# Patient Record
Sex: MALE | Race: WHITE
[De-identification: names, ages, dates, MRNs, and addresses within clinical notes are randomized per-mention and may not be internally consistent; named-entity substitution may affect disease eponyms.]

---

## 2013-06-25 VITALS
SYSTOLIC BLOOD PRESSURE: 116 MMHG | TEMPERATURE: 99.2 F | TEMPERATURE: 99.2 F | DIASTOLIC BLOOD PRESSURE: 73 MMHG | TEMPERATURE: 99.2 F | TEMPERATURE: 99.2 F | TEMPERATURE: 99.2 F | TEMPERATURE: 99.2 F | TEMPERATURE: 99.2 F | TEMPERATURE: 99.2 F | TEMPERATURE: 99.2 F | TEMPERATURE: 99.2 F | TEMPERATURE: 99.2 F | TEMPERATURE: 99.2 F | TEMPERATURE: 99.2 F | DIASTOLIC BLOOD PRESSURE: 73 MMHG | TEMPERATURE: 99.2 F | TEMPERATURE: 99.2 F | TEMPERATURE: 99.2 F | DIASTOLIC BLOOD PRESSURE: 73 MMHG | TEMPERATURE: 99.2 F | SYSTOLIC BLOOD PRESSURE: 116 MMHG | TEMPERATURE: 99.2 F | SYSTOLIC BLOOD PRESSURE: 116 MMHG | TEMPERATURE: 99.2 F

## 2017-01-21 ENCOUNTER — HOSPITAL ENCOUNTER (OUTPATIENT)
Dept: HOSPITAL 58 - AMBL | Age: 67
Discharge: HOME | End: 2017-01-21
Attending: INTERNAL MEDICINE

## 2017-01-21 ENCOUNTER — HOSPITAL ENCOUNTER (EMERGENCY)
Dept: HOSPITAL 58 - ED | Age: 67
Discharge: HOME | End: 2017-01-21
Payer: COMMERCIAL

## 2017-01-21 VITALS — TEMPERATURE: 96.9 F | DIASTOLIC BLOOD PRESSURE: 93 MMHG | SYSTOLIC BLOOD PRESSURE: 154 MMHG

## 2017-01-21 VITALS — BODY MASS INDEX: 31.4 KG/M2

## 2017-01-21 DIAGNOSIS — Z79.899: ICD-10-CM

## 2017-01-21 DIAGNOSIS — R42: Primary | ICD-10-CM

## 2017-01-21 DIAGNOSIS — M54.5: Primary | ICD-10-CM

## 2017-01-21 DIAGNOSIS — F17.210: ICD-10-CM

## 2017-01-21 DIAGNOSIS — R61: ICD-10-CM

## 2017-01-21 DIAGNOSIS — Z87.440: ICD-10-CM

## 2017-01-21 LAB
ADD URINE MICROSCOPIC: NO
ALBUMIN SERPL-MCNC: 3.6 G/DL (ref 3.4–5)
ALBUMIN/GLOB SERPL: 1.03 {RATIO}
ALP SERPL-CCNC: 102 U/L (ref 56–119)
ALT SERPL-CCNC: 14 U/L (ref 12–78)
ANION GAP SERPL CALC-SCNC: 16 MMOL/L
AST SERPL-CCNC: 22 U/L (ref 15–37)
BASOPHILS # BLD AUTO: 0.1 K/UL (ref 0–0.2)
BASOPHILS NFR BLD AUTO: 1 % (ref 0–3)
BILIRUB SERPL-MCNC: 0.68 MG/DL (ref 0–1.2)
BUN SERPL-MCNC: 9 MG/DL (ref 7–18)
BUN/CREAT SERPL: 11.25
CALCIUM SERPL-MCNC: 9.3 MG/DL (ref 8.2–10.2)
CHLORIDE SERPL-SCNC: 106 MMOL/L (ref 98–107)
CO2 BLD-SCNC: 26 MMOL/L (ref 23–31)
CREAT SERPL-MCNC: 0.8 MG/DL (ref 0.6–1.1)
EOSINOPHIL # BLD AUTO: 0.2 K/UL (ref 0–0.7)
EOSINOPHIL NFR BLD AUTO: 4.3 % (ref 0–7)
ERYTHROCYTE [SEDIMENTATION RATE] IN BLOOD: 9 MM/HR (ref 0–15)
ESR INTERNAL QC: (no result)
GFR SERPLBLD BASED ON 1.73 SQ M-ARVRAT: 96 ML/MIN
GLOBULIN SER CALC-MCNC: 3.5 G/L
GLUCOSE SERPL-MCNC: 94 MG/DL (ref 82–115)
HCT VFR BLD AUTO: 52 % (ref 42–52)
HGB BLD-MCNC: 16.9 G/DL (ref 14–18)
IMM GRANULOCYTES NFR BLD AUTO: 0.2 % (ref 0–5)
LYMPHOCYTES # SPEC AUTO: 1.9 K/UL (ref 0.6–3.4)
LYMPHOCYTES NFR BLD AUTO: 38.7 % (ref 10–50)
MCH RBC QN: 29.8 PG (ref 27–31)
MCHC RBC AUTO-ENTMCNC: 32.5 G/DL (ref 31.8–35.4)
MCV RBC: 91.5 FL (ref 80–94)
MONOCYTES # BLD AUTO: 0.7 K/UL (ref 0.4–2)
MONOCYTES NFR BLD AUTO: 14 % (ref 0–10)
NEUTROPHILS # BLD AUTO: 2 K/UL (ref 2–6.9)
NEUTROPHILS NFR BLD AUTO: 41.8 %
PH UR: 7 [PH] (ref 5–9)
PLATELET # BLD AUTO: 210 10^3/UL (ref 140–440)
POTASSIUM SERPL-SCNC: 4 MMOL/L (ref 3.5–5.1)
PROT SERPL-MCNC: 7.1 G/DL (ref 5.8–8.1)
RBC # BLD AUTO: 5.68 10^6/UL (ref 4.7–6.1)
SODIUM SERPL-SCNC: 144 MMOL/L (ref 136–145)
SP GR UR: 1.02 (ref 1–1.03)
WBC # BLD AUTO: 4.86 K/UL (ref 4.2–10.2)

## 2017-01-21 PROCEDURE — 36415 COLL VENOUS BLD VENIPUNCTURE: CPT

## 2017-01-21 PROCEDURE — 80053 COMPREHEN METABOLIC PANEL: CPT

## 2017-01-21 PROCEDURE — 96372 THER/PROPH/DIAG INJ SC/IM: CPT

## 2017-01-21 PROCEDURE — 99283 EMERGENCY DEPT VISIT LOW MDM: CPT

## 2017-01-21 PROCEDURE — 85025 COMPLETE CBC W/AUTO DIFF WBC: CPT

## 2017-01-21 PROCEDURE — 81001 URINALYSIS AUTO W/SCOPE: CPT

## 2017-01-21 PROCEDURE — 85651 RBC SED RATE NONAUTOMATED: CPT

## 2017-01-21 PROCEDURE — 99282 EMERGENCY DEPT VISIT SF MDM: CPT

## 2017-03-23 ENCOUNTER — HOSPITAL ENCOUNTER (EMERGENCY)
Dept: HOSPITAL 58 - ED | Age: 67
Discharge: HOME | End: 2017-03-23
Payer: COMMERCIAL

## 2017-03-23 VITALS — DIASTOLIC BLOOD PRESSURE: 81 MMHG | SYSTOLIC BLOOD PRESSURE: 153 MMHG | TEMPERATURE: 96.8 F

## 2017-03-23 VITALS — BODY MASS INDEX: 27.1 KG/M2

## 2017-03-23 DIAGNOSIS — G89.29: ICD-10-CM

## 2017-03-23 DIAGNOSIS — M54.9: ICD-10-CM

## 2017-03-23 DIAGNOSIS — Z79.899: ICD-10-CM

## 2017-03-23 DIAGNOSIS — J30.9: Primary | ICD-10-CM

## 2017-03-23 DIAGNOSIS — E87.6: ICD-10-CM

## 2017-03-23 PROCEDURE — 99283 EMERGENCY DEPT VISIT LOW MDM: CPT

## 2017-04-13 ENCOUNTER — HOSPITAL ENCOUNTER (EMERGENCY)
Dept: HOSPITAL 58 - ED | Age: 67
Discharge: HOME | End: 2017-04-13
Payer: COMMERCIAL

## 2017-04-13 VITALS — BODY MASS INDEX: 25.4 KG/M2

## 2017-04-13 VITALS — TEMPERATURE: 98.5 F | SYSTOLIC BLOOD PRESSURE: 104 MMHG | DIASTOLIC BLOOD PRESSURE: 70 MMHG

## 2017-04-13 DIAGNOSIS — F17.210: ICD-10-CM

## 2017-04-13 DIAGNOSIS — M54.5: Primary | ICD-10-CM

## 2017-04-13 PROCEDURE — 99282 EMERGENCY DEPT VISIT SF MDM: CPT

## 2017-06-02 ENCOUNTER — HOSPITAL ENCOUNTER (EMERGENCY)
Dept: HOSPITAL 58 - ED | Age: 67
Discharge: HOME | End: 2017-06-02

## 2017-06-02 VITALS — BODY MASS INDEX: 26.7 KG/M2

## 2017-06-02 VITALS — DIASTOLIC BLOOD PRESSURE: 90 MMHG | SYSTOLIC BLOOD PRESSURE: 168 MMHG | TEMPERATURE: 98.1 F

## 2017-06-02 DIAGNOSIS — J44.9: ICD-10-CM

## 2017-06-02 DIAGNOSIS — Z79.899: ICD-10-CM

## 2017-06-02 DIAGNOSIS — R10.13: Primary | ICD-10-CM

## 2017-06-02 DIAGNOSIS — Z87.440: ICD-10-CM

## 2017-06-02 DIAGNOSIS — I50.9: ICD-10-CM

## 2017-06-02 DIAGNOSIS — F17.210: ICD-10-CM

## 2017-06-02 LAB
ALBUMIN SERPL-MCNC: 3.7 G/DL (ref 3.4–5)
ALBUMIN/GLOB SERPL: 1.19 {RATIO}
ALP SERPL-CCNC: 77 U/L (ref 56–119)
ALT SERPL-CCNC: 19 U/L (ref 12–78)
AMYLASE SERPL-CCNC: 54 U/L (ref 25–115)
ANION GAP SERPL CALC-SCNC: 13.5 MMOL/L
AST SERPL-CCNC: 50 U/L (ref 15–37)
BASOPHILS # BLD AUTO: 0.1 K/UL (ref 0–0.2)
BASOPHILS NFR BLD AUTO: 1 % (ref 0–3)
BILIRUB SERPL-MCNC: 0.83 MG/DL (ref 0–1.2)
BUN SERPL-MCNC: 8 MG/DL (ref 7–18)
BUN/CREAT SERPL: 10.95
CALCIUM SERPL-MCNC: 9 MG/DL (ref 8.2–10.2)
CHLORIDE SERPL-SCNC: 106 MMOL/L (ref 98–107)
CK SERPL-CCNC: 62 U/L
CO2 BLD-SCNC: 26 MMOL/L (ref 23–31)
CREAT SERPL-MCNC: 0.73 MG/DL (ref 0.6–1.1)
EOSINOPHIL # BLD AUTO: 0.1 K/UL (ref 0–0.7)
EOSINOPHIL NFR BLD AUTO: 1.6 % (ref 0–7)
GFR SERPLBLD BASED ON 1.73 SQ M-ARVRAT: 107 ML/MIN
GLOBULIN SER CALC-MCNC: 3.1 G/L
GLUCOSE SERPL-MCNC: 102 MG/DL (ref 82–115)
HCT VFR BLD AUTO: 50.9 % (ref 42–52)
HGB BLD-MCNC: 17.4 G/DL (ref 14–18)
IMM GRANULOCYTES NFR BLD AUTO: 0.3 % (ref 0–5)
LIPASE SERPL-CCNC: 26 U/L (ref 8–78)
LYMPHOCYTES # SPEC AUTO: 2.1 K/UL (ref 0.6–3.4)
LYMPHOCYTES NFR BLD AUTO: 31 % (ref 10–50)
MCH RBC QN: 31.1 PG (ref 27–31)
MCHC RBC AUTO-ENTMCNC: 34.2 G/DL (ref 31.8–35.4)
MCV RBC: 90.9 FL (ref 80–94)
MONOCYTES # BLD AUTO: 0.9 K/UL (ref 0.4–2)
MONOCYTES NFR BLD AUTO: 13.1 % (ref 0–10)
NEUTROPHILS # BLD AUTO: 3.6 K/UL (ref 2–6.9)
NEUTROPHILS NFR BLD AUTO: 53 %
PLATELET # BLD AUTO: 243 10^3/UL (ref 140–440)
POTASSIUM SERPL-SCNC: 3.5 MMOL/L (ref 3.5–5.1)
PROT SERPL-MCNC: 6.8 G/DL (ref 5.8–8.1)
RBC # BLD AUTO: 5.6 10^6/UL (ref 4.7–6.1)
SODIUM SERPL-SCNC: 142 MMOL/L (ref 136–145)
WBC # BLD AUTO: 6.72 K/UL (ref 4.2–10.2)

## 2017-06-02 PROCEDURE — 36415 COLL VENOUS BLD VENIPUNCTURE: CPT

## 2017-06-02 PROCEDURE — 85025 COMPLETE CBC W/AUTO DIFF WBC: CPT

## 2017-06-02 PROCEDURE — 99283 EMERGENCY DEPT VISIT LOW MDM: CPT

## 2017-06-02 PROCEDURE — 80053 COMPREHEN METABOLIC PANEL: CPT

## 2017-06-02 PROCEDURE — 84484 ASSAY OF TROPONIN QUANT: CPT

## 2017-06-02 PROCEDURE — 83690 ASSAY OF LIPASE: CPT

## 2017-06-02 PROCEDURE — 93005 ELECTROCARDIOGRAM TRACING: CPT

## 2017-06-02 PROCEDURE — 82550 ASSAY OF CK (CPK): CPT

## 2017-06-02 PROCEDURE — 82150 ASSAY OF AMYLASE: CPT

## 2017-06-02 PROCEDURE — 93010 ELECTROCARDIOGRAM REPORT: CPT

## 2017-10-23 ENCOUNTER — HOSPITAL ENCOUNTER (OUTPATIENT)
Dept: HOSPITAL 58 - LAB | Age: 67
Discharge: HOME | End: 2017-10-23
Attending: INTERNAL MEDICINE

## 2017-10-23 VITALS — BODY MASS INDEX: 26.7 KG/M2

## 2017-10-23 DIAGNOSIS — N40.0: ICD-10-CM

## 2017-10-23 DIAGNOSIS — Z12.5: ICD-10-CM

## 2017-10-23 DIAGNOSIS — E03.9: Primary | ICD-10-CM

## 2017-10-23 LAB
ALBUMIN SERPL-MCNC: 3.5 G/DL (ref 3.4–5)
ALBUMIN/GLOB SERPL: 1 {RATIO}
ALP SERPL-CCNC: 78 U/L (ref 56–119)
ALT SERPL-CCNC: 12 U/L (ref 12–78)
ANION GAP SERPL CALC-SCNC: 15.1 MMOL/L
AST SERPL-CCNC: 22 U/L (ref 15–37)
BASOPHILS # BLD AUTO: 0.1 K/UL (ref 0–0.2)
BASOPHILS NFR BLD AUTO: 1 % (ref 0–3)
BILIRUB SERPL-MCNC: 0.89 MG/DL (ref 0–1.2)
BUN SERPL-MCNC: 5 MG/DL (ref 7–18)
BUN/CREAT SERPL: 6.66
CALCIUM SERPL-MCNC: 9.6 MG/DL (ref 8.2–10.2)
CHLORIDE SERPL-SCNC: 104 MMOL/L (ref 98–107)
CHOLEST SERPL-MCNC: 153 MG/DL (ref 0–200)
CHOLEST/HDLC SERPL: 3.5 {RATIO} (ref 4.5–6.4)
CO2 BLD-SCNC: 29 MMOL/L (ref 23–31)
CREAT SERPL-MCNC: 0.75 MG/DL (ref 0.6–1.1)
EOSINOPHIL # BLD AUTO: 0.2 K/UL (ref 0–0.7)
EOSINOPHIL NFR BLD AUTO: 2.6 % (ref 0–7)
GFR SERPLBLD BASED ON 1.73 SQ M-ARVRAT: 104 ML/MIN
GLOBULIN SER CALC-MCNC: 3.5 G/L
GLUCOSE SERPL-MCNC: 79 MG/DL (ref 82–115)
HCT VFR BLD AUTO: 53.5 % (ref 42–52)
HDLC SERPL-MCNC: 44 MG/DL (ref 35–60)
HGB BLD-MCNC: 17.8 G/DL (ref 14–18)
IMM GRANULOCYTES NFR BLD AUTO: 0.4 % (ref 0–5)
LYMPHOCYTES # SPEC AUTO: 2.1 K/UL (ref 0.6–3.4)
LYMPHOCYTES NFR BLD AUTO: 28.6 % (ref 10–50)
MCH RBC QN: 31.1 PG (ref 27–31)
MCHC RBC AUTO-ENTMCNC: 33.3 G/DL (ref 31.8–35.4)
MCV RBC: 93.4 FL (ref 80–94)
MONOCYTES # BLD AUTO: 0.8 K/UL (ref 0.4–2)
MONOCYTES NFR BLD AUTO: 11 % (ref 0–10)
NEUTROPHILS # BLD AUTO: 4.1 K/UL (ref 2–6.9)
NEUTROPHILS NFR BLD AUTO: 56.4 %
PLATELET # BLD AUTO: 261 10^3/UL (ref 140–440)
POTASSIUM SERPL-SCNC: 4.1 MMOL/L (ref 3.5–5.1)
PROT SERPL-MCNC: 7 G/DL (ref 5.8–8.1)
RBC # BLD AUTO: 5.73 10^6/UL (ref 4.7–6.1)
SODIUM SERPL-SCNC: 144 MMOL/L (ref 136–145)
TRIGL SERPL-MCNC: 71 MG/DL (ref 30–150)
VLDLC SERPL CALC-MCNC: 14 MG/DL (ref 2–30)
WBC # BLD AUTO: 7.3 K/UL (ref 4.2–10.2)

## 2017-10-23 PROCEDURE — 80061 LIPID PANEL: CPT

## 2017-10-23 PROCEDURE — 36415 COLL VENOUS BLD VENIPUNCTURE: CPT

## 2017-10-23 PROCEDURE — 80053 COMPREHEN METABOLIC PANEL: CPT

## 2017-10-23 PROCEDURE — 84443 ASSAY THYROID STIM HORMONE: CPT

## 2017-10-23 PROCEDURE — 85025 COMPLETE CBC W/AUTO DIFF WBC: CPT

## 2017-12-24 ENCOUNTER — HOSPITAL ENCOUNTER (EMERGENCY)
Dept: HOSPITAL 58 - ED | Age: 67
Discharge: HOME | End: 2017-12-24

## 2017-12-24 VITALS — BODY MASS INDEX: 26.9 KG/M2

## 2017-12-24 VITALS — DIASTOLIC BLOOD PRESSURE: 76 MMHG | SYSTOLIC BLOOD PRESSURE: 123 MMHG | TEMPERATURE: 99.3 F

## 2017-12-24 DIAGNOSIS — J20.9: Primary | ICD-10-CM

## 2017-12-24 DIAGNOSIS — F17.210: ICD-10-CM

## 2017-12-24 PROCEDURE — 99282 EMERGENCY DEPT VISIT SF MDM: CPT

## 2018-01-05 ENCOUNTER — HOSPITAL ENCOUNTER (OUTPATIENT)
Dept: HOSPITAL 58 - LAB | Age: 68
Discharge: HOME | End: 2018-01-05
Attending: INTERNAL MEDICINE

## 2018-01-05 VITALS — BODY MASS INDEX: 26.9 KG/M2

## 2018-01-05 DIAGNOSIS — M54.9: Primary | ICD-10-CM

## 2018-01-05 DIAGNOSIS — G89.29: ICD-10-CM

## 2018-01-05 DIAGNOSIS — Z12.5: ICD-10-CM

## 2018-01-05 DIAGNOSIS — N40.0: ICD-10-CM

## 2018-01-05 DIAGNOSIS — E03.9: ICD-10-CM

## 2018-01-05 DIAGNOSIS — M51.36: ICD-10-CM

## 2018-01-05 PROCEDURE — 36415 COLL VENOUS BLD VENIPUNCTURE: CPT

## 2018-01-05 PROCEDURE — 80061 LIPID PANEL: CPT

## 2018-01-05 PROCEDURE — 80053 COMPREHEN METABOLIC PANEL: CPT

## 2018-01-05 PROCEDURE — 85025 COMPLETE CBC W/AUTO DIFF WBC: CPT

## 2018-01-05 PROCEDURE — 84443 ASSAY THYROID STIM HORMONE: CPT

## 2018-04-25 ENCOUNTER — TRANSCRIBE ORDERS (OUTPATIENT)
Dept: ADMINISTRATIVE | Facility: HOSPITAL | Age: 68
End: 2018-04-25

## 2018-04-25 DIAGNOSIS — M51.17 INTERVERTEBRAL DISC DISORDER WITH RADICULOPATHY OF LUMBOSACRAL REGION: Primary | ICD-10-CM

## 2018-04-27 ENCOUNTER — HOSPITAL ENCOUNTER (OUTPATIENT)
Dept: MRI IMAGING | Facility: HOSPITAL | Age: 68
Discharge: HOME | End: 2018-04-27

## 2018-04-27 DIAGNOSIS — M51.17 INTERVERTEBRAL DISC DISORDER WITH RADICULOPATHY OF LUMBOSACRAL REGION: ICD-10-CM

## 2018-06-11 ENCOUNTER — HOSPITAL ENCOUNTER (OUTPATIENT)
Dept: HOSPITAL 58 - RAD | Age: 68
Discharge: HOME | End: 2018-06-11
Attending: INTERNAL MEDICINE
Payer: COMMERCIAL

## 2018-06-11 VITALS — BODY MASS INDEX: 26.9 KG/M2

## 2018-06-11 DIAGNOSIS — M25.552: Primary | ICD-10-CM

## 2018-06-12 ENCOUNTER — HOSPITAL ENCOUNTER (OUTPATIENT)
Dept: HOSPITAL 58 - RHC-LAB | Age: 68
Discharge: HOME | End: 2018-06-12
Attending: INTERNAL MEDICINE
Payer: COMMERCIAL

## 2018-06-12 VITALS — BODY MASS INDEX: 26.9 KG/M2

## 2018-06-12 DIAGNOSIS — M54.9: ICD-10-CM

## 2018-06-12 DIAGNOSIS — E03.9: Primary | ICD-10-CM

## 2018-06-12 DIAGNOSIS — G89.29: ICD-10-CM

## 2018-06-12 PROCEDURE — 80061 LIPID PANEL: CPT

## 2018-06-12 PROCEDURE — 36415 COLL VENOUS BLD VENIPUNCTURE: CPT

## 2018-06-12 PROCEDURE — 85025 COMPLETE CBC W/AUTO DIFF WBC: CPT

## 2018-06-12 PROCEDURE — 84443 ASSAY THYROID STIM HORMONE: CPT

## 2018-06-12 PROCEDURE — 80053 COMPREHEN METABOLIC PANEL: CPT

## 2018-06-26 ENCOUNTER — HOSPITAL ENCOUNTER (OUTPATIENT)
Dept: HOSPITAL 58 - RHC-LAB | Age: 68
Discharge: HOME | End: 2018-06-26
Attending: INTERNAL MEDICINE
Payer: COMMERCIAL

## 2018-06-26 VITALS — BODY MASS INDEX: 26.9 KG/M2

## 2018-06-26 DIAGNOSIS — E03.9: Primary | ICD-10-CM

## 2018-06-26 PROCEDURE — 36415 COLL VENOUS BLD VENIPUNCTURE: CPT

## 2018-06-26 PROCEDURE — 84443 ASSAY THYROID STIM HORMONE: CPT

## 2018-08-24 ENCOUNTER — HOSPITAL ENCOUNTER (EMERGENCY)
Dept: HOSPITAL 58 - ED | Age: 68
Discharge: LEFT BEFORE DISCHARGE | End: 2018-08-24
Payer: COMMERCIAL

## 2018-08-24 VITALS — SYSTOLIC BLOOD PRESSURE: 138 MMHG | DIASTOLIC BLOOD PRESSURE: 74 MMHG | TEMPERATURE: 97.6 F

## 2018-08-24 VITALS — BODY MASS INDEX: 27.1 KG/M2

## 2018-08-24 DIAGNOSIS — Z87.440: ICD-10-CM

## 2018-08-24 DIAGNOSIS — Z91.14: ICD-10-CM

## 2018-08-24 DIAGNOSIS — E03.9: ICD-10-CM

## 2018-08-24 DIAGNOSIS — F17.210: ICD-10-CM

## 2018-08-24 DIAGNOSIS — R06.02: ICD-10-CM

## 2018-08-24 DIAGNOSIS — R10.9: Primary | ICD-10-CM

## 2018-08-24 DIAGNOSIS — R91.8: ICD-10-CM

## 2018-08-24 PROCEDURE — 93010 ELECTROCARDIOGRAM REPORT: CPT

## 2018-08-24 PROCEDURE — 83880 ASSAY OF NATRIURETIC PEPTIDE: CPT

## 2018-08-24 PROCEDURE — 99283 EMERGENCY DEPT VISIT LOW MDM: CPT

## 2018-08-24 PROCEDURE — 84443 ASSAY THYROID STIM HORMONE: CPT

## 2018-08-24 PROCEDURE — 83690 ASSAY OF LIPASE: CPT

## 2018-08-24 PROCEDURE — 85025 COMPLETE CBC W/AUTO DIFF WBC: CPT

## 2018-08-24 PROCEDURE — 82150 ASSAY OF AMYLASE: CPT

## 2018-08-24 PROCEDURE — 80053 COMPREHEN METABOLIC PANEL: CPT

## 2018-08-24 PROCEDURE — 93005 ELECTROCARDIOGRAM TRACING: CPT

## 2018-08-24 PROCEDURE — 36415 COLL VENOUS BLD VENIPUNCTURE: CPT

## 2018-08-24 PROCEDURE — 81001 URINALYSIS AUTO W/SCOPE: CPT

## 2018-10-30 ENCOUNTER — HOSPITAL ENCOUNTER (EMERGENCY)
Dept: HOSPITAL 58 - ED | Age: 68
Discharge: HOME | End: 2018-10-30

## 2018-10-30 VITALS — TEMPERATURE: 97.5 F | SYSTOLIC BLOOD PRESSURE: 144 MMHG | DIASTOLIC BLOOD PRESSURE: 79 MMHG

## 2018-10-30 VITALS — BODY MASS INDEX: 25.5 KG/M2

## 2018-10-30 DIAGNOSIS — R94.6: ICD-10-CM

## 2018-10-30 DIAGNOSIS — J18.9: Primary | ICD-10-CM

## 2018-10-30 DIAGNOSIS — J44.9: ICD-10-CM

## 2018-10-30 DIAGNOSIS — F17.210: ICD-10-CM

## 2018-10-30 PROCEDURE — 93010 ELECTROCARDIOGRAM REPORT: CPT

## 2018-10-30 PROCEDURE — 94640 AIRWAY INHALATION TREATMENT: CPT

## 2018-10-30 PROCEDURE — 80306 DRUG TEST PRSMV INSTRMNT: CPT

## 2018-10-30 PROCEDURE — 82803 BLOOD GASES ANY COMBINATION: CPT

## 2018-10-30 PROCEDURE — 36415 COLL VENOUS BLD VENIPUNCTURE: CPT

## 2018-10-30 PROCEDURE — 84443 ASSAY THYROID STIM HORMONE: CPT

## 2018-10-30 PROCEDURE — 93005 ELECTROCARDIOGRAM TRACING: CPT

## 2018-10-30 PROCEDURE — 87086 URINE CULTURE/COLONY COUNT: CPT

## 2018-10-30 PROCEDURE — 83605 ASSAY OF LACTIC ACID: CPT

## 2018-10-30 PROCEDURE — 80053 COMPREHEN METABOLIC PANEL: CPT

## 2018-10-30 PROCEDURE — 84145 PROCALCITONIN (PCT): CPT

## 2018-10-30 PROCEDURE — 87040 BLOOD CULTURE FOR BACTERIA: CPT

## 2018-10-30 PROCEDURE — 87502 INFLUENZA DNA AMP PROBE: CPT

## 2018-10-30 PROCEDURE — 99283 EMERGENCY DEPT VISIT LOW MDM: CPT

## 2018-10-30 PROCEDURE — 82375 ASSAY CARBOXYHB QUANT: CPT

## 2018-10-30 PROCEDURE — 85025 COMPLETE CBC W/AUTO DIFF WBC: CPT

## 2018-10-30 PROCEDURE — 81001 URINALYSIS AUTO W/SCOPE: CPT

## 2018-10-31 ENCOUNTER — HOSPITAL ENCOUNTER (OUTPATIENT)
Dept: HOSPITAL 58 - RHC-LAB | Age: 68
Discharge: HOME | End: 2018-10-31
Attending: NURSE PRACTITIONER

## 2018-10-31 VITALS — BODY MASS INDEX: 25.5 KG/M2

## 2018-10-31 DIAGNOSIS — Z13.9: ICD-10-CM

## 2018-10-31 DIAGNOSIS — E03.9: Primary | ICD-10-CM

## 2018-10-31 PROCEDURE — 84443 ASSAY THYROID STIM HORMONE: CPT

## 2018-10-31 PROCEDURE — 80061 LIPID PANEL: CPT

## 2018-10-31 PROCEDURE — 36415 COLL VENOUS BLD VENIPUNCTURE: CPT

## 2018-11-19 ENCOUNTER — HOSPITAL ENCOUNTER (EMERGENCY)
Dept: HOSPITAL 58 - ED | Age: 68
Discharge: HOME | End: 2018-11-19
Payer: COMMERCIAL

## 2018-11-19 VITALS — SYSTOLIC BLOOD PRESSURE: 110 MMHG | DIASTOLIC BLOOD PRESSURE: 47 MMHG | TEMPERATURE: 100.3 F

## 2018-11-19 VITALS — BODY MASS INDEX: 25.5 KG/M2

## 2018-11-19 DIAGNOSIS — F17.210: ICD-10-CM

## 2018-11-19 DIAGNOSIS — K29.80: ICD-10-CM

## 2018-11-19 DIAGNOSIS — G89.29: ICD-10-CM

## 2018-11-19 DIAGNOSIS — R07.9: ICD-10-CM

## 2018-11-19 DIAGNOSIS — R50.9: ICD-10-CM

## 2018-11-19 DIAGNOSIS — M54.9: ICD-10-CM

## 2018-11-19 DIAGNOSIS — R11.0: ICD-10-CM

## 2018-11-19 DIAGNOSIS — J44.9: ICD-10-CM

## 2018-11-19 DIAGNOSIS — R10.84: ICD-10-CM

## 2018-11-19 DIAGNOSIS — K29.70: Primary | ICD-10-CM

## 2018-11-19 PROCEDURE — 83690 ASSAY OF LIPASE: CPT

## 2018-11-19 PROCEDURE — 96374 THER/PROPH/DIAG INJ IV PUSH: CPT

## 2018-11-19 PROCEDURE — 96375 TX/PRO/DX INJ NEW DRUG ADDON: CPT

## 2018-11-19 PROCEDURE — 84145 PROCALCITONIN (PCT): CPT

## 2018-11-19 PROCEDURE — 93010 ELECTROCARDIOGRAM REPORT: CPT

## 2018-11-19 PROCEDURE — 80053 COMPREHEN METABOLIC PANEL: CPT

## 2018-11-19 PROCEDURE — 83605 ASSAY OF LACTIC ACID: CPT

## 2018-11-19 PROCEDURE — 74176 CT ABD & PELVIS W/O CONTRAST: CPT

## 2018-11-19 PROCEDURE — 82550 ASSAY OF CK (CPK): CPT

## 2018-11-19 PROCEDURE — 87186 SC STD MICRODIL/AGAR DIL: CPT

## 2018-11-19 PROCEDURE — 82150 ASSAY OF AMYLASE: CPT

## 2018-11-19 PROCEDURE — 99283 EMERGENCY DEPT VISIT LOW MDM: CPT

## 2018-11-19 PROCEDURE — 85025 COMPLETE CBC W/AUTO DIFF WBC: CPT

## 2018-11-19 PROCEDURE — 96361 HYDRATE IV INFUSION ADD-ON: CPT

## 2018-11-19 PROCEDURE — 93005 ELECTROCARDIOGRAM TRACING: CPT

## 2018-11-19 PROCEDURE — 81001 URINALYSIS AUTO W/SCOPE: CPT

## 2018-11-19 PROCEDURE — 87070 CULTURE OTHR SPECIMN AEROBIC: CPT

## 2018-11-19 PROCEDURE — 87040 BLOOD CULTURE FOR BACTERIA: CPT

## 2018-11-19 PROCEDURE — 36415 COLL VENOUS BLD VENIPUNCTURE: CPT

## 2018-11-19 PROCEDURE — 84484 ASSAY OF TROPONIN QUANT: CPT

## 2018-11-19 RX ADMIN — Medication PRN SYR: at 03:22

## 2018-11-19 RX ADMIN — Medication PRN SYR: at 03:12

## 2019-01-17 ENCOUNTER — HOSPITAL ENCOUNTER (OUTPATIENT)
Dept: HOSPITAL 58 - RHC-LAB | Age: 69
Discharge: HOME | End: 2019-01-17
Attending: NURSE PRACTITIONER

## 2019-01-17 VITALS — BODY MASS INDEX: 25.5 KG/M2

## 2019-01-17 DIAGNOSIS — E03.9: Primary | ICD-10-CM

## 2019-01-17 DIAGNOSIS — R74.8: ICD-10-CM

## 2019-01-17 PROCEDURE — 84443 ASSAY THYROID STIM HORMONE: CPT

## 2019-01-17 PROCEDURE — 80053 COMPREHEN METABOLIC PANEL: CPT

## 2019-01-17 PROCEDURE — 36415 COLL VENOUS BLD VENIPUNCTURE: CPT

## 2019-01-26 ENCOUNTER — HOSPITAL ENCOUNTER (EMERGENCY)
Dept: HOSPITAL 58 - ED | Age: 69
Discharge: HOME | End: 2019-01-26
Payer: COMMERCIAL

## 2019-01-26 VITALS — SYSTOLIC BLOOD PRESSURE: 155 MMHG | DIASTOLIC BLOOD PRESSURE: 86 MMHG

## 2019-01-26 VITALS — BODY MASS INDEX: 27.8 KG/M2

## 2019-01-26 VITALS — TEMPERATURE: 97.8 F

## 2019-01-26 DIAGNOSIS — K80.50: ICD-10-CM

## 2019-01-26 DIAGNOSIS — Z79.899: ICD-10-CM

## 2019-01-26 DIAGNOSIS — K29.00: Primary | ICD-10-CM

## 2019-01-26 DIAGNOSIS — F17.210: ICD-10-CM

## 2019-01-26 DIAGNOSIS — K80.20: ICD-10-CM

## 2019-01-26 PROCEDURE — 82150 ASSAY OF AMYLASE: CPT

## 2019-01-26 PROCEDURE — 83690 ASSAY OF LIPASE: CPT

## 2019-01-26 PROCEDURE — 36415 COLL VENOUS BLD VENIPUNCTURE: CPT

## 2019-01-26 PROCEDURE — 93010 ELECTROCARDIOGRAM REPORT: CPT

## 2019-01-26 PROCEDURE — 99283 EMERGENCY DEPT VISIT LOW MDM: CPT

## 2019-01-26 PROCEDURE — 87086 URINE CULTURE/COLONY COUNT: CPT

## 2019-01-26 PROCEDURE — 80053 COMPREHEN METABOLIC PANEL: CPT

## 2019-01-26 PROCEDURE — 93005 ELECTROCARDIOGRAM TRACING: CPT

## 2019-01-26 PROCEDURE — 81001 URINALYSIS AUTO W/SCOPE: CPT

## 2019-01-26 PROCEDURE — 80306 DRUG TEST PRSMV INSTRMNT: CPT

## 2019-01-26 PROCEDURE — 85025 COMPLETE CBC W/AUTO DIFF WBC: CPT

## 2019-01-26 PROCEDURE — 84484 ASSAY OF TROPONIN QUANT: CPT

## 2019-01-28 ENCOUNTER — HOSPITAL ENCOUNTER (OUTPATIENT)
Dept: HOSPITAL 58 - RAD | Age: 69
Discharge: HOME | End: 2019-01-28
Attending: NURSE PRACTITIONER

## 2019-01-28 VITALS — BODY MASS INDEX: 27.8 KG/M2

## 2019-01-28 DIAGNOSIS — K80.50: ICD-10-CM

## 2019-01-28 DIAGNOSIS — K80.20: Primary | ICD-10-CM

## 2019-01-29 ENCOUNTER — ANESTHESIA (OUTPATIENT)
Dept: ENDOSCOPY | Age: 69
DRG: 872 | End: 2019-01-29
Payer: MEDICARE

## 2019-01-29 ENCOUNTER — HOSPITAL ENCOUNTER (EMERGENCY)
Dept: HOSPITAL 58 - ED | Age: 69
Discharge: ANOTHER HOSPITAL | End: 2019-01-29
Payer: COMMERCIAL

## 2019-01-29 ENCOUNTER — APPOINTMENT (OUTPATIENT)
Dept: GENERAL RADIOLOGY | Age: 69
DRG: 872 | End: 2019-01-29
Attending: INTERNAL MEDICINE
Payer: MEDICARE

## 2019-01-29 ENCOUNTER — ANESTHESIA EVENT (OUTPATIENT)
Dept: ENDOSCOPY | Age: 69
DRG: 872 | End: 2019-01-29
Payer: MEDICARE

## 2019-01-29 ENCOUNTER — HOSPITAL ENCOUNTER (INPATIENT)
Age: 69
LOS: 7 days | Discharge: HOME | DRG: 872 | End: 2019-02-05
Attending: INTERNAL MEDICINE | Admitting: INTERNAL MEDICINE
Payer: MEDICARE

## 2019-01-29 VITALS
DIASTOLIC BLOOD PRESSURE: 91 MMHG | SYSTOLIC BLOOD PRESSURE: 103 MMHG | OXYGEN SATURATION: 98 % | RESPIRATION RATE: 19 BRPM

## 2019-01-29 VITALS — BODY MASS INDEX: 28.1 KG/M2

## 2019-01-29 VITALS — TEMPERATURE: 99 F | SYSTOLIC BLOOD PRESSURE: 139 MMHG | DIASTOLIC BLOOD PRESSURE: 66 MMHG

## 2019-01-29 DIAGNOSIS — R10.9: ICD-10-CM

## 2019-01-29 DIAGNOSIS — K80.50: Primary | ICD-10-CM

## 2019-01-29 DIAGNOSIS — F17.210: ICD-10-CM

## 2019-01-29 DIAGNOSIS — Z87.440: ICD-10-CM

## 2019-01-29 PROBLEM — K80.31 CHOLANGITIS DUE TO BILE DUCT CALCULUS WITH OBSTRUCTION: Status: ACTIVE | Noted: 2019-01-29

## 2019-01-29 PROBLEM — E03.9 HYPOTHYROIDISM: Status: ACTIVE | Noted: 2019-01-29

## 2019-01-29 PROBLEM — G89.29 CHRONIC BACK PAIN: Status: ACTIVE | Noted: 2019-01-29

## 2019-01-29 PROBLEM — R33.9 URINARY RETENTION: Status: ACTIVE | Noted: 2019-01-29

## 2019-01-29 PROBLEM — M54.9 CHRONIC BACK PAIN: Status: ACTIVE | Noted: 2019-01-29

## 2019-01-29 PROBLEM — J44.9 COPD (CHRONIC OBSTRUCTIVE PULMONARY DISEASE) (HCC): Status: ACTIVE | Noted: 2019-01-29

## 2019-01-29 PROBLEM — D72.829 LEUKOCYTOSIS: Status: ACTIVE | Noted: 2019-01-29

## 2019-01-29 LAB
ALBUMIN SERPL-MCNC: 2.6 G/DL (ref 3.5–5.2)
ALBUMIN SERPL-MCNC: 2.9 G/DL (ref 3.5–5.2)
ALBUMIN SERPL-MCNC: 3.5 G/DL (ref 3.5–5.2)
ALP BLD-CCNC: 164 U/L (ref 40–130)
ALP BLD-CCNC: 199 U/L (ref 40–130)
ALP BLD-CCNC: 230 U/L (ref 40–130)
ALT SERPL-CCNC: 70 U/L (ref 5–41)
ALT SERPL-CCNC: 77 U/L (ref 5–41)
ALT SERPL-CCNC: 87 U/L (ref 5–41)
AMMONIA: 44 UMOL/L (ref 16–60)
ANION GAP SERPL CALCULATED.3IONS-SCNC: 11 MMOL/L (ref 7–19)
ANION GAP SERPL CALCULATED.3IONS-SCNC: 14 MMOL/L (ref 7–19)
ANION GAP SERPL CALCULATED.3IONS-SCNC: 15 MMOL/L (ref 7–19)
APTT: 28.6 SEC (ref 26–36.2)
AST SERPL-CCNC: 73 U/L (ref 5–40)
AST SERPL-CCNC: 75 U/L (ref 5–40)
AST SERPL-CCNC: 85 U/L (ref 5–40)
BASOPHILS ABSOLUTE: 0 K/UL (ref 0–0.2)
BASOPHILS ABSOLUTE: 0 K/UL (ref 0–0.2)
BASOPHILS RELATIVE PERCENT: 0.2 % (ref 0–1)
BASOPHILS RELATIVE PERCENT: 0.2 % (ref 0–1)
BILIRUB SERPL-MCNC: 4.2 MG/DL (ref 0.2–1.2)
BILIRUB SERPL-MCNC: 5.7 MG/DL (ref 0.2–1.2)
BILIRUB SERPL-MCNC: 6.2 MG/DL (ref 0.2–1.2)
BUN BLDV-MCNC: 6 MG/DL (ref 8–23)
BUN BLDV-MCNC: 8 MG/DL (ref 8–23)
BUN BLDV-MCNC: 9 MG/DL (ref 8–23)
CALCIUM SERPL-MCNC: 7.8 MG/DL (ref 8.8–10.2)
CALCIUM SERPL-MCNC: 8 MG/DL (ref 8.8–10.2)
CALCIUM SERPL-MCNC: 8.6 MG/DL (ref 8.8–10.2)
CHLORIDE BLD-SCNC: 102 MMOL/L (ref 98–111)
CHLORIDE BLD-SCNC: 104 MMOL/L (ref 98–111)
CHLORIDE BLD-SCNC: 105 MMOL/L (ref 98–111)
CO2: 21 MMOL/L (ref 22–29)
CO2: 23 MMOL/L (ref 22–29)
CO2: 26 MMOL/L (ref 22–29)
CREAT SERPL-MCNC: 0.5 MG/DL (ref 0.5–1.2)
CREAT SERPL-MCNC: 0.5 MG/DL (ref 0.5–1.2)
CREAT SERPL-MCNC: <0.5 MG/DL (ref 0.5–1.2)
EKG P AXIS: 66 DEGREES
EKG P-R INTERVAL: 160 MS
EKG Q-T INTERVAL: 378 MS
EKG QRS DURATION: 126 MS
EKG QTC CALCULATION (BAZETT): 418 MS
EKG T AXIS: 46 DEGREES
EOSINOPHILS ABSOLUTE: 0 K/UL (ref 0–0.6)
EOSINOPHILS ABSOLUTE: 0 K/UL (ref 0–0.6)
EOSINOPHILS RELATIVE PERCENT: 0 % (ref 0–5)
EOSINOPHILS RELATIVE PERCENT: 0 % (ref 0–5)
GFR NON-AFRICAN AMERICAN: >60
GLUCOSE BLD-MCNC: 100 MG/DL (ref 70–99)
GLUCOSE BLD-MCNC: 100 MG/DL (ref 74–109)
GLUCOSE BLD-MCNC: 129 MG/DL (ref 74–109)
GLUCOSE BLD-MCNC: 153 MG/DL (ref 74–109)
HCT VFR BLD CALC: 45.2 % (ref 42–52)
HCT VFR BLD CALC: 47.8 % (ref 42–52)
HEMOGLOBIN: 14.8 G/DL (ref 14–18)
HEMOGLOBIN: 15.8 G/DL (ref 14–18)
INR BLD: 1.21 (ref 0.88–1.18)
LACTIC ACID: 1.3 MMOL/L (ref 0.5–1.9)
LACTIC ACID: 2.2 MMOL/L (ref 0.5–1.9)
LIPASE: 25 U/L (ref 13–60)
LYMPHOCYTES ABSOLUTE: 0.3 K/UL (ref 1.1–4.5)
LYMPHOCYTES ABSOLUTE: 0.4 K/UL (ref 1.1–4.5)
LYMPHOCYTES RELATIVE PERCENT: 1.7 % (ref 20–40)
LYMPHOCYTES RELATIVE PERCENT: 3 % (ref 20–40)
MCH RBC QN AUTO: 30 PG (ref 27–31)
MCH RBC QN AUTO: 30.1 PG (ref 27–31)
MCHC RBC AUTO-ENTMCNC: 32.7 G/DL (ref 33–37)
MCHC RBC AUTO-ENTMCNC: 33.1 G/DL (ref 33–37)
MCV RBC AUTO: 90.7 FL (ref 80–94)
MCV RBC AUTO: 91.9 FL (ref 80–94)
MONOCYTES ABSOLUTE: 0.8 K/UL (ref 0–0.9)
MONOCYTES ABSOLUTE: 1.4 K/UL (ref 0–0.9)
MONOCYTES RELATIVE PERCENT: 10.7 % (ref 0–10)
MONOCYTES RELATIVE PERCENT: 4.7 % (ref 0–10)
NEUTROPHILS ABSOLUTE: 11.4 K/UL (ref 1.5–7.5)
NEUTROPHILS ABSOLUTE: 15.5 K/UL (ref 1.5–7.5)
NEUTROPHILS RELATIVE PERCENT: 85.7 % (ref 50–65)
NEUTROPHILS RELATIVE PERCENT: 92.5 % (ref 50–65)
PDW BLD-RTO: 13.5 % (ref 11.5–14.5)
PDW BLD-RTO: 13.7 % (ref 11.5–14.5)
PERFORMED ON: ABNORMAL
PHOSPHORUS: 2.3 MG/DL (ref 2.5–4.5)
PLATELET # BLD: 160 K/UL (ref 130–400)
PLATELET # BLD: 221 K/UL (ref 130–400)
PMV BLD AUTO: 9.4 FL (ref 9.4–12.4)
PMV BLD AUTO: 9.7 FL (ref 9.4–12.4)
POTASSIUM SERPL-SCNC: 2.8 MMOL/L (ref 3.5–5)
POTASSIUM SERPL-SCNC: 3.4 MMOL/L (ref 3.5–5)
POTASSIUM SERPL-SCNC: 3.5 MMOL/L (ref 3.5–5)
PROTHROMBIN TIME: 14.7 SEC (ref 12–14.6)
RBC # BLD: 4.92 M/UL (ref 4.7–6.1)
RBC # BLD: 5.27 M/UL (ref 4.7–6.1)
SODIUM BLD-SCNC: 139 MMOL/L (ref 136–145)
SODIUM BLD-SCNC: 141 MMOL/L (ref 136–145)
SODIUM BLD-SCNC: 141 MMOL/L (ref 136–145)
TOTAL PROTEIN: 5.1 G/DL (ref 6.6–8.7)
TOTAL PROTEIN: 5.6 G/DL (ref 6.6–8.7)
TOTAL PROTEIN: 6.4 G/DL (ref 6.6–8.7)
WBC # BLD: 13.3 K/UL (ref 4.8–10.8)
WBC # BLD: 16.8 K/UL (ref 4.8–10.8)

## 2019-01-29 PROCEDURE — 81001 URINALYSIS AUTO W/SCOPE: CPT

## 2019-01-29 PROCEDURE — 93005 ELECTROCARDIOGRAM TRACING: CPT

## 2019-01-29 PROCEDURE — 6360000002 HC RX W HCPCS: Performed by: FAMILY MEDICINE

## 2019-01-29 PROCEDURE — 6370000000 HC RX 637 (ALT 250 FOR IP): Performed by: INTERNAL MEDICINE

## 2019-01-29 PROCEDURE — 0FC98ZZ EXTIRPATION OF MATTER FROM COMMON BILE DUCT, VIA NATURAL OR ARTIFICIAL OPENING ENDOSCOPIC: ICD-10-PCS | Performed by: INTERNAL MEDICINE

## 2019-01-29 PROCEDURE — 83605 ASSAY OF LACTIC ACID: CPT

## 2019-01-29 PROCEDURE — 7100000001 HC PACU RECOVERY - ADDTL 15 MIN: Performed by: INTERNAL MEDICINE

## 2019-01-29 PROCEDURE — 84100 ASSAY OF PHOSPHORUS: CPT

## 2019-01-29 PROCEDURE — 2580000003 HC RX 258: Performed by: NURSE ANESTHETIST, CERTIFIED REGISTERED

## 2019-01-29 PROCEDURE — 87186 SC STD MICRODIL/AGAR DIL: CPT

## 2019-01-29 PROCEDURE — 3700000001 HC ADD 15 MINUTES (ANESTHESIA): Performed by: INTERNAL MEDICINE

## 2019-01-29 PROCEDURE — C1726 CATH, BAL DIL, NON-VASCULAR: HCPCS | Performed by: INTERNAL MEDICINE

## 2019-01-29 PROCEDURE — 2500000003 HC RX 250 WO HCPCS: Performed by: PHYSICIAN ASSISTANT

## 2019-01-29 PROCEDURE — 85025 COMPLETE CBC W/AUTO DIFF WBC: CPT

## 2019-01-29 PROCEDURE — 2580000003 HC RX 258: Performed by: INTERNAL MEDICINE

## 2019-01-29 PROCEDURE — 3700000000 HC ANESTHESIA ATTENDED CARE: Performed by: INTERNAL MEDICINE

## 2019-01-29 PROCEDURE — 87077 CULTURE AEROBIC IDENTIFY: CPT

## 2019-01-29 PROCEDURE — 82948 REAGENT STRIP/BLOOD GLUCOSE: CPT

## 2019-01-29 PROCEDURE — 74328 X-RAY BILE DUCT ENDOSCOPY: CPT

## 2019-01-29 PROCEDURE — 94664 DEMO&/EVAL PT USE INHALER: CPT

## 2019-01-29 PROCEDURE — 80053 COMPREHEN METABOLIC PANEL: CPT

## 2019-01-29 PROCEDURE — 99222 1ST HOSP IP/OBS MODERATE 55: CPT | Performed by: PHYSICIAN ASSISTANT

## 2019-01-29 PROCEDURE — 96375 TX/PRO/DX INJ NEW DRUG ADDON: CPT

## 2019-01-29 PROCEDURE — C1769 GUIDE WIRE: HCPCS | Performed by: INTERNAL MEDICINE

## 2019-01-29 PROCEDURE — 3209999900 FLUORO FOR SURGICAL PROCEDURES

## 2019-01-29 PROCEDURE — 99221 1ST HOSP IP/OBS SF/LOW 40: CPT | Performed by: INTERNAL MEDICINE

## 2019-01-29 PROCEDURE — 6360000002 HC RX W HCPCS: Performed by: PHYSICIAN ASSISTANT

## 2019-01-29 PROCEDURE — 2720000010 HC SURG SUPPLY STERILE: Performed by: INTERNAL MEDICINE

## 2019-01-29 PROCEDURE — 2580000003 HC RX 258: Performed by: PHYSICIAN ASSISTANT

## 2019-01-29 PROCEDURE — 83690 ASSAY OF LIPASE: CPT

## 2019-01-29 PROCEDURE — 0F798DZ DILATION OF COMMON BILE DUCT WITH INTRALUMINAL DEVICE, VIA NATURAL OR ARTIFICIAL OPENING ENDOSCOPIC: ICD-10-PCS | Performed by: INTERNAL MEDICINE

## 2019-01-29 PROCEDURE — 36415 COLL VENOUS BLD VENIPUNCTURE: CPT

## 2019-01-29 PROCEDURE — 99285 EMERGENCY DEPT VISIT HI MDM: CPT

## 2019-01-29 PROCEDURE — 85730 THROMBOPLASTIN TIME PARTIAL: CPT

## 2019-01-29 PROCEDURE — 82150 ASSAY OF AMYLASE: CPT

## 2019-01-29 PROCEDURE — 2000000000 HC ICU R&B

## 2019-01-29 PROCEDURE — 7100000000 HC PACU RECOVERY - FIRST 15 MIN: Performed by: INTERNAL MEDICINE

## 2019-01-29 PROCEDURE — 2709999900 HC NON-CHARGEABLE SUPPLY: Performed by: INTERNAL MEDICINE

## 2019-01-29 PROCEDURE — 82140 ASSAY OF AMMONIA: CPT

## 2019-01-29 PROCEDURE — C1874 STENT, COATED/COV W/DEL SYS: HCPCS | Performed by: INTERNAL MEDICINE

## 2019-01-29 PROCEDURE — 3609015200 HC ERCP REMOVE CALCULI/DEBRIS BILIARY/PANCREAS DUCT: Performed by: INTERNAL MEDICINE

## 2019-01-29 PROCEDURE — 85610 PROTHROMBIN TIME: CPT

## 2019-01-29 PROCEDURE — 6360000002 HC RX W HCPCS: Performed by: NURSE ANESTHETIST, CERTIFIED REGISTERED

## 2019-01-29 PROCEDURE — 93010 ELECTROCARDIOGRAM REPORT: CPT

## 2019-01-29 PROCEDURE — BF130ZZ FLUOROSCOPY OF GALLBLADDER AND BILE DUCTS USING HIGH OSMOLAR CONTRAST: ICD-10-PCS | Performed by: INTERNAL MEDICINE

## 2019-01-29 PROCEDURE — 2500000003 HC RX 250 WO HCPCS: Performed by: NURSE ANESTHETIST, CERTIFIED REGISTERED

## 2019-01-29 PROCEDURE — 96360 HYDRATION IV INFUSION INIT: CPT

## 2019-01-29 PROCEDURE — 87040 BLOOD CULTURE FOR BACTERIA: CPT

## 2019-01-29 DEVICE — STENT SYSTEM RMV
Type: IMPLANTABLE DEVICE | Site: BILE DUCT | Status: FUNCTIONAL
Brand: WALLFLEX BILIARY

## 2019-01-29 RX ORDER — PROMETHAZINE HYDROCHLORIDE 25 MG/ML
6.25 INJECTION, SOLUTION INTRAMUSCULAR; INTRAVENOUS
Status: DISCONTINUED | OUTPATIENT
Start: 2019-01-29 | End: 2019-01-29

## 2019-01-29 RX ORDER — POTASSIUM CHLORIDE 1.5 G/1.77G
20 POWDER, FOR SOLUTION ORAL EVERY OTHER DAY
Status: ON HOLD | COMMUNITY
End: 2019-02-26 | Stop reason: ALTCHOICE

## 2019-01-29 RX ORDER — CIPROFLOXACIN 2 MG/ML
400 INJECTION, SOLUTION INTRAVENOUS EVERY 12 HOURS
Status: DISCONTINUED | OUTPATIENT
Start: 2019-01-29 | End: 2019-01-30

## 2019-01-29 RX ORDER — METOCLOPRAMIDE HYDROCHLORIDE 5 MG/ML
10 INJECTION INTRAMUSCULAR; INTRAVENOUS
Status: DISCONTINUED | OUTPATIENT
Start: 2019-01-29 | End: 2019-01-29

## 2019-01-29 RX ORDER — SODIUM CHLORIDE, SODIUM LACTATE, POTASSIUM CHLORIDE, CALCIUM CHLORIDE 600; 310; 30; 20 MG/100ML; MG/100ML; MG/100ML; MG/100ML
INJECTION, SOLUTION INTRAVENOUS CONTINUOUS PRN
Status: DISCONTINUED | OUTPATIENT
Start: 2019-01-29 | End: 2019-01-29 | Stop reason: SDUPTHER

## 2019-01-29 RX ORDER — ONDANSETRON 2 MG/ML
4 INJECTION INTRAMUSCULAR; INTRAVENOUS EVERY 6 HOURS PRN
Status: DISCONTINUED | OUTPATIENT
Start: 2019-01-29 | End: 2019-02-05 | Stop reason: HOSPADM

## 2019-01-29 RX ORDER — FENTANYL CITRATE 50 UG/ML
INJECTION, SOLUTION INTRAMUSCULAR; INTRAVENOUS PRN
Status: DISCONTINUED | OUTPATIENT
Start: 2019-01-29 | End: 2019-01-29 | Stop reason: SDUPTHER

## 2019-01-29 RX ORDER — TRAMADOL HYDROCHLORIDE 50 MG/1
50 TABLET ORAL 2 TIMES DAILY PRN
COMMUNITY
End: 2019-05-16

## 2019-01-29 RX ORDER — SODIUM CHLORIDE 9 MG/ML
INJECTION, SOLUTION INTRAVENOUS CONTINUOUS
Status: DISCONTINUED | OUTPATIENT
Start: 2019-01-29 | End: 2019-02-01

## 2019-01-29 RX ORDER — MEPERIDINE HYDROCHLORIDE 50 MG/ML
12.5 INJECTION INTRAMUSCULAR; INTRAVENOUS; SUBCUTANEOUS EVERY 5 MIN PRN
Status: DISCONTINUED | OUTPATIENT
Start: 2019-01-29 | End: 2019-01-29

## 2019-01-29 RX ORDER — HYDRALAZINE HYDROCHLORIDE 20 MG/ML
5 INJECTION INTRAMUSCULAR; INTRAVENOUS EVERY 10 MIN PRN
Status: DISCONTINUED | OUTPATIENT
Start: 2019-01-29 | End: 2019-01-29

## 2019-01-29 RX ORDER — ENALAPRILAT 2.5 MG/2ML
1.25 INJECTION INTRAVENOUS
Status: DISCONTINUED | OUTPATIENT
Start: 2019-01-29 | End: 2019-01-29

## 2019-01-29 RX ORDER — SODIUM CHLORIDE, SODIUM LACTATE, POTASSIUM CHLORIDE, CALCIUM CHLORIDE 600; 310; 30; 20 MG/100ML; MG/100ML; MG/100ML; MG/100ML
INJECTION, SOLUTION INTRAVENOUS CONTINUOUS
Status: DISCONTINUED | OUTPATIENT
Start: 2019-01-29 | End: 2019-01-29

## 2019-01-29 RX ORDER — LEVOTHYROXINE SODIUM 0.1 MG/1
100 TABLET ORAL DAILY
COMMUNITY

## 2019-01-29 RX ORDER — POTASSIUM CHLORIDE 7.45 MG/ML
10 INJECTION INTRAVENOUS PRN
Status: DISCONTINUED | OUTPATIENT
Start: 2019-01-29 | End: 2019-02-05 | Stop reason: HOSPADM

## 2019-01-29 RX ORDER — MIDAZOLAM HYDROCHLORIDE 1 MG/ML
INJECTION INTRAMUSCULAR; INTRAVENOUS PRN
Status: DISCONTINUED | OUTPATIENT
Start: 2019-01-29 | End: 2019-01-29 | Stop reason: SDUPTHER

## 2019-01-29 RX ORDER — LIDOCAINE HYDROCHLORIDE 20 MG/ML
INJECTION, SOLUTION INFILTRATION; PERINEURAL PRN
Status: DISCONTINUED | OUTPATIENT
Start: 2019-01-29 | End: 2019-01-29 | Stop reason: SDUPTHER

## 2019-01-29 RX ORDER — PROPOFOL 10 MG/ML
INJECTION, EMULSION INTRAVENOUS PRN
Status: DISCONTINUED | OUTPATIENT
Start: 2019-01-29 | End: 2019-01-29 | Stop reason: SDUPTHER

## 2019-01-29 RX ORDER — RANITIDINE 150 MG/1
150 TABLET ORAL
COMMUNITY
End: 2019-05-16

## 2019-01-29 RX ORDER — MORPHINE SULFATE/0.9% NACL/PF 1 MG/ML
2 SYRINGE (ML) INJECTION EVERY 5 MIN PRN
Status: DISCONTINUED | OUTPATIENT
Start: 2019-01-29 | End: 2019-01-29

## 2019-01-29 RX ORDER — MORPHINE SULFATE/0.9% NACL/PF 1 MG/ML
4 SYRINGE (ML) INJECTION EVERY 5 MIN PRN
Status: DISCONTINUED | OUTPATIENT
Start: 2019-01-29 | End: 2019-01-29

## 2019-01-29 RX ORDER — SUCCINYLCHOLINE CHLORIDE 20 MG/ML
INJECTION INTRAMUSCULAR; INTRAVENOUS PRN
Status: DISCONTINUED | OUTPATIENT
Start: 2019-01-29 | End: 2019-01-29 | Stop reason: SDUPTHER

## 2019-01-29 RX ORDER — DIPHENHYDRAMINE HYDROCHLORIDE 50 MG/ML
12.5 INJECTION INTRAMUSCULAR; INTRAVENOUS
Status: DISCONTINUED | OUTPATIENT
Start: 2019-01-29 | End: 2019-01-29

## 2019-01-29 RX ORDER — LABETALOL HYDROCHLORIDE 5 MG/ML
5 INJECTION, SOLUTION INTRAVENOUS EVERY 10 MIN PRN
Status: DISCONTINUED | OUTPATIENT
Start: 2019-01-29 | End: 2019-01-29

## 2019-01-29 RX ORDER — 0.9 % SODIUM CHLORIDE 0.9 %
500 INTRAVENOUS SOLUTION INTRAVENOUS ONCE
Status: COMPLETED | OUTPATIENT
Start: 2019-01-29 | End: 2019-01-30

## 2019-01-29 RX ORDER — ROCURONIUM BROMIDE 10 MG/ML
INJECTION, SOLUTION INTRAVENOUS PRN
Status: DISCONTINUED | OUTPATIENT
Start: 2019-01-29 | End: 2019-01-29 | Stop reason: SDUPTHER

## 2019-01-29 RX ORDER — ONDANSETRON 2 MG/ML
INJECTION INTRAMUSCULAR; INTRAVENOUS PRN
Status: DISCONTINUED | OUTPATIENT
Start: 2019-01-29 | End: 2019-01-29 | Stop reason: SDUPTHER

## 2019-01-29 RX ADMIN — SUGAMMADEX 305 MG: 100 INJECTION, SOLUTION INTRAVENOUS at 15:13

## 2019-01-29 RX ADMIN — Medication 10 MEQ: at 23:19

## 2019-01-29 RX ADMIN — SODIUM CHLORIDE, SODIUM LACTATE, POTASSIUM CHLORIDE, AND CALCIUM CHLORIDE: 600; 310; 30; 20 INJECTION, SOLUTION INTRAVENOUS at 14:12

## 2019-01-29 RX ADMIN — Medication 10 MEQ: at 22:10

## 2019-01-29 RX ADMIN — METRONIDAZOLE 500 MG: 500 INJECTION, SOLUTION INTRAVENOUS at 19:37

## 2019-01-29 RX ADMIN — Medication 10 MEQ: at 21:05

## 2019-01-29 RX ADMIN — LIDOCAINE HYDROCHLORIDE 40 MG: 20 INJECTION, SOLUTION INFILTRATION; PERINEURAL at 14:18

## 2019-01-29 RX ADMIN — FENTANYL CITRATE 50 MCG: 50 INJECTION INTRAMUSCULAR; INTRAVENOUS at 14:18

## 2019-01-29 RX ADMIN — CIPROFLOXACIN 400 MG: 2 INJECTION, SOLUTION INTRAVENOUS at 21:05

## 2019-01-29 RX ADMIN — SODIUM CHLORIDE, SODIUM LACTATE, POTASSIUM CHLORIDE, AND CALCIUM CHLORIDE: 600; 310; 30; 20 INJECTION, SOLUTION INTRAVENOUS at 14:10

## 2019-01-29 RX ADMIN — CIPROFLOXACIN 400 MG: 2 INJECTION, SOLUTION INTRAVENOUS at 09:56

## 2019-01-29 RX ADMIN — ROCURONIUM BROMIDE 5 MG: 10 INJECTION INTRAVENOUS at 14:18

## 2019-01-29 RX ADMIN — SODIUM CHLORIDE 500 ML: 9 INJECTION, SOLUTION INTRAVENOUS at 22:18

## 2019-01-29 RX ADMIN — PROPOFOL 150 MG: 10 INJECTION, EMULSION INTRAVENOUS at 14:18

## 2019-01-29 RX ADMIN — Medication 10 MEQ: at 19:58

## 2019-01-29 RX ADMIN — SODIUM CHLORIDE, SODIUM LACTATE, POTASSIUM CHLORIDE, AND CALCIUM CHLORIDE: 600; 310; 30; 20 INJECTION, SOLUTION INTRAVENOUS at 15:08

## 2019-01-29 RX ADMIN — SODIUM CHLORIDE: 9 INJECTION, SOLUTION INTRAVENOUS at 18:51

## 2019-01-29 RX ADMIN — Medication 0.25 MG: at 10:05

## 2019-01-29 RX ADMIN — SODIUM CHLORIDE: 9 INJECTION, SOLUTION INTRAVENOUS at 09:56

## 2019-01-29 RX ADMIN — SUCCINYLCHOLINE CHLORIDE 120 MG: 20 INJECTION, SOLUTION INTRAMUSCULAR; INTRAVENOUS; PARENTERAL at 14:19

## 2019-01-29 RX ADMIN — ONDANSETRON HYDROCHLORIDE 4 MG: 2 INJECTION, SOLUTION INTRAMUSCULAR; INTRAVENOUS at 14:47

## 2019-01-29 RX ADMIN — ROCURONIUM BROMIDE 45 MG: 10 INJECTION INTRAVENOUS at 14:31

## 2019-01-29 RX ADMIN — MIDAZOLAM 2 MG: 1 INJECTION INTRAMUSCULAR; INTRAVENOUS at 14:09

## 2019-01-29 RX ADMIN — METRONIDAZOLE 500 MG: 500 INJECTION, SOLUTION INTRAVENOUS at 12:20

## 2019-01-29 ASSESSMENT — PAIN SCALES - GENERAL
PAINLEVEL_OUTOF10: 10
PAINLEVEL_OUTOF10: 0
PAINLEVEL_OUTOF10: 8
PAINLEVEL_OUTOF10: 4

## 2019-01-29 ASSESSMENT — PAIN DESCRIPTION - LOCATION
LOCATION: ABDOMEN
LOCATION: ABDOMEN

## 2019-01-29 ASSESSMENT — PAIN DESCRIPTION - PAIN TYPE: TYPE: REFERRED PAIN

## 2019-01-29 ASSESSMENT — PAIN DESCRIPTION - DIRECTION: RADIATING_TOWARDS: BACK

## 2019-01-29 ASSESSMENT — PAIN DESCRIPTION - ORIENTATION
ORIENTATION: RIGHT
ORIENTATION: RIGHT

## 2019-01-30 LAB
ALBUMIN SERPL-MCNC: 3.1 G/DL (ref 3.5–5.2)
ALP BLD-CCNC: 176 U/L (ref 40–130)
ALT SERPL-CCNC: 85 U/L (ref 5–41)
ANION GAP SERPL CALCULATED.3IONS-SCNC: 13 MMOL/L (ref 7–19)
AST SERPL-CCNC: 99 U/L (ref 5–40)
BASOPHILS ABSOLUTE: 0 K/UL (ref 0–0.2)
BASOPHILS RELATIVE PERCENT: 0.1 % (ref 0–1)
BILIRUB SERPL-MCNC: 5.8 MG/DL (ref 0.2–1.2)
BUN BLDV-MCNC: 9 MG/DL (ref 8–23)
CALCIUM SERPL-MCNC: 7.6 MG/DL (ref 8.8–10.2)
CHLORIDE BLD-SCNC: 108 MMOL/L (ref 98–111)
CO2: 20 MMOL/L (ref 22–29)
CREAT SERPL-MCNC: 0.5 MG/DL (ref 0.5–1.2)
EOSINOPHILS ABSOLUTE: 0 K/UL (ref 0–0.6)
EOSINOPHILS RELATIVE PERCENT: 0 % (ref 0–5)
GFR NON-AFRICAN AMERICAN: >60
GLUCOSE BLD-MCNC: 109 MG/DL (ref 74–109)
HCT VFR BLD CALC: 44.1 % (ref 42–52)
HEMOGLOBIN: 14.7 G/DL (ref 14–18)
LACTIC ACID: 2 MMOL/L (ref 0.5–1.9)
LV EF: 52 %
LVEF MODALITY: NORMAL
LYMPHOCYTES ABSOLUTE: 1.1 K/UL (ref 1.1–4.5)
LYMPHOCYTES RELATIVE PERCENT: 6.1 % (ref 20–40)
MCH RBC QN AUTO: 29.9 PG (ref 27–31)
MCHC RBC AUTO-ENTMCNC: 33.3 G/DL (ref 33–37)
MCV RBC AUTO: 89.6 FL (ref 80–94)
MONOCYTES ABSOLUTE: 1.8 K/UL (ref 0–0.9)
MONOCYTES RELATIVE PERCENT: 9.7 % (ref 0–10)
NEUTROPHILS ABSOLUTE: 15.5 K/UL (ref 1.5–7.5)
NEUTROPHILS RELATIVE PERCENT: 83.3 % (ref 50–65)
PDW BLD-RTO: 13.8 % (ref 11.5–14.5)
PLATELET # BLD: 139 K/UL (ref 130–400)
PMV BLD AUTO: 9.9 FL (ref 9.4–12.4)
POTASSIUM SERPL-SCNC: 3.9 MMOL/L (ref 3.5–5)
RBC # BLD: 4.92 M/UL (ref 4.7–6.1)
SODIUM BLD-SCNC: 141 MMOL/L (ref 136–145)
TOTAL PROTEIN: 5 G/DL (ref 6.6–8.7)
TROPONIN: <0.01 NG/ML (ref 0–0.03)
WBC # BLD: 19 K/UL (ref 4.8–10.8)

## 2019-01-30 PROCEDURE — 2580000003 HC RX 258: Performed by: INTERNAL MEDICINE

## 2019-01-30 PROCEDURE — 84484 ASSAY OF TROPONIN QUANT: CPT

## 2019-01-30 PROCEDURE — 6370000000 HC RX 637 (ALT 250 FOR IP): Performed by: HOSPITALIST

## 2019-01-30 PROCEDURE — 99231 SBSQ HOSP IP/OBS SF/LOW 25: CPT | Performed by: INTERNAL MEDICINE

## 2019-01-30 PROCEDURE — 2500000003 HC RX 250 WO HCPCS: Performed by: PHYSICIAN ASSISTANT

## 2019-01-30 PROCEDURE — 74328 X-RAY BILE DUCT ENDOSCOPY: CPT | Performed by: INTERNAL MEDICINE

## 2019-01-30 PROCEDURE — 93306 TTE W/DOPPLER COMPLETE: CPT

## 2019-01-30 PROCEDURE — 99233 SBSQ HOSP IP/OBS HIGH 50: CPT | Performed by: HOSPITALIST

## 2019-01-30 PROCEDURE — 6360000002 HC RX W HCPCS: Performed by: PHYSICIAN ASSISTANT

## 2019-01-30 PROCEDURE — 85025 COMPLETE CBC W/AUTO DIFF WBC: CPT

## 2019-01-30 PROCEDURE — 2500000003 HC RX 250 WO HCPCS: Performed by: INTERNAL MEDICINE

## 2019-01-30 PROCEDURE — 43264 ERCP REMOVE DUCT CALCULI: CPT | Performed by: INTERNAL MEDICINE

## 2019-01-30 PROCEDURE — 80053 COMPREHEN METABOLIC PANEL: CPT

## 2019-01-30 PROCEDURE — 36415 COLL VENOUS BLD VENIPUNCTURE: CPT

## 2019-01-30 PROCEDURE — 83605 ASSAY OF LACTIC ACID: CPT

## 2019-01-30 PROCEDURE — 6360000002 HC RX W HCPCS: Performed by: INTERNAL MEDICINE

## 2019-01-30 PROCEDURE — 6370000000 HC RX 637 (ALT 250 FOR IP): Performed by: PHYSICIAN ASSISTANT

## 2019-01-30 PROCEDURE — 1210000000 HC MED SURG R&B

## 2019-01-30 PROCEDURE — 6360000002 HC RX W HCPCS: Performed by: FAMILY MEDICINE

## 2019-01-30 PROCEDURE — 99222 1ST HOSP IP/OBS MODERATE 55: CPT | Performed by: SURGERY

## 2019-01-30 PROCEDURE — 43274 ERCP DUCT STENT PLACEMENT: CPT | Performed by: INTERNAL MEDICINE

## 2019-01-30 RX ORDER — ACETAMINOPHEN 325 MG/1
650 TABLET ORAL EVERY 4 HOURS PRN
Status: DISCONTINUED | OUTPATIENT
Start: 2019-01-30 | End: 2019-02-05 | Stop reason: HOSPADM

## 2019-01-30 RX ADMIN — MOMETASONE FUROATE AND FORMOTEROL FUMARATE DIHYDRATE 2 PUFF: 200; 5 AEROSOL RESPIRATORY (INHALATION) at 11:11

## 2019-01-30 RX ADMIN — Medication 10 MEQ: at 01:22

## 2019-01-30 RX ADMIN — MOMETASONE FUROATE AND FORMOTEROL FUMARATE DIHYDRATE 2 PUFF: 200; 5 AEROSOL RESPIRATORY (INHALATION) at 20:32

## 2019-01-30 RX ADMIN — NOREPINEPHRINE BITARTRATE 5 MCG/MIN: 1 INJECTION INTRAVENOUS at 00:35

## 2019-01-30 RX ADMIN — METRONIDAZOLE 500 MG: 500 INJECTION, SOLUTION INTRAVENOUS at 04:09

## 2019-01-30 RX ADMIN — ACETAMINOPHEN 650 MG: 325 TABLET ORAL at 17:13

## 2019-01-30 RX ADMIN — CIPROFLOXACIN 400 MG: 2 INJECTION, SOLUTION INTRAVENOUS at 11:11

## 2019-01-30 RX ADMIN — MEROPENEM 1 G: 1 INJECTION, POWDER, FOR SOLUTION INTRAVENOUS at 17:54

## 2019-01-30 RX ADMIN — Medication 10 MEQ: at 00:21

## 2019-01-30 RX ADMIN — METRONIDAZOLE 500 MG: 500 INJECTION, SOLUTION INTRAVENOUS at 12:20

## 2019-01-30 ASSESSMENT — PAIN SCALES - GENERAL
PAINLEVEL_OUTOF10: 2
PAINLEVEL_OUTOF10: 0
PAINLEVEL_OUTOF10: 4
PAINLEVEL_OUTOF10: 0

## 2019-01-31 LAB
ALBUMIN SERPL-MCNC: 2.4 G/DL (ref 3.5–5.2)
ALP BLD-CCNC: 131 U/L (ref 40–130)
ALT SERPL-CCNC: 58 U/L (ref 5–41)
ANION GAP SERPL CALCULATED.3IONS-SCNC: 12 MMOL/L (ref 7–19)
AST SERPL-CCNC: 61 U/L (ref 5–40)
BASOPHILS ABSOLUTE: 0 K/UL (ref 0–0.2)
BASOPHILS RELATIVE PERCENT: 0.2 % (ref 0–1)
BILIRUB SERPL-MCNC: 2.9 MG/DL (ref 0.2–1.2)
BLOOD CULTURE, ROUTINE: ABNORMAL
BLOOD CULTURE, ROUTINE: ABNORMAL
BUN BLDV-MCNC: 7 MG/DL (ref 8–23)
CALCIUM SERPL-MCNC: 7.6 MG/DL (ref 8.8–10.2)
CHLORIDE BLD-SCNC: 110 MMOL/L (ref 98–111)
CO2: 23 MMOL/L (ref 22–29)
CREAT SERPL-MCNC: <0.5 MG/DL (ref 0.5–1.2)
CULTURE, BLOOD 2: ABNORMAL
CULTURE, BLOOD 2: ABNORMAL
EOSINOPHILS ABSOLUTE: 0 K/UL (ref 0–0.6)
EOSINOPHILS RELATIVE PERCENT: 0.1 % (ref 0–5)
GFR NON-AFRICAN AMERICAN: >60
GLUCOSE BLD-MCNC: 107 MG/DL (ref 74–109)
HCT VFR BLD CALC: 39.9 % (ref 42–52)
HEMOGLOBIN: 13.2 G/DL (ref 14–18)
LYMPHOCYTES ABSOLUTE: 1 K/UL (ref 1.1–4.5)
LYMPHOCYTES RELATIVE PERCENT: 6.3 % (ref 20–40)
MAGNESIUM: 1.6 MG/DL (ref 1.6–2.4)
MCH RBC QN AUTO: 29.9 PG (ref 27–31)
MCHC RBC AUTO-ENTMCNC: 33.1 G/DL (ref 33–37)
MCV RBC AUTO: 90.5 FL (ref 80–94)
MONOCYTES ABSOLUTE: 1.4 K/UL (ref 0–0.9)
MONOCYTES RELATIVE PERCENT: 9.3 % (ref 0–10)
NEUTROPHILS ABSOLUTE: 12.9 K/UL (ref 1.5–7.5)
NEUTROPHILS RELATIVE PERCENT: 83.3 % (ref 50–65)
ORGANISM: ABNORMAL
ORGANISM: ABNORMAL
PDW BLD-RTO: 14.1 % (ref 11.5–14.5)
PLATELET # BLD: 141 K/UL (ref 130–400)
PMV BLD AUTO: 10.6 FL (ref 9.4–12.4)
POTASSIUM SERPL-SCNC: 3.1 MMOL/L (ref 3.5–5)
RBC # BLD: 4.41 M/UL (ref 4.7–6.1)
SODIUM BLD-SCNC: 145 MMOL/L (ref 136–145)
TOTAL PROTEIN: 4.7 G/DL (ref 6.6–8.7)
WBC # BLD: 15.5 K/UL (ref 4.8–10.8)

## 2019-01-31 PROCEDURE — 87040 BLOOD CULTURE FOR BACTERIA: CPT

## 2019-01-31 PROCEDURE — 2580000003 HC RX 258: Performed by: INTERNAL MEDICINE

## 2019-01-31 PROCEDURE — 80053 COMPREHEN METABOLIC PANEL: CPT

## 2019-01-31 PROCEDURE — 6360000002 HC RX W HCPCS: Performed by: PHYSICIAN ASSISTANT

## 2019-01-31 PROCEDURE — 85025 COMPLETE CBC W/AUTO DIFF WBC: CPT

## 2019-01-31 PROCEDURE — 99232 SBSQ HOSP IP/OBS MODERATE 35: CPT | Performed by: SURGERY

## 2019-01-31 PROCEDURE — 6370000000 HC RX 637 (ALT 250 FOR IP): Performed by: INTERNAL MEDICINE

## 2019-01-31 PROCEDURE — 6370000000 HC RX 637 (ALT 250 FOR IP): Performed by: HOSPITALIST

## 2019-01-31 PROCEDURE — 83735 ASSAY OF MAGNESIUM: CPT

## 2019-01-31 PROCEDURE — 1210000000 HC MED SURG R&B

## 2019-01-31 PROCEDURE — 99232 SBSQ HOSP IP/OBS MODERATE 35: CPT | Performed by: INTERNAL MEDICINE

## 2019-01-31 PROCEDURE — 6370000000 HC RX 637 (ALT 250 FOR IP)

## 2019-01-31 PROCEDURE — 99233 SBSQ HOSP IP/OBS HIGH 50: CPT | Performed by: INTERNAL MEDICINE

## 2019-01-31 PROCEDURE — 36415 COLL VENOUS BLD VENIPUNCTURE: CPT

## 2019-01-31 PROCEDURE — 6360000002 HC RX W HCPCS: Performed by: INTERNAL MEDICINE

## 2019-01-31 PROCEDURE — 6360000002 HC RX W HCPCS: Performed by: HOSPITALIST

## 2019-01-31 RX ORDER — LEVOTHYROXINE SODIUM 0.1 MG/1
100 TABLET ORAL DAILY
Status: DISCONTINUED | OUTPATIENT
Start: 2019-02-01 | End: 2019-02-05 | Stop reason: HOSPADM

## 2019-01-31 RX ORDER — POTASSIUM CHLORIDE 20 MEQ/1
40 TABLET, EXTENDED RELEASE ORAL ONCE
Status: COMPLETED | OUTPATIENT
Start: 2019-01-31 | End: 2019-01-31

## 2019-01-31 RX ORDER — LEVOTHYROXINE SODIUM 0.1 MG/1
TABLET ORAL
Status: COMPLETED
Start: 2019-01-31 | End: 2019-01-31

## 2019-01-31 RX ORDER — LANOLIN ALCOHOL/MO/W.PET/CERES
800 CREAM (GRAM) TOPICAL ONCE
Status: COMPLETED | OUTPATIENT
Start: 2019-01-31 | End: 2019-01-31

## 2019-01-31 RX ADMIN — MOMETASONE FUROATE AND FORMOTEROL FUMARATE DIHYDRATE 2 PUFF: 200; 5 AEROSOL RESPIRATORY (INHALATION) at 08:38

## 2019-01-31 RX ADMIN — ENOXAPARIN SODIUM 40 MG: 40 INJECTION SUBCUTANEOUS at 08:38

## 2019-01-31 RX ADMIN — CEFTRIAXONE 1 G: 1 INJECTION, POWDER, FOR SOLUTION INTRAMUSCULAR; INTRAVENOUS at 08:47

## 2019-01-31 RX ADMIN — Medication 800 MG: at 13:02

## 2019-01-31 RX ADMIN — MEROPENEM 1 G: 1 INJECTION, POWDER, FOR SOLUTION INTRAVENOUS at 01:22

## 2019-01-31 RX ADMIN — POTASSIUM CHLORIDE 40 MEQ: 20 TABLET, EXTENDED RELEASE ORAL at 13:02

## 2019-01-31 RX ADMIN — LEVOTHYROXINE SODIUM 100 MCG: 100 TABLET ORAL at 13:08

## 2019-01-31 RX ADMIN — MOMETASONE FUROATE AND FORMOTEROL FUMARATE DIHYDRATE 2 PUFF: 200; 5 AEROSOL RESPIRATORY (INHALATION) at 22:50

## 2019-01-31 RX ADMIN — ACETAMINOPHEN 650 MG: 325 TABLET ORAL at 23:53

## 2019-01-31 RX ADMIN — Medication 0.5 MG: at 01:32

## 2019-01-31 ASSESSMENT — PAIN SCALES - GENERAL
PAINLEVEL_OUTOF10: 7
PAINLEVEL_OUTOF10: 5

## 2019-02-01 LAB
ALBUMIN SERPL-MCNC: 2.5 G/DL (ref 3.5–5.2)
ALP BLD-CCNC: 135 U/L (ref 40–130)
ALT SERPL-CCNC: 46 U/L (ref 5–41)
ANION GAP SERPL CALCULATED.3IONS-SCNC: 9 MMOL/L (ref 7–19)
AST SERPL-CCNC: 35 U/L (ref 5–40)
BASOPHILS ABSOLUTE: 0 K/UL (ref 0–0.2)
BASOPHILS RELATIVE PERCENT: 0.3 % (ref 0–1)
BILIRUB SERPL-MCNC: 2.2 MG/DL (ref 0.2–1.2)
BLOOD CULTURE, ROUTINE: ABNORMAL
BLOOD CULTURE, ROUTINE: ABNORMAL
BUN BLDV-MCNC: 4 MG/DL (ref 8–23)
CALCIUM SERPL-MCNC: 7.7 MG/DL (ref 8.8–10.2)
CHLORIDE BLD-SCNC: 110 MMOL/L (ref 98–111)
CO2: 24 MMOL/L (ref 22–29)
CREAT SERPL-MCNC: 0.5 MG/DL (ref 0.5–1.2)
CULTURE, BLOOD 2: ABNORMAL
CULTURE, BLOOD 2: ABNORMAL
EOSINOPHILS ABSOLUTE: 0 K/UL (ref 0–0.6)
EOSINOPHILS RELATIVE PERCENT: 0.2 % (ref 0–5)
GFR NON-AFRICAN AMERICAN: >60
GLUCOSE BLD-MCNC: 131 MG/DL (ref 74–109)
HCT VFR BLD CALC: 38.2 % (ref 42–52)
HEMOGLOBIN: 12.9 G/DL (ref 14–18)
LYMPHOCYTES ABSOLUTE: 1.2 K/UL (ref 1.1–4.5)
LYMPHOCYTES RELATIVE PERCENT: 10.2 % (ref 20–40)
MCH RBC QN AUTO: 30.1 PG (ref 27–31)
MCHC RBC AUTO-ENTMCNC: 33.8 G/DL (ref 33–37)
MCV RBC AUTO: 89 FL (ref 80–94)
MONOCYTES ABSOLUTE: 1.7 K/UL (ref 0–0.9)
MONOCYTES RELATIVE PERCENT: 13.9 % (ref 0–10)
NEUTROPHILS ABSOLUTE: 8.5 K/UL (ref 1.5–7.5)
NEUTROPHILS RELATIVE PERCENT: 71.4 % (ref 50–65)
ORGANISM: ABNORMAL
ORGANISM: ABNORMAL
PDW BLD-RTO: 13.8 % (ref 11.5–14.5)
PLATELET # BLD: 143 K/UL (ref 130–400)
PMV BLD AUTO: 10.9 FL (ref 9.4–12.4)
POTASSIUM SERPL-SCNC: 2.7 MMOL/L (ref 3.5–5)
RBC # BLD: 4.29 M/UL (ref 4.7–6.1)
SODIUM BLD-SCNC: 143 MMOL/L (ref 136–145)
TOTAL PROTEIN: 4.8 G/DL (ref 6.6–8.7)
WBC # BLD: 11.9 K/UL (ref 4.8–10.8)

## 2019-02-01 PROCEDURE — 80053 COMPREHEN METABOLIC PANEL: CPT

## 2019-02-01 PROCEDURE — 6370000000 HC RX 637 (ALT 250 FOR IP): Performed by: INTERNAL MEDICINE

## 2019-02-01 PROCEDURE — 99232 SBSQ HOSP IP/OBS MODERATE 35: CPT | Performed by: INTERNAL MEDICINE

## 2019-02-01 PROCEDURE — 1210000000 HC MED SURG R&B

## 2019-02-01 PROCEDURE — 85025 COMPLETE CBC W/AUTO DIFF WBC: CPT

## 2019-02-01 PROCEDURE — 2580000003 HC RX 258: Performed by: INTERNAL MEDICINE

## 2019-02-01 PROCEDURE — 6360000002 HC RX W HCPCS: Performed by: INTERNAL MEDICINE

## 2019-02-01 PROCEDURE — 6360000002 HC RX W HCPCS: Performed by: HOSPITALIST

## 2019-02-01 PROCEDURE — 6360000002 HC RX W HCPCS: Performed by: FAMILY MEDICINE

## 2019-02-01 PROCEDURE — 36415 COLL VENOUS BLD VENIPUNCTURE: CPT

## 2019-02-01 RX ORDER — POTASSIUM CHLORIDE 20 MEQ/1
40 TABLET, EXTENDED RELEASE ORAL ONCE
Status: COMPLETED | OUTPATIENT
Start: 2019-02-01 | End: 2019-02-01

## 2019-02-01 RX ORDER — POTASSIUM CHLORIDE 20 MEQ/1
40 TABLET, EXTENDED RELEASE ORAL 2 TIMES DAILY
Status: DISCONTINUED | OUTPATIENT
Start: 2019-02-01 | End: 2019-02-01

## 2019-02-01 RX ORDER — POTASSIUM CHLORIDE 750 MG/1
10 TABLET, EXTENDED RELEASE ORAL 2 TIMES DAILY
Status: DISCONTINUED | OUTPATIENT
Start: 2019-02-01 | End: 2019-02-01

## 2019-02-01 RX ADMIN — ENOXAPARIN SODIUM 40 MG: 40 INJECTION SUBCUTANEOUS at 08:07

## 2019-02-01 RX ADMIN — Medication 10 MEQ: at 06:30

## 2019-02-01 RX ADMIN — CEFTRIAXONE 1 G: 1 INJECTION, POWDER, FOR SOLUTION INTRAMUSCULAR; INTRAVENOUS at 09:35

## 2019-02-01 RX ADMIN — Medication 10 MEQ: at 11:47

## 2019-02-01 RX ADMIN — Medication 10 MEQ: at 09:32

## 2019-02-01 RX ADMIN — Medication 10 MEQ: at 12:48

## 2019-02-01 RX ADMIN — MOMETASONE FUROATE AND FORMOTEROL FUMARATE DIHYDRATE 2 PUFF: 200; 5 AEROSOL RESPIRATORY (INHALATION) at 08:07

## 2019-02-01 RX ADMIN — Medication 10 MEQ: at 13:56

## 2019-02-01 RX ADMIN — POTASSIUM CHLORIDE 40 MEQ: 20 TABLET, EXTENDED RELEASE ORAL at 08:27

## 2019-02-01 RX ADMIN — LEVOTHYROXINE SODIUM 100 MCG: 100 TABLET ORAL at 06:30

## 2019-02-01 RX ADMIN — POTASSIUM CHLORIDE 40 MEQ: 20 TABLET, EXTENDED RELEASE ORAL at 14:18

## 2019-02-01 RX ADMIN — Medication 10 MEQ: at 11:48

## 2019-02-01 RX ADMIN — MOMETASONE FUROATE AND FORMOTEROL FUMARATE DIHYDRATE 2 PUFF: 200; 5 AEROSOL RESPIRATORY (INHALATION) at 22:03

## 2019-02-01 RX ADMIN — Medication 10 MEQ: at 04:40

## 2019-02-01 ASSESSMENT — PAIN SCALES - GENERAL: PAINLEVEL_OUTOF10: 0

## 2019-02-02 LAB
ALBUMIN SERPL-MCNC: 2.5 G/DL (ref 3.5–5.2)
ALP BLD-CCNC: 148 U/L (ref 40–130)
ALT SERPL-CCNC: 42 U/L (ref 5–41)
ANION GAP SERPL CALCULATED.3IONS-SCNC: 10 MMOL/L (ref 7–19)
AST SERPL-CCNC: 30 U/L (ref 5–40)
BASOPHILS ABSOLUTE: 0 K/UL (ref 0–0.2)
BASOPHILS RELATIVE PERCENT: 0.4 % (ref 0–1)
BILIRUB SERPL-MCNC: 1.8 MG/DL (ref 0.2–1.2)
BUN BLDV-MCNC: 6 MG/DL (ref 8–23)
CALCIUM SERPL-MCNC: 7.9 MG/DL (ref 8.8–10.2)
CHLORIDE BLD-SCNC: 109 MMOL/L (ref 98–111)
CO2: 22 MMOL/L (ref 22–29)
CREAT SERPL-MCNC: 0.5 MG/DL (ref 0.5–1.2)
EOSINOPHILS ABSOLUTE: 0.1 K/UL (ref 0–0.6)
EOSINOPHILS RELATIVE PERCENT: 0.6 % (ref 0–5)
GFR NON-AFRICAN AMERICAN: >60
GLUCOSE BLD-MCNC: 128 MG/DL (ref 74–109)
HCT VFR BLD CALC: 39.3 % (ref 42–52)
HEMOGLOBIN: 13.1 G/DL (ref 14–18)
LYMPHOCYTES ABSOLUTE: 1.3 K/UL (ref 1.1–4.5)
LYMPHOCYTES RELATIVE PERCENT: 13.3 % (ref 20–40)
MAGNESIUM: 1.7 MG/DL (ref 1.6–2.4)
MCH RBC QN AUTO: 29.8 PG (ref 27–31)
MCHC RBC AUTO-ENTMCNC: 33.3 G/DL (ref 33–37)
MCV RBC AUTO: 89.3 FL (ref 80–94)
MONOCYTES ABSOLUTE: 1.4 K/UL (ref 0–0.9)
MONOCYTES RELATIVE PERCENT: 14.7 % (ref 0–10)
NEUTROPHILS ABSOLUTE: 6.7 K/UL (ref 1.5–7.5)
NEUTROPHILS RELATIVE PERCENT: 69 % (ref 50–65)
PDW BLD-RTO: 14.1 % (ref 11.5–14.5)
PLATELET # BLD: 173 K/UL (ref 130–400)
PMV BLD AUTO: 10.9 FL (ref 9.4–12.4)
POTASSIUM SERPL-SCNC: 3.7 MMOL/L (ref 3.5–5)
RBC # BLD: 4.4 M/UL (ref 4.7–6.1)
SODIUM BLD-SCNC: 141 MMOL/L (ref 136–145)
TOTAL PROTEIN: 5.3 G/DL (ref 6.6–8.7)
WBC # BLD: 9.7 K/UL (ref 4.8–10.8)

## 2019-02-02 PROCEDURE — 6370000000 HC RX 637 (ALT 250 FOR IP): Performed by: INTERNAL MEDICINE

## 2019-02-02 PROCEDURE — 6360000002 HC RX W HCPCS: Performed by: INTERNAL MEDICINE

## 2019-02-02 PROCEDURE — 1210000000 HC MED SURG R&B

## 2019-02-02 PROCEDURE — 6360000002 HC RX W HCPCS: Performed by: HOSPITALIST

## 2019-02-02 PROCEDURE — 99232 SBSQ HOSP IP/OBS MODERATE 35: CPT | Performed by: INTERNAL MEDICINE

## 2019-02-02 PROCEDURE — 80053 COMPREHEN METABOLIC PANEL: CPT

## 2019-02-02 PROCEDURE — 2580000003 HC RX 258: Performed by: INTERNAL MEDICINE

## 2019-02-02 PROCEDURE — 36415 COLL VENOUS BLD VENIPUNCTURE: CPT

## 2019-02-02 PROCEDURE — 85025 COMPLETE CBC W/AUTO DIFF WBC: CPT

## 2019-02-02 PROCEDURE — 83735 ASSAY OF MAGNESIUM: CPT

## 2019-02-02 PROCEDURE — 6360000002 HC RX W HCPCS: Performed by: PHYSICIAN ASSISTANT

## 2019-02-02 RX ADMIN — ENOXAPARIN SODIUM 40 MG: 40 INJECTION SUBCUTANEOUS at 09:51

## 2019-02-02 RX ADMIN — Medication 0.5 MG: at 23:00

## 2019-02-02 RX ADMIN — MOMETASONE FUROATE AND FORMOTEROL FUMARATE DIHYDRATE 2 PUFF: 200; 5 AEROSOL RESPIRATORY (INHALATION) at 09:53

## 2019-02-02 RX ADMIN — LEVOTHYROXINE SODIUM 100 MCG: 100 TABLET ORAL at 07:27

## 2019-02-02 RX ADMIN — MOMETASONE FUROATE AND FORMOTEROL FUMARATE DIHYDRATE 2 PUFF: 200; 5 AEROSOL RESPIRATORY (INHALATION) at 20:20

## 2019-02-02 RX ADMIN — CEFTRIAXONE 1 G: 1 INJECTION, POWDER, FOR SOLUTION INTRAMUSCULAR; INTRAVENOUS at 09:51

## 2019-02-02 ASSESSMENT — PAIN SCALES - GENERAL
PAINLEVEL_OUTOF10: 0
PAINLEVEL_OUTOF10: 8

## 2019-02-03 LAB
ALBUMIN SERPL-MCNC: 2.7 G/DL (ref 3.5–5.2)
ALP BLD-CCNC: 148 U/L (ref 40–130)
ALT SERPL-CCNC: 32 U/L (ref 5–41)
ANION GAP SERPL CALCULATED.3IONS-SCNC: 9 MMOL/L (ref 7–19)
AST SERPL-CCNC: 23 U/L (ref 5–40)
BILIRUB SERPL-MCNC: 1.4 MG/DL (ref 0.2–1.2)
BUN BLDV-MCNC: 9 MG/DL (ref 8–23)
CALCIUM SERPL-MCNC: 8.5 MG/DL (ref 8.8–10.2)
CHLORIDE BLD-SCNC: 107 MMOL/L (ref 98–111)
CO2: 24 MMOL/L (ref 22–29)
CREAT SERPL-MCNC: 0.7 MG/DL (ref 0.5–1.2)
GFR NON-AFRICAN AMERICAN: >60
GLUCOSE BLD-MCNC: 128 MG/DL (ref 74–109)
MAGNESIUM: 1.6 MG/DL (ref 1.6–2.4)
POTASSIUM SERPL-SCNC: 3.4 MMOL/L (ref 3.5–5)
SODIUM BLD-SCNC: 140 MMOL/L (ref 136–145)
TOTAL PROTEIN: 5.3 G/DL (ref 6.6–8.7)

## 2019-02-03 PROCEDURE — 2580000003 HC RX 258: Performed by: INTERNAL MEDICINE

## 2019-02-03 PROCEDURE — 6360000002 HC RX W HCPCS: Performed by: HOSPITALIST

## 2019-02-03 PROCEDURE — 80053 COMPREHEN METABOLIC PANEL: CPT

## 2019-02-03 PROCEDURE — 6360000002 HC RX W HCPCS: Performed by: PHYSICIAN ASSISTANT

## 2019-02-03 PROCEDURE — 1210000000 HC MED SURG R&B

## 2019-02-03 PROCEDURE — 36415 COLL VENOUS BLD VENIPUNCTURE: CPT

## 2019-02-03 PROCEDURE — 99232 SBSQ HOSP IP/OBS MODERATE 35: CPT | Performed by: INTERNAL MEDICINE

## 2019-02-03 PROCEDURE — 6370000000 HC RX 637 (ALT 250 FOR IP): Performed by: INTERNAL MEDICINE

## 2019-02-03 PROCEDURE — 6360000002 HC RX W HCPCS: Performed by: INTERNAL MEDICINE

## 2019-02-03 PROCEDURE — 83735 ASSAY OF MAGNESIUM: CPT

## 2019-02-03 RX ORDER — POTASSIUM CHLORIDE 20 MEQ/1
40 TABLET, EXTENDED RELEASE ORAL ONCE
Status: COMPLETED | OUTPATIENT
Start: 2019-02-03 | End: 2019-02-03

## 2019-02-03 RX ADMIN — MOMETASONE FUROATE AND FORMOTEROL FUMARATE DIHYDRATE 2 PUFF: 200; 5 AEROSOL RESPIRATORY (INHALATION) at 09:47

## 2019-02-03 RX ADMIN — MOMETASONE FUROATE AND FORMOTEROL FUMARATE DIHYDRATE 2 PUFF: 200; 5 AEROSOL RESPIRATORY (INHALATION) at 19:59

## 2019-02-03 RX ADMIN — LEVOTHYROXINE SODIUM 100 MCG: 100 TABLET ORAL at 06:03

## 2019-02-03 RX ADMIN — Medication 0.5 MG: at 19:57

## 2019-02-03 RX ADMIN — CEFTRIAXONE 1 G: 1 INJECTION, POWDER, FOR SOLUTION INTRAMUSCULAR; INTRAVENOUS at 09:45

## 2019-02-03 RX ADMIN — POTASSIUM CHLORIDE 40 MEQ: 20 TABLET, EXTENDED RELEASE ORAL at 09:45

## 2019-02-03 RX ADMIN — ENOXAPARIN SODIUM 40 MG: 40 INJECTION SUBCUTANEOUS at 09:45

## 2019-02-03 ASSESSMENT — PAIN SCALES - GENERAL
PAINLEVEL_OUTOF10: 10
PAINLEVEL_OUTOF10: 0

## 2019-02-04 LAB
ALBUMIN SERPL-MCNC: 2.8 G/DL (ref 3.5–5.2)
ALP BLD-CCNC: 160 U/L (ref 40–130)
ALT SERPL-CCNC: 32 U/L (ref 5–41)
ANION GAP SERPL CALCULATED.3IONS-SCNC: 12 MMOL/L (ref 7–19)
AST SERPL-CCNC: 27 U/L (ref 5–40)
BILIRUB SERPL-MCNC: 1.2 MG/DL (ref 0.2–1.2)
BUN BLDV-MCNC: 8 MG/DL (ref 8–23)
CALCIUM SERPL-MCNC: 8.6 MG/DL (ref 8.8–10.2)
CHLORIDE BLD-SCNC: 103 MMOL/L (ref 98–111)
CO2: 26 MMOL/L (ref 22–29)
CREAT SERPL-MCNC: 0.6 MG/DL (ref 0.5–1.2)
GFR NON-AFRICAN AMERICAN: >60
GLUCOSE BLD-MCNC: 120 MG/DL (ref 74–109)
POTASSIUM SERPL-SCNC: 3.4 MMOL/L (ref 3.5–5)
SODIUM BLD-SCNC: 141 MMOL/L (ref 136–145)
TOTAL PROTEIN: 5.8 G/DL (ref 6.6–8.7)

## 2019-02-04 PROCEDURE — 6360000002 HC RX W HCPCS: Performed by: INTERNAL MEDICINE

## 2019-02-04 PROCEDURE — 6370000000 HC RX 637 (ALT 250 FOR IP): Performed by: INTERNAL MEDICINE

## 2019-02-04 PROCEDURE — 36415 COLL VENOUS BLD VENIPUNCTURE: CPT

## 2019-02-04 PROCEDURE — 80053 COMPREHEN METABOLIC PANEL: CPT

## 2019-02-04 PROCEDURE — 99232 SBSQ HOSP IP/OBS MODERATE 35: CPT | Performed by: INTERNAL MEDICINE

## 2019-02-04 PROCEDURE — 1210000000 HC MED SURG R&B

## 2019-02-04 PROCEDURE — 6360000002 HC RX W HCPCS: Performed by: HOSPITALIST

## 2019-02-04 PROCEDURE — 2580000003 HC RX 258: Performed by: INTERNAL MEDICINE

## 2019-02-04 RX ORDER — POTASSIUM CHLORIDE 20 MEQ/1
40 TABLET, EXTENDED RELEASE ORAL ONCE
Status: COMPLETED | OUTPATIENT
Start: 2019-02-04 | End: 2019-02-04

## 2019-02-04 RX ADMIN — ENOXAPARIN SODIUM 40 MG: 40 INJECTION SUBCUTANEOUS at 08:52

## 2019-02-04 RX ADMIN — LEVOTHYROXINE SODIUM 100 MCG: 100 TABLET ORAL at 05:44

## 2019-02-04 RX ADMIN — CEFTRIAXONE 1 G: 1 INJECTION, POWDER, FOR SOLUTION INTRAMUSCULAR; INTRAVENOUS at 10:01

## 2019-02-04 RX ADMIN — POTASSIUM CHLORIDE 40 MEQ: 20 TABLET, EXTENDED RELEASE ORAL at 13:35

## 2019-02-04 RX ADMIN — MOMETASONE FUROATE AND FORMOTEROL FUMARATE DIHYDRATE 2 PUFF: 200; 5 AEROSOL RESPIRATORY (INHALATION) at 08:53

## 2019-02-04 RX ADMIN — MOMETASONE FUROATE AND FORMOTEROL FUMARATE DIHYDRATE 2 PUFF: 200; 5 AEROSOL RESPIRATORY (INHALATION) at 22:27

## 2019-02-05 VITALS
RESPIRATION RATE: 16 BRPM | TEMPERATURE: 97.9 F | WEIGHT: 173.3 LBS | HEART RATE: 111 BPM | BODY MASS INDEX: 28.87 KG/M2 | SYSTOLIC BLOOD PRESSURE: 112 MMHG | HEIGHT: 65 IN | OXYGEN SATURATION: 95 % | DIASTOLIC BLOOD PRESSURE: 77 MMHG

## 2019-02-05 LAB
BLOOD CULTURE, ROUTINE: NORMAL
CULTURE, BLOOD 2: NORMAL

## 2019-02-05 PROCEDURE — 99239 HOSP IP/OBS DSCHRG MGMT >30: CPT | Performed by: INTERNAL MEDICINE

## 2019-02-05 PROCEDURE — 2580000003 HC RX 258: Performed by: INTERNAL MEDICINE

## 2019-02-05 PROCEDURE — 6370000000 HC RX 637 (ALT 250 FOR IP): Performed by: INTERNAL MEDICINE

## 2019-02-05 PROCEDURE — 6360000002 HC RX W HCPCS: Performed by: INTERNAL MEDICINE

## 2019-02-05 RX ORDER — CEFDINIR 300 MG/1
300 CAPSULE ORAL EVERY 12 HOURS SCHEDULED
Qty: 14 CAPSULE | Refills: 0 | Status: SHIPPED | OUTPATIENT
Start: 2019-02-06 | End: 2019-02-13

## 2019-02-05 RX ORDER — CEFDINIR 300 MG/1
300 CAPSULE ORAL EVERY 12 HOURS SCHEDULED
Status: DISCONTINUED | OUTPATIENT
Start: 2019-02-06 | End: 2019-02-05 | Stop reason: HOSPADM

## 2019-02-05 RX ADMIN — LEVOTHYROXINE SODIUM 100 MCG: 100 TABLET ORAL at 06:25

## 2019-02-05 RX ADMIN — CEFTRIAXONE 1 G: 1 INJECTION, POWDER, FOR SOLUTION INTRAMUSCULAR; INTRAVENOUS at 09:59

## 2019-02-06 ENCOUNTER — TELEPHONE (OUTPATIENT)
Dept: SURGERY | Age: 69
End: 2019-02-06

## 2019-02-08 ENCOUNTER — HOSPITAL ENCOUNTER (EMERGENCY)
Dept: HOSPITAL 58 - ED | Age: 69
LOS: 1 days | Discharge: HOME | End: 2019-02-09
Payer: COMMERCIAL

## 2019-02-08 ENCOUNTER — HOSPITAL ENCOUNTER (OUTPATIENT)
Dept: HOSPITAL 58 - RHC-LAB | Age: 69
Discharge: HOME | End: 2019-02-08
Attending: NURSE PRACTITIONER
Payer: COMMERCIAL

## 2019-02-08 VITALS — TEMPERATURE: 97.9 F | DIASTOLIC BLOOD PRESSURE: 74 MMHG | SYSTOLIC BLOOD PRESSURE: 129 MMHG

## 2019-02-08 VITALS — BODY MASS INDEX: 28.1 KG/M2

## 2019-02-08 VITALS — BODY MASS INDEX: 27.1 KG/M2

## 2019-02-08 DIAGNOSIS — R33.9: Primary | ICD-10-CM

## 2019-02-08 DIAGNOSIS — F17.210: ICD-10-CM

## 2019-02-08 DIAGNOSIS — R10.30: ICD-10-CM

## 2019-02-08 DIAGNOSIS — K80.20: Primary | ICD-10-CM

## 2019-02-08 DIAGNOSIS — Z98.890: ICD-10-CM

## 2019-02-08 DIAGNOSIS — K80.20: ICD-10-CM

## 2019-02-08 DIAGNOSIS — Z87.440: ICD-10-CM

## 2019-02-08 PROCEDURE — 36415 COLL VENOUS BLD VENIPUNCTURE: CPT

## 2019-02-08 PROCEDURE — 80053 COMPREHEN METABOLIC PANEL: CPT

## 2019-02-08 PROCEDURE — 85025 COMPLETE CBC W/AUTO DIFF WBC: CPT

## 2019-02-08 PROCEDURE — 99282 EMERGENCY DEPT VISIT SF MDM: CPT

## 2019-02-08 PROCEDURE — 51798 US URINE CAPACITY MEASURE: CPT

## 2019-02-11 ENCOUNTER — HOSPITAL ENCOUNTER (OUTPATIENT)
Dept: HOSPITAL 58 - RHC-LAB | Age: 69
Discharge: HOME | End: 2019-02-11
Attending: NURSE PRACTITIONER
Payer: COMMERCIAL

## 2019-02-11 ENCOUNTER — OFFICE VISIT (OUTPATIENT)
Dept: SURGERY | Age: 69
End: 2019-02-11
Payer: MEDICARE

## 2019-02-11 ENCOUNTER — TELEPHONE (OUTPATIENT)
Dept: GASTROENTEROLOGY | Age: 69
End: 2019-02-11

## 2019-02-11 VITALS
HEART RATE: 76 BPM | HEIGHT: 65 IN | BODY MASS INDEX: 27.16 KG/M2 | WEIGHT: 163 LBS | DIASTOLIC BLOOD PRESSURE: 70 MMHG | SYSTOLIC BLOOD PRESSURE: 118 MMHG

## 2019-02-11 VITALS — BODY MASS INDEX: 27.1 KG/M2

## 2019-02-11 DIAGNOSIS — K80.20: Primary | ICD-10-CM

## 2019-02-11 DIAGNOSIS — R74.8: ICD-10-CM

## 2019-02-11 DIAGNOSIS — R17: ICD-10-CM

## 2019-02-11 DIAGNOSIS — K80.13 CALCULUS OF GALLBLADDER WITH ACUTE ON CHRONIC CHOLECYSTITIS WITH OBSTRUCTION: Primary | ICD-10-CM

## 2019-02-11 PROCEDURE — 80076 HEPATIC FUNCTION PANEL: CPT

## 2019-02-11 PROCEDURE — 3017F COLORECTAL CA SCREEN DOC REV: CPT | Performed by: PHYSICIAN ASSISTANT

## 2019-02-11 PROCEDURE — 36415 COLL VENOUS BLD VENIPUNCTURE: CPT

## 2019-02-11 PROCEDURE — 1036F TOBACCO NON-USER: CPT | Performed by: PHYSICIAN ASSISTANT

## 2019-02-11 PROCEDURE — 4040F PNEUMOC VAC/ADMIN/RCVD: CPT | Performed by: PHYSICIAN ASSISTANT

## 2019-02-11 PROCEDURE — G8484 FLU IMMUNIZE NO ADMIN: HCPCS | Performed by: PHYSICIAN ASSISTANT

## 2019-02-11 PROCEDURE — 1101F PT FALLS ASSESS-DOCD LE1/YR: CPT | Performed by: PHYSICIAN ASSISTANT

## 2019-02-11 PROCEDURE — 1123F ACP DISCUSS/DSCN MKR DOCD: CPT | Performed by: PHYSICIAN ASSISTANT

## 2019-02-11 PROCEDURE — 1111F DSCHRG MED/CURRENT MED MERGE: CPT | Performed by: PHYSICIAN ASSISTANT

## 2019-02-11 PROCEDURE — G8419 CALC BMI OUT NRM PARAM NOF/U: HCPCS | Performed by: PHYSICIAN ASSISTANT

## 2019-02-11 PROCEDURE — 99213 OFFICE O/P EST LOW 20 MIN: CPT | Performed by: PHYSICIAN ASSISTANT

## 2019-02-11 PROCEDURE — G8427 DOCREV CUR MEDS BY ELIG CLIN: HCPCS | Performed by: PHYSICIAN ASSISTANT

## 2019-02-11 RX ORDER — DULOXETIN HYDROCHLORIDE 30 MG/1
CAPSULE, DELAYED RELEASE ORAL DAILY
COMMUNITY
Start: 2019-01-02 | End: 2019-05-16

## 2019-02-15 ENCOUNTER — HOSPITAL ENCOUNTER (OUTPATIENT)
Dept: HOSPITAL 58 - RHC-LAB | Age: 69
Discharge: HOME | End: 2019-02-15
Attending: NURSE PRACTITIONER

## 2019-02-15 VITALS — BODY MASS INDEX: 27.1 KG/M2

## 2019-02-15 DIAGNOSIS — R30.0: Primary | ICD-10-CM

## 2019-02-15 PROCEDURE — 81001 URINALYSIS AUTO W/SCOPE: CPT

## 2019-02-26 ENCOUNTER — APPOINTMENT (OUTPATIENT)
Dept: GENERAL RADIOLOGY | Age: 69
End: 2019-02-26
Attending: SURGERY
Payer: MEDICARE

## 2019-02-26 ENCOUNTER — ANESTHESIA (OUTPATIENT)
Dept: OPERATING ROOM | Age: 69
End: 2019-02-26
Payer: MEDICARE

## 2019-02-26 ENCOUNTER — HOSPITAL ENCOUNTER (OUTPATIENT)
Age: 69
Setting detail: OUTPATIENT SURGERY
Discharge: HOME HEALTH | End: 2019-02-26
Attending: SURGERY | Admitting: SURGERY
Payer: MEDICARE

## 2019-02-26 ENCOUNTER — ANESTHESIA EVENT (OUTPATIENT)
Dept: OPERATING ROOM | Age: 69
End: 2019-02-26
Payer: MEDICARE

## 2019-02-26 VITALS
BODY MASS INDEX: 27.16 KG/M2 | WEIGHT: 163 LBS | TEMPERATURE: 97.9 F | SYSTOLIC BLOOD PRESSURE: 143 MMHG | OXYGEN SATURATION: 91 % | HEART RATE: 91 BPM | DIASTOLIC BLOOD PRESSURE: 73 MMHG | RESPIRATION RATE: 20 BRPM | HEIGHT: 65 IN

## 2019-02-26 VITALS
SYSTOLIC BLOOD PRESSURE: 127 MMHG | TEMPERATURE: 96 F | DIASTOLIC BLOOD PRESSURE: 73 MMHG | OXYGEN SATURATION: 100 % | RESPIRATION RATE: 7 BRPM

## 2019-02-26 DIAGNOSIS — K80.20 GALLSTONES: Primary | ICD-10-CM

## 2019-02-26 PROCEDURE — 7100000000 HC PACU RECOVERY - FIRST 15 MIN: Performed by: SURGERY

## 2019-02-26 PROCEDURE — 74300 X-RAY BILE DUCTS/PANCREAS: CPT

## 2019-02-26 PROCEDURE — 3600000014 HC SURGERY LEVEL 4 ADDTL 15MIN: Performed by: SURGERY

## 2019-02-26 PROCEDURE — 3700000001 HC ADD 15 MINUTES (ANESTHESIA): Performed by: SURGERY

## 2019-02-26 PROCEDURE — 7100000010 HC PHASE II RECOVERY - FIRST 15 MIN: Performed by: SURGERY

## 2019-02-26 PROCEDURE — 88304 TISSUE EXAM BY PATHOLOGIST: CPT

## 2019-02-26 PROCEDURE — 2580000003 HC RX 258: Performed by: SURGERY

## 2019-02-26 PROCEDURE — 6360000002 HC RX W HCPCS: Performed by: SURGERY

## 2019-02-26 PROCEDURE — 7100000001 HC PACU RECOVERY - ADDTL 15 MIN: Performed by: SURGERY

## 2019-02-26 PROCEDURE — 7100000011 HC PHASE II RECOVERY - ADDTL 15 MIN: Performed by: SURGERY

## 2019-02-26 PROCEDURE — 47563 LAPARO CHOLECYSTECTOMY/GRAPH: CPT | Performed by: SURGERY

## 2019-02-26 PROCEDURE — 3700000000 HC ANESTHESIA ATTENDED CARE: Performed by: SURGERY

## 2019-02-26 PROCEDURE — 3600000004 HC SURGERY LEVEL 4 BASE: Performed by: SURGERY

## 2019-02-26 PROCEDURE — 2500000003 HC RX 250 WO HCPCS: Performed by: NURSE ANESTHETIST, CERTIFIED REGISTERED

## 2019-02-26 PROCEDURE — 6370000000 HC RX 637 (ALT 250 FOR IP): Performed by: ANESTHESIOLOGY

## 2019-02-26 PROCEDURE — 99999 PR OFFICE/OUTPT VISIT,PROCEDURE ONLY: CPT | Performed by: PHYSICIAN ASSISTANT

## 2019-02-26 PROCEDURE — 3209999900 FLUORO FOR SURGICAL PROCEDURES

## 2019-02-26 PROCEDURE — C1729 CATH, DRAINAGE: HCPCS | Performed by: SURGERY

## 2019-02-26 PROCEDURE — 6360000002 HC RX W HCPCS: Performed by: ANESTHESIOLOGY

## 2019-02-26 PROCEDURE — 2709999900 HC NON-CHARGEABLE SUPPLY: Performed by: SURGERY

## 2019-02-26 PROCEDURE — 6360000002 HC RX W HCPCS: Performed by: NURSE ANESTHETIST, CERTIFIED REGISTERED

## 2019-02-26 RX ORDER — SODIUM CHLORIDE 9 MG/ML
INJECTION, SOLUTION INTRAVENOUS CONTINUOUS
Status: DISCONTINUED | OUTPATIENT
Start: 2019-02-26 | End: 2019-02-26 | Stop reason: HOSPADM

## 2019-02-26 RX ORDER — ROCURONIUM BROMIDE 10 MG/ML
INJECTION, SOLUTION INTRAVENOUS PRN
Status: DISCONTINUED | OUTPATIENT
Start: 2019-02-26 | End: 2019-02-26 | Stop reason: SDUPTHER

## 2019-02-26 RX ORDER — FENTANYL CITRATE 50 UG/ML
50 INJECTION, SOLUTION INTRAMUSCULAR; INTRAVENOUS
Status: DISCONTINUED | OUTPATIENT
Start: 2019-02-26 | End: 2019-02-26 | Stop reason: HOSPADM

## 2019-02-26 RX ORDER — SODIUM CHLORIDE 0.9 % (FLUSH) 0.9 %
10 SYRINGE (ML) INJECTION EVERY 12 HOURS SCHEDULED
Status: DISCONTINUED | OUTPATIENT
Start: 2019-02-26 | End: 2019-02-26 | Stop reason: HOSPADM

## 2019-02-26 RX ORDER — APREPITANT 40 MG/1
40 CAPSULE ORAL ONCE
Status: COMPLETED | OUTPATIENT
Start: 2019-02-26 | End: 2019-02-26

## 2019-02-26 RX ORDER — HYDRALAZINE HYDROCHLORIDE 20 MG/ML
5 INJECTION INTRAMUSCULAR; INTRAVENOUS EVERY 10 MIN PRN
Status: DISCONTINUED | OUTPATIENT
Start: 2019-02-26 | End: 2019-02-26 | Stop reason: HOSPADM

## 2019-02-26 RX ORDER — PROMETHAZINE HYDROCHLORIDE 25 MG/ML
6.25 INJECTION, SOLUTION INTRAMUSCULAR; INTRAVENOUS
Status: DISCONTINUED | OUTPATIENT
Start: 2019-02-26 | End: 2019-02-26 | Stop reason: HOSPADM

## 2019-02-26 RX ORDER — ENALAPRILAT 2.5 MG/2ML
1.25 INJECTION INTRAVENOUS
Status: DISCONTINUED | OUTPATIENT
Start: 2019-02-26 | End: 2019-02-26 | Stop reason: HOSPADM

## 2019-02-26 RX ORDER — MORPHINE SULFATE 2 MG/ML
2 INJECTION, SOLUTION INTRAMUSCULAR; INTRAVENOUS EVERY 5 MIN PRN
Status: DISCONTINUED | OUTPATIENT
Start: 2019-02-26 | End: 2019-02-26 | Stop reason: HOSPADM

## 2019-02-26 RX ORDER — LIDOCAINE HYDROCHLORIDE 10 MG/ML
INJECTION, SOLUTION EPIDURAL; INFILTRATION; INTRACAUDAL; PERINEURAL PRN
Status: DISCONTINUED | OUTPATIENT
Start: 2019-02-26 | End: 2019-02-26 | Stop reason: SDUPTHER

## 2019-02-26 RX ORDER — MORPHINE SULFATE 2 MG/ML
4 INJECTION, SOLUTION INTRAMUSCULAR; INTRAVENOUS EVERY 5 MIN PRN
Status: DISCONTINUED | OUTPATIENT
Start: 2019-02-26 | End: 2019-02-26 | Stop reason: HOSPADM

## 2019-02-26 RX ORDER — LIDOCAINE HYDROCHLORIDE 10 MG/ML
1 INJECTION, SOLUTION EPIDURAL; INFILTRATION; INTRACAUDAL; PERINEURAL
Status: DISCONTINUED | OUTPATIENT
Start: 2019-02-26 | End: 2019-02-26 | Stop reason: HOSPADM

## 2019-02-26 RX ORDER — HYDROCODONE BITARTRATE AND ACETAMINOPHEN 5; 325 MG/1; MG/1
1 TABLET ORAL EVERY 6 HOURS PRN
Qty: 15 TABLET | Refills: 0 | Status: SHIPPED | OUTPATIENT
Start: 2019-02-26 | End: 2019-05-16

## 2019-02-26 RX ORDER — SODIUM CHLORIDE 0.9 % (FLUSH) 0.9 %
10 SYRINGE (ML) INJECTION PRN
Status: DISCONTINUED | OUTPATIENT
Start: 2019-02-26 | End: 2019-02-26 | Stop reason: HOSPADM

## 2019-02-26 RX ORDER — ONDANSETRON 2 MG/ML
INJECTION INTRAMUSCULAR; INTRAVENOUS PRN
Status: DISCONTINUED | OUTPATIENT
Start: 2019-02-26 | End: 2019-02-26 | Stop reason: SDUPTHER

## 2019-02-26 RX ORDER — DEXAMETHASONE SODIUM PHOSPHATE 10 MG/ML
INJECTION INTRAMUSCULAR; INTRAVENOUS PRN
Status: DISCONTINUED | OUTPATIENT
Start: 2019-02-26 | End: 2019-02-26 | Stop reason: SDUPTHER

## 2019-02-26 RX ORDER — LIDOCAINE HYDROCHLORIDE 10 MG/ML
1 INJECTION, SOLUTION EPIDURAL; INFILTRATION; INTRACAUDAL; PERINEURAL ONCE
Status: DISCONTINUED | OUTPATIENT
Start: 2019-02-26 | End: 2019-02-26 | Stop reason: HOSPADM

## 2019-02-26 RX ORDER — FENTANYL CITRATE 50 UG/ML
INJECTION, SOLUTION INTRAMUSCULAR; INTRAVENOUS PRN
Status: DISCONTINUED | OUTPATIENT
Start: 2019-02-26 | End: 2019-02-26 | Stop reason: SDUPTHER

## 2019-02-26 RX ORDER — SODIUM CHLORIDE, SODIUM LACTATE, POTASSIUM CHLORIDE, CALCIUM CHLORIDE 600; 310; 30; 20 MG/100ML; MG/100ML; MG/100ML; MG/100ML
INJECTION, SOLUTION INTRAVENOUS CONTINUOUS
Status: DISCONTINUED | OUTPATIENT
Start: 2019-02-26 | End: 2019-02-26 | Stop reason: HOSPADM

## 2019-02-26 RX ORDER — LABETALOL HYDROCHLORIDE 5 MG/ML
5 INJECTION, SOLUTION INTRAVENOUS EVERY 10 MIN PRN
Status: DISCONTINUED | OUTPATIENT
Start: 2019-02-26 | End: 2019-02-26 | Stop reason: HOSPADM

## 2019-02-26 RX ORDER — FENTANYL CITRATE 50 UG/ML
25 INJECTION, SOLUTION INTRAMUSCULAR; INTRAVENOUS
Status: DISCONTINUED | OUTPATIENT
Start: 2019-02-26 | End: 2019-02-26 | Stop reason: HOSPADM

## 2019-02-26 RX ORDER — EPHEDRINE SULFATE 50 MG/ML
INJECTION, SOLUTION INTRAVENOUS PRN
Status: DISCONTINUED | OUTPATIENT
Start: 2019-02-26 | End: 2019-02-26 | Stop reason: SDUPTHER

## 2019-02-26 RX ORDER — KETAMINE HYDROCHLORIDE 100 MG/ML
INJECTION, SOLUTION INTRAMUSCULAR; INTRAVENOUS PRN
Status: DISCONTINUED | OUTPATIENT
Start: 2019-02-26 | End: 2019-02-26 | Stop reason: SDUPTHER

## 2019-02-26 RX ORDER — PROPOFOL 10 MG/ML
INJECTION, EMULSION INTRAVENOUS PRN
Status: DISCONTINUED | OUTPATIENT
Start: 2019-02-26 | End: 2019-02-26 | Stop reason: SDUPTHER

## 2019-02-26 RX ORDER — MIDAZOLAM HYDROCHLORIDE 1 MG/ML
2 INJECTION INTRAMUSCULAR; INTRAVENOUS
Status: COMPLETED | OUTPATIENT
Start: 2019-02-26 | End: 2019-02-26

## 2019-02-26 RX ORDER — METOCLOPRAMIDE HYDROCHLORIDE 5 MG/ML
10 INJECTION INTRAMUSCULAR; INTRAVENOUS
Status: DISCONTINUED | OUTPATIENT
Start: 2019-02-26 | End: 2019-02-26 | Stop reason: HOSPADM

## 2019-02-26 RX ORDER — MEPERIDINE HYDROCHLORIDE 50 MG/ML
12.5 INJECTION INTRAMUSCULAR; INTRAVENOUS; SUBCUTANEOUS EVERY 5 MIN PRN
Status: DISCONTINUED | OUTPATIENT
Start: 2019-02-26 | End: 2019-02-26 | Stop reason: HOSPADM

## 2019-02-26 RX ORDER — SCOLOPAMINE TRANSDERMAL SYSTEM 1 MG/1
1 PATCH, EXTENDED RELEASE TRANSDERMAL ONCE
Status: DISCONTINUED | OUTPATIENT
Start: 2019-02-26 | End: 2019-02-26 | Stop reason: HOSPADM

## 2019-02-26 RX ORDER — DIPHENHYDRAMINE HYDROCHLORIDE 50 MG/ML
12.5 INJECTION INTRAMUSCULAR; INTRAVENOUS
Status: DISCONTINUED | OUTPATIENT
Start: 2019-02-26 | End: 2019-02-26 | Stop reason: HOSPADM

## 2019-02-26 RX ADMIN — ROCURONIUM BROMIDE 50 MG: 10 INJECTION INTRAVENOUS at 15:10

## 2019-02-26 RX ADMIN — Medication 50 MG: at 15:17

## 2019-02-26 RX ADMIN — ROCURONIUM BROMIDE 10 MG: 10 INJECTION INTRAVENOUS at 15:56

## 2019-02-26 RX ADMIN — LIDOCAINE HYDROCHLORIDE 50 MG: 10 INJECTION, SOLUTION EPIDURAL; INFILTRATION; INTRACAUDAL; PERINEURAL at 15:09

## 2019-02-26 RX ADMIN — SODIUM CHLORIDE, SODIUM LACTATE, POTASSIUM CHLORIDE, AND CALCIUM CHLORIDE: 600; 310; 30; 20 INJECTION, SOLUTION INTRAVENOUS at 11:09

## 2019-02-26 RX ADMIN — ONDANSETRON HYDROCHLORIDE 4 MG: 2 INJECTION, SOLUTION INTRAMUSCULAR; INTRAVENOUS at 16:20

## 2019-02-26 RX ADMIN — EPHEDRINE SULFATE 10 MG: 50 INJECTION, SOLUTION INTRAMUSCULAR; INTRAVENOUS; SUBCUTANEOUS at 15:35

## 2019-02-26 RX ADMIN — FENTANYL CITRATE 100 MCG: 50 INJECTION INTRAMUSCULAR; INTRAVENOUS at 15:09

## 2019-02-26 RX ADMIN — HYDROMORPHONE HYDROCHLORIDE 0.5 MG: 1 INJECTION, SOLUTION INTRAMUSCULAR; INTRAVENOUS; SUBCUTANEOUS at 15:53

## 2019-02-26 RX ADMIN — HYDROMORPHONE HYDROCHLORIDE 0.5 MG: 1 INJECTION, SOLUTION INTRAMUSCULAR; INTRAVENOUS; SUBCUTANEOUS at 16:07

## 2019-02-26 RX ADMIN — SUGAMMADEX 150 MG: 100 INJECTION, SOLUTION INTRAVENOUS at 16:26

## 2019-02-26 RX ADMIN — SODIUM CHLORIDE, SODIUM LACTATE, POTASSIUM CHLORIDE, AND CALCIUM CHLORIDE: 600; 310; 30; 20 INJECTION, SOLUTION INTRAVENOUS at 16:20

## 2019-02-26 RX ADMIN — DEXAMETHASONE SODIUM PHOSPHATE 10 MG: 10 INJECTION INTRAMUSCULAR; INTRAVENOUS at 15:19

## 2019-02-26 RX ADMIN — HYDROMORPHONE HYDROCHLORIDE 0.5 MG: 1 INJECTION, SOLUTION INTRAMUSCULAR; INTRAVENOUS; SUBCUTANEOUS at 16:34

## 2019-02-26 RX ADMIN — HYDROMORPHONE HYDROCHLORIDE 0.5 MG: 1 INJECTION, SOLUTION INTRAMUSCULAR; INTRAVENOUS; SUBCUTANEOUS at 16:28

## 2019-02-26 RX ADMIN — APREPITANT 40 MG: 40 CAPSULE ORAL at 11:12

## 2019-02-26 RX ADMIN — PROPOFOL 150 MG: 10 INJECTION, EMULSION INTRAVENOUS at 15:09

## 2019-02-26 RX ADMIN — MIDAZOLAM 2 MG: 1 INJECTION INTRAMUSCULAR; INTRAVENOUS at 14:31

## 2019-02-26 RX ADMIN — WATER 1 G: 1 INJECTION INTRAMUSCULAR; INTRAVENOUS; SUBCUTANEOUS at 15:22

## 2019-02-26 ASSESSMENT — PAIN SCALES - GENERAL
PAINLEVEL_OUTOF10: 0

## 2019-02-26 ASSESSMENT — PAIN - FUNCTIONAL ASSESSMENT: PAIN_FUNCTIONAL_ASSESSMENT: 0-10

## 2019-03-04 ENCOUNTER — TELEPHONE (OUTPATIENT)
Dept: SURGERY | Age: 69
End: 2019-03-04

## 2019-03-08 ENCOUNTER — TELEPHONE (OUTPATIENT)
Dept: SURGERY | Age: 69
End: 2019-03-08

## 2019-03-10 ENCOUNTER — HOSPITAL ENCOUNTER (EMERGENCY)
Dept: HOSPITAL 58 - ED | Age: 69
Discharge: HOME | End: 2019-03-10
Payer: COMMERCIAL

## 2019-03-10 VITALS — TEMPERATURE: 98.4 F | DIASTOLIC BLOOD PRESSURE: 78 MMHG | SYSTOLIC BLOOD PRESSURE: 133 MMHG

## 2019-03-10 VITALS — BODY MASS INDEX: 25.7 KG/M2

## 2019-03-10 DIAGNOSIS — F17.210: ICD-10-CM

## 2019-03-10 DIAGNOSIS — T14.90XA: Primary | ICD-10-CM

## 2019-03-10 PROCEDURE — 99281 EMR DPT VST MAYX REQ PHY/QHP: CPT

## 2019-03-11 ENCOUNTER — TELEPHONE (OUTPATIENT)
Dept: SURGERY | Age: 69
End: 2019-03-11

## 2019-03-18 ENCOUNTER — TELEPHONE (OUTPATIENT)
Dept: SURGERY | Age: 69
End: 2019-03-18

## 2019-03-27 ENCOUNTER — OFFICE VISIT (OUTPATIENT)
Dept: SURGERY | Age: 69
End: 2019-03-27

## 2019-03-27 VITALS
OXYGEN SATURATION: 98 % | BODY MASS INDEX: 24.02 KG/M2 | HEART RATE: 75 BPM | TEMPERATURE: 97.5 F | WEIGHT: 162.2 LBS | HEIGHT: 69 IN

## 2019-03-27 DIAGNOSIS — Z90.49 S/P LAPAROSCOPIC CHOLECYSTECTOMY: Primary | ICD-10-CM

## 2019-03-27 PROCEDURE — 99024 POSTOP FOLLOW-UP VISIT: CPT | Performed by: PHYSICIAN ASSISTANT

## 2019-04-05 ENCOUNTER — HOSPITAL ENCOUNTER (OUTPATIENT)
Dept: HOSPITAL 58 - RHC-LAB | Age: 69
Discharge: HOME | End: 2019-04-05
Attending: NURSE PRACTITIONER

## 2019-04-05 VITALS — BODY MASS INDEX: 25.7 KG/M2

## 2019-04-05 DIAGNOSIS — J44.9: Primary | ICD-10-CM

## 2019-04-05 DIAGNOSIS — R74.8: ICD-10-CM

## 2019-04-05 DIAGNOSIS — D72.828: ICD-10-CM

## 2019-04-05 PROCEDURE — 36415 COLL VENOUS BLD VENIPUNCTURE: CPT

## 2019-04-05 PROCEDURE — 85025 COMPLETE CBC W/AUTO DIFF WBC: CPT

## 2019-04-05 PROCEDURE — 80053 COMPREHEN METABOLIC PANEL: CPT

## 2019-04-09 ENCOUNTER — TELEPHONE (OUTPATIENT)
Dept: GASTROENTEROLOGY | Age: 69
End: 2019-04-09

## 2019-04-09 NOTE — TELEPHONE ENCOUNTER
Patient left me a vm today stating that he is scheduled for ERCP with stent removal on 4/24/19. He said he wanted to know what that meant. I left him a voice mail stating that it is an endoscope where they go through his mouth into his stomach. They remove the stent and then he will be able to go home the same day. I advised for him to call with any other questions.

## 2019-04-12 ENCOUNTER — HOSPITAL ENCOUNTER (OUTPATIENT)
Dept: HOSPITAL 58 - REHAB | Age: 69
LOS: 18 days | End: 2019-04-30
Attending: NURSE PRACTITIONER

## 2019-04-12 VITALS — BODY MASS INDEX: 25.7 KG/M2

## 2019-04-12 DIAGNOSIS — M79.604: ICD-10-CM

## 2019-04-12 DIAGNOSIS — M54.16: ICD-10-CM

## 2019-04-12 DIAGNOSIS — G89.29: ICD-10-CM

## 2019-04-12 DIAGNOSIS — M62.89: ICD-10-CM

## 2019-04-12 DIAGNOSIS — M62.81: ICD-10-CM

## 2019-04-12 DIAGNOSIS — M51.36: ICD-10-CM

## 2019-04-12 DIAGNOSIS — M79.605: ICD-10-CM

## 2019-04-12 DIAGNOSIS — M54.9: Primary | ICD-10-CM

## 2019-04-23 ENCOUNTER — ANESTHESIA EVENT (OUTPATIENT)
Dept: ENDOSCOPY | Age: 69
End: 2019-04-23
Payer: MEDICARE

## 2019-04-24 ENCOUNTER — ANESTHESIA (OUTPATIENT)
Dept: ENDOSCOPY | Age: 69
End: 2019-04-24
Payer: MEDICARE

## 2019-04-24 ENCOUNTER — HOSPITAL ENCOUNTER (OUTPATIENT)
Age: 69
Setting detail: OUTPATIENT SURGERY
Discharge: HOME | End: 2019-04-24
Attending: INTERNAL MEDICINE | Admitting: INTERNAL MEDICINE
Payer: MEDICARE

## 2019-04-24 ENCOUNTER — APPOINTMENT (OUTPATIENT)
Dept: GENERAL RADIOLOGY | Age: 69
End: 2019-04-24
Attending: INTERNAL MEDICINE
Payer: MEDICARE

## 2019-04-24 VITALS
DIASTOLIC BLOOD PRESSURE: 59 MMHG | OXYGEN SATURATION: 98 % | RESPIRATION RATE: 18 BRPM | BODY MASS INDEX: 24.14 KG/M2 | TEMPERATURE: 96.6 F | SYSTOLIC BLOOD PRESSURE: 123 MMHG | HEART RATE: 53 BPM | HEIGHT: 69 IN | WEIGHT: 163 LBS

## 2019-04-24 VITALS
RESPIRATION RATE: 15 BRPM | DIASTOLIC BLOOD PRESSURE: 58 MMHG | SYSTOLIC BLOOD PRESSURE: 92 MMHG | OXYGEN SATURATION: 100 %

## 2019-04-24 PROCEDURE — 2720000010 HC SURG SUPPLY STERILE: Performed by: INTERNAL MEDICINE

## 2019-04-24 PROCEDURE — 6360000002 HC RX W HCPCS: Performed by: NURSE ANESTHETIST, CERTIFIED REGISTERED

## 2019-04-24 PROCEDURE — 43273 ENDOSCOPIC PANCREATOSCOPY: CPT | Performed by: INTERNAL MEDICINE

## 2019-04-24 PROCEDURE — 2709999900 HC NON-CHARGEABLE SUPPLY: Performed by: INTERNAL MEDICINE

## 2019-04-24 PROCEDURE — 2500000003 HC RX 250 WO HCPCS: Performed by: INTERNAL MEDICINE

## 2019-04-24 PROCEDURE — C1769 GUIDE WIRE: HCPCS | Performed by: INTERNAL MEDICINE

## 2019-04-24 PROCEDURE — 7100000010 HC PHASE II RECOVERY - FIRST 15 MIN: Performed by: INTERNAL MEDICINE

## 2019-04-24 PROCEDURE — 3609014400 HC ERCP DIAGNOSTIC: Performed by: INTERNAL MEDICINE

## 2019-04-24 PROCEDURE — 7100000000 HC PACU RECOVERY - FIRST 15 MIN: Performed by: INTERNAL MEDICINE

## 2019-04-24 PROCEDURE — 2580000003 HC RX 258: Performed by: INTERNAL MEDICINE

## 2019-04-24 PROCEDURE — 3700000001 HC ADD 15 MINUTES (ANESTHESIA): Performed by: INTERNAL MEDICINE

## 2019-04-24 PROCEDURE — 43275 ERCP REMOVE FORGN BODY DUCT: CPT | Performed by: INTERNAL MEDICINE

## 2019-04-24 PROCEDURE — 74328 X-RAY BILE DUCT ENDOSCOPY: CPT

## 2019-04-24 PROCEDURE — 7100000001 HC PACU RECOVERY - ADDTL 15 MIN: Performed by: INTERNAL MEDICINE

## 2019-04-24 PROCEDURE — 3700000000 HC ANESTHESIA ATTENDED CARE: Performed by: INTERNAL MEDICINE

## 2019-04-24 PROCEDURE — 74328 X-RAY BILE DUCT ENDOSCOPY: CPT | Performed by: INTERNAL MEDICINE

## 2019-04-24 PROCEDURE — 3209999900 FLUORO FOR SURGICAL PROCEDURES

## 2019-04-24 PROCEDURE — 7100000011 HC PHASE II RECOVERY - ADDTL 15 MIN: Performed by: INTERNAL MEDICINE

## 2019-04-24 PROCEDURE — 2500000003 HC RX 250 WO HCPCS: Performed by: NURSE ANESTHETIST, CERTIFIED REGISTERED

## 2019-04-24 PROCEDURE — 43264 ERCP REMOVE DUCT CALCULI: CPT | Performed by: INTERNAL MEDICINE

## 2019-04-24 RX ORDER — LIDOCAINE HYDROCHLORIDE 20 MG/ML
INJECTION, SOLUTION INFILTRATION; PERINEURAL PRN
Status: DISCONTINUED | OUTPATIENT
Start: 2019-04-24 | End: 2019-04-24 | Stop reason: SDUPTHER

## 2019-04-24 RX ORDER — MIDAZOLAM HYDROCHLORIDE 1 MG/ML
INJECTION INTRAMUSCULAR; INTRAVENOUS PRN
Status: DISCONTINUED | OUTPATIENT
Start: 2019-04-24 | End: 2019-04-24 | Stop reason: SDUPTHER

## 2019-04-24 RX ORDER — MORPHINE SULFATE 2 MG/ML
2 INJECTION, SOLUTION INTRAMUSCULAR; INTRAVENOUS EVERY 5 MIN PRN
Status: DISCONTINUED | OUTPATIENT
Start: 2019-04-24 | End: 2019-04-24 | Stop reason: HOSPADM

## 2019-04-24 RX ORDER — HYDRALAZINE HYDROCHLORIDE 20 MG/ML
5 INJECTION INTRAMUSCULAR; INTRAVENOUS EVERY 10 MIN PRN
Status: DISCONTINUED | OUTPATIENT
Start: 2019-04-24 | End: 2019-04-24 | Stop reason: HOSPADM

## 2019-04-24 RX ORDER — ENALAPRILAT 2.5 MG/2ML
1.25 INJECTION INTRAVENOUS
Status: DISCONTINUED | OUTPATIENT
Start: 2019-04-24 | End: 2019-04-24 | Stop reason: HOSPADM

## 2019-04-24 RX ORDER — MORPHINE SULFATE 2 MG/ML
4 INJECTION, SOLUTION INTRAMUSCULAR; INTRAVENOUS EVERY 5 MIN PRN
Status: DISCONTINUED | OUTPATIENT
Start: 2019-04-24 | End: 2019-04-24 | Stop reason: HOSPADM

## 2019-04-24 RX ORDER — DIPHENHYDRAMINE HYDROCHLORIDE 50 MG/ML
12.5 INJECTION INTRAMUSCULAR; INTRAVENOUS
Status: DISCONTINUED | OUTPATIENT
Start: 2019-04-24 | End: 2019-04-24 | Stop reason: HOSPADM

## 2019-04-24 RX ORDER — SUCCINYLCHOLINE CHLORIDE 20 MG/ML
INJECTION INTRAMUSCULAR; INTRAVENOUS PRN
Status: DISCONTINUED | OUTPATIENT
Start: 2019-04-24 | End: 2019-04-24 | Stop reason: SDUPTHER

## 2019-04-24 RX ORDER — LABETALOL HYDROCHLORIDE 5 MG/ML
5 INJECTION, SOLUTION INTRAVENOUS EVERY 10 MIN PRN
Status: DISCONTINUED | OUTPATIENT
Start: 2019-04-24 | End: 2019-04-24 | Stop reason: HOSPADM

## 2019-04-24 RX ORDER — LIDOCAINE HYDROCHLORIDE 10 MG/ML
1 INJECTION, SOLUTION EPIDURAL; INFILTRATION; INTRACAUDAL; PERINEURAL ONCE
Status: COMPLETED | OUTPATIENT
Start: 2019-04-24 | End: 2019-04-24

## 2019-04-24 RX ORDER — FENTANYL CITRATE 50 UG/ML
INJECTION, SOLUTION INTRAMUSCULAR; INTRAVENOUS PRN
Status: DISCONTINUED | OUTPATIENT
Start: 2019-04-24 | End: 2019-04-24 | Stop reason: SDUPTHER

## 2019-04-24 RX ORDER — SODIUM CHLORIDE, SODIUM LACTATE, POTASSIUM CHLORIDE, CALCIUM CHLORIDE 600; 310; 30; 20 MG/100ML; MG/100ML; MG/100ML; MG/100ML
INJECTION, SOLUTION INTRAVENOUS CONTINUOUS
Status: DISCONTINUED | OUTPATIENT
Start: 2019-04-24 | End: 2019-04-24 | Stop reason: HOSPADM

## 2019-04-24 RX ORDER — PROPOFOL 10 MG/ML
INJECTION, EMULSION INTRAVENOUS PRN
Status: DISCONTINUED | OUTPATIENT
Start: 2019-04-24 | End: 2019-04-24 | Stop reason: SDUPTHER

## 2019-04-24 RX ORDER — METOCLOPRAMIDE HYDROCHLORIDE 5 MG/ML
10 INJECTION INTRAMUSCULAR; INTRAVENOUS
Status: DISCONTINUED | OUTPATIENT
Start: 2019-04-24 | End: 2019-04-24 | Stop reason: HOSPADM

## 2019-04-24 RX ORDER — ONDANSETRON 2 MG/ML
INJECTION INTRAMUSCULAR; INTRAVENOUS PRN
Status: DISCONTINUED | OUTPATIENT
Start: 2019-04-24 | End: 2019-04-24 | Stop reason: SDUPTHER

## 2019-04-24 RX ORDER — MEPERIDINE HYDROCHLORIDE 50 MG/ML
12.5 INJECTION INTRAMUSCULAR; INTRAVENOUS; SUBCUTANEOUS EVERY 5 MIN PRN
Status: DISCONTINUED | OUTPATIENT
Start: 2019-04-24 | End: 2019-04-24 | Stop reason: HOSPADM

## 2019-04-24 RX ORDER — ROCURONIUM BROMIDE 10 MG/ML
INJECTION, SOLUTION INTRAVENOUS PRN
Status: DISCONTINUED | OUTPATIENT
Start: 2019-04-24 | End: 2019-04-24 | Stop reason: SDUPTHER

## 2019-04-24 RX ORDER — PROMETHAZINE HYDROCHLORIDE 25 MG/ML
6.25 INJECTION, SOLUTION INTRAMUSCULAR; INTRAVENOUS
Status: DISCONTINUED | OUTPATIENT
Start: 2019-04-24 | End: 2019-04-24 | Stop reason: HOSPADM

## 2019-04-24 RX ADMIN — FENTANYL CITRATE 50 MCG: 50 INJECTION INTRAMUSCULAR; INTRAVENOUS at 10:36

## 2019-04-24 RX ADMIN — LIDOCAINE HYDROCHLORIDE 1 ML: 10 INJECTION, SOLUTION EPIDURAL; INFILTRATION; INTRACAUDAL; PERINEURAL at 08:48

## 2019-04-24 RX ADMIN — FENTANYL CITRATE 50 MCG: 50 INJECTION INTRAMUSCULAR; INTRAVENOUS at 10:53

## 2019-04-24 RX ADMIN — SUGAMMADEX 296 MG: 100 INJECTION, SOLUTION INTRAVENOUS at 11:21

## 2019-04-24 RX ADMIN — SODIUM CHLORIDE, POTASSIUM CHLORIDE, SODIUM LACTATE AND CALCIUM CHLORIDE: 600; 310; 30; 20 INJECTION, SOLUTION INTRAVENOUS at 08:48

## 2019-04-24 RX ADMIN — ROCURONIUM BROMIDE 5 MG: 10 INJECTION INTRAVENOUS at 10:36

## 2019-04-24 RX ADMIN — MIDAZOLAM 2 MG: 1 INJECTION INTRAMUSCULAR; INTRAVENOUS at 10:32

## 2019-04-24 RX ADMIN — SUCCINYLCHOLINE CHLORIDE 120 MG: 20 INJECTION, SOLUTION INTRAMUSCULAR; INTRAVENOUS; PARENTERAL at 10:38

## 2019-04-24 RX ADMIN — ROCURONIUM BROMIDE 45 MG: 10 INJECTION INTRAVENOUS at 10:46

## 2019-04-24 RX ADMIN — LIDOCAINE HYDROCHLORIDE 40 MG: 20 INJECTION, SOLUTION INFILTRATION; PERINEURAL at 10:36

## 2019-04-24 RX ADMIN — PROPOFOL 200 MG: 10 INJECTION, EMULSION INTRAVENOUS at 10:36

## 2019-04-24 RX ADMIN — ONDANSETRON HYDROCHLORIDE 4 MG: 2 INJECTION, SOLUTION INTRAMUSCULAR; INTRAVENOUS at 10:48

## 2019-04-24 ASSESSMENT — PAIN SCALES - GENERAL
PAINLEVEL_OUTOF10: 0

## 2019-04-24 ASSESSMENT — PAIN - FUNCTIONAL ASSESSMENT: PAIN_FUNCTIONAL_ASSESSMENT: 0-10

## 2019-04-24 NOTE — PROGRESS NOTES
Jh Lucas     :  1950    Chief Complaint   Patient presents with    Post-Op Check     Lap cholecystectomy with gram 19        HPI:  Bereket Megan comes today in follow-up from a laparoscopic cholecystectomy with operative cholangiogram. The pathology report demonstrated chronic cholecystitis with cholelithiasis. Patient Active Problem List    Diagnosis Date Noted    Gallstones 2019    E coli bacteremia     Hypokalemia     Sepsis ([facility] [address]     Hypotension due to hypovolemia     Choledocholithiasis 2019    Hypothyroidism 2019    Chronic back pain 2019    COPD (chronic obstructive pulmonary disease) ([facility] [address] 2019    Urinary retention 2019    Leukocytosis 2019    Cholangitis due to bile duct calculus with obstruction 2019    Left groin pain 2012     Current Outpatient Medications   Medication Sig Dispense Refill    DULoxetine (CYMBALTA) 30 MG extended release capsule Take by mouth daily       ranitidine (ZANTAC) 150 MG tablet Take 150 mg by mouth 2 times daily (before meals)      NONFORMULARY Take 2 puffs by mouth 4 times daily Albuterol Sulfate 6.7 GM Hfa. Aer.ad      traMADol (ULTRAM) 50 MG tablet Take 50 mg by mouth 2 times daily as needed for Pain. Mau Prabhakar fluticasone-salmeterol (ADVAIR) 250-50 MCG/DOSE AEPB Inhale 1 puff into the lungs every 12 hours      levothyroxine (SYNTHROID) 100 MCG tablet Take 100 mcg by mouth Daily      aspirin 81 MG tablet Take 81 mg by mouth as needed for Pain      furosemide (LASIX) 20 MG tablet Take 1 tablet by mouth daily 60 tablet 3    HYDROcodone-acetaminophen (NORCO) 5-325 MG per tablet Take 1 tablet by mouth every 6 hours as needed for Pain. . 15 tablet 0    potassium chloride SA (K-DUR;KLOR-CON M) 10 MEQ tablet Take 1 tablet by mouth daily for 7 days (Patient taking differently: Take 10 mEq by mouth daily ) 7 tablet 0     No current facility-administered medications for this visit.

## 2019-04-24 NOTE — H&P
Patient Name: Chad Murphy  : 1950  MRN: [id number]  DATE: 19    Allergies: Allergies   Allergen Reactions    Meloxicam      Dizzy, skin crawls    Tamsulosin      excessive sweating    Pcn [Penicillins] Hives     Pt reported to me that he had \" little red bumps\" on arms - no swelling, no SOA, no mucous membrane involvement. KEILA Gautam MD        ENDOSCOPY  History and Physical    Procedure:    [] Diagnostic Colonoscopy       [] Screening Colonoscopy  [] EGD      [x] ERCP      [] EUS       [] Other    [x] Previous office notes/History and Physical reviewed from the patients chart. Please see EMR for further details of HPI. I have examined the patient's status immediately prior to the procedure and:      Indications/HPI:    []Abdominal Pain   []Cancer- GI/Lung     []Fhx of colon CA/polyps  []History of Polyps  []Barretts            []Melena  []Abnormal Imaging              []Dysphagia              []Persistent Pneumonia   []Anemia                            []Food Impaction        []History of Polyps  [] GI Bleed             []Pulmonary nodule/Mass   []Change in bowel habits []Heartburn/Reflux  []Rectal Bleed (BRBPR)  []Chest Pain - Non Cardiac []Heme (+) Stool []Ulcers  []Constipation  []Hemoptysis  []Varices  []Diarrhea  []Hypoxemia    []Nausea/Vomiting   []Screening   []Crohns/Colitis  [x]Other:  Stent removal and stone extraction. Anesthesia:   [] MAC [] Moderate Sedation   [x] General   [] None     ROS: 12 pt Review of Symptoms was negative unless mentioned above    Medications:   Prior to Admission medications    Medication Sig Start Date End Date Taking? Authorizing Provider   HYDROcodone-acetaminophen (NORCO) 5-325 MG per tablet Take 1 tablet by mouth every 6 hours as needed for Pain. . 19  Concetta Ye MD   DULoxetine (CYMBALTA) 30 MG extended release capsule Take by mouth daily  19   Historical Provider, MD   ranitidine (ZANTAC) 150 MG tablet Take 150 mg by mouth 2 times daily (before meals)    Historical Provider, MD   NONFORMULARY Take 2 puffs by mouth 4 times daily Albuterol Sulfate 6.7 GM Hfa. Aer.ad    Historical Provider, MD   traMADol (ULTRAM) 50 MG tablet Take 50 mg by mouth 2 times daily as needed for Pain. Jasiel Velazquez Historical Provider, MD   fluticasone-salmeterol (ADVAIR) 250-50 MCG/DOSE AEPB Inhale 1 puff into the lungs every 12 hours    Historical Provider, MD   levothyroxine (SYNTHROID) 100 MCG tablet Take 100 mcg by mouth Daily    Historical Provider, MD   aspirin 81 MG tablet Take 81 mg by mouth as needed for Pain    Historical Provider, MD   furosemide (LASIX) 20 MG tablet Take 1 tablet by mouth daily 12/15/15   DESTINI Goins CNP   potassium chloride SA (K-DUR;KLOR-CON M) 10 MEQ tablet Take 1 tablet by mouth daily for 7 days  Patient taking differently: Take 10 mEq by mouth daily  12/15/15 12/22/15  DESTINI Goins CNP       Past Medical History:  Past Medical History:   Diagnosis Date    Bacteremia     Chronic back pain     COPD [facility] disease) ([facility] [address]     GERD (gastroesophageal reflux disease)     Hypothyroidism     Neuropathy     LLE    Urinary retention        Past Surgical History:  Past Surgical History:   Procedure Laterality Date    CHOLECYSTECTOMY, LAPAROSCOPIC N/A 2/26/2019    CHOLECYSTECTOMY LAPAROSCOPIC WITH GRAM performed by Catherine Boyd MD at [place] [address]  7/2011    R neck    ERCP N/A 1/29/2019    Dr SEVERINO Shook-w/placement of a 10 x 40 mm self-expanding metal biliary stent, stone removal, surgical consult-Repeat in 8-12 wks    HERNIA REPAIR  7/2011    L ing hernia w/mesh, small umb hernia    KNEE SURGERY      SPINE SURGERY      complicated by a postop. hematoma       Social History:  Social History     Tobacco Use    Smoking status: Former Smoker     Packs/day: 1.00     Years: 30.00     Pack years: 30.00    Smokeless tobacco: Never Used   Substance Use Topics    Alcohol use:  No Comment: used to drink heavy in the past    Drug use: No       Vital Signs: There were no vitals filed for this visit. Physical Exam:  Cardiac:  [x]WNL  []Comments:  Pulmonary:  [x]WNL   []Comments:  Neuro/Mental Status:  [x]WNL  []Comments:  Abdominal:  [x]WNL    []Comments:  Other:   []WNL  []Comments:    Informed Consent:  The risks and benefits of the procedure have been discussed with either the patient or if they cannot consent, their representative. Assessment:  Patient examined and appropriate for planned sedation and procedure. Plan:  Proceed with planned sedation and procedure as above.          Renaldo Spencer MD

## 2019-04-24 NOTE — PROGRESS NOTES
Pt has met all criteria for discharge. His responsible adult will not be able to pick pt up until 1600. Dr. Robson Ivey has talked with patient.

## 2019-04-24 NOTE — ANESTHESIA PRE PROCEDURE
Department of Anesthesiology  Preprocedure Note       Name:  Skip Duque   Age:  77 y.o.  :  1950                                          MRN:  [id number]         Date:  2019      Surgeon: Elsie Baeza):  Ricky Keating MD    Procedure: ERCP DIAGNOSTIC (N/A Abdomen)    Medications prior to admission:   Prior to Admission medications    Medication Sig Start Date End Date Taking? Authorizing Provider   HYDROcodone-acetaminophen (NORCO) 5-325 MG per tablet Take 1 tablet by mouth every 6 hours as needed for Pain. . 19  Gemini Moreno MD   DULoxetine (CYMBALTA) 30 MG extended release capsule Take by mouth daily  19   Historical Provider, MD   ranitidine (ZANTAC) 150 MG tablet Take 150 mg by mouth 2 times daily (before meals)    Historical Provider, MD   NONFORMULARY Take 2 puffs by mouth 4 times daily Albuterol Sulfate 6.7 GM Hfa. Aer.ad    Historical Provider, MD   traMADol (ULTRAM) 50 MG tablet Take 50 mg by mouth 2 times daily as needed for Pain. Clifton Cruz Historical Provider, MD   fluticasone-salmeterol (ADVAIR) 250-50 MCG/DOSE AEPB Inhale 1 puff into the lungs every 12 hours    Historical Provider, MD   levothyroxine (SYNTHROID) 100 MCG tablet Take 100 mcg by mouth Daily    Historical Provider, MD   aspirin 81 MG tablet Take 81 mg by mouth as needed for Pain    Historical Provider, MD   furosemide (LASIX) 20 MG tablet Take 1 tablet by mouth daily 12/15/15   DESTINI Gavin CNP   potassium chloride SA (K-DUR;KLOR-CON M) 10 MEQ tablet Take 1 tablet by mouth daily for 7 days  Patient taking differently: Take 10 mEq by mouth daily  12/15/15 12/22/15  DESTINI Gavin CNP       Current medications:    No current facility-administered medications for this visit. No current outpatient medications on file.      Facility-Administered Medications Ordered in Other Visits   Medication Dose Route Frequency Provider Last Rate Last Dose    lactated ringers infusion   Intravenous Continuous Counseling given: Not Answered      Vital Signs (Current): There were no vitals filed for this visit. BP Readings from Last 3 Encounters:   02/26/19 (!) 143/73   02/26/19 127/73   02/11/19 118/70       NPO Status:                                                                                 BMI:   Wt Readings from Last 3 Encounters:   03/27/19 162 lb 3.2 oz (73.6 kg)   02/26/19 163 lb (73.9 kg)   02/11/19 163 lb (73.9 kg)     There is no height or weight on file to calculate BMI.    CBC:   Lab Results   Component Value Date    WBC 9.7 02/02/2019    RBC 4.40 02/02/2019    HGB 13.1 02/02/2019    HGB 17.1 07/15/2011    HCT 39.3 02/02/2019    HCT 50.4 07/15/2011    MCV 89.3 02/02/2019    RDW 14.1 02/02/2019     02/02/2019     07/15/2011       CMP:   Lab Results   Component Value Date     02/04/2019     07/15/2011    K 3.4 02/04/2019     02/04/2019     07/15/2011    CO2 26 02/04/2019    BUN 8 02/04/2019    CREATININE 0.6 02/04/2019    CREATININE 0.7 07/15/2011    LABGLOM >60 02/04/2019    GLUCOSE 120 02/04/2019    PROT 5.8 02/04/2019    PROT 6.7 07/15/2011    CALCIUM 8.6 02/04/2019    BILITOT 1.2 02/04/2019    ALKPHOS 160 02/04/2019    ALKPHOS 75 07/15/2011    AST 27 02/04/2019    ALT 32 02/04/2019       POC Tests: No results for input(s): POCGLU, POCNA, POCK, POCCL, POCBUN, POCHEMO, POCHCT in the last 72 hours.     Coags:   Lab Results   Component Value Date    PROTIME 14.7 01/29/2019    PROTIME 12.68 07/15/2011    INR 1.21 01/29/2019    APTT 28.6 01/29/2019       HCG (If Applicable): No results found for: PREGTESTUR, PREGSERUM, HCG, HCGQUANT     ABGs: No results found for: PHART, PO2ART, VDH1SBI, BHL6YLS, BEART, I0ZAAIVH     Type & Screen (If Applicable):  No results found for: LABABO, [address]    Anesthesia Evaluation  Patient summary reviewed and Nursing notes reviewed no history of anesthetic complications:

## 2019-04-24 NOTE — ANESTHESIA POSTPROCEDURE EVALUATION
Department of Anesthesiology  Postprocedure Note    Patient: Skip Duque  MRN: [id number]  YOB: 1950  Date of evaluation: 4/24/2019  Time:  11:42 AM     Procedure Summary     Date:  04/24/19 Room / Location:  [facility] ENDO 12 / [facility] Endoscopy    Anesthesia Start:  1032 Anesthesia Stop:      Procedure:  ERCP DIAGNOSTIC with spyglass (N/A Abdomen) Diagnosis:  (STENT REMOVAL)    Surgeon:  Ricky Keating MD Responsible Provider:  DESTINI Mercer CRNA    Anesthesia Type:  general ASA Status:  3          Anesthesia Type: general    Fidelia Phase I: Fidelia Score: 10    Fidelia Phase II:      Last vitals: Reviewed and per EMR flowsheets.        Anesthesia Post Evaluation    Patient location during evaluation: PACU  Patient participation: complete - patient participated  Level of consciousness: sleepy but conscious  Pain score: 0  Airway patency: patent  Nausea & Vomiting: no nausea and no vomiting  Complications: no  Cardiovascular status: hemodynamically stable and blood pressure returned to baseline  Respiratory status: acceptable, nasal cannula and oral airway  Hydration status: stable

## 2019-04-26 ENCOUNTER — HOSPITAL ENCOUNTER (OUTPATIENT)
Dept: HOSPITAL 58 - RHC-LAB | Age: 69
Discharge: HOME | End: 2019-04-26
Attending: NURSE PRACTITIONER

## 2019-04-26 VITALS — BODY MASS INDEX: 25.7 KG/M2

## 2019-04-26 DIAGNOSIS — B35.1: ICD-10-CM

## 2019-04-26 DIAGNOSIS — E03.9: Primary | ICD-10-CM

## 2019-04-26 PROCEDURE — 87101 SKIN FUNGI CULTURE: CPT

## 2019-04-26 PROCEDURE — 36415 COLL VENOUS BLD VENIPUNCTURE: CPT

## 2019-04-26 PROCEDURE — 84443 ASSAY THYROID STIM HORMONE: CPT

## 2019-04-29 ENCOUNTER — HOSPITAL ENCOUNTER (EMERGENCY)
Dept: HOSPITAL 58 - ED | Age: 69
Discharge: HOME | End: 2019-04-29
Payer: COMMERCIAL

## 2019-04-29 VITALS — SYSTOLIC BLOOD PRESSURE: 125 MMHG | TEMPERATURE: 98.1 F | DIASTOLIC BLOOD PRESSURE: 70 MMHG

## 2019-04-29 VITALS — BODY MASS INDEX: 26.6 KG/M2

## 2019-04-29 DIAGNOSIS — M25.461: ICD-10-CM

## 2019-04-29 DIAGNOSIS — M25.561: Primary | ICD-10-CM

## 2019-04-29 DIAGNOSIS — M25.551: ICD-10-CM

## 2019-04-29 DIAGNOSIS — R53.1: ICD-10-CM

## 2019-04-29 DIAGNOSIS — M54.9: ICD-10-CM

## 2019-04-29 PROCEDURE — 81001 URINALYSIS AUTO W/SCOPE: CPT

## 2019-04-29 PROCEDURE — 36415 COLL VENOUS BLD VENIPUNCTURE: CPT

## 2019-04-29 PROCEDURE — 85025 COMPLETE CBC W/AUTO DIFF WBC: CPT

## 2019-04-29 PROCEDURE — 96372 THER/PROPH/DIAG INJ SC/IM: CPT

## 2019-04-29 PROCEDURE — 99283 EMERGENCY DEPT VISIT LOW MDM: CPT

## 2019-05-08 ENCOUNTER — HOSPITAL ENCOUNTER (OUTPATIENT)
Dept: HOSPITAL 58 - RHC-LAB | Age: 69
Discharge: HOME | End: 2019-05-08
Attending: NURSE PRACTITIONER

## 2019-05-08 VITALS — BODY MASS INDEX: 26.6 KG/M2

## 2019-05-08 DIAGNOSIS — Z79.899: ICD-10-CM

## 2019-05-08 DIAGNOSIS — Z51.81: ICD-10-CM

## 2019-05-08 DIAGNOSIS — R23.8: Primary | ICD-10-CM

## 2019-05-08 PROCEDURE — 85025 COMPLETE CBC W/AUTO DIFF WBC: CPT

## 2019-05-08 PROCEDURE — 36415 COLL VENOUS BLD VENIPUNCTURE: CPT

## 2019-05-08 PROCEDURE — 80053 COMPREHEN METABOLIC PANEL: CPT

## 2019-05-08 PROCEDURE — 85610 PROTHROMBIN TIME: CPT

## 2019-05-10 ENCOUNTER — HOSPITAL ENCOUNTER (OUTPATIENT)
Dept: HOSPITAL 58 - RAD | Age: 69
Discharge: HOME | End: 2019-05-10
Attending: NURSE PRACTITIONER

## 2019-05-10 VITALS — BODY MASS INDEX: 26.6 KG/M2

## 2019-05-10 DIAGNOSIS — M79.604: ICD-10-CM

## 2019-05-10 DIAGNOSIS — R23.8: Primary | ICD-10-CM

## 2019-05-15 ENCOUNTER — HOSPITAL ENCOUNTER (EMERGENCY)
Dept: HOSPITAL 58 - ED | Age: 69
LOS: 1 days | Discharge: HOME | End: 2019-05-16

## 2019-05-15 VITALS — BODY MASS INDEX: 27.6 KG/M2

## 2019-05-15 DIAGNOSIS — R10.11: Primary | ICD-10-CM

## 2019-05-15 DIAGNOSIS — Z72.0: ICD-10-CM

## 2019-05-15 DIAGNOSIS — K59.00: ICD-10-CM

## 2019-05-15 PROCEDURE — 83690 ASSAY OF LIPASE: CPT

## 2019-05-15 PROCEDURE — 96374 THER/PROPH/DIAG INJ IV PUSH: CPT

## 2019-05-15 PROCEDURE — 36415 COLL VENOUS BLD VENIPUNCTURE: CPT

## 2019-05-15 PROCEDURE — 96361 HYDRATE IV INFUSION ADD-ON: CPT

## 2019-05-15 PROCEDURE — 99283 EMERGENCY DEPT VISIT LOW MDM: CPT

## 2019-05-15 PROCEDURE — 81001 URINALYSIS AUTO W/SCOPE: CPT

## 2019-05-15 PROCEDURE — 85025 COMPLETE CBC W/AUTO DIFF WBC: CPT

## 2019-05-15 PROCEDURE — 80053 COMPREHEN METABOLIC PANEL: CPT

## 2019-05-15 PROCEDURE — 82150 ASSAY OF AMYLASE: CPT

## 2019-05-15 RX ADMIN — ONDANSETRON STA MG: 2 INJECTION INTRAMUSCULAR; INTRAVENOUS at 23:10

## 2019-05-16 ENCOUNTER — HOSPITAL ENCOUNTER (INPATIENT)
Age: 69
LOS: 2 days | Discharge: LEFT BEFORE DISCHARGE | DRG: 872 | End: 2019-05-18
Attending: EMERGENCY MEDICINE | Admitting: INTERNAL MEDICINE
Payer: MEDICARE

## 2019-05-16 ENCOUNTER — APPOINTMENT (OUTPATIENT)
Dept: CT IMAGING | Age: 69
DRG: 872 | End: 2019-05-16
Payer: MEDICARE

## 2019-05-16 ENCOUNTER — HOSPITAL ENCOUNTER (OUTPATIENT)
Dept: HOSPITAL 58 - AMBL | Age: 69
Discharge: ANOTHER HOSPITAL | End: 2019-05-16
Attending: INTERNAL MEDICINE

## 2019-05-16 VITALS — BODY MASS INDEX: 27.6 KG/M2

## 2019-05-16 VITALS — DIASTOLIC BLOOD PRESSURE: 77 MMHG | SYSTOLIC BLOOD PRESSURE: 136 MMHG | TEMPERATURE: 97.2 F

## 2019-05-16 DIAGNOSIS — R10.9 ABDOMINAL PAIN, UNSPECIFIED ABDOMINAL LOCATION: ICD-10-CM

## 2019-05-16 DIAGNOSIS — A41.9 SEPTICEMIA (HCC): Primary | ICD-10-CM

## 2019-05-16 DIAGNOSIS — K83.09 CHOLANGITIS: ICD-10-CM

## 2019-05-16 DIAGNOSIS — R10.9: Primary | ICD-10-CM

## 2019-05-16 DIAGNOSIS — R11.2: ICD-10-CM

## 2019-05-16 LAB
ALBUMIN SERPL-MCNC: 3.7 G/DL (ref 3.5–5.2)
ALP BLD-CCNC: 226 U/L (ref 40–130)
ALT SERPL-CCNC: 50 U/L (ref 5–41)
ANION GAP SERPL CALCULATED.3IONS-SCNC: 10 MMOL/L (ref 7–19)
AST SERPL-CCNC: 130 U/L (ref 5–40)
BASOPHILS ABSOLUTE: 0 K/UL (ref 0–0.2)
BASOPHILS RELATIVE PERCENT: 0.2 % (ref 0–1)
BILIRUB SERPL-MCNC: 1.6 MG/DL (ref 0.2–1.2)
BILIRUBIN URINE: NEGATIVE
BLOOD, URINE: NEGATIVE
BUN BLDV-MCNC: 9 MG/DL (ref 8–23)
CALCIUM SERPL-MCNC: 8.5 MG/DL (ref 8.8–10.2)
CHLORIDE BLD-SCNC: 105 MMOL/L (ref 98–111)
CLARITY: CLEAR
CO2: 31 MMOL/L (ref 22–29)
COLOR: YELLOW
CREAT SERPL-MCNC: 0.6 MG/DL (ref 0.5–1.2)
EKG P AXIS: 60 DEGREES
EKG P-R INTERVAL: 152 MS
EKG Q-T INTERVAL: 302 MS
EKG QRS DURATION: 102 MS
EKG QTC CALCULATION (BAZETT): 419 MS
EKG T AXIS: 45 DEGREES
EOSINOPHILS ABSOLUTE: 0.1 K/UL (ref 0–0.6)
EOSINOPHILS RELATIVE PERCENT: 0.4 % (ref 0–5)
GFR NON-AFRICAN AMERICAN: >60
GLUCOSE BLD-MCNC: 155 MG/DL (ref 74–109)
GLUCOSE URINE: NEGATIVE MG/DL
HCT VFR BLD CALC: 47.5 % (ref 42–52)
HEMOGLOBIN: 15 G/DL (ref 14–18)
INR BLD: 1.15 (ref 0.88–1.18)
KETONES, URINE: NEGATIVE MG/DL
LACTIC ACID, SEPSIS: 1.8 MG/DL (ref 0.5–1.9)
LACTIC ACID, SEPSIS: 2.2 MG/DL (ref 0.5–1.9)
LACTIC ACID: 2.8 MMOL/L (ref 0.5–1.9)
LACTIC ACID: 3 MMOL/L (ref 0.5–1.9)
LACTIC ACID: 3.6 MMOL/L (ref 0.5–1.9)
LEUKOCYTE ESTERASE, URINE: NEGATIVE
LIPASE: 25 U/L (ref 13–60)
LYMPHOCYTES ABSOLUTE: 3.1 K/UL (ref 1.1–4.5)
LYMPHOCYTES RELATIVE PERCENT: 17.1 % (ref 20–40)
MCH RBC QN AUTO: 30.6 PG (ref 27–31)
MCHC RBC AUTO-ENTMCNC: 31.6 G/DL (ref 33–37)
MCV RBC AUTO: 96.9 FL (ref 80–94)
MONOCYTES ABSOLUTE: 0.8 K/UL (ref 0–0.9)
MONOCYTES RELATIVE PERCENT: 4.3 % (ref 0–10)
NEUTROPHILS ABSOLUTE: 14.2 K/UL (ref 1.5–7.5)
NEUTROPHILS RELATIVE PERCENT: 77.5 % (ref 50–65)
NITRITE, URINE: NEGATIVE
PDW BLD-RTO: 15 % (ref 11.5–14.5)
PH UA: 7.5 (ref 5–8)
PLATELET # BLD: 346 K/UL (ref 130–400)
PMV BLD AUTO: 9.3 FL (ref 9.4–12.4)
POTASSIUM SERPL-SCNC: 3.7 MMOL/L (ref 3.5–5)
PROTEIN UA: NEGATIVE MG/DL
PROTHROMBIN TIME: 14.1 SEC (ref 12–14.6)
RBC # BLD: 4.9 M/UL (ref 4.7–6.1)
SODIUM BLD-SCNC: 146 MMOL/L (ref 136–145)
SPECIFIC GRAVITY UA: 1.02 (ref 1–1.03)
TOTAL PROTEIN: 6.4 G/DL (ref 6.6–8.7)
URINE REFLEX TO CULTURE: NORMAL
UROBILINOGEN, URINE: 1 E.U./DL
WBC # BLD: 18.3 K/UL (ref 4.8–10.8)

## 2019-05-16 PROCEDURE — 87040 BLOOD CULTURE FOR BACTERIA: CPT

## 2019-05-16 PROCEDURE — 6360000002 HC RX W HCPCS: Performed by: FAMILY MEDICINE

## 2019-05-16 PROCEDURE — 85025 COMPLETE CBC W/AUTO DIFF WBC: CPT

## 2019-05-16 PROCEDURE — 51798 US URINE CAPACITY MEASURE: CPT

## 2019-05-16 PROCEDURE — 74177 CT ABD & PELVIS W/CONTRAST: CPT

## 2019-05-16 PROCEDURE — 96375 TX/PRO/DX INJ NEW DRUG ADDON: CPT

## 2019-05-16 PROCEDURE — 93005 ELECTROCARDIOGRAM TRACING: CPT

## 2019-05-16 PROCEDURE — 96367 TX/PROPH/DG ADDL SEQ IV INF: CPT

## 2019-05-16 PROCEDURE — 85610 PROTHROMBIN TIME: CPT

## 2019-05-16 PROCEDURE — 6370000000 HC RX 637 (ALT 250 FOR IP): Performed by: FAMILY MEDICINE

## 2019-05-16 PROCEDURE — 80053 COMPREHEN METABOLIC PANEL: CPT

## 2019-05-16 PROCEDURE — 96368 THER/DIAG CONCURRENT INF: CPT

## 2019-05-16 PROCEDURE — 2580000003 HC RX 258: Performed by: FAMILY MEDICINE

## 2019-05-16 PROCEDURE — 2500000003 HC RX 250 WO HCPCS: Performed by: FAMILY MEDICINE

## 2019-05-16 PROCEDURE — 6360000002 HC RX W HCPCS: Performed by: EMERGENCY MEDICINE

## 2019-05-16 PROCEDURE — 99223 1ST HOSP IP/OBS HIGH 75: CPT | Performed by: FAMILY MEDICINE

## 2019-05-16 PROCEDURE — 87077 CULTURE AEROBIC IDENTIFY: CPT

## 2019-05-16 PROCEDURE — 1210000000 HC MED SURG R&B

## 2019-05-16 PROCEDURE — 96365 THER/PROPH/DIAG IV INF INIT: CPT

## 2019-05-16 PROCEDURE — 51702 INSERT TEMP BLADDER CATH: CPT

## 2019-05-16 PROCEDURE — 36415 COLL VENOUS BLD VENIPUNCTURE: CPT

## 2019-05-16 PROCEDURE — 2580000003 HC RX 258: Performed by: EMERGENCY MEDICINE

## 2019-05-16 PROCEDURE — 99285 EMERGENCY DEPT VISIT HI MDM: CPT

## 2019-05-16 PROCEDURE — 6370000000 HC RX 637 (ALT 250 FOR IP): Performed by: EMERGENCY MEDICINE

## 2019-05-16 PROCEDURE — 99285 EMERGENCY DEPT VISIT HI MDM: CPT | Performed by: EMERGENCY MEDICINE

## 2019-05-16 PROCEDURE — 83690 ASSAY OF LIPASE: CPT

## 2019-05-16 PROCEDURE — 81003 URINALYSIS AUTO W/O SCOPE: CPT

## 2019-05-16 PROCEDURE — 83605 ASSAY OF LACTIC ACID: CPT

## 2019-05-16 PROCEDURE — 87186 SC STD MICRODIL/AGAR DIL: CPT

## 2019-05-16 PROCEDURE — 6360000004 HC RX CONTRAST MEDICATION: Performed by: EMERGENCY MEDICINE

## 2019-05-16 RX ORDER — FENTANYL CITRATE 50 UG/ML
25 INJECTION, SOLUTION INTRAMUSCULAR; INTRAVENOUS ONCE
Status: COMPLETED | OUTPATIENT
Start: 2019-05-16 | End: 2019-05-16

## 2019-05-16 RX ORDER — ACETAMINOPHEN 325 MG/1
650 TABLET ORAL ONCE
Status: COMPLETED | OUTPATIENT
Start: 2019-05-16 | End: 2019-05-16

## 2019-05-16 RX ORDER — SODIUM CHLORIDE, SODIUM LACTATE, POTASSIUM CHLORIDE, CALCIUM CHLORIDE 600; 310; 30; 20 MG/100ML; MG/100ML; MG/100ML; MG/100ML
INJECTION, SOLUTION INTRAVENOUS CONTINUOUS
Status: DISCONTINUED | OUTPATIENT
Start: 2019-05-16 | End: 2019-05-16

## 2019-05-16 RX ORDER — SODIUM CHLORIDE, SODIUM LACTATE, POTASSIUM CHLORIDE, CALCIUM CHLORIDE 600; 310; 30; 20 MG/100ML; MG/100ML; MG/100ML; MG/100ML
1000 INJECTION, SOLUTION INTRAVENOUS ONCE
Status: COMPLETED | OUTPATIENT
Start: 2019-05-16 | End: 2019-05-16

## 2019-05-16 RX ORDER — SODIUM CHLORIDE 0.9 % (FLUSH) 0.9 %
10 SYRINGE (ML) INJECTION PRN
Status: DISCONTINUED | OUTPATIENT
Start: 2019-05-16 | End: 2019-05-18 | Stop reason: HOSPADM

## 2019-05-16 RX ORDER — METOCLOPRAMIDE HYDROCHLORIDE 5 MG/ML
10 INJECTION INTRAMUSCULAR; INTRAVENOUS ONCE
Status: COMPLETED | OUTPATIENT
Start: 2019-05-16 | End: 2019-05-16

## 2019-05-16 RX ORDER — SODIUM CHLORIDE 9 MG/ML
INJECTION, SOLUTION INTRAVENOUS CONTINUOUS
Status: DISCONTINUED | OUTPATIENT
Start: 2019-05-16 | End: 2019-05-17

## 2019-05-16 RX ORDER — MORPHINE SULFATE 2 MG/ML
4 INJECTION, SOLUTION INTRAMUSCULAR; INTRAVENOUS
Status: DISCONTINUED | OUTPATIENT
Start: 2019-05-16 | End: 2019-05-18 | Stop reason: HOSPADM

## 2019-05-16 RX ORDER — MORPHINE SULFATE 4 MG/ML
4 INJECTION, SOLUTION INTRAMUSCULAR; INTRAVENOUS ONCE
Status: COMPLETED | OUTPATIENT
Start: 2019-05-16 | End: 2019-05-16

## 2019-05-16 RX ORDER — SODIUM CHLORIDE 0.9 % (FLUSH) 0.9 %
10 SYRINGE (ML) INJECTION EVERY 12 HOURS SCHEDULED
Status: DISCONTINUED | OUTPATIENT
Start: 2019-05-16 | End: 2019-05-18 | Stop reason: HOSPADM

## 2019-05-16 RX ORDER — LEVOTHYROXINE SODIUM 0.1 MG/1
100 TABLET ORAL DAILY
Status: DISCONTINUED | OUTPATIENT
Start: 2019-05-16 | End: 2019-05-18 | Stop reason: HOSPADM

## 2019-05-16 RX ORDER — VANCOMYCIN HYDROCHLORIDE 1 G/200ML
1000 INJECTION, SOLUTION INTRAVENOUS EVERY 12 HOURS
Status: DISCONTINUED | OUTPATIENT
Start: 2019-05-16 | End: 2019-05-17

## 2019-05-16 RX ADMIN — METRONIDAZOLE 500 MG: 500 INJECTION, SOLUTION INTRAVENOUS at 17:59

## 2019-05-16 RX ADMIN — IOPAMIDOL 95 ML: 755 INJECTION, SOLUTION INTRAVENOUS at 02:58

## 2019-05-16 RX ADMIN — METOCLOPRAMIDE 10 MG: 5 INJECTION, SOLUTION INTRAMUSCULAR; INTRAVENOUS at 03:18

## 2019-05-16 RX ADMIN — HYDROMORPHONE HYDROCHLORIDE 0.5 MG: 1 INJECTION, SOLUTION INTRAMUSCULAR; INTRAVENOUS; SUBCUTANEOUS at 04:02

## 2019-05-16 RX ADMIN — CEFTRIAXONE 1 G: 1 INJECTION, POWDER, FOR SOLUTION INTRAMUSCULAR; INTRAVENOUS at 04:36

## 2019-05-16 RX ADMIN — VANCOMYCIN HYDROCHLORIDE 1000 MG: 1 INJECTION, SOLUTION INTRAVENOUS at 17:59

## 2019-05-16 RX ADMIN — LEVOTHYROXINE SODIUM 100 MCG: 100 TABLET ORAL at 09:05

## 2019-05-16 RX ADMIN — SODIUM CHLORIDE: 9 INJECTION, SOLUTION INTRAVENOUS at 09:02

## 2019-05-16 RX ADMIN — ACETAMINOPHEN 650 MG: 325 TABLET ORAL at 03:45

## 2019-05-16 RX ADMIN — Medication 2 G: at 17:59

## 2019-05-16 RX ADMIN — FENTANYL CITRATE 25 MCG: 50 INJECTION, SOLUTION INTRAMUSCULAR; INTRAVENOUS at 02:54

## 2019-05-16 RX ADMIN — SODIUM CHLORIDE, POTASSIUM CHLORIDE, SODIUM LACTATE AND CALCIUM CHLORIDE 1000 ML: 600; 310; 30; 20 INJECTION, SOLUTION INTRAVENOUS at 02:39

## 2019-05-16 RX ADMIN — Medication 10 ML: at 09:07

## 2019-05-16 RX ADMIN — MORPHINE SULFATE 4 MG: 4 INJECTION INTRAVENOUS at 02:36

## 2019-05-16 RX ADMIN — SODIUM CHLORIDE, POTASSIUM CHLORIDE, SODIUM LACTATE AND CALCIUM CHLORIDE 1000 ML: 600; 310; 30; 20 INJECTION, SOLUTION INTRAVENOUS at 04:36

## 2019-05-16 RX ADMIN — Medication 2 G: at 09:05

## 2019-05-16 RX ADMIN — MORPHINE SULFATE 4 MG: 2 INJECTION, SOLUTION INTRAMUSCULAR; INTRAVENOUS at 18:30

## 2019-05-16 RX ADMIN — METRONIDAZOLE 500 MG: 500 INJECTION, SOLUTION INTRAVENOUS at 09:05

## 2019-05-16 RX ADMIN — MEROPENEM 1 G: 1 INJECTION, POWDER, FOR SOLUTION INTRAVENOUS at 04:02

## 2019-05-16 RX ADMIN — VANCOMYCIN HYDROCHLORIDE 1000 MG: 1 INJECTION, SOLUTION INTRAVENOUS at 04:37

## 2019-05-16 RX ADMIN — SODIUM CHLORIDE: 9 INJECTION, SOLUTION INTRAVENOUS at 17:59

## 2019-05-16 ASSESSMENT — PAIN SCALES - GENERAL
PAINLEVEL_OUTOF10: 10
PAINLEVEL_OUTOF10: 5
PAINLEVEL_OUTOF10: 0
PAINLEVEL_OUTOF10: 9
PAINLEVEL_OUTOF10: 10
PAINLEVEL_OUTOF10: 9
PAINLEVEL_OUTOF10: 4
PAINLEVEL_OUTOF10: 10
PAINLEVEL_OUTOF10: 9
PAINLEVEL_OUTOF10: 5
PAINLEVEL_OUTOF10: 5

## 2019-05-16 ASSESSMENT — ENCOUNTER SYMPTOMS
ABDOMINAL PAIN: 1
VOMITING: 1
SHORTNESS OF BREATH: 0

## 2019-05-16 ASSESSMENT — PAIN DESCRIPTION - LOCATION
LOCATION: ABDOMEN
LOCATION: ABDOMEN

## 2019-05-16 ASSESSMENT — PAIN DESCRIPTION - DESCRIPTORS: DESCRIPTORS: CRAMPING

## 2019-05-16 NOTE — PROGRESS NOTES
Pharmacy Note  Vancomycin Consult    Lanier Kanner is a 77 y.o. male started on Vancomycin; consult received from Dr. Vy An to manage therapy. Also receiving the following antibiotics: Meropenem. Patient Active Problem List   Diagnosis    Left groin pain    Choledocholithiasis    Hypothyroidism    Chronic back pain    COPD (chronic obstructive pulmonary disease) (HCC)    Urinary retention    Leukocytosis    Cholangitis due to bile duct calculus with obstruction    Sepsis ([address]    Hypotension due to hypovolemia    E coli bacteremia    Hypokalemia    Gallstones       Allergies:  Meloxicam; Tamsulosin; and Pcn [penicillins]     Temp max: 101    Recent Labs     05/16/19  0219   BUN 9       Recent Labs     05/16/19 0219   CREATININE 0.6       Recent Labs     05/16/19 0219   WBC 18.3*         Intake/Output Summary (Last 24 hours) at 5/16/2019 0359  Last data filed at 5/16/2019 0357  Gross per 24 hour   Intake 1000 ml   Output --   Net 1000 ml       Culture Date Source Results   05/16/19 Blood Sent       Ht Readings from Last 1 Encounters:   05/16/19 5' 5\" (1.651 m)        Wt Readings from Last 1 Encounters:   05/16/19 155 lb (70.3 kg)         Body mass index is 25.79 kg/m². Estimated Creatinine Clearance: 100 mL/min (based on SCr of 0.6 mg/dL). Assessment/Plan:  Will initiate vancomycin 1000 mg IV every 12 hours. Timing of trough level will be determined based on culture results, renal function, and clinical response. Thank you for the consult. Will continue to follow.     Electronically signed by Christophe Holley, [address] [place] [address] on 5/16/2019 at 3:59 AM

## 2019-05-16 NOTE — PROGRESS NOTES
Giovanny Kamara transferred to [phone]  from [facility] via wheelchair. Reason for transfer: to floor bed   Explained reason for transfer to Patient. Belongings: Dentures upper with patient at bedside (in pts mouth) . Soft chart transferred with patient: Yes. Telemetry box number N/A transferred with patient: N/A. Report given to: [facility], via telephone.       Electronically signed by Mika Valentin RN on 5/16/2019 at 5:56 PM

## 2019-05-16 NOTE — ED PROVIDER NOTES
[facility]  eMERGENCY dEPARTMENT eNCOUnter      Pt Name: Florencio Gaviria  MRN: [id number]  [place] 1950  Date of evaluation: 5/16/2019  Provider: Jennie Hubbard MD    CHIEF COMPLAINT       Chief Complaint   Patient presents with    Abdominal Pain     Pt arrived to the ed via ems with c/o abd pain and n/v. Pt was seen at [facility] ER at 0000 and pt is still c/o pain and nausea. Pt is actively vomiting.  Nausea & Vomiting         HISTORY OF PRESENT ILLNESS   (Location/Symptom, Timing/Onset,Context/Setting, Quality, Duration, Modifying Factors, Severity)  Note limiting factors. Florencio Gaviria is a 77 y.o. male who presents to the emergency department with n/v. He has severe abd pain. Went to [facility], states nothing done for him, although I think he had a complete work up actually. Left went home still vomiting and so called [facility] EMS to bring him to ED here. He is actively vomiting large quantity of food on arrival, severe abd pain with guarding and rebound. Hx of gallbladder removal and also with egd in last couple months. States had BM today. This all started around 5-6 hours ago. The history is provided by the patient. NursingNotes were reviewed. REVIEW OF SYSTEMS    (2-9 systems for level 4, 10 or more for level 5)     Review of Systems   Constitutional: Negative for fever. Respiratory: Negative for shortness of breath. Cardiovascular: Negative for chest pain. Gastrointestinal: Positive for abdominal pain and vomiting. Neurological: Negative for seizures and syncope. Psychiatric/Behavioral: Negative for confusion. A complete review of systems was performed and is negative except as noted above in the HPI.        PAST MEDICAL HISTORY     Past Medical History:   Diagnosis Date    Bacteremia     Chronic back pain     COPD (chronic obstructive pulmonary disease) (HCC)     GERD (gastroesophageal reflux disease)     Hypothyroidism     Neuropathy     LLE    Urinary retention          SURGICAL HISTORY       Past Surgical History:   Procedure Laterality Date    BACK SURGERY      lumbar    CHOLECYSTECTOMY, LAPAROSCOPIC N/A 2/26/2019    CHOLECYSTECTOMY LAPAROSCOPIC WITH GRAM performed by Santana Chawla MD at [address] CYST REMOVAL  7/2011    R neck    ERCP N/A 1/29/2019    Dr SEVERINO Shook-w/placement of a 10 x 40 mm self-expanding metal biliary stent, stone removal, surgical consult-Repeat in 8-12 wks    ERCP N/A 4/24/2019    ERCP DIAGNOSTIC with spyglass performed by Sandra Goodwin MD at [facility]    HERNIA REPAIR  7/2011    L ing hernia w/mesh, small umb hernia    KNEE SURGERY      SPINE SURGERY      complicated by a postop. hematoma         CURRENT MEDICATIONS       Previous Medications    FLUTICASONE-SALMETEROL (ADVAIR) 250-50 MCG/DOSE AEPB    Inhale 1 puff into the lungs every 12 hours    LEVOTHYROXINE (SYNTHROID) 100 MCG TABLET    Take 100 mcg by mouth Daily       ALLERGIES     Meloxicam; Tamsulosin; and Pcn [penicillins]    FAMILY HISTORY       Family History   Family history unknown: Yes          SOCIAL HISTORY       Social History     Socioeconomic History    Marital status:      Spouse name: None    Number of children: None    Years of education: None    Highest education level: None   Occupational History    None   Social Needs    Financial resource strain: None    Food insecurity:     Worry: None     Inability: None    Transportation needs:     Medical: None     Non-medical: None   Tobacco Use    Smoking status: Current Every Day Smoker     Packs/day: 1.00     Years: 30.00     Pack years: 30.00    Smokeless tobacco: Never Used    Tobacco comment: smokes a few cigars now   Substance and Sexual Activity    Alcohol use: No     Comment: used to drink heavy in the past, 9 years    Drug use: No    Sexual activity: None   Lifestyle    Physical activity:     Days per week: None     Minutes per session: None    Stress: None Relationships    Social connections:     Talks on phone: None     Gets together: None     Attends [employer] service: None     Active member of club or organization: None     Attends meetings of clubs or organizations: None     Relationship status: None    Intimate partner violence:     Fear of current or ex partner: None     Emotionally abused: None     Physically abused: None     Forced sexual activity: None   Other Topics Concern    None   Social History Narrative    None       SCREENINGS    Boyd Coma Scale  Eye Opening: Spontaneous  Best Verbal Response: Oriented  Best Motor Response: Obeys commands  [place] Coma Scale Score: 15        PHYSICAL EXAM    (up to 7 for level 4, 8 or more for level 5)     ED Triage Vitals [05/16/19 0216]   BP Temp Temp Source Pulse Resp SpO2 Height Weight   (!) 155/82 98.3 °F (36.8 °C) Oral 105 16 92 % 5' 5\" (1.651 m) 155 lb (70.3 kg)       Physical Exam   Constitutional: He is oriented to person, place, and time. He appears well-developed and well-nourished. In pain and anxious and vomiting    HENT:   Head: Normocephalic and atraumatic. Eyes: Pupils are equal, round, and reactive to light. EOM are normal.   Neck: Normal range of motion. Neck supple. Cardiovascular: Regular rhythm.     Pulmonary/Chest: Effort normal and breath sounds normal.   Abdominal: Soft. He exhibits distension. There is tenderness. There is rebound and guarding. Hypoactive BS    Musculoskeletal: Normal range of motion. He exhibits no deformity. Neurological: He is alert and oriented to person, place, and time. Skin: Skin is warm and dry. Psychiatric: He has a normal mood and affect. His behavior is normal.   Nursing note and vitals reviewed.       DIAGNOSTIC RESULTS     EKG: All EKG's are interpreted by the Emergency Department Physician who either signs or Co-signs this chart in the absence of a cardiologist.    Sinus tachy and artifact rate 127    RADIOLOGY:   Non-plain film images such as CT, Ultrasound and MRI are read by the radiologist. Noryza Leigh images are visualized and preliminarily interpreted by the emergency physician with the below findings:        Interpretation per the Radiologist below, if available at the time of this note:    CT ABDOMEN PELVIS W IV CONTRAST Additional Contrast? None    (Results Pending)   Preliminary Findings Only -- See Final Report For Complete Findings  CT ABDOMEN & PELVIS With Contrast:    Status post cholecystectomy. Moderate/severe intra-and extrahepatic biliary dilatation with CBD measuring up to 2.2 cm in diameter. A 1.1 cm soft tissue mass in the distal CBD (series 3, image 26). ERCP with brushings is recommended for further evaluation. Moderate fluid-filled hiatal hernia. Prostatomegaly. Innumerable urinary bladder wall diverticula suggestive of chronic bladder outlet obstruction. Mild diffuse bronchiectasis involving lung bases. Mild bibasilar atelectasis. Moderate paraseptal emphysematous changes. Findings concerning for UIP. Bilateral renal cysts. Small right inguinal hernia containing a knuckle of small bowel proximally without evidence of obstruction. Moderate fat-containing infraumbilical hernia. Duodenal diverticulum. Colonic diverticulosis. No free air. No free fluid.       ED BEDSIDE ULTRASOUND:   Performed by ED Physician - none    LABS:  Labs Reviewed   COMPREHENSIVE METABOLIC PANEL - Abnormal; Notable for the following components:       Result Value    Sodium 146 (*)     CO2 31 (*)     Glucose 155 (*)     Calcium 8.5 (*)     Total Protein 6.4 (*)     Total Bilirubin 1.6 (*)     Alkaline Phosphatase 226 (*)     ALT 50 (*)      (*)     All other components within normal limits   CBC WITH AUTO DIFFERENTIAL - Abnormal; Notable for the following components:    WBC 18.3 (*)     MCV 96.9 (*)     MCHC 31.6 (*)     RDW 15.0 (*)     MPV 9.3 (*)     Neutrophils % 77.5 (*)     Lymphocytes % 17.1 (*) Neutrophils # 14.2 (*)     All other components within normal limits   LACTIC ACID, PLASMA - Abnormal; Notable for the following components:    Lactic Acid 2.8 (*)     All other components within normal limits    Narrative:     Yumi Marlon tel. ,  Chemistry results called to and read back by [facility] SHADYSIDE-ER RN/ER,  05/16/2019 03:00, by TIA   LACTIC ACID, PLASMA - Abnormal; Notable for the following components:    Lactic Acid 3.6 (*)     All other components within normal limits    Narrative:     CALL  Lee  KLED tel. ,  Chemistry results called to and read back by Adriana Cedillo, 05/16/2019 04:20,  by TIA   CULTURE BLOOD #1   CULTURE BLOOD #2   LIPASE   PROTIME-INR   URINE RT REFLEX TO CULTURE   LACTIC ACID, PLASMA       All other labs were within normal range or not returned as of this dictation. EMERGENCY DEPARTMENT COURSE and DIFFERENTIALDIAGNOSIS/MDM:   Vitals:    Vitals:    05/16/19 0432 05/16/19 0501 05/16/19 0531 05/16/19 0602   BP: 137/66 (!) 141/82 (!) 141/70 (!) 145/94   Pulse: 128 123 120 124   Resp: 21 17 19    Temp: 100.2 °F (37.9 °C)   101.1 °F (38.4 °C)   TempSrc: Oral   Oral   SpO2: 93% 91% 92% 94%   Weight:       Height:           MDM  Number of Diagnoses or Management Options  Abdominal pain, unspecified abdominal location: new and requires workup  Cholangitis: new and requires workup  Septicemia [facility]): new and requires workup  Diagnosis management comments: Pt with hx of cholecystectomy in Feb.     Has had ercp for stone removal and stenting. Dr Chetan Pereira took out stent 3 weeks ago. Pt with RUQ pain and n/v. Fever and elevated WBC. CBD dilitation. Bili 1.6. IV abx broad for cholangitis given recent instrumentation add Vanc. Had ecoli sepsis in past from similar, sensitive to merrem and rocephin. Pt no longer vomiting. Tachy not hypotensive. Pt  Will be admitted. IVF boluses.      6:16 AM  Trying to contact Dr Kevin Naranjo since is his patient and has done ercp and stenting in past on him. Once discussed with Dr Catalina Zaldivar will admit to Hospitalist.     Pt does feel better pain improved and no longer vomiting.     6:57 AM   Spoke with Dr Catalina Zaldivar advises to admit pt and he will see him and get him taken care of. Amount and/or Complexity of Data Reviewed  Clinical lab tests: reviewed and ordered  Tests in the radiology section of CPT®: reviewed and ordered  Decide to obtain previous medical records or to obtain history from someone other than the patient: yes  Discuss the patient with other providers: yes  Independent visualization of images, tracings, or specimens: yes    Risk of Complications, Morbidity, and/or Mortality  Presenting problems: high  Management options: high    Patient Progress  Patient progress: (Guarded )    7:08 AM  Spoke to Dr Cahr Noel for admit     CONSULTS:  IP CONSULT TO PHARMACY    PROCEDURES:  Unless otherwise notedbelow, none     Procedures    FINAL IMPRESSION     1. Septicemia ([facility] [address]    2. Abdominal pain, unspecified abdominal location    3. Cholangitis          DISPOSITION/PLAN   DISPOSITION Decision To Admit 05/16/2019 04:17:39 AM      PATIENT REFERRED TO:  No follow-up provider specified.     DISCHARGE MEDICATIONS:  New Prescriptions    No medications on file          (Please note that portions of this note were completed with a voice recognition program.  Efforts were made to edit the dictations butoccasionally words are mis-transcribed.)    Allen Wade MD (electronically signed)  AttendingEmergency Physician          Allen Wade MD  05/16/19 4458       Allen Wade MD  05/16/19 3741

## 2019-05-16 NOTE — CARE COORDINATION
DAVID placed return call to [employer] with [place] [employer] at [phone]; she indicated that she did call the school; a friend of Pt's son ended up taking him to school this morning; Pt's son will be [facility] his friend and [facility] [facility] [phone] father's name is Corrinne Aden. Demi Selby noted that these people are agreeable to keep Pt's son with them until he can return home with his dad. Uncertain of d/c date at present. Laxmi asked DAVID to call when d/c is confirmed and also if there are any concerns noted at that time.     Electronically signed by RADHA Lieberman on 5/16/2019 at 3:46 PM

## 2019-05-16 NOTE — ED NOTES
Pt states he is unable to take ibuprofen but does not remember why. Dr. Parker Avina notified.       Benjamin Mcnulty, KEVIN  05/16/19 4810

## 2019-05-16 NOTE — CARE COORDINATION
SW placed call to [facility] ([phone]) re: report Pt gave RN-Kristan in ICU.:    Pt's grandson, Tee/Asher? Kathryn Cosby, lives with him and early this morning when he went to the ED, he left him at home asleep as he was not easily roused. When Pt arrived in ICU, he has been unable to reach his son via phone and he is not at school when he called there. Pt also indicated he called the police to do a well-check on Pt's son, however, was noted to not be at home when they came by.     This report was given to Viktoriya at [address] [place] [address]; case # [id number]    Electronically signed by RADHA Noguera on 5/16/2019 at 3:32 PM

## 2019-05-16 NOTE — OP NOTE
Endoscopic Procedure Note    Patient: Keon Sober: 1950  [facility] Rec#: [id number] Acc#: [id number]     Primary Care Provider Mary Anne Ann  Referring Provider: Ashley Garvey MD    Endoscopist: Israel Cardenas MD    Date of Procedure:  5/16/2019    Procedure:   1. ERCP with stent removal of previously placed SEMS  2. ERCP with stone removal   3. ERCP with spyglass cholangioscopy  4. Interpretation of biliary fluoroscopy [id number]    Indications:   1. Known choledocholithiasis with previous stent placement  2. Now s/p cholecystectomy     Anesthesia:  Sedation was administered by anesthesia who monitored the patient during the procedure. Estimated Blood Loss: minimal    Procedure:   After reviewing the patient's chart and obtaining informed consent, the patient was placed in the prone position. A side Olympus duodenoscope was lubricated and inserted through the mouth into the oropharynx. Under indirect visualization, the upper esophagus was intubated. The scope was advanced to the level of the third portion of duodenum and reduced to a short position opposite the ampulla. The previously placed biliary stent was noted. A  film showed evidence of a previous cholecystectomy and a properly placed stent. The stent was grasped and removed in its entirety through the scope. As previously noted there was a large duodenal diverticulum. Positioning was somewhat difficult with the ampulla and abnormal angle due to the large diverticulum. The bile duct was selectively cannulated with a 0.035 inch ×260 cm dream wire. The short nose traction sphincter tone was advanced over the wire and the bile duct was deeply cannulated. Contrast was injected. I personally interpreted the bile duct images. There was evidence of significant biliary dilatation especially in the proximal common hepatic ducts. There was a questionable tortuosity in the mid common bile duct but no obvious filling stricture.  The cystic duct appeared patent. To discover objects a 15-18 mm biliary extraction balloon was used to sweep the bile duct starting at the bifurcation. Multiple small-medium sized well formed stones were removed. These were photographed. An occlusion cholangiogram showed no obvious retained filling defects. Due to the patient's problems with recurrent choledocholithiasis as well as the abnormal appearance of the biliary tree, I elected to pursue Spyglass cholangioscopy to confirm ductal clearance of stones. Using a [employer] II cholangioscope the biliary epithelium was directly visualized up to the level of the bifurcation. The biliary tree as well as the apparent takeoff of the cystic duct were all visualized and photographed. No obvious retained stones were noted. The bile duct appeared to be draining well on its own and the procedure was ended. Pancreatic duct was intentionally not cannulated nor injected. IMPRESSION:  1. Status post removal of previously placed self-expanding metal stent  2. Successful removal of retained choledocholithiasis  3. Status post direct cholangioscopy to help rule out retained stones     RECOMMENDATIONS:    1. Clear liquid diet today, Advance diet as tolerated tomorrow  2. Patient to call with any questions/concerns or recurrent symptoms. The results were discussed with the patient and family. A copy of the images obtained were given to the patient.      Osmar Das MD

## 2019-05-16 NOTE — ED NOTES
Bed: 04  Expected date: 5/16/19  Expected time:   Means of arrival: [facility]  Comments:     Yolanda Painter RN  05/16/19 0166

## 2019-05-16 NOTE — H&P
[facility] - History & Physical      PCP: Rizwan Beyer    Date of Admission: 5/16/2019    Date of Service: 5/16/2019    Chief Complaint:  Abdominal pain    History Of Present Illness: The patient is a 77 y.o. male who presented to ER complaining of RUQ, started 2 days ago and progressively worsened, aggravated by food or movement, released by nothing, associated with nausea. He has had a cholecystectomy. Past Medical History:        Diagnosis Date    Bacteremia     Chronic back pain     COPD (chronic obstructive pulmonary disease) (HCC)     GERD (gastroesophageal reflux disease)     Hypothyroidism     Neuropathy     LLE    Urinary retention        Past Surgical History:        Procedure Laterality Date    BACK SURGERY      lumbar    CHOLECYSTECTOMY, LAPAROSCOPIC N/A 2/26/2019    CHOLECYSTECTOMY LAPAROSCOPIC WITH GRAM performed by Jamin Khan MD at [place] [address]  7/2011    R neck    ERCP N/A 1/29/2019    Dr SEVERINO Shook-w/placement of a 10 x 40 mm self-expanding metal biliary stent, stone removal, surgical consult-Repeat in 8-12 wks    ERCP N/A 4/24/2019    ERCP DIAGNOSTIC with spyglass performed by Bridger Fonseca MD at [facility]   [address]  7/2011    L ing hernia w/mesh, small umb hernia    KNEE SURGERY      SPINE SURGERY      complicated by a postop. hematoma       Home Medications:  Prior to Admission medications    Medication Sig Start Date End Date Taking? Authorizing Provider   levothyroxine (SYNTHROID) 100 MCG tablet Take 100 mcg by mouth Daily   Yes Historical Provider, MD   fluticasone-salmeterol (ADVAIR) 250-50 MCG/DOSE AEPB Inhale 1 puff into the lungs every 12 hours    Historical Provider, MD       Allergies:    Meloxicam; Tamsulosin; and Pcn [penicillins]    Social History:    The patient currently lives independently  Tobacco:   reports that he has been smoking. He has a 30.00 pack-year smoking history.  He has never used smokeless tobacco.  Alcohol:   reports that he does not drink alcohol. Illicit Drugs: denies    Family History:      Problem Relation Age of Onset    Cancer Sister        Review of Systems:   Constitutional / general:  + fever / chills / sweats  HEENT: No sore throat / hoarseness / vision changes  CV:  No chest pain / palpitations/ orthopnea   Respiratory:  No cough / shortness of breath / sputum / hemoptysis  GI: + nausea / vomiting / abdominal pain / No diarrhea or constipation  :  No dysuria / hesitancy / urgency / hematuria   Neuro: No muscle weakness / dysphagia / headache / paresthesias  Musculoskeletal:  No edema / cyanosis / pain  Psychiatric:  No depression / anxiety / insomnia  Skin:  No new rashes / lesions    Physical Examination:          BP (!) 116/56   Pulse 87   Temp 99.1 °F (37.3 °C) (Temporal)   Resp 17   Ht 5' 5\" (1.651 m)   Wt 172 lb 14.4 oz (78.4 kg)   SpO2 92%   BMI 28.77 kg/m²   General appearance: Acutely ill appearing and dehydrated senior male. Appears stated age and cooperative. Well developed. Uncomfortable due to pain. HEENT: Normal cephalic, atraumatic without obvious deformity. Pupils equal, round, and reactive to light. Extra ocular muscles intact. Conjunctivae/corneas clear. Normal ears and nose. Hearing Normal. Lips with no blisters. Neck: Supple, with full range of motion. No jugular venous distention. Trachea midline. Thyroid no masses noted. Respiratory: Normal respiratory effort. Clear to auscultation bilaterally, without Rales/Wheezes/Rhonchi. Cardiovascular: Regular rate and rhythm with normal S1/S2 without murmurs, rubs or gallops. Capillary refill normal. Pulses symmetric in upper extremities. Carotids with no bruit. Abdomen: Soft, tender RUQ with palpation, non-distended with normal hypoactive sounds. No organomegaly. No hernias. Musculoskeletal: No clubbing, cyanosis or edema bilaterally. Full range of motion without deformity in 4 extremities.   Lymphatic: No enlarged lymph nodes axilla, groin or neck. Skin: Skin color, texture, turgor normal.  No rashes or lesions. No nodules. Neurologic:  Neurovascularly intact without any focal sensory/motor deficits. Cranial nerves: II-XII intact, grossly non-focal.  Psychiatric: Alert and oriented, thought content appropriate, normal insight. Normal memory. : Bladder is full, scan revealed over 999 cc. Diagnostic Data:  CT Abd/pelvis: I have reviewed the report and films with the following interpretation:   1. Prior cholecystectomy. Prominent intrahepatic and extrahepatic  biliary dilation with abrupt transition from dilated to nondilated  common bile duct as the duct enters the pancreatic head. The  preliminary report mentions a possible intraductal soft tissue mass. I am uncertain if this is real or artifactual, but the overall imaging appearance of the abrupt transition is suspicious and neoplasm is not excluded by CT. 2. Moderate size hiatal hernia. Stomach appears moderately distended with fluid filling the hernia sac. 3. Bilateral simple renal cortical cyst.  4. Colonic diverticulosis without acute diverticulitis. 5. Interstitial scarring with mild traction bronchiectasis in the lung  bases. Preliminary report mentions possible UIP pattern although I do not see obvious honeycombing. Pattern is nonspecific. 6. Right inguinal and umbilical hernias. A short segment of small  bowel enters the neck of the right inguinal hernia although this is  nonobstructive. No inflammatory change in the hernia sac. 7. Likely chronic partial bladder outlet obstruction with  circumferential thickening of the bladder wall and numerous bladder  diverticula. 8. Findings in agreement with the emergent findings from the initial  [facility] preliminary report.      CBC:  Recent Labs     05/16/19 0219   WBC 18.3*   HGB 15.0   HCT 47.5        BMP:  Recent Labs     05/16/19 0219   *   K 3.7      CO2 31*   BUN 9   CREATININE 0. 6   CALCIUM 8.5*     Recent Labs     05/16/19 0219   *   ALT 50*   BILITOT 1.6*   ALKPHOS 226*     Coag Panel:   Recent Labs     05/16/19 0219   INR 1.15   PROTIME 14.1     Cardiac Enzymes: No results for input(s): Venita Alexander in the last 72 hours. ABGs:No results found for: PHART, PO2ART, VHY2PGS  Urinalysis:  Lab Results   Component Value Date    NITRU Negative 05/16/2019    BLOODU Negative 05/16/2019    SPECGRAV 1.016 05/16/2019    GLUCOSEU Negative 05/16/2019       Active Hospital Problems    Diagnosis Date Noted    Sepsis ([facility] [address] [A41.9]     Cholangitis due to bile duct calculus with obstruction [K80.31] 01/29/2019    COPD (chronic obstructive pulmonary disease) ([facility] [facility] [J44.9] 01/29/2019    Hypothyroidism [E03.9] 01/29/2019    Urinary retention [R33.9] 01/29/2019       Assessment and Plan:  Principal Problem:    Sepsis ([facility] [address]  Active Problems:    Hypothyroidism    COPD (chronic obstructive pulmonary disease) ([facility] [address] [facility]    Urinary retention    Cholangitis due to bile duct calculus with obstruction  Resolved Problems:    * No resolved hospital problems. *      1. Admit to ICU floor inpatient. Serial vitals, telemetry, diet npo except sips with medications, activity bedrest with commode. 2. IVF NS Bolus and drip per Sepsis protocol  3. Follow labs Lactic acid/CBC/CMP. 4. Order tests: ERCP? 5. Consults: GI consult  6. Start medications Cefepime/Flagyl. Morphine as needed. 7. Home medications reconciled. 8. DVT prophylaxis with SCD  9. Code status full  10. Oxygen as needed, not dependent  11. Disposition observe in ICU, if hemodynamics remain stable to medical floor. 12. Wyatt anchored for urinary retention. Consider Flomax/Proscar. 13. Prognosis guarded.       [facility]  Hospitalist service  5/16/2019  4:12 PM

## 2019-05-17 ENCOUNTER — ANESTHESIA (OUTPATIENT)
Dept: ENDOSCOPY | Age: 69
DRG: 872 | End: 2019-05-17
Payer: MEDICARE

## 2019-05-17 ENCOUNTER — APPOINTMENT (OUTPATIENT)
Dept: GENERAL RADIOLOGY | Age: 69
DRG: 872 | End: 2019-05-17
Payer: MEDICARE

## 2019-05-17 ENCOUNTER — ANESTHESIA EVENT (OUTPATIENT)
Dept: ENDOSCOPY | Age: 69
DRG: 872 | End: 2019-05-17
Payer: MEDICARE

## 2019-05-17 VITALS
OXYGEN SATURATION: 100 % | RESPIRATION RATE: 6 BRPM | SYSTOLIC BLOOD PRESSURE: 116 MMHG | DIASTOLIC BLOOD PRESSURE: 77 MMHG

## 2019-05-17 LAB
ALBUMIN SERPL-MCNC: 2.6 G/DL (ref 3.5–5.2)
ALP BLD-CCNC: 141 U/L (ref 40–130)
ALT SERPL-CCNC: 76 U/L (ref 5–41)
ANION GAP SERPL CALCULATED.3IONS-SCNC: 10 MMOL/L (ref 7–19)
ANISOCYTOSIS: ABNORMAL
AST SERPL-CCNC: 85 U/L (ref 5–40)
BANDED NEUTROPHILS RELATIVE PERCENT: 7 % (ref 0–5)
BASOPHILS ABSOLUTE: 0 K/UL (ref 0–0.2)
BASOPHILS RELATIVE PERCENT: 0 % (ref 0–1)
BILIRUB SERPL-MCNC: 4.4 MG/DL (ref 0.2–1.2)
BUN BLDV-MCNC: 13 MG/DL (ref 8–23)
CALCIUM SERPL-MCNC: 8.1 MG/DL (ref 8.8–10.2)
CHLORIDE BLD-SCNC: 109 MMOL/L (ref 98–111)
CO2: 23 MMOL/L (ref 22–29)
CREAT SERPL-MCNC: <0.5 MG/DL (ref 0.5–1.2)
EOSINOPHILS ABSOLUTE: 0 K/UL (ref 0–0.6)
EOSINOPHILS RELATIVE PERCENT: 0 % (ref 0–5)
GFR NON-AFRICAN AMERICAN: >60
GLUCOSE BLD-MCNC: 99 MG/DL (ref 74–109)
HCT VFR BLD CALC: 39.7 % (ref 42–52)
HEMOGLOBIN: 12.9 G/DL (ref 14–18)
HYPOCHROMIA: ABNORMAL
LYMPHOCYTES ABSOLUTE: 1.6 K/UL (ref 1.1–4.5)
LYMPHOCYTES RELATIVE PERCENT: 9 % (ref 20–40)
MACROCYTES: ABNORMAL
MAGNESIUM: 1.5 MG/DL (ref 1.6–2.4)
MCH RBC QN AUTO: 31.2 PG (ref 27–31)
MCHC RBC AUTO-ENTMCNC: 32.5 G/DL (ref 33–37)
MCV RBC AUTO: 95.9 FL (ref 80–94)
MONOCYTES ABSOLUTE: 0.2 K/UL (ref 0–0.9)
MONOCYTES RELATIVE PERCENT: 1 % (ref 0–10)
NEUTROPHILS ABSOLUTE: 15.8 K/UL (ref 1.5–7.5)
NEUTROPHILS RELATIVE PERCENT: 83 % (ref 50–65)
OVALOCYTES: ABNORMAL
PDW BLD-RTO: 15.2 % (ref 11.5–14.5)
PHOSPHORUS: 2.2 MG/DL (ref 2.5–4.5)
PLATELET # BLD: 191 K/UL (ref 130–400)
PLATELET SLIDE REVIEW: ADEQUATE
PMV BLD AUTO: 9.9 FL (ref 9.4–12.4)
POLYCHROMASIA: ABNORMAL
POTASSIUM REFLEX MAGNESIUM: 3.4 MMOL/L (ref 3.5–5)
RBC # BLD: 4.14 M/UL (ref 4.7–6.1)
SODIUM BLD-SCNC: 142 MMOL/L (ref 136–145)
TOTAL PROTEIN: 4.9 G/DL (ref 6.6–8.7)
WBC # BLD: 17.5 K/UL (ref 4.8–10.8)

## 2019-05-17 PROCEDURE — 36415 COLL VENOUS BLD VENIPUNCTURE: CPT

## 2019-05-17 PROCEDURE — 6360000002 HC RX W HCPCS: Performed by: NURSE ANESTHETIST, CERTIFIED REGISTERED

## 2019-05-17 PROCEDURE — C1726 CATH, BAL DIL, NON-VASCULAR: HCPCS | Performed by: INTERNAL MEDICINE

## 2019-05-17 PROCEDURE — 2580000003 HC RX 258: Performed by: NURSE ANESTHETIST, CERTIFIED REGISTERED

## 2019-05-17 PROCEDURE — C1769 GUIDE WIRE: HCPCS | Performed by: INTERNAL MEDICINE

## 2019-05-17 PROCEDURE — 6370000000 HC RX 637 (ALT 250 FOR IP): Performed by: INTERNAL MEDICINE

## 2019-05-17 PROCEDURE — 3209999900 FLUORO FOR SURGICAL PROCEDURES

## 2019-05-17 PROCEDURE — 2500000003 HC RX 250 WO HCPCS: Performed by: NURSE ANESTHETIST, CERTIFIED REGISTERED

## 2019-05-17 PROCEDURE — 2580000003 HC RX 258: Performed by: FAMILY MEDICINE

## 2019-05-17 PROCEDURE — 43264 ERCP REMOVE DUCT CALCULI: CPT | Performed by: INTERNAL MEDICINE

## 2019-05-17 PROCEDURE — 85025 COMPLETE CBC W/AUTO DIFF WBC: CPT

## 2019-05-17 PROCEDURE — 2500000003 HC RX 250 WO HCPCS: Performed by: INTERNAL MEDICINE

## 2019-05-17 PROCEDURE — 6360000002 HC RX W HCPCS: Performed by: INTERNAL MEDICINE

## 2019-05-17 PROCEDURE — 7100000000 HC PACU RECOVERY - FIRST 15 MIN: Performed by: INTERNAL MEDICINE

## 2019-05-17 PROCEDURE — 2580000003 HC RX 258: Performed by: INTERNAL MEDICINE

## 2019-05-17 PROCEDURE — 6360000002 HC RX W HCPCS: Performed by: FAMILY MEDICINE

## 2019-05-17 PROCEDURE — 2720000010 HC SURG SUPPLY STERILE: Performed by: INTERNAL MEDICINE

## 2019-05-17 PROCEDURE — 84100 ASSAY OF PHOSPHORUS: CPT

## 2019-05-17 PROCEDURE — C2625 STENT, NON-COR, TEM W/DEL SY: HCPCS | Performed by: INTERNAL MEDICINE

## 2019-05-17 PROCEDURE — 74328 X-RAY BILE DUCT ENDOSCOPY: CPT

## 2019-05-17 PROCEDURE — 80053 COMPREHEN METABOLIC PANEL: CPT

## 2019-05-17 PROCEDURE — 43274 ERCP DUCT STENT PLACEMENT: CPT | Performed by: INTERNAL MEDICINE

## 2019-05-17 PROCEDURE — 3609014400 HC ERCP DIAGNOSTIC: Performed by: INTERNAL MEDICINE

## 2019-05-17 PROCEDURE — 3700000001 HC ADD 15 MINUTES (ANESTHESIA): Performed by: INTERNAL MEDICINE

## 2019-05-17 PROCEDURE — BF101ZZ FLUOROSCOPY OF BILE DUCTS USING LOW OSMOLAR CONTRAST: ICD-10-PCS | Performed by: INTERNAL MEDICINE

## 2019-05-17 PROCEDURE — 1210000000 HC MED SURG R&B

## 2019-05-17 PROCEDURE — 2500000003 HC RX 250 WO HCPCS: Performed by: FAMILY MEDICINE

## 2019-05-17 PROCEDURE — 99222 1ST HOSP IP/OBS MODERATE 55: CPT | Performed by: INTERNAL MEDICINE

## 2019-05-17 PROCEDURE — 87040 BLOOD CULTURE FOR BACTERIA: CPT

## 2019-05-17 PROCEDURE — 99232 SBSQ HOSP IP/OBS MODERATE 35: CPT | Performed by: HOSPITALIST

## 2019-05-17 PROCEDURE — 74328 X-RAY BILE DUCT ENDOSCOPY: CPT | Performed by: INTERNAL MEDICINE

## 2019-05-17 PROCEDURE — 0F798DZ DILATION OF COMMON BILE DUCT WITH INTRALUMINAL DEVICE, VIA NATURAL OR ARTIFICIAL OPENING ENDOSCOPIC: ICD-10-PCS | Performed by: INTERNAL MEDICINE

## 2019-05-17 PROCEDURE — 6360000002 HC RX W HCPCS: Performed by: HOSPITALIST

## 2019-05-17 PROCEDURE — 3700000000 HC ANESTHESIA ATTENDED CARE: Performed by: INTERNAL MEDICINE

## 2019-05-17 PROCEDURE — 7100000001 HC PACU RECOVERY - ADDTL 15 MIN: Performed by: INTERNAL MEDICINE

## 2019-05-17 PROCEDURE — 2709999900 HC NON-CHARGEABLE SUPPLY: Performed by: INTERNAL MEDICINE

## 2019-05-17 PROCEDURE — 83735 ASSAY OF MAGNESIUM: CPT

## 2019-05-17 DEVICE — BILIARY STENT WITH NAVIFLEXTM RX DELIVERY SYSTEM
Type: IMPLANTABLE DEVICE | Site: BILE DUCT | Status: FUNCTIONAL
Brand: ADVANIX™ BILIARY

## 2019-05-17 RX ORDER — ROCURONIUM BROMIDE 10 MG/ML
INJECTION, SOLUTION INTRAVENOUS PRN
Status: DISCONTINUED | OUTPATIENT
Start: 2019-05-17 | End: 2019-05-17 | Stop reason: SDUPTHER

## 2019-05-17 RX ORDER — SODIUM CHLORIDE 0.9 % (FLUSH) 0.9 %
10 SYRINGE (ML) INJECTION PRN
Status: DISCONTINUED | OUTPATIENT
Start: 2019-05-17 | End: 2019-05-17

## 2019-05-17 RX ORDER — MORPHINE SULFATE 4 MG/ML
4 INJECTION, SOLUTION INTRAMUSCULAR; INTRAVENOUS
Status: DISCONTINUED | OUTPATIENT
Start: 2019-05-17 | End: 2019-05-17

## 2019-05-17 RX ORDER — HYDRALAZINE HYDROCHLORIDE 20 MG/ML
5 INJECTION INTRAMUSCULAR; INTRAVENOUS EVERY 10 MIN PRN
Status: DISCONTINUED | OUTPATIENT
Start: 2019-05-17 | End: 2019-05-17

## 2019-05-17 RX ORDER — MIDAZOLAM HYDROCHLORIDE 1 MG/ML
2 INJECTION INTRAMUSCULAR; INTRAVENOUS
Status: DISCONTINUED | OUTPATIENT
Start: 2019-05-17 | End: 2019-05-17

## 2019-05-17 RX ORDER — PROMETHAZINE HYDROCHLORIDE 25 MG/ML
6.25 INJECTION, SOLUTION INTRAMUSCULAR; INTRAVENOUS
Status: DISCONTINUED | OUTPATIENT
Start: 2019-05-17 | End: 2019-05-17

## 2019-05-17 RX ORDER — SCOLOPAMINE TRANSDERMAL SYSTEM 1 MG/1
1 PATCH, EXTENDED RELEASE TRANSDERMAL ONCE
Status: DISCONTINUED | OUTPATIENT
Start: 2019-05-17 | End: 2019-05-17

## 2019-05-17 RX ORDER — DIPHENHYDRAMINE HYDROCHLORIDE 50 MG/ML
12.5 INJECTION INTRAMUSCULAR; INTRAVENOUS
Status: DISCONTINUED | OUTPATIENT
Start: 2019-05-17 | End: 2019-05-17

## 2019-05-17 RX ORDER — ESMOLOL HYDROCHLORIDE 10 MG/ML
INJECTION INTRAVENOUS PRN
Status: DISCONTINUED | OUTPATIENT
Start: 2019-05-17 | End: 2019-05-17 | Stop reason: SDUPTHER

## 2019-05-17 RX ORDER — PROPOFOL 10 MG/ML
INJECTION, EMULSION INTRAVENOUS PRN
Status: DISCONTINUED | OUTPATIENT
Start: 2019-05-17 | End: 2019-05-17 | Stop reason: SDUPTHER

## 2019-05-17 RX ORDER — MAGNESIUM SULFATE IN WATER 40 MG/ML
2 INJECTION, SOLUTION INTRAVENOUS ONCE
Status: COMPLETED | OUTPATIENT
Start: 2019-05-17 | End: 2019-05-17

## 2019-05-17 RX ORDER — METOCLOPRAMIDE HYDROCHLORIDE 5 MG/ML
10 INJECTION INTRAMUSCULAR; INTRAVENOUS
Status: DISCONTINUED | OUTPATIENT
Start: 2019-05-17 | End: 2019-05-17

## 2019-05-17 RX ORDER — LIDOCAINE HYDROCHLORIDE 10 MG/ML
1 INJECTION, SOLUTION EPIDURAL; INFILTRATION; INTRACAUDAL; PERINEURAL
Status: DISCONTINUED | OUTPATIENT
Start: 2019-05-17 | End: 2019-05-17

## 2019-05-17 RX ORDER — MORPHINE SULFATE 2 MG/ML
4 INJECTION, SOLUTION INTRAMUSCULAR; INTRAVENOUS EVERY 5 MIN PRN
Status: DISCONTINUED | OUTPATIENT
Start: 2019-05-17 | End: 2019-05-17

## 2019-05-17 RX ORDER — SODIUM CHLORIDE, SODIUM LACTATE, POTASSIUM CHLORIDE, CALCIUM CHLORIDE 600; 310; 30; 20 MG/100ML; MG/100ML; MG/100ML; MG/100ML
INJECTION, SOLUTION INTRAVENOUS CONTINUOUS
Status: DISCONTINUED | OUTPATIENT
Start: 2019-05-17 | End: 2019-05-17

## 2019-05-17 RX ORDER — ONDANSETRON 2 MG/ML
INJECTION INTRAMUSCULAR; INTRAVENOUS PRN
Status: DISCONTINUED | OUTPATIENT
Start: 2019-05-17 | End: 2019-05-17 | Stop reason: SDUPTHER

## 2019-05-17 RX ORDER — SODIUM CHLORIDE, SODIUM LACTATE, POTASSIUM CHLORIDE, CALCIUM CHLORIDE 600; 310; 30; 20 MG/100ML; MG/100ML; MG/100ML; MG/100ML
INJECTION, SOLUTION INTRAVENOUS CONTINUOUS PRN
Status: DISCONTINUED | OUTPATIENT
Start: 2019-05-17 | End: 2019-05-17 | Stop reason: SDUPTHER

## 2019-05-17 RX ORDER — MEPERIDINE HYDROCHLORIDE 50 MG/ML
12.5 INJECTION INTRAMUSCULAR; INTRAVENOUS; SUBCUTANEOUS EVERY 5 MIN PRN
Status: DISCONTINUED | OUTPATIENT
Start: 2019-05-17 | End: 2019-05-17

## 2019-05-17 RX ORDER — SUCCINYLCHOLINE/SOD CL,ISO/PF 100 MG/5ML
SYRINGE (ML) INTRAVENOUS PRN
Status: DISCONTINUED | OUTPATIENT
Start: 2019-05-17 | End: 2019-05-17 | Stop reason: SDUPTHER

## 2019-05-17 RX ORDER — SODIUM CHLORIDE 0.9 % (FLUSH) 0.9 %
10 SYRINGE (ML) INJECTION EVERY 12 HOURS SCHEDULED
Status: DISCONTINUED | OUTPATIENT
Start: 2019-05-17 | End: 2019-05-17

## 2019-05-17 RX ORDER — LABETALOL HYDROCHLORIDE 5 MG/ML
5 INJECTION, SOLUTION INTRAVENOUS EVERY 10 MIN PRN
Status: DISCONTINUED | OUTPATIENT
Start: 2019-05-17 | End: 2019-05-17

## 2019-05-17 RX ORDER — LIDOCAINE HYDROCHLORIDE 10 MG/ML
INJECTION, SOLUTION INFILTRATION; PERINEURAL PRN
Status: DISCONTINUED | OUTPATIENT
Start: 2019-05-17 | End: 2019-05-17 | Stop reason: SDUPTHER

## 2019-05-17 RX ORDER — SODIUM CHLORIDE 9 MG/ML
INJECTION, SOLUTION INTRAVENOUS CONTINUOUS
Status: DISCONTINUED | OUTPATIENT
Start: 2019-05-17 | End: 2019-05-18 | Stop reason: HOSPADM

## 2019-05-17 RX ORDER — MIDAZOLAM HYDROCHLORIDE 1 MG/ML
INJECTION INTRAMUSCULAR; INTRAVENOUS PRN
Status: DISCONTINUED | OUTPATIENT
Start: 2019-05-17 | End: 2019-05-17 | Stop reason: SDUPTHER

## 2019-05-17 RX ORDER — MORPHINE SULFATE 2 MG/ML
2 INJECTION, SOLUTION INTRAMUSCULAR; INTRAVENOUS EVERY 5 MIN PRN
Status: DISCONTINUED | OUTPATIENT
Start: 2019-05-17 | End: 2019-05-17

## 2019-05-17 RX ORDER — FENTANYL CITRATE 50 UG/ML
50 INJECTION, SOLUTION INTRAMUSCULAR; INTRAVENOUS
Status: DISCONTINUED | OUTPATIENT
Start: 2019-05-17 | End: 2019-05-17

## 2019-05-17 RX ADMIN — ENOXAPARIN SODIUM 40 MG: 40 INJECTION SUBCUTANEOUS at 14:07

## 2019-05-17 RX ADMIN — Medication 100 MG: at 07:16

## 2019-05-17 RX ADMIN — LIDOCAINE HYDROCHLORIDE 40 MG: 10 INJECTION, SOLUTION INFILTRATION; PERINEURAL at 07:14

## 2019-05-17 RX ADMIN — SODIUM CHLORIDE, SODIUM LACTATE, POTASSIUM CHLORIDE, AND CALCIUM CHLORIDE: 600; 310; 30; 20 INJECTION, SOLUTION INTRAVENOUS at 07:04

## 2019-05-17 RX ADMIN — LEVOTHYROXINE SODIUM 100 MCG: 100 TABLET ORAL at 10:19

## 2019-05-17 RX ADMIN — ROCURONIUM BROMIDE 5 MG: 10 INJECTION INTRAVENOUS at 07:14

## 2019-05-17 RX ADMIN — SODIUM CHLORIDE, SODIUM LACTATE, POTASSIUM CHLORIDE, AND CALCIUM CHLORIDE: 600; 310; 30; 20 INJECTION, SOLUTION INTRAVENOUS at 06:48

## 2019-05-17 RX ADMIN — SODIUM CHLORIDE: 9 INJECTION, SOLUTION INTRAVENOUS at 10:19

## 2019-05-17 RX ADMIN — SUGAMMADEX 310 MG: 100 INJECTION, SOLUTION INTRAVENOUS at 08:15

## 2019-05-17 RX ADMIN — PROPOFOL 150 MG: 10 INJECTION, EMULSION INTRAVENOUS at 07:14

## 2019-05-17 RX ADMIN — Medication 2 G: at 10:19

## 2019-05-17 RX ADMIN — METRONIDAZOLE 500 MG: 500 INJECTION, SOLUTION INTRAVENOUS at 18:17

## 2019-05-17 RX ADMIN — GLUCAGON HYDROCHLORIDE 1 MG: 1 INJECTION, POWDER, FOR SOLUTION INTRAMUSCULAR; INTRAVENOUS; SUBCUTANEOUS at 07:38

## 2019-05-17 RX ADMIN — MAGNESIUM SULFATE HEPTAHYDRATE 2 G: 40 INJECTION, SOLUTION INTRAVENOUS at 14:07

## 2019-05-17 RX ADMIN — ESMOLOL HYDROCHLORIDE 10 MG: 10 INJECTION, SOLUTION INTRAVENOUS at 07:34

## 2019-05-17 RX ADMIN — Medication 2 G: at 18:17

## 2019-05-17 RX ADMIN — METRONIDAZOLE 500 MG: 500 INJECTION, SOLUTION INTRAVENOUS at 10:19

## 2019-05-17 RX ADMIN — Medication 2 G: at 02:48

## 2019-05-17 RX ADMIN — METRONIDAZOLE 500 MG: 500 INJECTION, SOLUTION INTRAVENOUS at 02:48

## 2019-05-17 RX ADMIN — MIDAZOLAM 2 MG: 1 INJECTION INTRAMUSCULAR; INTRAVENOUS at 07:07

## 2019-05-17 RX ADMIN — ROCURONIUM BROMIDE 40 MG: 10 INJECTION INTRAVENOUS at 07:21

## 2019-05-17 RX ADMIN — VANCOMYCIN HYDROCHLORIDE 1000 MG: 1 INJECTION, SOLUTION INTRAVENOUS at 02:55

## 2019-05-17 RX ADMIN — ONDANSETRON HYDROCHLORIDE 4 MG: 2 INJECTION, SOLUTION INTRAMUSCULAR; INTRAVENOUS at 07:43

## 2019-05-17 ASSESSMENT — COPD QUESTIONNAIRES: CAT_SEVERITY: MODERATE

## 2019-05-17 ASSESSMENT — LIFESTYLE VARIABLES: SMOKING_STATUS: 1

## 2019-05-17 ASSESSMENT — ENCOUNTER SYMPTOMS: SHORTNESS OF BREATH: 0

## 2019-05-17 NOTE — CONSULTS
performed by Bhavani Jain MD at [address] [facility] CYST REMOVAL  7/2011    R neck    ERCP N/A 1/29/2019    Dr SEVERINO Shook-w/placement of a 10 x 40 mm self-expanding metal biliary stent, stone removal, surgical consult-Repeat in 8-12 wks    ERCP N/A 4/24/2019    ERCP DIAGNOSTIC with spyglass performed by Bruce Siemens, MD at [facility]    HERNIA REPAIR  7/2011    L ing hernia w/mesh, small umb hernia    KNEE SURGERY      SPINE SURGERY      complicated by a postop. hematoma     Allergies:  Meloxicam; Tamsulosin; and Pcn [penicillins]  Home Meds:  Prior to Admission medications    Medication Sig Start Date End Date Taking?  Authorizing Provider   levothyroxine (SYNTHROID) 100 MCG tablet Take 100 mcg by mouth Daily   Yes Historical Provider, MD   fluticasone-salmeterol (ADVAIR) 250-50 MCG/DOSE AEPB Inhale 1 puff into the lungs every 12 hours    Historical Provider, MD        Current Meds:       indomethacin  100 mg Rectal Once    sodium chloride flush  10 mL Intravenous 2 times per day    famotidine (PEPCID) injection  20 mg Intravenous Once    scopolamine  1 patch Transdermal Once    levothyroxine  100 mcg Oral Daily    sodium chloride flush  10 mL Intravenous 2 times per day    cefepime  2 g Intravenous Q8H    metroNIDAZOLE  500 mg Intravenous Q8H        sodium chloride      lactated ringers 100 mL/hr at 05/17/19 0648    lactated ringers         PRN Meds:  sodium chloride flush, lidocaine PF, morphine, morphine, morphine, HYDROmorphone, HYDROmorphone, fentanNYL, midazolam, diphenhydrAMINE, promethazine, metoclopramide, labetalol, hydrALAZINE, meperidine, sodium chloride flush, morphine    Social History:   Social History     Socioeconomic History    Marital status:      Spouse name: Not on file    Number of children: Not on file    Years of education: Not on file    Highest education level: Not on file   Occupational History    Not on file   Social Needs    Financial resource strain: Not on file  Food insecurity:     Worry: Not on file     Inability: Not on file    Transportation needs:     Medical: Not on file     Non-medical: Not on file   Tobacco Use    Smoking status: Current Every Day Smoker     Packs/day: 1.00     Years: 30.00     Pack years: 30.00    Smokeless tobacco: Never Used    Tobacco comment: smokes a few cigars now   Substance and Sexual Activity    Alcohol use: No     Comment: used to drink heavy in the past, 9 years    Drug use: No    Sexual activity: Not on file   Lifestyle    Physical activity:     Days per week: Not on file     Minutes per session: Not on file    Stress: Not on file   Relationships    Social connections:     Talks on phone: Not on file     Gets together: Not on file     Attends Rastafari service: Not on file     Active member of club or organization: Not on file     Attends meetings of clubs or organizations: Not on file     Relationship status: Not on file    Intimate partner violence:     Fear of current or ex partner: Not on file     Emotionally abused: Not on file     Physically abused: Not on file     Forced sexual activity: Not on file   Other Topics Concern    Not on file   Social History Narrative    Not on file       Family History:   Family History   Problem Relation Age of Onset    Cancer Sister        No GI malignancies     ROS:  GENERAL: +fever and chills. Fatigue,   NEURO: No headache or seizure. EYES: No diplopia or vision loss. CARDIOVASCULAR: No chest pain or palpitations. RESPIRATORY: No dyspnea or cough. GI: no melena. no hematochezia, + abdominal pain, + nausea/vomiting  : No dysuria or hematuria. GYN: No discharge or abnormal bleeding. MUSCULOSKELETAL: No new arthralgias or myalgias  DERM: No rash or jaundice. ENDOCRINE: No polyuria or polydipsia. PSYCH: No anxiety or depression.     Physical Exam:  VITALS:   Vitals:    05/16/19 1800 05/16/19 1911 05/16/19 2300 05/17/19 0155   BP: 102/69 (!) 110/58 110/61    Pulse: 89 93 96    Resp: 21 18 20    Temp:  100.3 °F (37.9 °C) 100 °F (37.8 °C)    TempSrc:  Temporal Temporal    SpO2: 92% 94% 94%    Weight:    171 lb (77.6 kg)   Height:           General appearance: alert and cooperative with exam, appears stated age, no acute distress   Head: normal cephalic, atraumatic. EOMI bilaterally, no neck lymphadenopathy appreciated, no carotid bruits noted  Lungs: clear to auscultation bilaterally  Heart: regular rate and rhythm, S1, S2 normal, no murmur, click, rub or gallop  Abdomen: normal findings: bowel sounds normal, liver span normal to percussion, spleen non-palpable and umbilicus normal and abnormal findings:  tenderness moderate in the epigastrium and in the RUQ  Skin: warm, dry, no obvious rash, non-jaundice  Extremities: extremities normal, atraumatic, no cyanosis or edema  Neurologic: No obvious focal neurologic deficits. CN II-XII grossly intact      Labs:     Recent Labs     05/16/19 0219 05/17/19 0318   WBC 18.3* 17.5*   RBC 4.90 4.14*   HGB 15.0 12.9*   HCT 47.5 39.7*   MCV 96.9* 95.9*   MCH 30.6 31.2*   MCHC 31.6* 32.5*    191     Recent Labs     05/16/19 0219 05/17/19 0318   * 142   K 3.7 3.4*   ANIONGAP 10 10    109   CO2 31* 23   BUN 9 13   CREATININE 0.6 <0.5   GLUCOSE 155* 99   CALCIUM 8.5* 8.1*     Recent Labs     05/17/19 0318   MG 1.5*   PHOS 2.2*     Recent Labs     05/16/19 0219 05/17/19 0318   * 85*   ALT 50* 76*   BILITOT 1.6* 4.4*   ALKPHOS 226* 141*     HgBA1c:  No components found for: HGBA1C  FLP:  No results found for: TRIG, HDL, LDLCALC, LDLDIRECT, LABVLDL  TSH:  No results found for: TSH  Troponin T: No results for input(s): TROPONINI in the last 72 hours.   INR:   Recent Labs     05/16/19 0219   INR 1.15       Recent Labs     05/16/19  0219   LIPASE 25       Radiology:  Ct Abdomen Pelvis W Iv Contrast Additional Contrast? None    Result Date: 5/16/2019  CT ABDOMEN PELVIS W IV CONTRAST 5/16/2019 1:45 AM HISTORY: Vomiting, abdominal pain COMPARISON: None. DLP: 830 mGy cm TECHNIQUE: Following the uneventful administration of intravenous iodinated contrast, helical CT tomographic images of the abdomen and pelvis were acquired. Coronal reformatted images were also provided for review. FINDINGS: Scarring with mild traction bronchiectasis in the lung bases. Minimal debris within the left lower lobe posterior basal segmental airway. Included portion the mediastinum is unremarkable. Beckey Conquest LIVER: No focal liver lesion. The hepatic vasculature is patent. BILIARY SYSTEM: Prior cholecystectomy. There is prominent dilation of the intrahepatic ducts as well as dilation of the common hepatic duct and common bile duct down to the level of the pancreatic head. Abrupt transition from the dilated common bile duct to a normal caliber as the duct enters the pancreatic head (series 3-image 23). PANCREAS: I do not see an obvious, discrete mass lesion the pancreatic head. The main pancreatic duct is nondilated. The pancreas is not atrophic. SPLEEN: Unremarkable. KIDNEYS AND ADRENALS: Adrenal glands are unremarkable. There are small bilateral simple appearing renal cortical cyst. No solid or complex lesion appreciated. No hydronephrosis. The ureters are decompressed and normal in appearance. RETROPERITONEUM: No mass, lymphadenopathy or hemorrhage. GI TRACT: There is a moderate-sized hiatal hernia. A fluid fills the hernia sac. There is moderate dilatation of the stomach. I do not see an obvious obstructing mass in the region of the gastric antrum. The duodenal bulb is nondistended. There is a small duodenal diverticulum with an air-fluid level along the second portion (series 2-image 30). A second air-fluid level just anterior to the aorta relates to fluid within the third portion the duodenum as it crosses the midline (series 2-image 33). In general, the small bowel is nondistended.  Numerous descending and sigmoid colon diverticula without obvious acute diverticulitis. Appendix is unremarkable. OTHER: There is no intraperitoneal free fluid or free air. The abdominopelvic vasculature is patent. No acute fracture identified. Advanced hip joint osteoarthritis, grade 4 on the left. Moderate lumbar spondylosis. There is a fat-containing umbilical hernia. No inflammatory change identified within the hernia sac. There is a fat-containing right inguinal hernia with a small amount of small bowel extending into the hernia sac (series 3-image 20). No inflammatory change within this hernia. The bowel is nondistended. PELVIS: Prostatomegaly identified. Urinary bladder is mildly distended and displays mild wall thickening. . No pelvic adenopathy. There are several bladder diverticula arising from the superior, left lateral and posterior walls. 1. Prior cholecystectomy. Prominent intrahepatic and extrahepatic biliary dilation with abrupt transition from dilated to nondilated common bile duct as the duct enters the pancreatic head. The preliminary report mentions a possible intraductal soft tissue mass. I am uncertain if this is real or artifactual, but the overall imaging appearance of the abrupt transition is suspicious and neoplasm is not excluded by CT. 2. Moderate size hiatal hernia. Stomach appears moderately distended with fluid filling the hernia sac. 3. Bilateral simple renal cortical cyst. 4. Colonic diverticulosis without acute diverticulitis. 5. Interstitial scarring with mild traction bronchiectasis in the lung bases. Preliminary report mentions possible UIP pattern although I do not see obvious honeycombing. Pattern is nonspecific. 6. Right inguinal and umbilical hernias. A short segment of small bowel enters the neck of the right inguinal hernia although this is nonobstructive. No inflammatory change in the hernia sac. 7. Likely chronic partial bladder outlet obstruction with circumferential thickening of the bladder wall and numerous bladder diverticula.  8.

## 2019-05-17 NOTE — PROGRESS NOTES
[id number] [facility]      Patient:  Tiffany Ventura  YOB: 1950  Date of Service: 5/17/2019  MRN: [id number]   Acct: [de-identified]   Primary Care Physician: Ghassan Miller  Advance Directive: Full Code  Admit Date: 5/16/2019       Hospital Day: 1    Chief Complaint:   Chief Complaint   Patient presents with    Abdominal Pain     Pt arrived to the ed via ems with c/o abd pain and n/v. Pt was seen at [facility] ER at 0000 and pt is still c/o pain and nausea. Pt is actively vomiting.  Nausea & Vomiting       Subjective: lethargic, just had procedure done    Objective:   VITALS:  /69   Pulse 78   Temp 99.2 °F (37.3 °C) (Temporal)   Resp 12   Ht 5' 5\" (1.651 m)   Wt 171 lb (77.6 kg)   SpO2 95%   BMI 28.46 kg/m²   24HR INTAKE/OUTPUT:      Intake/Output Summary (Last 24 hours) at 5/17/2019 1154  Last data filed at 5/17/2019 3002  Gross per 24 hour   Intake 2002.5 ml   Output 970 ml   Net 1032.5 ml     Constitutional: Oriented to person, place, and time. Well-developed and well-nourished. No distress. HENT:    Head: Normocephalic and atraumatic. Mouth/Throat: Oropharynx is clear and moist. No oropharyngeal exudate. Eyes: Conjunctivae and EOM are normal. Pupils are equal, round, and reactive to light. No scleral icterus. Neck: Normal range of motion. Neck supple. No JVD present. No tracheal deviation present. No thyromegaly present. Cardiovascular: Normal rate, regular rhythm and normal heart sounds. Exam reveals no gallop and no friction rub. Pulmonary/Chest: Effort normal and breath sounds normal. No stridor. No respiratory distress. No wheezes. No rales. Abdominal: Soft. Bowel sounds are normal. No distension and no mass. There is no tenderness. There is no rebound and no guarding. Musculoskeletal: Normal range of motion. There is no edema or tenderness. Extremities: No edema  Neurological: Alert and oriented to person, place, and time. No cranial nerve deficit.    Skin: Skin is warm and dry. No rash noted. Not diaphoretic. No erythema. No pallor. Psychiatric: Normal mood and affect. Behavior is normal. Judgment and thought content normal.     Medications:      sodium chloride 100 mL/hr at 05/17/19 1019      indomethacin  100 mg Rectal Once    enoxaparin  40 mg Subcutaneous Daily    magnesium sulfate  2 g Intravenous Once    levothyroxine  100 mcg Oral Daily    sodium chloride flush  10 mL Intravenous 2 times per day    cefepime  2 g Intravenous Q8H    metroNIDAZOLE  500 mg Intravenous Q8H     indomethacin, sodium chloride flush, morphine  DIET CLEAR LIQUID;         Lab and other Data:     Recent Labs     05/16/19 0219 05/17/19 0318   WBC 18.3* 17.5*   HGB 15.0 12.9*    191     Recent Labs     05/16/19 0219 05/17/19 0318   * 142   K 3.7 3.4*    109   CO2 31* 23   BUN 9 13   CREATININE 0.6 <0.5   GLUCOSE 155* 99     Recent Labs     05/16/19 0219 05/17/19 0318   * 85*   ALT 50* 76*   BILITOT 1.6* 4.4*   ALKPHOS 226* 141*     Troponin T: No results for input(s): TROPONINI in the last 72 hours. Pro-BNP: No results for input(s): BNP in the last 72 hours. INR:   Recent Labs     05/16/19 0219   INR 1.15     ABGs: No results found for: PHART, PO2ART, [id number]  UA:  Recent Labs     05/16/19  0247   COLORU YELLOW   PHUR 7.5   CLARITYU Clear   SPECGRAV 1.016   LEUKOCYTESUR Negative   UROBILINOGEN 1.0   BILIRUBINUR Negative   BLOODU Negative   GLUCOSEU Negative       Imaging:   FL ERCP BILIARY S&I   Final Result      CT ABDOMEN PELVIS W IV CONTRAST Additional Contrast? None   Final Result   1. Prior cholecystectomy. Prominent intrahepatic and extrahepatic   biliary dilation with abrupt transition from dilated to nondilated   common bile duct as the duct enters the pancreatic head. The   preliminary report mentions a possible intraductal soft tissue mass.  I   am uncertain if this is real or artifactual, but the overall imaging   appearance of the abrupt transition is suspicious and neoplasm is not   excluded by CT. 2. Moderate size hiatal hernia. Stomach appears moderately distended   with fluid filling the hernia sac. 3. Bilateral simple renal cortical cyst.   4. Colonic diverticulosis without acute diverticulitis. 5. Interstitial scarring with mild traction bronchiectasis in the lung   bases. Preliminary report mentions possible UIP pattern although I do   not see obvious honeycombing. Pattern is nonspecific. 6. Right inguinal and umbilical hernias. A short segment of small   bowel enters the neck of the right inguinal hernia although this is   nonobstructive. No inflammatory change in the hernia sac. 7. Likely chronic partial bladder outlet obstruction with   circumferential thickening of the bladder wall and numerous bladder   diverticula. 8. Findings in agreement with the emergent findings from the initial   [facility] preliminary report.     Signed by Dr Gilbert Gasca on 5/16/2019 7:35 AM      FLUORO FOR SURGICAL PROCEDURES    (Results Pending)     Micro:   Blood Culture, Routine   Date Value Ref Range Status   05/16/2019 (A)  Preliminary     Bottle volume = 6 ml  Gram stain Aerobic bottle:  Gram negative rods  Culture in progress  Please notify Physician  Gram stain Anaerobic bottle:  Gram negative rods  Culture in progress  Please notify Physician   Bottle volume = 6 ml     05/16/2019   Preliminary    Sensitivity to follow  Isolated from Aerobic and Anaerobic bottles     , No components found for: LABURINE  Patient Active Problem List    Diagnosis Date Noted    History of biliary stent insertion     Gallstones 02/26/2019    E coli bacteremia     Hypokalemia     Sepsis ([address]     Hypotension due to hypovolemia     Choledocholithiasis 01/29/2019    Hypothyroidism 01/29/2019    Chronic back pain 01/29/2019    COPD (chronic obstructive pulmonary disease) ([address] 01/29/2019    Urinary retention 01/29/2019    Leukocytosis 01/29/2019    Cholangitis due to bile duct calculus with obstruction 01/29/2019    Left groin pain 01/26/2012       Assessment/plan:   Principal Problem:    Sepsis ([address]  Active Problems:    Hypothyroidism    COPD (chronic obstructive pulmonary disease) (HCC)    Urinary retention    Cholangitis due to bile duct calculus with obstruction    E coli bacteremia  Resolved Problems:    * No resolved hospital problems.  *      Plan:   - s/p ERCP with stent placement today, GI following  - cont IV abx, f/u sensitivities  - has bacteremia as well as cholangitis, will need prolonged course of IV abx likely  - supportive care, CLD, anti emetics, IVF    DVT Prophylaxis: lovenox      Nicole Mathews MD  Hospitalist Service  5/17/2019  11:54 AM

## 2019-05-17 NOTE — ANESTHESIA POSTPROCEDURE EVALUATION
Department of Anesthesiology  Postprocedure Note    Patient: Shirlene Bullard  MRN: [id number]  YOB: 1950  Date of evaluation: 5/17/2019  Time:  8:34 AM     Procedure Summary     Date:  05/17/19 Room / Location:  [facility] END[facility] / [facility]    Anesthesia Start:  0704 Anesthesia Stop:      Procedure:  ERCP DIAGNOSTIC w/stent placement (N/A Abdomen) Diagnosis:  (Abd pain, Cholangitis due to bile duct calculus with obstruction)    Surgeon:  Bonifacio Barrett MD Responsible Provider:  DESTINI Jung CRNA    Anesthesia Type:  general ASA Status:  3          Anesthesia Type: general    Fidelia Phase I: Fidelia Score: 10    Fidelia Phase II:      Last vitals: Reviewed and per EMR flowsheets.        Anesthesia Post Evaluation    Patient location during evaluation: bedside  Patient participation: complete - patient participated  Level of consciousness: sleepy but conscious  Pain score: 0  Airway patency: patent  Nausea & Vomiting: no nausea and no vomiting  Complications: no  Cardiovascular status: hemodynamically stable and blood pressure returned to baseline  Respiratory status: acceptable and nasal cannula  Hydration status: stable

## 2019-05-17 NOTE — ANESTHESIA PRE PROCEDURE
Department of Anesthesiology  Preprocedure Note       Name:  Tiffany Ventura   Age:  77 y.o.  :  1950                                          MRN:  [id number]         Date:  2019      Surgeon: Felicita Faust):  Ivon Serna MD    Procedure: ERCP DIAGNOSTIC (N/A Abdomen)    Medications prior to admission:   Prior to Admission medications    Medication Sig Start Date End Date Taking? Authorizing Provider   levothyroxine (SYNTHROID) 100 MCG tablet Take 100 mcg by mouth Daily   Yes Historical Provider, MD   fluticasone-salmeterol (ADVAIR) 250-50 MCG/DOSE AEPB Inhale 1 puff into the lungs every 12 hours    Historical Provider, MD       Current medications:    Current Facility-Administered Medications   Medication Dose Route Frequency Provider Last Rate Last Dose    0.9 % sodium chloride infusion   Intravenous Continuous Sammie Zelaya MD        indomethacin (INDOCIN) 50 MG suppository 100 mg  100 mg Rectal Once Ivon Serna MD        lactated ringers infusion   Intravenous Continuous Ivon Serna MD        levothyroxine (SYNTHROID) tablet 100 mcg  100 mcg Oral Daily Sammie Zelaya MD   100 mcg at 19 0905    sodium chloride flush 0.9 % injection 10 mL  10 mL Intravenous 2 times per day Sammie Zelaya MD   10 mL at 19 0907    sodium chloride flush 0.9 % injection 10 mL  10 mL Intravenous PRN Sammie Zelaya MD        ceFEPIme (MAXIPIME) 2 g in sodium chloride (PF) 20 mL IV syringe  2 g Intravenous Q8H Sammie Zelaya MD   2 g at 19 0248    metronidazole (FLAGYL) 500 mg in NaCl 100 mL IVPB premix  500 mg Intravenous Q8H Sammie Zelaya MD   Stopped at 19 0348    morphine (PF) injection 4 mg  4 mg Intravenous Q3H PRN Sammie Zelaya MD   4 mg at 19 1830       Allergies:     Allergies   Allergen Reactions    Meloxicam      Dizzy, skin crawls    Tamsulosin      excessive sweating    Pcn [Penicillins] Hives     Pt reported to me that he had \" little red bumps\" on arms - no swelling, no SOA, no mucous membrane involvement. KEILA Gautam MD       Problem List:    Patient Active Problem List   Diagnosis Code    Left groin pain R10.32    Choledocholithiasis K80.50    Hypothyroidism E03.9    Chronic back pain M54.9, G89.29    COPD (chronic obstructive pulmonary disease) ([facility]) J44.9    Urinary retention R33.9    Leukocytosis D72.829    Cholangitis due to bile duct calculus with obstruction K80.31    Sepsis ([facility]) A41.9    Hypotension due to hypovolemia I95.89, E86.1    E coli bacteremia R78.81    Hypokalemia E87.6    Gallstones K80.20    History of biliary stent insertion Z98.890       Past Medical History:        Diagnosis Date    Bacteremia     Chronic back pain     COPD (chronic obstructive pulmonary disease) ([facility])     GERD (gastroesophageal reflux disease)     Hypothyroidism     Neuropathy     LLE    Urinary retention        Past Surgical History:        Procedure Laterality Date    BACK SURGERY      lumbar    CHOLECYSTECTOMY, LAPAROSCOPIC N/A 2/26/2019    CHOLECYSTECTOMY LAPAROSCOPIC WITH GRAM performed by Amina Strickland MD at [address] [place] [address] CYST REMOVAL  7/2011    R neck    ERCP N/A 1/29/2019    Dr SEVERINO Shook-w/placement of a 10 x 40 mm self-expanding metal biliary stent, stone removal, surgical consult-Repeat in 8-12 wks    ERCP N/A 4/24/2019    ERCP DIAGNOSTIC with spyglass performed by Quang Vivar MD at [address] [place] [address] Endoscopy    HERNIA REPAIR  7/2011    L ing hernia w/mesh, small umb hernia    KNEE SURGERY      SPINE SURGERY      complicated by a postop. hematoma       Social History:    Social History     Tobacco Use    Smoking status: Current Every Day Smoker     Packs/day: 1.00     Years: 30.00     Pack years: 30.00    Smokeless tobacco: Never Used    Tobacco comment: smokes a few cigars now   Substance Use Topics    Alcohol use: No     Comment: used to drink heavy in the past, 9 years                                Ready to quit: Not Answered  Counseling given: Not Answered  Comment: smokes a few cigars now      Vital Signs (Current):   Vitals:    05/16/19 1800 05/16/19 1911 05/16/19 2300 05/17/19 0155   BP: 102/69 (!) 110/58 110/61    Pulse: 89 93 96    Resp: 21 18 20    Temp:  100.3 °F (37.9 °C) 100 °F (37.8 °C)    TempSrc:  Temporal Temporal    SpO2: 92% 94% 94%    Weight:    171 lb (77.6 kg)   Height:                                                  BP Readings from Last 3 Encounters:   05/16/19 110/61   04/24/19 (!) 92/58   04/24/19 (!) 123/59       NPO Status: Time of last liquid consumption: 2350                        Time of last solid consumption: 2350                        Date of last liquid consumption: 05/16/19                        Date of last solid food consumption: 05/16/19    BMI:   Wt Readings from Last 3 Encounters:   05/17/19 171 lb (77.6 kg)   04/24/19 163 lb (73.9 kg)   03/27/19 162 lb 3.2 oz (73.6 kg)     Body mass index is 28.46 kg/m². CBC:   Lab Results   Component Value Date    WBC 17.5 05/17/2019    RBC 4.14 05/17/2019    HGB 12.9 05/17/2019    HGB 17.1 07/15/2011    HCT 39.7 05/17/2019    HCT 50.4 07/15/2011    MCV 95.9 05/17/2019    RDW 15.2 05/17/2019     05/17/2019     07/15/2011       CMP:   Lab Results   Component Value Date     05/17/2019     07/15/2011    K 3.4 05/17/2019     05/17/2019     07/15/2011    CO2 23 05/17/2019    BUN 13 05/17/2019    CREATININE <0.5 05/17/2019    CREATININE 0.7 07/15/2011    LABGLOM >60 05/17/2019    GLUCOSE 99 05/17/2019    PROT 4.9 05/17/2019    PROT 6.7 07/15/2011    CALCIUM 8.1 05/17/2019    BILITOT 4.4 05/17/2019    ALKPHOS 141 05/17/2019    ALKPHOS 75 07/15/2011    AST 85 05/17/2019    ALT 76 05/17/2019       POC Tests: No results for input(s): POCGLU, POCNA, POCK, POCCL, POCBUN, POCHEMO, POCHCT in the last 72 hours.     Coags:   Lab Results   Component Value Date    PROTIME 14.1 05/16/2019    PROTIME 12.68 07/15/2011    INR 1.15 05/16/2019    APTT 28.6 01/29/2019       HCG (If Applicable): No results found for: PREGTESTUR, PREGSERUM, HCG, HCGQUANT     ABGs: No results found for: PHART, PO2ART, DOH0XUD, TKP5GEP, BEART, X3RACRSQ     Type & Screen (If Applicable):  No results found for: LABABO, [address]    Anesthesia Evaluation  Patient summary reviewed and Nursing notes reviewed no history of anesthetic complications:   Airway: Mallampati: II  TM distance: >3 FB   Neck ROM: full  Mouth opening: > = 3 FB Dental: normal exam   (+) edentulous      Pulmonary:Negative Pulmonary ROS and normal exam  breath sounds clear to auscultation  (+) COPD: moderate,  current smoker    (-) shortness of breath          Patient did not smoke on day of surgery. ROS comment: On oxygen 2   Cardiovascular:        (-) hypertension, CAD,  angina and  CHF    NYHA Classification: I  ECG reviewed  Rhythm: regular  Rate: normal           Beta Blocker:  Not on Beta Blocker      ROS comment:     Summary   Mitral valve leaflets are mildly thickened with preserved leaflet   mobility.   Mild mitral regurgitation is present.  Lundy Safer thickened aortic valve leaflets with preserved leaflet mobility.   Aortic valve appears to be tricuspid.   Trace tricuspid regurgitation .   Mildly dilated left atrium.   Normal left ventricular size and function.   Moderate concentric left ventricular hypertrophy.   Impaired relaxation compatible with diastolic dysfunction. ( reversed E/A   ratio)      Signature      ----------------------------------------------------------------   Electronically signed by Casey Ordaz MD(Interpreting   [id number]) on 01/30/2019 02:52 PM     Neuro/Psych:   Negative Neuro/Psych ROS     (-) seizures, CVA and depression/anxiety            GI/Hepatic/Renal: Neg GI/Hepatic/Renal ROS  (+) GERD:,      (-) hiatal hernia      ROS comment: cholangitis. Endo/Other: Negative Endo/Other ROS   (+) hypothyroidism::., electrolyte abnormalities, .           Pt had PAT visit. Abdominal:       Abdomen: soft. Vascular:                                      Anesthesia Plan      general     ASA 3     (Iv zofran within 30 min of closing )  Induction: intravenous. BIS  MIPS: Postoperative opioids intended and Prophylactic antiemetics administered. Anesthetic plan and risks discussed with patient. Use of blood products discussed with patient whom. Plan discussed with CRNA.     Attending anesthesiologist reviewed and agrees with Pre Eval content              Nixon Black MD   5/17/2019

## 2019-05-18 VITALS
SYSTOLIC BLOOD PRESSURE: 134 MMHG | DIASTOLIC BLOOD PRESSURE: 78 MMHG | HEIGHT: 65 IN | HEART RATE: 86 BPM | OXYGEN SATURATION: 95 % | WEIGHT: 173.44 LBS | BODY MASS INDEX: 28.9 KG/M2 | RESPIRATION RATE: 20 BRPM | TEMPERATURE: 97.6 F

## 2019-05-18 LAB
ALBUMIN SERPL-MCNC: 2.7 G/DL (ref 3.5–5.2)
ALP BLD-CCNC: 154 U/L (ref 40–130)
ALT SERPL-CCNC: 54 U/L (ref 5–41)
ANION GAP SERPL CALCULATED.3IONS-SCNC: 10 MMOL/L (ref 7–19)
AST SERPL-CCNC: 49 U/L (ref 5–40)
BASOPHILS ABSOLUTE: 0 K/UL (ref 0–0.2)
BASOPHILS RELATIVE PERCENT: 0.3 % (ref 0–1)
BILIRUB SERPL-MCNC: 2.5 MG/DL (ref 0.2–1.2)
BLOOD CULTURE, ROUTINE: ABNORMAL
BLOOD CULTURE, ROUTINE: ABNORMAL
BUN BLDV-MCNC: 9 MG/DL (ref 8–23)
CALCIUM SERPL-MCNC: 8 MG/DL (ref 8.8–10.2)
CHLORIDE BLD-SCNC: 107 MMOL/L (ref 98–111)
CO2: 25 MMOL/L (ref 22–29)
CREAT SERPL-MCNC: <0.5 MG/DL (ref 0.5–1.2)
CULTURE, BLOOD 2: ABNORMAL
CULTURE, BLOOD 2: ABNORMAL
EOSINOPHILS ABSOLUTE: 0.1 K/UL (ref 0–0.6)
EOSINOPHILS RELATIVE PERCENT: 0.4 % (ref 0–5)
GFR NON-AFRICAN AMERICAN: >60
GLUCOSE BLD-MCNC: 97 MG/DL (ref 74–109)
HCT VFR BLD CALC: 38 % (ref 42–52)
HEMOGLOBIN: 12.5 G/DL (ref 14–18)
LYMPHOCYTES ABSOLUTE: 0.9 K/UL (ref 1.1–4.5)
LYMPHOCYTES RELATIVE PERCENT: 7.8 % (ref 20–40)
MAGNESIUM: 1.9 MG/DL (ref 1.6–2.4)
MCH RBC QN AUTO: 31.1 PG (ref 27–31)
MCHC RBC AUTO-ENTMCNC: 32.9 G/DL (ref 33–37)
MCV RBC AUTO: 94.5 FL (ref 80–94)
MONOCYTES ABSOLUTE: 0.7 K/UL (ref 0–0.9)
MONOCYTES RELATIVE PERCENT: 5.4 % (ref 0–10)
NEUTROPHILS ABSOLUTE: 10.2 K/UL (ref 1.5–7.5)
NEUTROPHILS RELATIVE PERCENT: 85.1 % (ref 50–65)
ORGANISM: ABNORMAL
ORGANISM: ABNORMAL
PDW BLD-RTO: 15.1 % (ref 11.5–14.5)
PHOSPHORUS: 1.4 MG/DL (ref 2.5–4.5)
PLATELET # BLD: 165 K/UL (ref 130–400)
PMV BLD AUTO: 10.1 FL (ref 9.4–12.4)
POTASSIUM REFLEX MAGNESIUM: 3.2 MMOL/L (ref 3.5–5)
RBC # BLD: 4.02 M/UL (ref 4.7–6.1)
SODIUM BLD-SCNC: 142 MMOL/L (ref 136–145)
TOTAL PROTEIN: 5 G/DL (ref 6.6–8.7)
WBC # BLD: 12 K/UL (ref 4.8–10.8)

## 2019-05-18 PROCEDURE — 80053 COMPREHEN METABOLIC PANEL: CPT

## 2019-05-18 PROCEDURE — 99999 PR OFFICE/OUTPT VISIT,PROCEDURE ONLY: CPT | Performed by: INTERNAL MEDICINE

## 2019-05-18 PROCEDURE — 99232 SBSQ HOSP IP/OBS MODERATE 35: CPT | Performed by: INTERNAL MEDICINE

## 2019-05-18 PROCEDURE — 6370000000 HC RX 637 (ALT 250 FOR IP): Performed by: INTERNAL MEDICINE

## 2019-05-18 PROCEDURE — 6360000002 HC RX W HCPCS: Performed by: INTERNAL MEDICINE

## 2019-05-18 PROCEDURE — 36415 COLL VENOUS BLD VENIPUNCTURE: CPT

## 2019-05-18 PROCEDURE — 2500000003 HC RX 250 WO HCPCS: Performed by: INTERNAL MEDICINE

## 2019-05-18 PROCEDURE — 6360000002 HC RX W HCPCS: Performed by: HOSPITALIST

## 2019-05-18 PROCEDURE — 84100 ASSAY OF PHOSPHORUS: CPT

## 2019-05-18 PROCEDURE — 85025 COMPLETE CBC W/AUTO DIFF WBC: CPT

## 2019-05-18 PROCEDURE — 83735 ASSAY OF MAGNESIUM: CPT

## 2019-05-18 PROCEDURE — 2580000003 HC RX 258: Performed by: INTERNAL MEDICINE

## 2019-05-18 RX ORDER — CIPROFLOXACIN 500 MG/1
500 TABLET, FILM COATED ORAL 2 TIMES DAILY
Qty: 14 TABLET | Refills: 0 | Status: SHIPPED | OUTPATIENT
Start: 2019-05-18 | End: 2019-05-25

## 2019-05-18 RX ORDER — METRONIDAZOLE 500 MG/1
500 TABLET ORAL 3 TIMES DAILY
Qty: 21 TABLET | Refills: 0 | Status: SHIPPED | OUTPATIENT
Start: 2019-05-18 | End: 2019-05-25

## 2019-05-18 RX ADMIN — LEVOTHYROXINE SODIUM 100 MCG: 100 TABLET ORAL at 08:16

## 2019-05-18 RX ADMIN — Medication 10 ML: at 08:18

## 2019-05-18 RX ADMIN — Medication 2 G: at 08:16

## 2019-05-18 RX ADMIN — METRONIDAZOLE 500 MG: 500 INJECTION, SOLUTION INTRAVENOUS at 08:17

## 2019-05-18 RX ADMIN — ENOXAPARIN SODIUM 40 MG: 40 INJECTION SUBCUTANEOUS at 08:16

## 2019-05-18 NOTE — PROGRESS NOTES
GI  - PROGRESS NOTE    Subjective:   Admit Date: 5/16/2019  PCP: Ghassan Miller    CC: Cholangitis    Pt seen and examined. Pt is resting in his chair and ate breakfast without issue. He denies fever, abdominal pain, nausea, and vomiting. He would like to go home and stated that he's going to go home. Medications:  Scheduled Meds:   indomethacin  100 mg Rectal Once    enoxaparin  40 mg Subcutaneous Daily    levothyroxine  100 mcg Oral Daily    sodium chloride flush  10 mL Intravenous 2 times per day    cefepime  2 g Intravenous Q8H    metroNIDAZOLE  500 mg Intravenous Q8H       Continuous Infusions:   sodium chloride 100 mL/hr at 05/17/19 1019       PRN Meds:.indomethacin, sodium chloride flush, morphine    Allergies: Meloxicam; Tamsulosin; and Pcn [penicillins]    Labs:     Recent Labs     05/16/19 0219 05/17/19 0318 05/18/19  0208   WBC 18.3* 17.5* 12.0*   RBC 4.90 4.14* 4.02*   HGB 15.0 12.9* 12.5*   HCT 47.5 39.7* 38.0*   MCV 96.9* 95.9* 94.5*   MCH 30.6 31.2* 31.1*   MCHC 31.6* 32.5* 32.9*    191 165     Recent Labs     05/16/19 0219 05/17/19 0318 05/18/19  0208   * 142 142   K 3.7 3.4* 3.2*   ANIONGAP 10 10 10    109 107   CO2 31* 23 25   BUN 9 13 9   CREATININE 0.6 <0.5 <0.5   GLUCOSE 155* 99 97   CALCIUM 8.5* 8.1* 8.0*     Recent Labs     05/17/19 0318 05/18/19  0208   MG 1.5* 1.9   PHOS 2.2* 1.4*     Recent Labs     05/16/19 0219 05/17/19 0318 05/18/19  0208   * 85* 49*   ALT 50* 76* 54*   BILITOT 1.6* 4.4* 2.5*   ALKPHOS 226* 141* 154*     HgBA1c:  No components found for: HGBA1C  FLP:  No results found for: TRIG, HDL, LDLCALC, LDLDIRECT, LABVLDL  TSH:  No results found for: TSH  Troponin T: No results for input(s): TROPONINI in the last 72 hours.   INR:   Recent Labs     05/16/19 0219   INR 1.15       Recent Labs     05/16/19 0219   LIPASE 25     -----------------------------------------------------------------  RAD:   Ct Abdomen Pelvis W Iv Contrast Additional Contrast? None    Result Date: 5/16/2019  CT ABDOMEN PELVIS W IV CONTRAST 5/16/2019 1:45 AM HISTORY: Vomiting, abdominal pain COMPARISON: None. DLP: 830 mGy cm TECHNIQUE: Following the uneventful administration of intravenous iodinated contrast, helical CT tomographic images of the abdomen and pelvis were acquired. Coronal reformatted images were also provided for review. FINDINGS: Scarring with mild traction bronchiectasis in the lung bases. Minimal debris within the left lower lobe posterior basal segmental airway. Included portion the mediastinum is unremarkable. Cydne Torey LIVER: No focal liver lesion. The hepatic vasculature is patent. BILIARY SYSTEM: Prior cholecystectomy. There is prominent dilation of the intrahepatic ducts as well as dilation of the common hepatic duct and common bile duct down to the level of the pancreatic head. Abrupt transition from the dilated common bile duct to a normal caliber as the duct enters the pancreatic head (series 3-image 23). PANCREAS: I do not see an obvious, discrete mass lesion the pancreatic head. The main pancreatic duct is nondilated. The pancreas is not atrophic. SPLEEN: Unremarkable. KIDNEYS AND ADRENALS: Adrenal glands are unremarkable. There are small bilateral simple appearing renal cortical cyst. No solid or complex lesion appreciated. No hydronephrosis. The ureters are decompressed and normal in appearance. RETROPERITONEUM: No mass, lymphadenopathy or hemorrhage. GI TRACT: There is a moderate-sized hiatal hernia. A fluid fills the hernia sac. There is moderate dilatation of the stomach. I do not see an obvious obstructing mass in the region of the gastric antrum. The duodenal bulb is nondistended. There is a small duodenal diverticulum with an air-fluid level along the second portion (series 2-image 30). A second air-fluid level just anterior to the aorta relates to fluid within the third portion the duodenum as it crosses the midline (series 2-image 33).  In outlet obstruction with circumferential thickening of the bladder wall and numerous bladder diverticula. 8. Findings in agreement with the emergent findings from the initial [facility] preliminary report. Signed by Dr Deanna Rivero on 5/16/2019 7:35 AM    Fl Ercp Biliary S&i    Result Date: 5/17/2019  EXAMINATION: FL ERCP BILIARY S&I 5/17/2019 8:41 AM HISTORY: ERCP 6 images are obtained under the direction of Dr. Arya Walsh during an ERCP. 343 seconds fluoroscopic time was used. Radiation dose 2.08 mg meter square Signed by Dr Abrahan Tellez on 5/17/2019 8:42 AM    Fl Ercp Biliary S&i    Result Date: 4/24/2019  EXAMINATION: FL ERCP BILIARY S&I 4/24/2019 1:27 PM HISTORY: Choledocholithiasis, ERCP. Dose: 1.04 mGym2 Report: A separate intraprocedural fluoroscopic spot views were obtained during ERCP, performed by Dr. Arya Walsh. Previous cholecystectomy is noted. There is respiratory motion artifact, the balloon catheter is seen sweeping the common bile duct. There is mild dilation of the common bile duct. On the images following the balloon catheter procedure, there are small filling defects within the common bile duct, possibly air bubbles. There appears to be a stent in the common bile duct on the final image. Please review the intraoperative procedural notes for additional details. Signed by Dr Lawyer Miles.  Elia on 4/24/2019 1:30 PM      Physical Exam:     Vitals:    05/17/19 1401 05/17/19 1844 05/18/19 0401 05/18/19 0632   BP: (!) 102/54 (!) 102/51  134/78   Pulse: 86 84  86   Resp: 16 18  20   Temp: 99.5 °F (37.5 °C) 101.4 °F (38.6 °C)  97.6 °F (36.4 °C)   TempSrc: Temporal Temporal  Temporal   SpO2: 96% 95%  95%   Weight:   173 lb 7 oz (78.7 kg)    Height:         24HR INTAKE/OUTPUT:      Intake/Output Summary (Last 24 hours) at 5/18/2019 1049  Last data filed at 5/18/2019 8779  Gross per 24 hour   Intake 2280 ml   Output 2750 ml   Net -470 ml     Constitutional: [x] NAD, [x] of stated age, [x] well nourished  Eyes: [x] conjunctiva clear, [x] Non inflamed irises, [x] no scleral icterus  ENT/Mouth: [x]  Nares patent with pink mucosa, [x] oropharynx clear without exudates or erythema, [x] hearing grossly normal  Head/Neck: [x] symmetrical, [x] supple  Lungs: [x] respirations non labored with good effort, [x] no respiratory distress, [x] no wheezing, [x]  Equal air entry bilaterally  Heart: [x]  Regular rate, [x] pedal pulses preserved 2/4 bilaterally  Abdomen: [x] +BSx4, [x] NTND, [x] soft, [x] no guarding, [x] no peritoneal signs  Muscuoskeletal: [x] Normal gait and station, [x]  Normal nails and digits bilaterally, [x] no muscle atrophy  Skin/SubQ: [x] No jaundice, [x] warm, dry skin, [x] no masses or rashes on inspection,   Psychiatric: [x]  Orientated to person, place, and time; [x] mood and affect unremarkable, [x] memory recent and remote intact        Impression:       1. Cholangitis  2. Choledocholithiasis (retained)      Plan:   - Pt is hemodynamically stable without abdominal pain. He did have a fever last night of 101.4F. It would be best to observe him for another day to ensure no further fevers, but the patient stated he is going to leave at 5pm when his ride gets here. Informed him that if his condition deteriorates, then he could have a bad outcome up to and including death. But he still stated that he is going to leave at 5pm. Pt will needs to continue cipro 500mg PO Q12hrs x 7 days and Flagyl 500mg PO Q8hrs x 7 days.   - Also, pt should contact GI clinic within 1 week to arrange for repeat ERCP in 8 weeks for stent and possible stone removal    Jose David Winter DO

## 2019-05-18 NOTE — PROGRESS NOTES
Pt leaving AMA. Wyatt and IV removed. AMA paper signed. Dr. Guerrero Santillan spoke with pt and informed of risks of leaving AMA. He did however agree to write scripts for PO abx which were given to the pt before he walked off the arriaga.  Electronically signed by Dalila Peraza RN on 5/18/2019 at 12:37 PM

## 2019-05-18 NOTE — DISCHARGE SUMMARY
Patient's grandson is at home and patient needs to get back home. He had a fever today and ERCP yesterday, positive blood culture as well. He understands the risk of leaving against medical advice including but not limited to death. He acknowledges understanding and still wishes to leave against medical advice (AMA). I will prescribe him antibiotics on discharge however. See all progress notes for complete information. Patient will be allowed to leave AMA.         Tee Schmitz, DO

## 2019-05-18 NOTE — PLAN OF CARE
Problem: Pain:  Description  Pain management should include both nonpharmacologic and pharmacologic interventions. Goal: Pain level will decrease  Description  Pain level will decrease  5/18/2019 0420 by Shu Jones RN  Outcome: Ongoing  5/17/2019 1655 by Jair Babcock RN  Outcome: Ongoing  Goal: Control of acute pain  Description  Control of acute pain  5/18/2019 0420 by Shu Jones RN  Outcome: Ongoing  5/17/2019 1655 by Jair Babcock RN  Outcome: Ongoing  Goal: Control of chronic pain  Description  Control of chronic pain  5/18/2019 0420 by Shu Jones RN  Outcome: Ongoing  5/17/2019 1655 by Jair Babcock RN  Outcome: Ongoing     Problem: Falls - Risk of:  Goal: Will remain free from falls  Description  Will remain free from falls  5/18/2019 0420 by Shu Jones RN  Outcome: Ongoing  5/17/2019 1655 by Jair Babcock RN  Outcome: Ongoing  Goal: Absence of physical injury  Description  Absence of physical injury  5/18/2019 0420 by Shu Jones RN  Outcome: Ongoing  5/17/2019 1655 by Jair Babcock RN  Outcome: Ongoing     Problem: Risk for Impaired Skin Integrity  Goal: Tissue integrity - skin and mucous membranes  Description  Structural intactness and normal physiological function of skin and  mucous membranes.   5/18/2019 0420 by Shu Jones RN  Outcome: Ongoing  5/17/2019 1655 by Jair Babcock RN  Outcome: Ongoing

## 2019-05-20 ENCOUNTER — TELEPHONE (OUTPATIENT)
Dept: GASTROENTEROLOGY | Age: 69
End: 2019-05-20

## 2019-05-20 NOTE — TELEPHONE ENCOUNTER
Kylah Rodas called requesting to speak with nurse regarding instructions from his time in the hospital. He was discharged over the weekend. .    Please return call to update him on status. The best time to call him to accommodate their needs is Anytime    Thank you.

## 2019-05-20 NOTE — TELEPHONE ENCOUNTER
I see where Dr Husam Smith recommended an ERCP in 8 weeks. Shira- Will you please call the pt and explain this as pt has questions? Ayo Marc- Will you call the pt to schedule his repeat ERCP? Thanks!  aa

## 2019-05-20 NOTE — TELEPHONE ENCOUNTER
I called patient back and he had restriction questions basically. I see where he left the hospital AMA. Patient stated that he feels ok, has been eating ribs and potatoes since discharge. I stated that normally we'd tell him to slowly introduce foods, but he sounded like he was doing fine if he was not having issues with what he has already been eating. Patient advised to slowly introduce activities, not lifting a lot of weight. He stated he was been good about that. Also, patient advised if he has fever, chills, severe pain, etc, those are things to look for. He is aware that he will be getting a phone call to schedule his repeat ERCP. He will contact us if he has further questions or issue.

## 2019-05-22 LAB
BLOOD CULTURE, ROUTINE: NORMAL
CULTURE, BLOOD 2: NORMAL

## 2019-05-24 NOTE — TELEPHONE ENCOUNTER
Patient is scheduled for repeat ERCP for STENT REMOVAL with Dr. Elisha Jackson at [facility] on July 24, 2019. Patient is aware of appointment date and all instructions.

## 2019-06-21 NOTE — OP NOTE
[facility] [employer] [facility] [address], [address] [facility]                                OPERATIVE REPORT    PATIENT NAME: Juli Hernandez                  :        1950  MED REC NO:   [id number]                              ROOM:       [facility]  ACCOUNT NO:   [de-identified]                           ADMIT DATE: 2019  PROVIDER:     Michael Larson MD    DATE OF PROCEDURE:  2019    ATTENDING PHYSICIAN:  Dr. Michael Larson. PCP:  Akira Haddad. REFERRING PROVIDER:  Dr. Adrian Rivera and Dr. Krystle Forbes. INDICATIONS FOR PROCEDURE:  The patient is a 68-year-old male with a  history of choledocholithiasis, status post ERCPs in the past.  He had a  metal stent in place for 2 to 3 months and has had recurrent stones  removed in April. He also has a very large diverticulum in the duodenum  _____ dilation. He presented with signs concerning for cholangitis,  low-grade fever, and lactic acidosis. PROCEDURE PERFORMED:  1. ERCP with stone removal.  2.  ERCP with stent placement. 3.  Intraprocedure interpretation of biliary cholangiogram, [id number]. OPERATIVE PROCEDURE:  I met with the patient prior to the procedure. I  discussed risks, benefits, and alternatives. He was agreeable to  continue. He was brought to the operating room, underwent general  anesthesia, and placed in the prone position. Side-viewing duodenoscope  was advanced in the oropharynx down to the distal duodenum. It was  reduced in the short position opposite to the ampulla.  films  showed evidence of previous cholecystectomy. There was a very large  periampullary diverticulum. The bile duct was then selectively  cannulated with a 0.035 inch x 260 cm straight Dreamwire. Immediately  upon cannulating the biliary tree, there was some pus drainage from the  ampulla. To help discover objects, the bile duct was swept multiple  times with a biliary extraction balloon.   There was

## 2019-07-16 ENCOUNTER — HOSPITAL ENCOUNTER (EMERGENCY)
Age: 69
Discharge: HOME | End: 2019-07-16
Attending: EMERGENCY MEDICINE
Payer: MEDICARE

## 2019-07-16 VITALS
WEIGHT: 167 LBS | SYSTOLIC BLOOD PRESSURE: 132 MMHG | RESPIRATION RATE: 18 BRPM | BODY MASS INDEX: 27.82 KG/M2 | DIASTOLIC BLOOD PRESSURE: 75 MMHG | TEMPERATURE: 98.6 F | HEIGHT: 65 IN | HEART RATE: 70 BPM

## 2019-07-16 DIAGNOSIS — K42.9 UMBILICAL HERNIA WITHOUT OBSTRUCTION AND WITHOUT GANGRENE: Primary | ICD-10-CM

## 2019-07-16 PROCEDURE — 99283 EMERGENCY DEPT VISIT LOW MDM: CPT | Performed by: EMERGENCY MEDICINE

## 2019-07-16 PROCEDURE — 99284 EMERGENCY DEPT VISIT MOD MDM: CPT

## 2019-07-16 ASSESSMENT — ENCOUNTER SYMPTOMS
EYE PAIN: 0
ABDOMINAL PAIN: 1
VOMITING: 0
VOICE CHANGE: 0
RHINORRHEA: 0
COUGH: 0
SHORTNESS OF BREATH: 0
DIARRHEA: 0
EYE REDNESS: 0

## 2019-07-16 ASSESSMENT — PAIN DESCRIPTION - DESCRIPTORS: DESCRIPTORS: BURNING

## 2019-07-16 ASSESSMENT — PAIN SCALES - GENERAL
PAINLEVEL_OUTOF10: 0
PAINLEVEL_OUTOF10: 5

## 2019-07-16 ASSESSMENT — PAIN DESCRIPTION - LOCATION: LOCATION: ABDOMEN

## 2019-07-16 ASSESSMENT — PAIN DESCRIPTION - FREQUENCY: FREQUENCY: CONTINUOUS

## 2019-07-23 ENCOUNTER — ANESTHESIA EVENT (OUTPATIENT)
Dept: ENDOSCOPY | Age: 69
End: 2019-07-23
Payer: MEDICARE

## 2019-07-24 ENCOUNTER — HOSPITAL ENCOUNTER (OUTPATIENT)
Age: 69
Setting detail: OUTPATIENT SURGERY
Discharge: HOME | End: 2019-07-24
Attending: INTERNAL MEDICINE | Admitting: INTERNAL MEDICINE
Payer: MEDICARE

## 2019-07-24 ENCOUNTER — APPOINTMENT (OUTPATIENT)
Dept: GENERAL RADIOLOGY | Age: 69
End: 2019-07-24
Attending: INTERNAL MEDICINE
Payer: MEDICARE

## 2019-07-24 ENCOUNTER — ANESTHESIA (OUTPATIENT)
Dept: ENDOSCOPY | Age: 69
End: 2019-07-24
Payer: MEDICARE

## 2019-07-24 VITALS
HEIGHT: 65 IN | SYSTOLIC BLOOD PRESSURE: 139 MMHG | RESPIRATION RATE: 18 BRPM | HEART RATE: 57 BPM | BODY MASS INDEX: 27.16 KG/M2 | DIASTOLIC BLOOD PRESSURE: 68 MMHG | WEIGHT: 163 LBS | TEMPERATURE: 97.3 F | OXYGEN SATURATION: 97 %

## 2019-07-24 VITALS
RESPIRATION RATE: 11 BRPM | DIASTOLIC BLOOD PRESSURE: 78 MMHG | OXYGEN SATURATION: 100 % | SYSTOLIC BLOOD PRESSURE: 131 MMHG

## 2019-07-24 PROCEDURE — 2500000003 HC RX 250 WO HCPCS: Performed by: INTERNAL MEDICINE

## 2019-07-24 PROCEDURE — 6370000000 HC RX 637 (ALT 250 FOR IP): Performed by: INTERNAL MEDICINE

## 2019-07-24 PROCEDURE — 2500000003 HC RX 250 WO HCPCS: Performed by: NURSE ANESTHETIST, CERTIFIED REGISTERED

## 2019-07-24 PROCEDURE — 7100000001 HC PACU RECOVERY - ADDTL 15 MIN: Performed by: INTERNAL MEDICINE

## 2019-07-24 PROCEDURE — 74328 X-RAY BILE DUCT ENDOSCOPY: CPT

## 2019-07-24 PROCEDURE — 6360000002 HC RX W HCPCS: Performed by: NURSE ANESTHETIST, CERTIFIED REGISTERED

## 2019-07-24 PROCEDURE — 3209999900 FLUORO FOR SURGICAL PROCEDURES

## 2019-07-24 PROCEDURE — 3700000000 HC ANESTHESIA ATTENDED CARE: Performed by: INTERNAL MEDICINE

## 2019-07-24 PROCEDURE — 7100000010 HC PHASE II RECOVERY - FIRST 15 MIN: Performed by: INTERNAL MEDICINE

## 2019-07-24 PROCEDURE — 43273 ENDOSCOPIC PANCREATOSCOPY: CPT | Performed by: INTERNAL MEDICINE

## 2019-07-24 PROCEDURE — 2580000003 HC RX 258: Performed by: INTERNAL MEDICINE

## 2019-07-24 PROCEDURE — C1769 GUIDE WIRE: HCPCS | Performed by: INTERNAL MEDICINE

## 2019-07-24 PROCEDURE — 43265 ERCP LITHOTRIPSY CALCULI: CPT | Performed by: INTERNAL MEDICINE

## 2019-07-24 PROCEDURE — 3609014400 HC ERCP DIAGNOSTIC: Performed by: INTERNAL MEDICINE

## 2019-07-24 PROCEDURE — 3700000001 HC ADD 15 MINUTES (ANESTHESIA): Performed by: INTERNAL MEDICINE

## 2019-07-24 PROCEDURE — 74328 X-RAY BILE DUCT ENDOSCOPY: CPT | Performed by: INTERNAL MEDICINE

## 2019-07-24 PROCEDURE — 2720000010 HC SURG SUPPLY STERILE: Performed by: INTERNAL MEDICINE

## 2019-07-24 PROCEDURE — 7100000000 HC PACU RECOVERY - FIRST 15 MIN: Performed by: INTERNAL MEDICINE

## 2019-07-24 PROCEDURE — 2709999900 HC NON-CHARGEABLE SUPPLY: Performed by: INTERNAL MEDICINE

## 2019-07-24 PROCEDURE — 43275 ERCP REMOVE FORGN BODY DUCT: CPT | Performed by: INTERNAL MEDICINE

## 2019-07-24 PROCEDURE — 7100000011 HC PHASE II RECOVERY - ADDTL 15 MIN: Performed by: INTERNAL MEDICINE

## 2019-07-24 RX ORDER — LABETALOL 20 MG/4 ML (5 MG/ML) INTRAVENOUS SYRINGE
5 EVERY 10 MIN PRN
Status: DISCONTINUED | OUTPATIENT
Start: 2019-07-24 | End: 2019-07-24 | Stop reason: HOSPADM

## 2019-07-24 RX ORDER — PROMETHAZINE HYDROCHLORIDE 25 MG/ML
6.25 INJECTION, SOLUTION INTRAMUSCULAR; INTRAVENOUS
Status: DISCONTINUED | OUTPATIENT
Start: 2019-07-24 | End: 2019-07-24 | Stop reason: HOSPADM

## 2019-07-24 RX ORDER — MEPERIDINE HYDROCHLORIDE 50 MG/ML
12.5 INJECTION INTRAMUSCULAR; INTRAVENOUS; SUBCUTANEOUS EVERY 5 MIN PRN
Status: DISCONTINUED | OUTPATIENT
Start: 2019-07-24 | End: 2019-07-24 | Stop reason: HOSPADM

## 2019-07-24 RX ORDER — ACETAMINOPHEN 500 MG
500 TABLET ORAL EVERY 6 HOURS PRN
COMMUNITY

## 2019-07-24 RX ORDER — LIDOCAINE HYDROCHLORIDE 20 MG/ML
INJECTION, SOLUTION INFILTRATION; PERINEURAL PRN
Status: DISCONTINUED | OUTPATIENT
Start: 2019-07-24 | End: 2019-07-24 | Stop reason: SDUPTHER

## 2019-07-24 RX ORDER — PROPOFOL 10 MG/ML
INJECTION, EMULSION INTRAVENOUS PRN
Status: DISCONTINUED | OUTPATIENT
Start: 2019-07-24 | End: 2019-07-24 | Stop reason: SDUPTHER

## 2019-07-24 RX ORDER — MORPHINE SULFATE 2 MG/ML
4 INJECTION, SOLUTION INTRAMUSCULAR; INTRAVENOUS EVERY 5 MIN PRN
Status: DISCONTINUED | OUTPATIENT
Start: 2019-07-24 | End: 2019-07-24 | Stop reason: HOSPADM

## 2019-07-24 RX ORDER — TERBINAFINE HYDROCHLORIDE 250 MG/1
250 TABLET ORAL DAILY
COMMUNITY

## 2019-07-24 RX ORDER — FENTANYL CITRATE 50 UG/ML
INJECTION, SOLUTION INTRAMUSCULAR; INTRAVENOUS PRN
Status: DISCONTINUED | OUTPATIENT
Start: 2019-07-24 | End: 2019-07-24 | Stop reason: SDUPTHER

## 2019-07-24 RX ORDER — HYDRALAZINE HYDROCHLORIDE 20 MG/ML
5 INJECTION INTRAMUSCULAR; INTRAVENOUS EVERY 10 MIN PRN
Status: DISCONTINUED | OUTPATIENT
Start: 2019-07-24 | End: 2019-07-24 | Stop reason: HOSPADM

## 2019-07-24 RX ORDER — LIDOCAINE HYDROCHLORIDE 10 MG/ML
1 INJECTION, SOLUTION EPIDURAL; INFILTRATION; INTRACAUDAL; PERINEURAL ONCE
Status: COMPLETED | OUTPATIENT
Start: 2019-07-24 | End: 2019-07-24

## 2019-07-24 RX ORDER — ENALAPRILAT 2.5 MG/2ML
1.25 INJECTION INTRAVENOUS
Status: DISCONTINUED | OUTPATIENT
Start: 2019-07-24 | End: 2019-07-24 | Stop reason: HOSPADM

## 2019-07-24 RX ORDER — METOCLOPRAMIDE HYDROCHLORIDE 5 MG/ML
10 INJECTION INTRAMUSCULAR; INTRAVENOUS
Status: DISCONTINUED | OUTPATIENT
Start: 2019-07-24 | End: 2019-07-24 | Stop reason: HOSPADM

## 2019-07-24 RX ORDER — SODIUM CHLORIDE, SODIUM LACTATE, POTASSIUM CHLORIDE, CALCIUM CHLORIDE 600; 310; 30; 20 MG/100ML; MG/100ML; MG/100ML; MG/100ML
INJECTION, SOLUTION INTRAVENOUS CONTINUOUS
Status: DISCONTINUED | OUTPATIENT
Start: 2019-07-24 | End: 2019-07-24 | Stop reason: HOSPADM

## 2019-07-24 RX ORDER — MORPHINE SULFATE 2 MG/ML
2 INJECTION, SOLUTION INTRAMUSCULAR; INTRAVENOUS EVERY 5 MIN PRN
Status: DISCONTINUED | OUTPATIENT
Start: 2019-07-24 | End: 2019-07-24 | Stop reason: HOSPADM

## 2019-07-24 RX ORDER — ROCURONIUM BROMIDE 10 MG/ML
INJECTION, SOLUTION INTRAVENOUS PRN
Status: DISCONTINUED | OUTPATIENT
Start: 2019-07-24 | End: 2019-07-24 | Stop reason: SDUPTHER

## 2019-07-24 RX ORDER — ONDANSETRON 2 MG/ML
INJECTION INTRAMUSCULAR; INTRAVENOUS PRN
Status: DISCONTINUED | OUTPATIENT
Start: 2019-07-24 | End: 2019-07-24 | Stop reason: SDUPTHER

## 2019-07-24 RX ORDER — DIPHENHYDRAMINE HYDROCHLORIDE 50 MG/ML
12.5 INJECTION INTRAMUSCULAR; INTRAVENOUS
Status: DISCONTINUED | OUTPATIENT
Start: 2019-07-24 | End: 2019-07-24 | Stop reason: HOSPADM

## 2019-07-24 RX ORDER — DEXAMETHASONE SODIUM PHOSPHATE 10 MG/ML
INJECTION INTRAMUSCULAR; INTRAVENOUS PRN
Status: DISCONTINUED | OUTPATIENT
Start: 2019-07-24 | End: 2019-07-24 | Stop reason: SDUPTHER

## 2019-07-24 RX ADMIN — ONDANSETRON HYDROCHLORIDE 4 MG: 2 INJECTION, SOLUTION INTRAMUSCULAR; INTRAVENOUS at 11:23

## 2019-07-24 RX ADMIN — SODIUM CHLORIDE, SODIUM LACTATE, POTASSIUM CHLORIDE, AND CALCIUM CHLORIDE: 600; 310; 30; 20 INJECTION, SOLUTION INTRAVENOUS at 08:58

## 2019-07-24 RX ADMIN — PROPOFOL 200 MG: 10 INJECTION, EMULSION INTRAVENOUS at 11:08

## 2019-07-24 RX ADMIN — LIDOCAINE HYDROCHLORIDE 40 MG: 20 INJECTION, SOLUTION INFILTRATION; PERINEURAL at 11:08

## 2019-07-24 RX ADMIN — DEXAMETHASONE SODIUM PHOSPHATE 4 MG: 10 INJECTION INTRAMUSCULAR; INTRAVENOUS at 11:23

## 2019-07-24 RX ADMIN — ROCURONIUM BROMIDE 50 MG: 10 INJECTION INTRAVENOUS at 11:10

## 2019-07-24 RX ADMIN — LIDOCAINE HYDROCHLORIDE 1 ML: 10 INJECTION, SOLUTION EPIDURAL; INFILTRATION; INTRACAUDAL; PERINEURAL at 08:58

## 2019-07-24 RX ADMIN — SUGAMMADEX 148 MG: 100 INJECTION, SOLUTION INTRAVENOUS at 12:23

## 2019-07-24 RX ADMIN — FENTANYL CITRATE 50 MCG: 50 INJECTION INTRAMUSCULAR; INTRAVENOUS at 11:08

## 2019-07-24 ASSESSMENT — PAIN SCALES - GENERAL
PAINLEVEL_OUTOF10: 0

## 2019-07-24 ASSESSMENT — PAIN - FUNCTIONAL ASSESSMENT: PAIN_FUNCTIONAL_ASSESSMENT: 0-10

## 2019-07-24 ASSESSMENT — LIFESTYLE VARIABLES: SMOKING_STATUS: 1

## 2019-07-24 NOTE — ANESTHESIA PRE PROCEDURE
Pulmonary:normal exam  breath sounds clear to auscultation  (+) COPD:  current smoker          Patient did not smoke on day of surgery. ROS comment: On oxygen 2   Cardiovascular:        (-) hypertension, CAD,  angina and  CHF      Rhythm: regular  Rate: normal           Beta Blocker:  Not on Beta Blocker      ROS comment:     Summary   Mitral valve leaflets are mildly thickened with preserved leaflet   mobility.   Mild mitral regurgitation is present.  Saskia Livings thickened aortic valve leaflets with preserved leaflet mobility.   Aortic valve appears to be tricuspid.   Trace tricuspid regurgitation .   Mildly dilated left atrium.   Normal left ventricular size and function.   Moderate concentric left ventricular hypertrophy.   Impaired relaxation compatible with diastolic dysfunction. ( reversed E/A   ratio)      Signature      ----------------------------------------------------------------   Electronically signed by Matthew Pemberton MD(Interpreting   [id number]) on 01/30/2019 02:52 PM     Neuro/Psych:      (-) seizures, CVA and depression/anxiety             ROS comment: Neuropathy  GI/Hepatic/Renal:   (+) GERD:,      (-) hiatal hernia      ROS comment: cholangitis. Endo/Other:    (+) hypothyroidism::., electrolyte abnormalities, . Pt had PAT visit. Abdominal:       Abdomen: soft. Vascular: negative vascular ROS. Anesthesia Plan      general     ASA 3     (Iv zofran within 30 min of closing )  Induction: intravenous. BIS  MIPS: Postoperative opioids intended and Prophylactic antiemetics administered. Anesthetic plan and risks discussed with patient. Use of blood products discussed with patient whom.                    DESTINI Stahl - CRNA   7/24/2019

## 2019-07-24 NOTE — ANESTHESIA POSTPROCEDURE EVALUATION
Department of Anesthesiology  Postprocedure Note    Patient: Miriam Riley  MRN: [id number]  YOB: 1950  Date of evaluation: 7/24/2019  Time:  12:40 PM     Procedure Summary     Date:  07/24/19 Room / Location:  [facility] ENDO 12 / [facility]    Anesthesia Start:  1104 Anesthesia Stop:      Procedure:  ERCP DIAGNOSTIC with spyglass and EHL (N/A Abdomen) Diagnosis:  (STENT REMOVAL)    Surgeon:  Louise Duenas MD Responsible Provider:  DESTINI Sheridan CRNA    Anesthesia Type:  general ASA Status:  3          Anesthesia Type: general    Fidelia Phase I: Fidelia Score: 10    Fidelia Phase II:      Last vitals: Reviewed and per EMR flowsheets.        Anesthesia Post Evaluation    Patient location during evaluation: PACU  Patient participation: complete - patient participated  Level of consciousness: sleepy but conscious  Pain score: 0  Airway patency: patent  Nausea & Vomiting: no nausea and no vomiting  Complications: no  Cardiovascular status: hemodynamically stable and blood pressure returned to baseline  Respiratory status: acceptable and nasal cannula  Hydration status: stable

## 2019-07-26 ENCOUNTER — HOSPITAL ENCOUNTER (EMERGENCY)
Age: 69
Discharge: HOME | End: 2019-07-26
Attending: EMERGENCY MEDICINE
Payer: MEDICARE

## 2019-07-26 VITALS
DIASTOLIC BLOOD PRESSURE: 85 MMHG | HEART RATE: 75 BPM | OXYGEN SATURATION: 99 % | TEMPERATURE: 97.6 F | SYSTOLIC BLOOD PRESSURE: 147 MMHG | BODY MASS INDEX: 27.29 KG/M2 | RESPIRATION RATE: 20 BRPM | WEIGHT: 164 LBS

## 2019-07-26 DIAGNOSIS — K42.9 UMBILICAL HERNIA WITHOUT OBSTRUCTION AND WITHOUT GANGRENE: Primary | ICD-10-CM

## 2019-07-26 PROCEDURE — 99283 EMERGENCY DEPT VISIT LOW MDM: CPT | Performed by: EMERGENCY MEDICINE

## 2019-07-26 PROCEDURE — 99284 EMERGENCY DEPT VISIT MOD MDM: CPT

## 2019-07-26 ASSESSMENT — ENCOUNTER SYMPTOMS
RHINORRHEA: 0
EYE PAIN: 0
VOMITING: 0
EYE REDNESS: 0
SHORTNESS OF BREATH: 0
VOICE CHANGE: 0
ABDOMINAL PAIN: 1
DIARRHEA: 0
COUGH: 0

## 2019-07-26 ASSESSMENT — PAIN SCALES - GENERAL: PAINLEVEL_OUTOF10: 6

## 2019-07-26 NOTE — ED NOTES
Clotilde Thorpe, contacted for pt's ride home.      [facility] KEVIN Dunn  07/26/19 1880

## 2019-07-26 NOTE — ED PROVIDER NOTES
Negative for weakness and headaches. Hematological: Negative. Psychiatric/Behavioral: Negative. All other systems reviewed and are negative. A complete review of systems was performed and is negative except as noted above in the HPI.        PAST MEDICAL HISTORY     Past Medical History:   Diagnosis Date    Bacteremia     Chronic back pain     COPD (chronic obstructive pulmonary disease) (HCC)     GERD (gastroesophageal reflux disease)     Hypothyroidism     Neuropathy     LLE    Urinary retention          SURGICAL HISTORY       Past Surgical History:   Procedure Laterality Date    BACK SURGERY      lumbar    CHOLECYSTECTOMY, LAPAROSCOPIC N/A 2/26/2019    CHOLECYSTECTOMY LAPAROSCOPIC WITH GRAM performed by Candance Jarred, MD at [place] [address]  7/2011    R neck    ERCP N/A 1/29/2019    Dr SEVERINO Aiken/placement of a 10 x 40 mm self-expanding metal biliary stent, stone removal, surgical consult-Repeat in 8-12 wks    ERCP N/A 4/24/2019    Dr SEVERINO Aiken/stent removal of previously placed SEMS, stone removal    ERCP N/A 5/17/2019    Dr Tayo Aiken/stent placement-Retained stones/sludge removed with balloon sweep, repeat in 8 wks    ERCP N/A 7/24/2019    ERCP DIAGNOSTIC with spyglass and EHL performed by Veronica Galicia MD at [facility]  7/2011    L ing hernia w/mesh, small umb hernia    KNEE SURGERY      SPINE SURGERY      complicated by a postop. hematoma         CURRENT MEDICATIONS       Discharge Medication List as of 7/26/2019  4:27 PM      CONTINUE these medications which have NOT CHANGED    Details   dicloxacillin (DYNAPEN) 500 MG capsule Take 500 mg by mouth 2 times dailyHistorical Med      acetaminophen (TYLENOL) 500 MG tablet Take 500 mg by mouth every 6 hours as needed for PainHistorical Med      terbinafine (LAMISIL) 250 MG tablet Take 250 mg by mouth dailyHistorical Med      fluticasone-salmeterol (ADVAIR) 250-50 MCG/DOSE AEPB Inhale 1 puff into the lungs every

## 2019-07-30 ENCOUNTER — TELEPHONE (OUTPATIENT)
Dept: SURGERY | Age: 69
End: 2019-07-30

## 2019-07-30 NOTE — TELEPHONE ENCOUNTER
I will fwd this message to Pierre Bettencourt.  She works our referral work que and will call patient with appointment information

## 2019-07-30 NOTE — TELEPHONE ENCOUNTER
Patient is calling   for status of referral  Please return call to update patient on status. The best time to call is  Anytime. Patient is requesting that you mail him a letter with appt information    Thank you!

## 2019-08-05 ENCOUNTER — OFFICE VISIT (OUTPATIENT)
Dept: SURGERY | Age: 69
End: 2019-08-05
Payer: MEDICARE

## 2019-08-05 ENCOUNTER — HOSPITAL ENCOUNTER (OUTPATIENT)
Dept: PREADMISSION TESTING | Age: 69
Discharge: HOME | End: 2019-08-09
Payer: MEDICARE

## 2019-08-05 VITALS — HEIGHT: 65 IN | WEIGHT: 164 LBS | BODY MASS INDEX: 27.32 KG/M2

## 2019-08-05 VITALS — SYSTOLIC BLOOD PRESSURE: 110 MMHG | HEART RATE: 72 BPM | DIASTOLIC BLOOD PRESSURE: 70 MMHG

## 2019-08-05 DIAGNOSIS — K40.90 RIGHT INGUINAL HERNIA: Primary | ICD-10-CM

## 2019-08-05 DIAGNOSIS — K42.9 UMBILICAL HERNIA WITHOUT OBSTRUCTION AND WITHOUT GANGRENE: ICD-10-CM

## 2019-08-05 LAB
EKG P AXIS: 25 DEGREES
EKG P-R INTERVAL: 172 MS
EKG Q-T INTERVAL: 430 MS
EKG QRS DURATION: 100 MS
EKG QTC CALCULATION (BAZETT): 430 MS
EKG T AXIS: 50 DEGREES

## 2019-08-05 PROCEDURE — G8419 CALC BMI OUT NRM PARAM NOF/U: HCPCS | Performed by: PHYSICIAN ASSISTANT

## 2019-08-05 PROCEDURE — 87081 CULTURE SCREEN ONLY: CPT

## 2019-08-05 PROCEDURE — 3017F COLORECTAL CA SCREEN DOC REV: CPT | Performed by: PHYSICIAN ASSISTANT

## 2019-08-05 PROCEDURE — 1123F ACP DISCUSS/DSCN MKR DOCD: CPT | Performed by: PHYSICIAN ASSISTANT

## 2019-08-05 PROCEDURE — 93005 ELECTROCARDIOGRAM TRACING: CPT

## 2019-08-05 PROCEDURE — 99213 OFFICE O/P EST LOW 20 MIN: CPT | Performed by: PHYSICIAN ASSISTANT

## 2019-08-05 PROCEDURE — G8427 DOCREV CUR MEDS BY ELIG CLIN: HCPCS | Performed by: PHYSICIAN ASSISTANT

## 2019-08-05 PROCEDURE — 4040F PNEUMOC VAC/ADMIN/RCVD: CPT | Performed by: PHYSICIAN ASSISTANT

## 2019-08-05 PROCEDURE — 1036F TOBACCO NON-USER: CPT | Performed by: PHYSICIAN ASSISTANT

## 2019-08-06 LAB — MRSA CULTURE ONLY: NORMAL

## 2019-08-07 NOTE — CARE COORDINATION
8/7/19: SW received letter from [facility] dated 7/16/19 indicating the report made re: possible inadequate supervision of a minor; was found to be \"unfounded\". Letter placed into medical records basket for filing.     Electronically signed by RADHA Benitez on 8/7/2019 at 4:42 PM

## 2019-08-08 ENCOUNTER — TELEPHONE (OUTPATIENT)
Dept: SURGERY | Age: 69
End: 2019-08-08

## 2019-08-12 NOTE — OP NOTE
[employer] [facility] [address], [address] [facility]                                OPERATIVE REPORT    PATIENT NAME: Juan Velarde                  :        1950  MED REC NO:   [id number]                              ROOM:  ACCOUNT NO:   [de-identified]                           ADMIT DATE: 2019  PROVIDER:     Asya Vanessa MD    DATE OF PROCEDURE:  2019    ATTENDING PHYSICIAN:  Asya Vanessa MD    PCP:  Florentin Penny. REFERRING PROVIDER:  Dr. Alex Jane. INDICATIONS FOR PROCEDURE:  This is a 68-year-old male known to me with  a history of choledocholithiasis. He has undergone ERCP evaluation and  presumed removal of these at end of  and in 2019; however, he  re-presented with an episode of cholangitis and what appeared to be a  somewhat occult large stone hiding in a dilated and sigmoid-shaped bile  duct. Currently, he has a biliary stent in place. He was brought back  for repeat procedure for stone destruction and removal.    PROCEDURE PERFORMED:  1. ERCP with stent removal, 55456.  2.  ERCP with removal of biliary stones, 75131. 3.  ERCP with endoscopic hydraulic lithotripsy (EHL),  choledocholithiasis, 05501.  4.  SpyGlass cholangioscopy for EHL (13724). 5.  Direction of biliary fluoroscopy, [id number]. ANESTHESIA:  General.    COMPLICATIONS:  None. BLOOD LOSS:  None. DESCRIPTION OF PROCEDURE:  I met with the patient prior to the  procedure. We discussed the risks, benefits, and alternatives as well  as the procedure plan. He was agreeable to continue. He was brought to  the operating room, underwent general anesthesia, and placed in the  prone position. Side-viewing duodenoscope was advanced in the  oropharynx down the distal duodenum. It was reduced in the short  position opposite to the ampulla.    films showed evidence of  previous cholecystectomy as well as a biliary stent that appeared to be  in proper

## 2019-08-13 ENCOUNTER — ANESTHESIA (OUTPATIENT)
Dept: OPERATING ROOM | Age: 69
End: 2019-08-13
Payer: MEDICARE

## 2019-08-13 ENCOUNTER — HOSPITAL ENCOUNTER (OUTPATIENT)
Age: 69
Setting detail: OUTPATIENT SURGERY
Discharge: HOME | End: 2019-08-13
Attending: SURGERY | Admitting: SURGERY
Payer: MEDICARE

## 2019-08-13 ENCOUNTER — ANESTHESIA EVENT (OUTPATIENT)
Dept: OPERATING ROOM | Age: 69
End: 2019-08-13
Payer: MEDICARE

## 2019-08-13 VITALS
RESPIRATION RATE: 3 BRPM | OXYGEN SATURATION: 99 % | SYSTOLIC BLOOD PRESSURE: 136 MMHG | DIASTOLIC BLOOD PRESSURE: 66 MMHG

## 2019-08-13 VITALS
WEIGHT: 163 LBS | HEIGHT: 65 IN | SYSTOLIC BLOOD PRESSURE: 120 MMHG | OXYGEN SATURATION: 95 % | RESPIRATION RATE: 18 BRPM | DIASTOLIC BLOOD PRESSURE: 56 MMHG | BODY MASS INDEX: 27.16 KG/M2 | HEART RATE: 76 BPM | TEMPERATURE: 97.3 F

## 2019-08-13 DIAGNOSIS — K43.2 VENTRAL HERNIA, RECURRENT: Primary | ICD-10-CM

## 2019-08-13 LAB
REASON FOR REJECTION: NORMAL
REJECTED TEST: NORMAL

## 2019-08-13 PROCEDURE — 7100000011 HC PHASE II RECOVERY - ADDTL 15 MIN: Performed by: SURGERY

## 2019-08-13 PROCEDURE — 2580000003 HC RX 258: Performed by: ANESTHESIOLOGY

## 2019-08-13 PROCEDURE — 2500000003 HC RX 250 WO HCPCS: Performed by: SURGERY

## 2019-08-13 PROCEDURE — C1781 MESH (IMPLANTABLE): HCPCS | Performed by: SURGERY

## 2019-08-13 PROCEDURE — 7100000010 HC PHASE II RECOVERY - FIRST 15 MIN: Performed by: SURGERY

## 2019-08-13 PROCEDURE — 49568 PR IMPLANT MESH HERNIA REPAIR/DEBRIDEMENT CLOSURE: CPT | Performed by: PHYSICIAN ASSISTANT

## 2019-08-13 PROCEDURE — 3600000014 HC SURGERY LEVEL 4 ADDTL 15MIN: Performed by: SURGERY

## 2019-08-13 PROCEDURE — 3700000001 HC ADD 15 MINUTES (ANESTHESIA): Performed by: SURGERY

## 2019-08-13 PROCEDURE — 6360000002 HC RX W HCPCS: Performed by: SURGERY

## 2019-08-13 PROCEDURE — 2500000003 HC RX 250 WO HCPCS: Performed by: NURSE ANESTHETIST, CERTIFIED REGISTERED

## 2019-08-13 PROCEDURE — 3600000004 HC SURGERY LEVEL 4 BASE: Performed by: SURGERY

## 2019-08-13 PROCEDURE — C9290 INJ, BUPIVACAINE LIPOSOME: HCPCS | Performed by: SURGERY

## 2019-08-13 PROCEDURE — 3700000000 HC ANESTHESIA ATTENDED CARE: Performed by: SURGERY

## 2019-08-13 PROCEDURE — 2709999900 HC NON-CHARGEABLE SUPPLY: Performed by: SURGERY

## 2019-08-13 PROCEDURE — 7100000001 HC PACU RECOVERY - ADDTL 15 MIN: Performed by: SURGERY

## 2019-08-13 PROCEDURE — 49565 PR REPAIR RECURR INCIS HERNIA,REDUC: CPT | Performed by: PHYSICIAN ASSISTANT

## 2019-08-13 PROCEDURE — 7100000000 HC PACU RECOVERY - FIRST 15 MIN: Performed by: SURGERY

## 2019-08-13 PROCEDURE — 2580000003 HC RX 258: Performed by: SURGERY

## 2019-08-13 PROCEDURE — 6370000000 HC RX 637 (ALT 250 FOR IP): Performed by: SURGERY

## 2019-08-13 PROCEDURE — 49565 PR REPAIR RECURR INCIS HERNIA,REDUC: CPT | Performed by: SURGERY

## 2019-08-13 PROCEDURE — 49568 PR IMPLANT MESH HERNIA REPAIR/DEBRIDEMENT CLOSURE: CPT | Performed by: SURGERY

## 2019-08-13 PROCEDURE — 6360000002 HC RX W HCPCS: Performed by: NURSE ANESTHETIST, CERTIFIED REGISTERED

## 2019-08-13 DEVICE — MESH HERN L DIA8CM VENTRAL POLYPR EPTFE CIR SELF EXP PTCH: Type: IMPLANTABLE DEVICE | Site: UMBILICAL | Status: FUNCTIONAL

## 2019-08-13 RX ORDER — FENTANYL CITRATE 50 UG/ML
25 INJECTION, SOLUTION INTRAMUSCULAR; INTRAVENOUS
Status: DISCONTINUED | OUTPATIENT
Start: 2019-08-13 | End: 2019-08-13 | Stop reason: HOSPADM

## 2019-08-13 RX ORDER — MORPHINE SULFATE 2 MG/ML
2 INJECTION, SOLUTION INTRAMUSCULAR; INTRAVENOUS EVERY 5 MIN PRN
Status: DISCONTINUED | OUTPATIENT
Start: 2019-08-13 | End: 2019-08-13 | Stop reason: HOSPADM

## 2019-08-13 RX ORDER — HYDRALAZINE HYDROCHLORIDE 20 MG/ML
5 INJECTION INTRAMUSCULAR; INTRAVENOUS EVERY 10 MIN PRN
Status: DISCONTINUED | OUTPATIENT
Start: 2019-08-13 | End: 2019-08-13 | Stop reason: HOSPADM

## 2019-08-13 RX ORDER — MIDAZOLAM HYDROCHLORIDE 1 MG/ML
2 INJECTION INTRAMUSCULAR; INTRAVENOUS
Status: DISCONTINUED | OUTPATIENT
Start: 2019-08-13 | End: 2019-08-13 | Stop reason: HOSPADM

## 2019-08-13 RX ORDER — PROPOFOL 10 MG/ML
INJECTION, EMULSION INTRAVENOUS PRN
Status: DISCONTINUED | OUTPATIENT
Start: 2019-08-13 | End: 2019-08-13 | Stop reason: SDUPTHER

## 2019-08-13 RX ORDER — MEPERIDINE HYDROCHLORIDE 50 MG/ML
12.5 INJECTION INTRAMUSCULAR; INTRAVENOUS; SUBCUTANEOUS EVERY 5 MIN PRN
Status: DISCONTINUED | OUTPATIENT
Start: 2019-08-13 | End: 2019-08-13 | Stop reason: HOSPADM

## 2019-08-13 RX ORDER — ONDANSETRON 2 MG/ML
INJECTION INTRAMUSCULAR; INTRAVENOUS PRN
Status: DISCONTINUED | OUTPATIENT
Start: 2019-08-13 | End: 2019-08-13 | Stop reason: SDUPTHER

## 2019-08-13 RX ORDER — PROMETHAZINE HYDROCHLORIDE 25 MG/ML
6.25 INJECTION, SOLUTION INTRAMUSCULAR; INTRAVENOUS
Status: DISCONTINUED | OUTPATIENT
Start: 2019-08-13 | End: 2019-08-13 | Stop reason: HOSPADM

## 2019-08-13 RX ORDER — DIPHENHYDRAMINE HYDROCHLORIDE 50 MG/ML
12.5 INJECTION INTRAMUSCULAR; INTRAVENOUS
Status: DISCONTINUED | OUTPATIENT
Start: 2019-08-13 | End: 2019-08-13 | Stop reason: HOSPADM

## 2019-08-13 RX ORDER — LIDOCAINE HYDROCHLORIDE 10 MG/ML
INJECTION, SOLUTION INFILTRATION; PERINEURAL PRN
Status: DISCONTINUED | OUTPATIENT
Start: 2019-08-13 | End: 2019-08-13 | Stop reason: SDUPTHER

## 2019-08-13 RX ORDER — KETAMINE HYDROCHLORIDE 100 MG/ML
INJECTION, SOLUTION INTRAMUSCULAR; INTRAVENOUS PRN
Status: DISCONTINUED | OUTPATIENT
Start: 2019-08-13 | End: 2019-08-13 | Stop reason: SDUPTHER

## 2019-08-13 RX ORDER — LABETALOL 20 MG/4 ML (5 MG/ML) INTRAVENOUS SYRINGE
5 EVERY 10 MIN PRN
Status: DISCONTINUED | OUTPATIENT
Start: 2019-08-13 | End: 2019-08-13 | Stop reason: HOSPADM

## 2019-08-13 RX ORDER — LEVOFLOXACIN 5 MG/ML
500 INJECTION, SOLUTION INTRAVENOUS
Status: COMPLETED | OUTPATIENT
Start: 2019-08-13 | End: 2019-08-13

## 2019-08-13 RX ORDER — HYDROCODONE BITARTRATE AND ACETAMINOPHEN 5; 325 MG/1; MG/1
1 TABLET ORAL EVERY 6 HOURS PRN
Qty: 12 TABLET | Refills: 0 | Status: SHIPPED | OUTPATIENT
Start: 2019-08-13 | End: 2020-08-12

## 2019-08-13 RX ORDER — FENTANYL CITRATE 50 UG/ML
50 INJECTION, SOLUTION INTRAMUSCULAR; INTRAVENOUS
Status: DISCONTINUED | OUTPATIENT
Start: 2019-08-13 | End: 2019-08-13 | Stop reason: HOSPADM

## 2019-08-13 RX ORDER — SODIUM CHLORIDE, SODIUM LACTATE, POTASSIUM CHLORIDE, CALCIUM CHLORIDE 600; 310; 30; 20 MG/100ML; MG/100ML; MG/100ML; MG/100ML
INJECTION, SOLUTION INTRAVENOUS CONTINUOUS
Status: DISCONTINUED | OUTPATIENT
Start: 2019-08-13 | End: 2019-08-13 | Stop reason: HOSPADM

## 2019-08-13 RX ORDER — MORPHINE SULFATE 10 MG/ML
INJECTION, SOLUTION INTRAMUSCULAR; INTRAVENOUS PRN
Status: DISCONTINUED | OUTPATIENT
Start: 2019-08-13 | End: 2019-08-13 | Stop reason: SDUPTHER

## 2019-08-13 RX ORDER — ROCURONIUM BROMIDE 10 MG/ML
INJECTION, SOLUTION INTRAVENOUS PRN
Status: DISCONTINUED | OUTPATIENT
Start: 2019-08-13 | End: 2019-08-13 | Stop reason: SDUPTHER

## 2019-08-13 RX ORDER — FENTANYL CITRATE 50 UG/ML
INJECTION, SOLUTION INTRAMUSCULAR; INTRAVENOUS PRN
Status: DISCONTINUED | OUTPATIENT
Start: 2019-08-13 | End: 2019-08-13 | Stop reason: SDUPTHER

## 2019-08-13 RX ORDER — SUCCINYLCHOLINE CHLORIDE 20 MG/ML
INJECTION INTRAMUSCULAR; INTRAVENOUS PRN
Status: DISCONTINUED | OUTPATIENT
Start: 2019-08-13 | End: 2019-08-13 | Stop reason: SDUPTHER

## 2019-08-13 RX ORDER — DEXAMETHASONE SODIUM PHOSPHATE 4 MG/ML
INJECTION, SOLUTION INTRA-ARTICULAR; INTRALESIONAL; INTRAMUSCULAR; INTRAVENOUS; SOFT TISSUE PRN
Status: DISCONTINUED | OUTPATIENT
Start: 2019-08-13 | End: 2019-08-13 | Stop reason: SDUPTHER

## 2019-08-13 RX ORDER — LIDOCAINE HYDROCHLORIDE 10 MG/ML
1 INJECTION, SOLUTION EPIDURAL; INFILTRATION; INTRACAUDAL; PERINEURAL
Status: DISCONTINUED | OUTPATIENT
Start: 2019-08-13 | End: 2019-08-13 | Stop reason: HOSPADM

## 2019-08-13 RX ORDER — MORPHINE SULFATE 2 MG/ML
4 INJECTION, SOLUTION INTRAMUSCULAR; INTRAVENOUS EVERY 5 MIN PRN
Status: DISCONTINUED | OUTPATIENT
Start: 2019-08-13 | End: 2019-08-13 | Stop reason: HOSPADM

## 2019-08-13 RX ORDER — SODIUM CHLORIDE 0.9 % (FLUSH) 0.9 %
10 SYRINGE (ML) INJECTION PRN
Status: DISCONTINUED | OUTPATIENT
Start: 2019-08-13 | End: 2019-08-13 | Stop reason: HOSPADM

## 2019-08-13 RX ORDER — SODIUM CHLORIDE 0.9 % (FLUSH) 0.9 %
10 SYRINGE (ML) INJECTION EVERY 12 HOURS SCHEDULED
Status: DISCONTINUED | OUTPATIENT
Start: 2019-08-13 | End: 2019-08-13 | Stop reason: HOSPADM

## 2019-08-13 RX ORDER — HYDROCODONE BITARTRATE AND ACETAMINOPHEN 5; 325 MG/1; MG/1
1 TABLET ORAL EVERY 6 HOURS PRN
Status: DISCONTINUED | OUTPATIENT
Start: 2019-08-13 | End: 2019-08-13 | Stop reason: HOSPADM

## 2019-08-13 RX ORDER — MIDAZOLAM HYDROCHLORIDE 1 MG/ML
INJECTION INTRAMUSCULAR; INTRAVENOUS PRN
Status: DISCONTINUED | OUTPATIENT
Start: 2019-08-13 | End: 2019-08-13 | Stop reason: SDUPTHER

## 2019-08-13 RX ORDER — NYSTATIN 100000 U/G
CREAM TOPICAL 2 TIMES DAILY
Status: DISCONTINUED | OUTPATIENT
Start: 2019-08-13 | End: 2019-08-13 | Stop reason: HOSPADM

## 2019-08-13 RX ORDER — METOCLOPRAMIDE HYDROCHLORIDE 5 MG/ML
10 INJECTION INTRAMUSCULAR; INTRAVENOUS
Status: DISCONTINUED | OUTPATIENT
Start: 2019-08-13 | End: 2019-08-13 | Stop reason: HOSPADM

## 2019-08-13 RX ADMIN — ROCURONIUM BROMIDE 15 MG: 10 INJECTION INTRAVENOUS at 10:00

## 2019-08-13 RX ADMIN — MORPHINE SULFATE 4 MG: 10 INJECTION INTRAMUSCULAR; INTRAVENOUS; SUBCUTANEOUS at 10:48

## 2019-08-13 RX ADMIN — FENTANYL CITRATE 25 MCG: 50 INJECTION INTRAMUSCULAR; INTRAVENOUS at 10:07

## 2019-08-13 RX ADMIN — Medication 10 MG: at 11:18

## 2019-08-13 RX ADMIN — FENTANYL CITRATE 50 MCG: 50 INJECTION INTRAMUSCULAR; INTRAVENOUS at 11:05

## 2019-08-13 RX ADMIN — ROCURONIUM BROMIDE 5 MG: 10 INJECTION INTRAVENOUS at 10:09

## 2019-08-13 RX ADMIN — PROPOFOL 140 MG: 10 INJECTION, EMULSION INTRAVENOUS at 09:57

## 2019-08-13 RX ADMIN — MORPHINE SULFATE 4 MG: 10 INJECTION INTRAMUSCULAR; INTRAVENOUS; SUBCUTANEOUS at 10:36

## 2019-08-13 RX ADMIN — HYDROCODONE BITARTRATE AND ACETAMINOPHEN 1 TABLET: 5; 325 TABLET ORAL at 15:58

## 2019-08-13 RX ADMIN — SUCCINYLCHOLINE CHLORIDE 100 MG: 20 INJECTION, SOLUTION INTRAMUSCULAR; INTRAVENOUS; PARENTERAL at 09:57

## 2019-08-13 RX ADMIN — SODIUM CHLORIDE, SODIUM LACTATE, POTASSIUM CHLORIDE, AND CALCIUM CHLORIDE: 600; 310; 30; 20 INJECTION, SOLUTION INTRAVENOUS at 08:40

## 2019-08-13 RX ADMIN — SUGAMMADEX 150 MG: 100 INJECTION, SOLUTION INTRAVENOUS at 11:45

## 2019-08-13 RX ADMIN — DEXAMETHASONE SODIUM PHOSPHATE 4 MG: 4 INJECTION, SOLUTION INTRAMUSCULAR; INTRAVENOUS at 10:09

## 2019-08-13 RX ADMIN — FENTANYL CITRATE 50 MCG: 50 INJECTION INTRAMUSCULAR; INTRAVENOUS at 10:09

## 2019-08-13 RX ADMIN — ROCURONIUM BROMIDE 20 MG: 10 INJECTION INTRAVENOUS at 10:59

## 2019-08-13 RX ADMIN — FENTANYL CITRATE 50 MCG: 50 INJECTION INTRAMUSCULAR; INTRAVENOUS at 11:01

## 2019-08-13 RX ADMIN — LIDOCAINE HYDROCHLORIDE 5 ML: 10 INJECTION, SOLUTION INFILTRATION; PERINEURAL at 09:57

## 2019-08-13 RX ADMIN — MORPHINE SULFATE 2 MG: 10 INJECTION INTRAMUSCULAR; INTRAVENOUS; SUBCUTANEOUS at 10:43

## 2019-08-13 RX ADMIN — LEVOFLOXACIN 500 MG: 5 INJECTION, SOLUTION INTRAVENOUS at 10:01

## 2019-08-13 RX ADMIN — ROCURONIUM BROMIDE 5 MG: 10 INJECTION INTRAVENOUS at 09:57

## 2019-08-13 RX ADMIN — ONDANSETRON HYDROCHLORIDE 4 MG: 2 INJECTION, SOLUTION INTRAMUSCULAR; INTRAVENOUS at 10:34

## 2019-08-13 RX ADMIN — MIDAZOLAM 2 MG: 1 INJECTION INTRAMUSCULAR; INTRAVENOUS at 09:48

## 2019-08-13 RX ADMIN — SODIUM CHLORIDE, SODIUM LACTATE, POTASSIUM CHLORIDE, AND CALCIUM CHLORIDE: 600; 310; 30; 20 INJECTION, SOLUTION INTRAVENOUS at 10:32

## 2019-08-13 RX ADMIN — FENTANYL CITRATE 50 MCG: 50 INJECTION INTRAMUSCULAR; INTRAVENOUS at 10:58

## 2019-08-13 RX ADMIN — FENTANYL CITRATE 75 MCG: 50 INJECTION INTRAMUSCULAR; INTRAVENOUS at 09:56

## 2019-08-13 ASSESSMENT — PAIN SCALES - GENERAL
PAINLEVEL_OUTOF10: 0
PAINLEVEL_OUTOF10: 5
PAINLEVEL_OUTOF10: 5

## 2019-08-13 ASSESSMENT — PAIN - FUNCTIONAL ASSESSMENT: PAIN_FUNCTIONAL_ASSESSMENT: 0-10

## 2019-08-13 ASSESSMENT — PAIN DESCRIPTION - ORIENTATION: ORIENTATION: LEFT;LOWER

## 2019-08-13 ASSESSMENT — PAIN DESCRIPTION - LOCATION: LOCATION: ABDOMEN

## 2019-08-13 ASSESSMENT — PAIN DESCRIPTION - DESCRIPTORS: DESCRIPTORS: ACHING

## 2019-08-13 ASSESSMENT — PAIN DESCRIPTION - PAIN TYPE: TYPE: SURGICAL PAIN

## 2019-08-13 ASSESSMENT — LIFESTYLE VARIABLES: SMOKING_STATUS: 0

## 2019-08-13 NOTE — H&P
acetaminophen (TYLENOL) 500 MG tablet Take 500 mg by mouth every 6 hours as needed for Pain        terbinafine (LAMISIL) 250 MG tablet Take 250 mg by mouth daily        fluticasone-salmeterol (ADVAIR) 250-50 MCG/DOSE AEPB Inhale 1 puff into the lungs every 12 hours        levothyroxine (SYNTHROID) 100 MCG tablet Take 100 mcg by mouth Daily          No current facility-administered medications for this visit.          Allergies: Meloxicam; Tamsulosin; and Pcn [penicillins]      Past Medical History        Past Medical History:   Diagnosis Date    Bacteremia      Chronic back pain      COPD (chronic obstructive pulmonary disease) (HCC)      GERD (gastroesophageal reflux disease)      Hypothyroidism      Inguinal hernia      Neuropathy       LLE    Umbilical hernia      Urinary retention           Past Surgical History   Past Surgical History:   Procedure Laterality Date    BACK SURGERY         lumbar    CHOLECYSTECTOMY, LAPAROSCOPIC N/A 2/26/2019     CHOLECYSTECTOMY LAPAROSCOPIC WITH GRAM performed by Ted Yepez MD at [place] [address]   7/2011     R neck    ERCP N/A 1/29/2019     Dr SEVERINO Aiken/placement of a 10 x 40 mm self-expanding metal biliary stent, stone removal, surgical consult-Repeat in 8-12 wks    ERCP N/A 4/24/2019     Dr SEVERINO Aiken/stent removal of previously placed SEMS, stone removal    ERCP N/A 5/17/2019     Dr Lalita Aiken/stent placement-Retained stones/sludge removed with balloon sweep, repeat in 8 wks    ERCP N/A 7/24/2019     ERCP DIAGNOSTIC with spyglass and EHL performed by Binh Villareal MD at [address] [place] [address] Endoscopy    HERNIA REPAIR   7/2011     L ing hernia w/mesh, small umb hernia    KNEE SURGERY        SPINE SURGERY         complicated by a postop. hematoma         Family History         Family History   Problem Relation Age of Onset    Cancer Sister           Social History            Tobacco Use    Smoking status: Former Smoker       Packs/day: 1.00       Years: 30.00

## 2019-08-13 NOTE — ANESTHESIA PRE PROCEDURE
stent insertion Z98.890    Septicemia ([facility] [address] A41.9    Abdominal pain R10.9       Past Medical History:        Diagnosis Date    Bacteremia     Chronic back pain     COPD (chronic obstructive pulmonary disease) (HCC)     GERD (gastroesophageal reflux disease)     Hypothyroidism     Inguinal hernia     Neuropathy     LLE    Umbilical hernia     Urinary retention        Past Surgical History:        Procedure Laterality Date    BACK SURGERY      lumbar    CHOLECYSTECTOMY, LAPAROSCOPIC N/A 2019    CHOLECYSTECTOMY LAPAROSCOPIC WITH GRAM performed by Mary Ann Jackson MD at [address] [place] [address] CYST REMOVAL  2011    R neck    ERCP N/A 2019    Dr SEVERINO Salazarw/placement of a 10 x 40 mm self-expanding metal biliary stent, stone removal, surgical consult-Repeat in 8-12 wks    ERCP N/A 2019    Dr SEVERINO Aiken/stent removal of previously placed SEMS, stone removal    ERCP N/A 2019    Dr Haily Aiken/stent placement-Retained stones/sludge removed with balloon sweep, repeat in 8 wks    ERCP N/A 2019    ERCP DIAGNOSTIC with spyglass and EHL performed by Yani Samuels MD at [facility]    HERNIA REPAIR  2011    L ing hernia w/mesh, small umb hernia    KNEE SURGERY      SPINE SURGERY      complicated by a postop. hematoma       Social History:    Social History     Tobacco Use    Smoking status: Former Smoker     Packs/day: 1.00     Years: 30.00     Pack years: 30.00     Last attempt to quit: 2019     Years since quittin.1    Smokeless tobacco: Never Used    Tobacco comment: smokes a few cigars now   Substance Use Topics    Alcohol use: No     Comment: used to drink heavy in the past, 9 years                                Counseling given: Not Answered  Comment: smokes a few cigars now      Vital Signs (Current): There were no vitals filed for this visit.                                            BP Readings from Last 3 Encounters:   19 110/70   19 (!) 147/85   19 131/78 NPO Status:                                                                                 BMI:   Wt Readings from Last 3 Encounters:   08/05/19 164 lb (74.4 kg)   07/26/19 164 lb (74.4 kg)   07/24/19 163 lb (73.9 kg)     There is no height or weight on file to calculate BMI.    CBC:   Lab Results   Component Value Date    WBC 12.0 05/18/2019    RBC 4.02 05/18/2019    HGB 12.5 05/18/2019    HGB 17.1 07/15/2011    HCT 38.0 05/18/2019    HCT 50.4 07/15/2011    MCV 94.5 05/18/2019    RDW 15.1 05/18/2019     05/18/2019     07/15/2011       CMP:   Lab Results   Component Value Date     05/18/2019     07/15/2011    K 3.2 05/18/2019     05/18/2019     07/15/2011    CO2 25 05/18/2019    BUN 9 05/18/2019    CREATININE <0.5 05/18/2019    CREATININE 0.7 07/15/2011    LABGLOM >60 05/18/2019    GLUCOSE 97 05/18/2019    PROT 5.0 05/18/2019    PROT 6.7 07/15/2011    CALCIUM 8.0 05/18/2019    BILITOT 2.5 05/18/2019    ALKPHOS 154 05/18/2019    ALKPHOS 75 07/15/2011    AST 49 05/18/2019    ALT 54 05/18/2019       POC Tests: No results for input(s): POCGLU, POCNA, POCK, POCCL, POCBUN, POCHEMO, POCHCT in the last 72 hours. Coags:   Lab Results   Component Value Date    PROTIME 14.1 05/16/2019    PROTIME 12.68 07/15/2011    INR 1.15 05/16/2019    APTT 28.6 01/29/2019       HCG (If Applicable): No results found for: PREGTESTUR, PREGSERUM, HCG, HCGQUANT     ABGs: No results found for: PHART, PO2ART, FLQ4EBQ, LGM5PTE, BEART, F9XCCWRL     Type & Screen (If Applicable):  No results found for: LABABO, [address]    Anesthesia Evaluation  Patient summary reviewed and Nursing notes reviewed no history of anesthetic complications:   Airway: Mallampati: II  TM distance: >3 FB   Neck ROM: full  Mouth opening: > = 3 FB Dental:          Pulmonary:   (+) COPD:      (-) not a current smoker          Patient did not smoke on day of surgery.                  Cardiovascular:          ECG reviewed Beta Blocker:  Not on Beta Blocker         Neuro/Psych:               GI/Hepatic/Renal:             Endo/Other:    (+) hypothyroidism::., .                 Abdominal:           Vascular:                                        Anesthesia Plan      general     ASA 2     (Decadron/Zofran Intraop)  Induction: intravenous. BIS  MIPS: Postoperative opioids intended and Prophylactic antiemetics administered. Anesthetic plan and risks discussed with patient.                       Matthew Braun MD   8/13/2019

## 2019-08-14 ENCOUNTER — TELEPHONE (OUTPATIENT)
Dept: SURGERY | Age: 69
End: 2019-08-14

## 2019-08-14 NOTE — TELEPHONE ENCOUNTER
Nish Amada Lambert called and stated that he is having a great deal of pain on his left side after his surgery yesterday. He can be reached at [phone]. Thanks!

## 2019-08-14 NOTE — OP NOTE
then grasped the  fascia and carefully dissected in all directions to stay in the  preperitoneal space. We were actually able to accomplish this. There  were some rents in the peritoneum, which were closed with 3-0 Vicryl  sutures. We then had a very good space for deployment of the mesh. We  utilized the 8 cm Ventralex mesh. We deployed it appropriately to make  sure all directions were well spread out and then sutured it in place  with multiple 0 Vicryl sutures going through the fascia down to the mesh  material.  We irrigated copiously with Kefzol-bacitracin. It appeared  to have everything in very good order and then reapproximated the  primary defect with interrupted 0 Vicryl sutures as well. We did  administer some combination of Exparel and Marcaine for postoperative  pain. We then closed the subcu with 2-0 and 3-0 Vicryl and the skin  with 4-0 Monocryl. Sterile dressings were applied. Estimated blood  loss, minimal.  Complications, none. He tolerated the procedure well.         Evgeny Neff MD    D: 08/13/2019 13:14:06      T: 08/13/2019 14:55:34     /[email or web address]  Job#: [id number]     Doc#: [id number]    CC:

## 2019-08-14 NOTE — TELEPHONE ENCOUNTER
Discussed with patient. Pain management improved. Will see at routine follow up. This has been electronically signed by Deonte Elizabeth.  Edward Jackson PA-C.

## 2019-08-20 ENCOUNTER — TELEPHONE (OUTPATIENT)
Dept: SURGERY | Age: 69
End: 2019-08-20

## 2019-08-25 ENCOUNTER — HOSPITAL ENCOUNTER (OUTPATIENT)
Dept: HOSPITAL 58 - AMBL | Age: 69
Discharge: ANOTHER HOSPITAL | End: 2019-08-25
Attending: INTERNAL MEDICINE

## 2019-08-25 ENCOUNTER — APPOINTMENT (OUTPATIENT)
Dept: CT IMAGING | Age: 69
End: 2019-08-25
Payer: MEDICARE

## 2019-08-25 ENCOUNTER — HOSPITAL ENCOUNTER (EMERGENCY)
Age: 69
Discharge: HOME | End: 2019-08-25
Attending: EMERGENCY MEDICINE
Payer: MEDICARE

## 2019-08-25 VITALS
WEIGHT: 163 LBS | TEMPERATURE: 98.3 F | HEART RATE: 80 BPM | RESPIRATION RATE: 18 BRPM | BODY MASS INDEX: 27.12 KG/M2 | SYSTOLIC BLOOD PRESSURE: 137 MMHG | OXYGEN SATURATION: 94 % | DIASTOLIC BLOOD PRESSURE: 84 MMHG

## 2019-08-25 VITALS — BODY MASS INDEX: 27.6 KG/M2

## 2019-08-25 DIAGNOSIS — L76.34 POSTOPERATIVE SEROMA OF SUBCUTANEOUS TISSUE AFTER NON-DERMATOLOGIC PROCEDURE: Primary | ICD-10-CM

## 2019-08-25 DIAGNOSIS — L76.82: Primary | ICD-10-CM

## 2019-08-25 LAB
ALBUMIN SERPL-MCNC: 3.9 G/DL (ref 3.5–5.2)
ALP BLD-CCNC: 107 U/L (ref 40–130)
ALT SERPL-CCNC: 11 U/L (ref 5–41)
ANION GAP SERPL CALCULATED.3IONS-SCNC: 12 MMOL/L (ref 7–19)
AST SERPL-CCNC: 17 U/L (ref 5–40)
BASOPHILS ABSOLUTE: 0.1 K/UL (ref 0–0.2)
BASOPHILS RELATIVE PERCENT: 1.2 % (ref 0–1)
BILIRUB SERPL-MCNC: 0.4 MG/DL (ref 0.2–1.2)
BUN BLDV-MCNC: 11 MG/DL (ref 8–23)
CALCIUM SERPL-MCNC: 9.1 MG/DL (ref 8.8–10.2)
CHLORIDE BLD-SCNC: 106 MMOL/L (ref 98–111)
CO2: 26 MMOL/L (ref 22–29)
CREAT SERPL-MCNC: 0.6 MG/DL (ref 0.5–1.2)
EOSINOPHILS ABSOLUTE: 0.2 K/UL (ref 0–0.6)
EOSINOPHILS RELATIVE PERCENT: 3.4 % (ref 0–5)
GFR NON-AFRICAN AMERICAN: >60
GLUCOSE BLD-MCNC: 102 MG/DL (ref 74–109)
HCT VFR BLD CALC: 50 % (ref 42–52)
HEMOGLOBIN: 16.3 G/DL (ref 14–18)
IMMATURE GRANULOCYTES #: 0 K/UL
LYMPHOCYTES ABSOLUTE: 1.7 K/UL (ref 1.1–4.5)
LYMPHOCYTES RELATIVE PERCENT: 30 % (ref 20–40)
MCH RBC QN AUTO: 30.1 PG (ref 27–31)
MCHC RBC AUTO-ENTMCNC: 32.6 G/DL (ref 33–37)
MCV RBC AUTO: 92.4 FL (ref 80–94)
MONOCYTES ABSOLUTE: 0.7 K/UL (ref 0–0.9)
MONOCYTES RELATIVE PERCENT: 12.2 % (ref 0–10)
NEUTROPHILS ABSOLUTE: 3 K/UL (ref 1.5–7.5)
NEUTROPHILS RELATIVE PERCENT: 52.8 % (ref 50–65)
PDW BLD-RTO: 13.1 % (ref 11.5–14.5)
PLATELET # BLD: 255 K/UL (ref 130–400)
PMV BLD AUTO: 9.3 FL (ref 9.4–12.4)
POTASSIUM SERPL-SCNC: 3.8 MMOL/L (ref 3.5–5)
RBC # BLD: 5.41 M/UL (ref 4.7–6.1)
SODIUM BLD-SCNC: 144 MMOL/L (ref 136–145)
TOTAL PROTEIN: 6.6 G/DL (ref 6.6–8.7)
WBC # BLD: 5.6 K/UL (ref 4.8–10.8)

## 2019-08-25 PROCEDURE — 36415 COLL VENOUS BLD VENIPUNCTURE: CPT

## 2019-08-25 PROCEDURE — 99284 EMERGENCY DEPT VISIT MOD MDM: CPT

## 2019-08-25 PROCEDURE — 6360000004 HC RX CONTRAST MEDICATION: Performed by: EMERGENCY MEDICINE

## 2019-08-25 PROCEDURE — 74177 CT ABD & PELVIS W/CONTRAST: CPT

## 2019-08-25 PROCEDURE — 85025 COMPLETE CBC W/AUTO DIFF WBC: CPT

## 2019-08-25 PROCEDURE — 80053 COMPREHEN METABOLIC PANEL: CPT

## 2019-08-25 RX ADMIN — IOPAMIDOL 90 ML: 755 INJECTION, SOLUTION INTRAVENOUS at 04:50

## 2019-08-25 ASSESSMENT — ENCOUNTER SYMPTOMS
DIARRHEA: 0
SHORTNESS OF BREATH: 0
VOMITING: 0
NAUSEA: 0
ABDOMINAL PAIN: 0

## 2019-08-25 NOTE — ED PROVIDER NOTES
[facility] EMERGENCY DEPT  eMERGENCY dEPARTMENT eNCOUnter      Pt Name: Smita Bullard  MRN: [id number]  [place] 1950  Date of evaluation: 8/25/2019  Provider: Eddi Da Silva MD    [address] [place] [address]       Chief Complaint   Patient presents with    Post-op Problem     pt c/o \"hard area\" beneath abdominal incision from surgery last tuesday         HISTORY OF PRESENT ILLNESS   (Location/Symptom, Timing/Onset,Context/Setting, Quality, Duration, Modifying Factors, Severity)  Note limiting factors. Smita Bullard is a 77 y.o. male who presents to the emergency department a hard area in his incision. Patient had a ventral hernia repair by Dr. Julian Johnson on August 13. He states that he got up this morning around 2 AM to go urinate and whenever he was getting back in bed he noticed that the incision felt somewhat hard and it was nice and soft before this. He has no complaint of pain. He has no associated GI symptoms. He has had no drainage or redness of this incision area. HPI    NursingNotes were reviewed. REVIEW OF SYSTEMS    (2-9 systems for level 4, 10 or more for level 5)     Review of Systems   Constitutional: Negative for chills and fever. Respiratory: Negative for shortness of breath. Cardiovascular: Negative for chest pain. Gastrointestinal: Negative for abdominal pain, diarrhea, nausea and vomiting. Genitourinary: Negative for dysuria and frequency. All other systems reviewed and are negative.            PAST MEDICALHISTORY     Past Medical History:   Diagnosis Date    Arthritis     Bacteremia     Chronic back pain     COPD (chronic obstructive pulmonary disease) (HCC)     GERD (gastroesophageal reflux disease)     Hypothyroidism     Inguinal hernia     Neuropathy     LLE    Umbilical hernia     Urinary retention          SURGICAL HISTORY       Past Surgical History:   Procedure Laterality Date    BACK SURGERY      lumbar    CHOLECYSTECTOMY, LAPAROSCOPIC N/A 2/26/2019    CHOLECYSTECTOMY LAPAROSCOPIC WITH GRAM performed by Hodan Rao MD at [address] CYST REMOVAL  7/2011    R neck    ERCP N/A 1/29/2019    Dr SEVERINO Shook-w/placement of a 10 x 40 mm self-expanding metal biliary stent, stone removal, surgical consult-Repeat in 8-12 wks    ERCP N/A 4/24/2019    Dr SEVERINO Shook-w/stent removal of previously placed SEMS, stone removal    ERCP N/A 5/17/2019    Dr Gunner Shook-w/stent placement-Retained stones/sludge removed with balloon sweep, repeat in 8 wks    ERCP N/A 7/24/2019    Dr SEVERINO Shook-Successful direct SpyGlass cholangioscopy with endoscopic hydraulic lithotripsy, stent removal    HERNIA REPAIR  7/2011    L ing hernia w/mesh, small umb hernia    KNEE SURGERY      SPINE SURGERY      complicated by a postop. hematoma    UMBILICAL HERNIA REPAIR N/A 8/13/2019    RECURRENT VENTRAL HERNIA REPAIR WITH MESH performed by Hodan Rao MD at [address] [facility]     Discharge Medication List as of 8/25/2019  5:29 AM      CONTINUE these medications which have NOT CHANGED    Details   HYDROcodone-acetaminophen (NORCO) 5-325 MG per tablet Take 1 tablet by mouth every 6 hours as needed for Pain., Disp-12 tablet, R-0Print      levothyroxine (SYNTHROID) 100 MCG tablet Take 100 mcg by mouth DailyHistorical Med      acetaminophen (TYLENOL) 500 MG tablet Take 500 mg by mouth every 6 hours as needed for PainHistorical Med      terbinafine (LAMISIL) 250 MG tablet Take 250 mg by mouth dailyHistorical Med      fluticasone-salmeterol (ADVAIR) 250-50 MCG/DOSE AEPB Inhale 1 puff into the lungs every 12 hoursHistorical Med             ALLERGIES     Meloxicam; Tamsulosin; and Pcn [penicillins]    FAMILY HISTORY       Family History   Problem Relation Age of Onset    Cancer Sister           SOCIAL HISTORY       Social History     Socioeconomic History    Marital status:      Spouse name: None    Number of children: None    Years of education: None    Highest education level: None   Occupational there is no drainage and it is healing very well, we will have him follow-up with Dr. Joaquim Leon this week for reevaluation, discussed return precautions    CONSULTS:  None    PROCEDURES:  Unless otherwise noted below, none     Procedures    FINAL IMPRESSION      1.  Postoperative seroma of subcutaneous tissue after non-dermatologic procedure          DISPOSITION/PLAN   DISPOSITION Decision To Discharge 08/25/2019 05:27:21 AM      PATIENT REFERRED TO:  Todd Dickey MD  [address] [facility] [phone]    Call in 1 day        DISCHARGE MEDICATIONS:  Discharge Medication List as of 8/25/2019  5:29 AM             (Please note that portions of this note were completed with a voice recognition program.  Efforts were made to edit thedictations but occasionally words are mis-transcribed.)    Vinita Costa MD (electronically signed)  Attending Emergency Physician        Sangita Sharma MD  08/25/19 [id number]

## 2019-09-11 ENCOUNTER — OFFICE VISIT (OUTPATIENT)
Dept: SURGERY | Age: 69
End: 2019-09-11

## 2019-09-11 VITALS
BODY MASS INDEX: 26.82 KG/M2 | WEIGHT: 161 LBS | DIASTOLIC BLOOD PRESSURE: 60 MMHG | HEIGHT: 65 IN | SYSTOLIC BLOOD PRESSURE: 110 MMHG

## 2019-09-11 DIAGNOSIS — Z09 S/P UMBILICAL HERNIA REPAIR, FOLLOW-UP EXAM: Primary | ICD-10-CM

## 2019-09-11 PROCEDURE — 99024 POSTOP FOLLOW-UP VISIT: CPT | Performed by: PHYSICIAN ASSISTANT

## 2019-09-11 NOTE — PROGRESS NOTES
Alysa Cagle     :  1950    CHIEF COMPLAINT:   Chief Complaint   Patient presents with    Post-Op Check     RECURRENT VENTRAL HERNIA REPAIR WITH MESH 19        HPI:  Alysha Lui  comes today in follow-up from recurrent ventral hernia. He is doing well. Patient Active Problem List    Diagnosis Date Noted    Ventral hernia, recurrent 2019    Abdominal pain     Septicemia ([facility] [address]     History of biliary stent insertion     Gallstones 2019    E coli bacteremia     Hypokalemia     Sepsis ([facility] [address]     Hypotension due to hypovolemia     Choledocholithiasis 2019    Hypothyroidism 2019    Chronic back pain 2019    COPD (chronic obstructive pulmonary disease) ([facility] [address] 2019    Urinary retention 2019    Leukocytosis 2019    Cholangitis due to bile duct calculus with obstruction 2019    Left groin pain 2012     Current Outpatient Medications   Medication Sig Dispense Refill    UNABLE TO FIND Indications: Patient taking Moriah Bac and Body       levothyroxine (SYNTHROID) 100 MCG tablet Take 100 mcg by mouth Daily      HYDROcodone-acetaminophen (NORCO) 5-325 MG per tablet Take 1 tablet by mouth every 6 hours as needed for Pain. (Patient not taking: Reported on 2019) 12 tablet 0    acetaminophen (TYLENOL) 500 MG tablet Take 500 mg by mouth every 6 hours as needed for Pain      terbinafine (LAMISIL) 250 MG tablet Take 250 mg by mouth daily      fluticasone-salmeterol (ADVAIR) 250-50 MCG/DOSE AEPB Inhale 1 puff into the lungs every 12 hours       No current facility-administered medications for this visit.       Allergies: Meloxicam; Tamsulosin; and Pcn [penicillins]  Past Medical History:   Diagnosis Date    Arthritis     Bacteremia     Chronic back pain     COPD (chronic obstructive pulmonary disease) (HCC)     GERD (gastroesophageal reflux disease)     Hypothyroidism     Inguinal hernia     Neuropathy     LLE    Umbilical

## 2020-02-21 ENCOUNTER — TELEPHONE (OUTPATIENT)
Dept: GASTROENTEROLOGY | Age: 70
End: 2020-02-21

## 2020-02-27 ENCOUNTER — TELEPHONE (OUTPATIENT)
Dept: SURGERY | Age: 70
End: 2020-02-27

## 2020-02-27 NOTE — TELEPHONE ENCOUNTER
Patient called with abdominal pain. He wonders if there is a problem with the mesh from previous hernia repair. I offered him an appointment to see me Monday and he declines. He will go to the ED for evaluation. This has been electronically signed by Heidi Pina.  Cayetano Wilkinson PA-C.

## 2020-04-16 ENCOUNTER — TELEPHONE (OUTPATIENT)
Dept: SURGERY | Age: 70
End: 2020-04-16

## 2020-04-16 NOTE — TELEPHONE ENCOUNTER
Patient called in and stated that his hernia is poking back out in his belly. He stated that he is having a little pain but is scared to go to the hospital to have it seen about so he would like a phone call and to speak with a provider to decide what he needs to do.    [phone]  CC Doreen Rhoades  Thank you

## 2021-01-11 ENCOUNTER — OFFICE VISIT (OUTPATIENT)
Dept: SURGERY | Age: 71
End: 2021-01-11
Payer: MEDICARE

## 2021-01-11 VITALS — DIASTOLIC BLOOD PRESSURE: 80 MMHG | SYSTOLIC BLOOD PRESSURE: 138 MMHG | HEART RATE: 88 BPM

## 2021-01-11 DIAGNOSIS — K43.2 RECURRENT VENTRAL HERNIA: Primary | ICD-10-CM

## 2021-01-11 PROCEDURE — 1123F ACP DISCUSS/DSCN MKR DOCD: CPT | Performed by: PHYSICIAN ASSISTANT

## 2021-01-11 PROCEDURE — G8484 FLU IMMUNIZE NO ADMIN: HCPCS | Performed by: PHYSICIAN ASSISTANT

## 2021-01-11 PROCEDURE — 99212 OFFICE O/P EST SF 10 MIN: CPT | Performed by: PHYSICIAN ASSISTANT

## 2021-01-11 PROCEDURE — G8421 BMI NOT CALCULATED: HCPCS | Performed by: PHYSICIAN ASSISTANT

## 2021-01-11 PROCEDURE — G8427 DOCREV CUR MEDS BY ELIG CLIN: HCPCS | Performed by: PHYSICIAN ASSISTANT

## 2021-01-11 PROCEDURE — 3017F COLORECTAL CA SCREEN DOC REV: CPT | Performed by: PHYSICIAN ASSISTANT

## 2021-01-11 PROCEDURE — 4040F PNEUMOC VAC/ADMIN/RCVD: CPT | Performed by: PHYSICIAN ASSISTANT

## 2021-01-11 PROCEDURE — 1036F TOBACCO NON-USER: CPT | Performed by: PHYSICIAN ASSISTANT

## 2021-01-22 ENCOUNTER — TELEPHONE (OUTPATIENT)
Dept: SURGERY | Age: 71
End: 2021-01-22

## 2021-01-22 DIAGNOSIS — K43.2 RECURRENT VENTRAL HERNIA: Primary | ICD-10-CM

## 2021-01-22 NOTE — TELEPHONE ENCOUNTER
I called patient to discuss his hernia and again he noted that his scan was done at [facility] that confirmed a hernia. I was able to call [facility] and was informed that the only CAT scan he had was a CAT scan of his lumbosacral spine and it did not specifically mention any hernia. I will order a CT of the abdomen and have him see me back in the office to discuss repair. It would be likely that this should be done robotically. This has been electronically signed by Garfield Zhong.  Marifer Polo PA-C.

## 2021-01-22 NOTE — PROGRESS NOTES
HISTORY OF PRESENT ILLNESS:  David Jeffries comes to the office today with complaints of a recurrent ventral hernia. The hernia is above the umbilicus. He states he noticed it several months ago. He has had some mild pain. He is also noticed a bulge. The pain is worse with straining. He denies any strangulation or incarceration of the hernia. He reports a fall a few months ago and states that he had a CT scan that demonstrated a hernia. Patient Active Problem List    Diagnosis Date Noted    Ventral hernia, recurrent 08/13/2019    Abdominal pain     [facility]icemia ([facility] [address]     History of biliary stent insertion     Gallstones 02/26/2019    E coli bacteremia     Hypokalemia     Sepsis ([facility] [address]     Hypotension due to hypovolemia     Choledocholithiasis 01/29/2019    Hypothyroidism 01/29/2019    Chronic back pain 01/29/2019    COPD (chronic obstructive pulmonary disease) ([facility] [address] 01/29/2019    Urinary retention 01/29/2019    Leukocytosis 01/29/2019    Cholangitis due to bile duct calculus with obstruction 01/29/2019    Left groin pain 01/26/2012     Current Outpatient Medications   Medication Sig Dispense Refill    UNABLE TO FIND Indications: Patient taking Moriah Bac and Body       acetaminophen (TYLENOL) 500 MG tablet Take 500 mg by mouth every 6 hours as needed for Pain      terbinafine (LAMISIL) 250 MG tablet Take 250 mg by mouth daily      fluticasone-salmeterol (ADVAIR) 250-50 MCG/DOSE AEPB Inhale 1 puff into the lungs every 12 hours      levothyroxine (SYNTHROID) 100 MCG tablet Take 100 mcg by mouth Daily       No current facility-administered medications for this visit.       Allergies: Meloxicam, Tamsulosin, and Pcn [penicillins]     Past Medical History:   Diagnosis Date    Arthritis     Bacteremia     Chronic back pain     COPD (chronic obstructive pulmonary disease) (HCC)     GERD (gastroesophageal reflux disease)     Hypothyroidism     Inguinal hernia     Neuropathy     LLE  Umbilical hernia     Urinary retention      Past Surgical History:   Procedure Laterality Date    BACK SURGERY      lumbar    CHOLECYSTECTOMY, LAPAROSCOPIC N/A 2019    CHOLECYSTECTOMY LAPAROSCOPIC WITH GRAM performed by Rayray Lentz MD at [place] [address]  2011    R neck    ERCP N/A 2019    Dr SEVERINO Shook-w/placement of a 10 x 40 mm self-expanding metal biliary stent, stone removal, surgical consult-Repeat in 8-12 wks    ERCP N/A 2019    Dr SEVERINO Shook-w/stent removal of previously placed SEMS, stone removal    ERCP N/A 2019    Dr Sylvia Shook-w/stent placement-Retained stones/sludge removed with balloon sweep, repeat in 8 wks    ERCP N/A 2019    Dr SEVERINO Shook-Successful direct SpyGlass cholangioscopy with endoscopic hydraulic lithotripsy, stent removal    HERNIA REPAIR  2011    L ing hernia w/mesh, small umb hernia    KNEE SURGERY      SPINE SURGERY      complicated by a postop. hematoma    UMBILICAL HERNIA REPAIR N/A 2019    RECURRENT VENTRAL HERNIA REPAIR WITH MESH performed by [facility]z MD at [facility] History   Problem Relation Age of Onset    Cancer Sister      Social History     Tobacco Use    Smoking status: Former Smoker     Packs/day: 1.00     Years: 30.00     Pack years: 30.00     Quit date: 2019     Years since quittin.5    Smokeless tobacco: Never Used    Tobacco comment: smokes a few cigars now   Substance Use Topics    Alcohol use: No     Comment: used to drink heavy in the past, 9 years       Review of Systems  Review of system reviewed and positive for the above    Physical Exam  Blood pressure 138/80, pulse 88. GENERAL:  Reveals a 65 y.o. male that  appears to be in no acute distress. He is frail but ambulates without assistance. HEENT:  Head is normocephalic and atraumatic. CHEST:  Patient has normal respiratory effort. Chest is clear bilaterally with good thoracic expansion.       HEART:

## 2021-01-22 NOTE — TELEPHONE ENCOUNTER
Left Message   CT Scan @[facility] Am   NPO 4-6 hours prior to test.   oral contrast and drink 90 minutes prior to appt (Start drinking at 8:30) and he will see Rachael Caicedo in the office at 11:30 AM that same day. I mailed appts with instructions as well.

## 2021-01-29 ENCOUNTER — TELEPHONE (OUTPATIENT)
Dept: SURGERY | Age: 71
End: 2021-01-29

## 2021-01-29 NOTE — TELEPHONE ENCOUNTER
Patient left  stating that he could not go into the hospital until after the 3rd and would like to reschedule surgery until then if that was okay with the provider. Tried to call the patient back to get more details but it went to . Left msg to return call. Provider may want to speak with patient for more details about the ct scan and surgery.   Thank you  JODIE Zuñiga   [phone]

## 2021-01-29 NOTE — TELEPHONE ENCOUNTER
Please reschedule CT and FU eval after 2/3 per patient request.      This has been electronically signed by Naomy Perry.  Pratibha Felton PA-C.

## 2021-02-01 ENCOUNTER — TELEPHONE (OUTPATIENT)
Dept: SURGERY | Age: 71
End: 2021-02-01

## 2021-02-01 NOTE — TELEPHONE ENCOUNTER
Patient called in and stated that he has had diarrhea since having his hernia and wanted to know if that was normal or what can be done for it. Please call patient and advise.    Thank you  Jessica FLORES.[phone]

## 2021-02-03 ENCOUNTER — TELEPHONE (OUTPATIENT)
Dept: SURGERY | Age: 71
End: 2021-02-03

## 2021-02-03 NOTE — TELEPHONE ENCOUNTER
Patient called in and stated that he would like to wait on having surgery right now. He stated he will call back when he has calmed down and not so scared to have surgery.   Thank you  JODIE Slade

## 2021-11-19 ENCOUNTER — TELEPHONE (OUTPATIENT)
Dept: SURGERY | Age: 71
End: 2021-11-19

## 2023-03-30 ENCOUNTER — HOSPITAL ENCOUNTER (OUTPATIENT)
Dept: CT IMAGING | Age: 73
Discharge: HOME | End: 2023-03-30
Payer: MEDICARE

## 2023-03-30 DIAGNOSIS — K43.9 VENTRAL HERNIA WITHOUT OBSTRUCTION OR GANGRENE: ICD-10-CM

## 2023-03-30 PROCEDURE — 74150 CT ABDOMEN W/O CONTRAST: CPT

## 2023-04-13 ENCOUNTER — OFFICE VISIT (OUTPATIENT)
Dept: SURGERY | Age: 73
End: 2023-04-13

## 2023-04-13 VITALS — HEIGHT: 65 IN | WEIGHT: 161 LBS | BODY MASS INDEX: 26.82 KG/M2

## 2023-04-13 DIAGNOSIS — R46.89 CLOTHING DISHEVELED: ICD-10-CM

## 2023-04-13 DIAGNOSIS — K43.2 RECURRENT VENTRAL HERNIA: Primary | ICD-10-CM

## 2023-07-31 ENCOUNTER — OFFICE VISIT (OUTPATIENT)
Dept: FAMILY MEDICINE CLINIC | Facility: CLINIC | Age: 73
End: 2023-07-31
Payer: MEDICARE

## 2023-07-31 VITALS
TEMPERATURE: 97.1 F | SYSTOLIC BLOOD PRESSURE: 122 MMHG | HEIGHT: 69 IN | OXYGEN SATURATION: 98 % | HEART RATE: 74 BPM | BODY MASS INDEX: 23.55 KG/M2 | DIASTOLIC BLOOD PRESSURE: 66 MMHG | WEIGHT: 159 LBS

## 2023-07-31 DIAGNOSIS — I10 PRIMARY HYPERTENSION: Primary | ICD-10-CM

## 2023-07-31 DIAGNOSIS — Z12.5 SCREENING FOR MALIGNANT NEOPLASM OF PROSTATE: ICD-10-CM

## 2023-07-31 DIAGNOSIS — E03.9 HYPOTHYROIDISM, UNSPECIFIED TYPE: ICD-10-CM

## 2023-07-31 PROCEDURE — 99202 OFFICE O/P NEW SF 15 MIN: CPT | Performed by: FAMILY MEDICINE

## 2023-07-31 PROCEDURE — 3074F SYST BP LT 130 MM HG: CPT | Performed by: FAMILY MEDICINE

## 2023-07-31 PROCEDURE — 3078F DIAST BP <80 MM HG: CPT | Performed by: FAMILY MEDICINE

## 2023-07-31 RX ORDER — LORATADINE 10 MG/1
10 TABLET ORAL DAILY
COMMUNITY

## 2023-07-31 RX ORDER — LEVOTHYROXINE SODIUM 0.1 MG/1
100 TABLET ORAL DAILY
Qty: 30 TABLET | Refills: 1 | Status: SHIPPED | OUTPATIENT
Start: 2023-07-31

## 2023-07-31 RX ORDER — LISINOPRIL AND HYDROCHLOROTHIAZIDE 12.5; 1 MG/1; MG/1
1 TABLET ORAL DAILY
Qty: 30 TABLET | Refills: 1 | Status: SHIPPED | OUTPATIENT
Start: 2023-07-31

## 2023-07-31 RX ORDER — LISINOPRIL AND HYDROCHLOROTHIAZIDE 12.5; 1 MG/1; MG/1
1 TABLET ORAL DAILY
COMMUNITY
Start: 2023-07-21 | End: 2023-07-31 | Stop reason: SDUPTHER

## 2023-07-31 RX ORDER — HYDROCODONE BITARTRATE AND ACETAMINOPHEN 5; 325 MG/1; MG/1
1 TABLET ORAL DAILY PRN
COMMUNITY
Start: 2023-04-03

## 2023-07-31 RX ORDER — LEVOTHYROXINE SODIUM 0.1 MG/1
1 TABLET ORAL DAILY
COMMUNITY
End: 2023-07-31 | Stop reason: SDUPTHER

## 2023-07-31 RX ORDER — TIZANIDINE 4 MG/1
4 TABLET ORAL 3 TIMES DAILY PRN
COMMUNITY

## 2023-08-01 ENCOUNTER — TELEPHONE (OUTPATIENT)
Dept: FAMILY MEDICINE CLINIC | Facility: CLINIC | Age: 73
End: 2023-08-01

## 2023-08-01 LAB
ALBUMIN SERPL-MCNC: 4.4 G/DL (ref 3.8–4.8)
ALBUMIN/GLOB SERPL: 1.8 {RATIO} (ref 1.2–2.2)
ALP SERPL-CCNC: 113 IU/L (ref 44–121)
ALT SERPL-CCNC: 11 IU/L (ref 0–44)
AST SERPL-CCNC: 22 IU/L (ref 0–40)
BASOPHILS # BLD AUTO: 0.1 X10E3/UL (ref 0–0.2)
BASOPHILS NFR BLD AUTO: 1 %
BILIRUB SERPL-MCNC: 0.9 MG/DL (ref 0–1.2)
BUN SERPL-MCNC: 17 MG/DL (ref 8–27)
BUN/CREAT SERPL: 15 (ref 10–24)
CALCIUM SERPL-MCNC: 9.7 MG/DL (ref 8.6–10.2)
CHLORIDE SERPL-SCNC: 96 MMOL/L (ref 96–106)
CHOLEST SERPL-MCNC: 149 MG/DL (ref 100–199)
CHOLEST/HDLC SERPL: 3.5 RATIO (ref 0–5)
CO2 SERPL-SCNC: 28 MMOL/L (ref 20–29)
CREAT SERPL-MCNC: 1.1 MG/DL (ref 0.76–1.27)
EGFRCR SERPLBLD CKD-EPI 2021: 70 ML/MIN/1.73
EOSINOPHIL # BLD AUTO: 0.1 X10E3/UL (ref 0–0.4)
EOSINOPHIL NFR BLD AUTO: 1 %
ERYTHROCYTE [DISTWIDTH] IN BLOOD BY AUTOMATED COUNT: 12.8 % (ref 11.6–15.4)
GLOBULIN SER CALC-MCNC: 2.5 G/DL (ref 1.5–4.5)
GLUCOSE SERPL-MCNC: 121 MG/DL (ref 70–99)
HCT VFR BLD AUTO: 54.4 % (ref 37.5–51)
HDLC SERPL-MCNC: 42 MG/DL
HGB BLD-MCNC: 17.9 G/DL (ref 13–17.7)
IMM GRANULOCYTES # BLD AUTO: 0 X10E3/UL (ref 0–0.1)
IMM GRANULOCYTES NFR BLD AUTO: 0 %
LDLC SERPL CALC-MCNC: 89 MG/DL (ref 0–99)
LYMPHOCYTES # BLD AUTO: 1.6 X10E3/UL (ref 0.7–3.1)
LYMPHOCYTES NFR BLD AUTO: 27 %
MCH RBC QN AUTO: 30.6 PG (ref 26.6–33)
MCHC RBC AUTO-ENTMCNC: 32.9 G/DL (ref 31.5–35.7)
MCV RBC AUTO: 93 FL (ref 79–97)
MONOCYTES # BLD AUTO: 0.5 X10E3/UL (ref 0.1–0.9)
MONOCYTES NFR BLD AUTO: 8 %
NEUTROPHILS # BLD AUTO: 3.8 X10E3/UL (ref 1.4–7)
NEUTROPHILS NFR BLD AUTO: 63 %
PLATELET # BLD AUTO: 236 X10E3/UL (ref 150–450)
POTASSIUM SERPL-SCNC: 4.2 MMOL/L (ref 3.5–5.2)
PROT SERPL-MCNC: 6.9 G/DL (ref 6–8.5)
PSA SERPL-MCNC: 0.5 NG/ML (ref 0–4)
RBC # BLD AUTO: 5.85 X10E6/UL (ref 4.14–5.8)
SODIUM SERPL-SCNC: 138 MMOL/L (ref 134–144)
TRIGL SERPL-MCNC: 96 MG/DL (ref 0–149)
VLDLC SERPL CALC-MCNC: 18 MG/DL (ref 5–40)
WBC # BLD AUTO: 6.1 X10E3/UL (ref 3.4–10.8)

## 2023-08-01 RX ORDER — LORATADINE 10 MG/1
10 TABLET ORAL DAILY
Qty: 30 TABLET | Refills: 0 | Status: SHIPPED | OUTPATIENT
Start: 2023-08-01

## 2023-08-01 NOTE — TELEPHONE ENCOUNTER
Caller: Jagdish Cook    Relationship: Self    Best call back number:  [phone]     What medication are you requesting: SOMETHING FOR ALLERGIES    What are your current symptoms: RUNNY NOSE, ITCHY AND WATERY EYES    How long have you been experiencing symptoms: 2 WEEKS    Have you had these symptoms before:    [x] Yes  [] No    Have you been treated for these symptoms before:   [] Yes  [x] No    If a prescription is needed, what is your preferred pharmacy and phone number:  [facility]    Additional notes: HE FORGOT TO ASK YESTERDAY.

## 2023-08-01 NOTE — TELEPHONE ENCOUNTER
Caller: Jagdish Cook    Relationship: Self    Best call back number: [phone]     Who are you requesting to speak with (clinical staff, provider,  specific staff member): CLINICAL STAFF    What was the call regarding: PATIENT WOULD LIKE TO LET DR. CIFUENTES KNOW HE HAS HAD HIS COVID SHOT, COVID BOOSTER, AND HIS FLU SHOT.

## 2023-08-04 ENCOUNTER — TELEPHONE (OUTPATIENT)
Dept: FAMILY MEDICINE CLINIC | Facility: CLINIC | Age: 73
End: 2023-08-04

## 2023-08-04 NOTE — TELEPHONE ENCOUNTER
Called patient and offered appointment and patient stated he does not have a ride to come to office

## 2023-08-04 NOTE — TELEPHONE ENCOUNTER
Caller: Jagdish Cook    Relationship: Self    Best call back number: [phone]    What medication are you requesting:     PCP RECOMMENDATION    What are your current symptoms:     LEFT BACK OF HIP IS HURTING REALLY BAD AND IS VERY PAINFUL. PATIENT STATES HE HAS RA AND IS IN NEED OF SOMETHING FOR PAIN.     How long have you been experiencing symptoms:     6 MONTHS AGO AND IS REALLY HURTING NOW.     Have you had these symptoms before:    [x] Yes  [] No    Have you been treated for these symptoms before:   [] Yes  [x] No    [facility] - [place], [place] - [address] [place] [address] [phone] [facility] [phone]     PLEASE CALL AND ADVISE PATIENT

## 2023-08-07 ENCOUNTER — TELEPHONE (OUTPATIENT)
Dept: FAMILY MEDICINE CLINIC | Facility: CLINIC | Age: 73
End: 2023-08-07

## 2023-08-07 NOTE — TELEPHONE ENCOUNTER
Caller: Jagdish Cook    Relationship: Self    Best call back number: [phone]     What is the best time to reach you: ANY    Who are you requesting to speak with (clinical staff, provider,  specific staff member): CLINICAL    What was the call regarding:    PATIENT IS WONDERING WHAT HE CAN DO TO GET HIS MEDICATIONS? PATIENT STATES HE DOES NOT HAVE TRANSPORTATION FOR APPOINTMENTS.     Is it okay if the provider responds through MyChart: NO

## 2023-08-08 ENCOUNTER — TELEPHONE (OUTPATIENT)
Dept: FAMILY MEDICINE CLINIC | Facility: CLINIC | Age: 73
End: 2023-08-08

## 2023-08-08 NOTE — TELEPHONE ENCOUNTER
Caller: Jagdish Cook    Relationship: Self    Best call back number:     [phone] (Mobile)       What is the best time to reach you:  ANYTIME SOON     Who are you requesting to speak with (clinical staff, provider,  specific staff member): CLINICAL     Do you know the name of the person who called: CLINICAL     What was the call regarding: HE STATES HE IS WORRIED ABOUT HIS HERNIA BECAUSE IT HAS GOTTEN BIGGER AND HE IS REQUESTING A CALL BACK TODAY 08/08/23     Is it okay if the provider responds through MyChart: REQUESTING CALL BACK

## 2023-08-08 NOTE — TELEPHONE ENCOUNTER
Caller: Jagdish Cook    Relationship: Self    Best call back number:  [phone]     What is the best time to reach you:  ANYTIME    Who are you requesting to speak with:  CLINICAL    What was the call regarding:  PATIENT SAID HE HAS A HERNIA IN HIS LOWER ABDOMEN & THAT HE HAD TO PUSH IT BACK IN.  PATIENT SAID HE'S WHEELCHAIR DEPENDENT, & HAS NO ONE TO DRIVE HIM TO THE HOSPITAL OR PCP'S OFFICE.      Do you require a call back?  YES

## 2023-08-14 ENCOUNTER — OFFICE VISIT (OUTPATIENT)
Dept: FAMILY MEDICINE CLINIC | Facility: CLINIC | Age: 73
End: 2023-08-14
Payer: MEDICARE

## 2023-08-14 VITALS
BODY MASS INDEX: 23.55 KG/M2 | OXYGEN SATURATION: 99 % | WEIGHT: 159 LBS | HEART RATE: 64 BPM | TEMPERATURE: 97.3 F | SYSTOLIC BLOOD PRESSURE: 126 MMHG | DIASTOLIC BLOOD PRESSURE: 66 MMHG | HEIGHT: 69 IN

## 2023-08-14 DIAGNOSIS — K46.9 HERNIA OF ABDOMINAL CAVITY: Primary | ICD-10-CM

## 2023-08-14 DIAGNOSIS — J30.2 SEASONAL ALLERGIES: ICD-10-CM

## 2023-08-14 DIAGNOSIS — G89.29 CHRONIC BILATERAL LOW BACK PAIN WITH BILATERAL SCIATICA: ICD-10-CM

## 2023-08-14 DIAGNOSIS — M13.0 POLYARTHRITIS: ICD-10-CM

## 2023-08-14 DIAGNOSIS — M54.41 CHRONIC BILATERAL LOW BACK PAIN WITH BILATERAL SCIATICA: ICD-10-CM

## 2023-08-14 DIAGNOSIS — M54.42 CHRONIC BILATERAL LOW BACK PAIN WITH BILATERAL SCIATICA: ICD-10-CM

## 2023-08-14 PROCEDURE — 99214 OFFICE O/P EST MOD 30 MIN: CPT | Performed by: NURSE PRACTITIONER

## 2023-08-14 PROCEDURE — 3078F DIAST BP <80 MM HG: CPT | Performed by: NURSE PRACTITIONER

## 2023-08-14 PROCEDURE — 3074F SYST BP LT 130 MM HG: CPT | Performed by: NURSE PRACTITIONER

## 2023-08-14 RX ORDER — HYDROCODONE BITARTRATE AND ACETAMINOPHEN 5; 325 MG/1; MG/1
1 TABLET ORAL EVERY 6 HOURS PRN
Qty: 20 TABLET | Refills: 0 | Status: SHIPPED | OUTPATIENT
Start: 2023-08-14

## 2023-08-14 RX ORDER — FLUTICASONE PROPIONATE 50 MCG
2 SPRAY, SUSPENSION (ML) NASAL DAILY
Qty: 16 G | Refills: 0 | Status: SHIPPED | OUTPATIENT
Start: 2023-08-14

## 2023-08-14 RX ORDER — LISINOPRIL AND HYDROCHLOROTHIAZIDE 12.5; 1 MG/1; MG/1
1 TABLET ORAL DAILY
Qty: 30 TABLET | Refills: 5 | Status: SHIPPED | OUTPATIENT
Start: 2023-08-14

## 2023-08-14 RX ORDER — MELOXICAM 7.5 MG/1
7.5 TABLET ORAL DAILY
Qty: 30 TABLET | Refills: 0 | Status: SHIPPED | OUTPATIENT
Start: 2023-08-14

## 2023-08-14 NOTE — PROGRESS NOTES
Subjective   Chief Complaint:  Hernia, medication refill    History of Present Illness:  This 72 y.o. male was seen in the office today.  He presents today to discuss the neck steps for abdominal hernia and also has some medication refills.    Hernia: Handy was seen by Dr. Alvarez at [facility] approximately 6 months ago.  Reports had surgical plan but put it off due to anesthesia risk.    Chronic back pain: Reports takes tizanidine and Norco as needed.  Reports is needing a Norco refill.  Denies any problems with addiction in past.    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  He inquires if he can get on a routine arthritis medication.    Seasonal allergies: Reports taking Claritin every single day but still having some residual stuffiness.    Allergies   Allergen Reactions    Penicillins       Current Outpatient Medications on File Prior to Visit   Medication Sig    levothyroxine (SYNTHROID, LEVOTHROID) 100 MCG tablet Take 1 tablet by mouth Daily.    loratadine (CLARITIN) 10 MG tablet Take 1 tablet by mouth Daily.    loratadine (Claritin) 10 MG tablet Take 1 tablet by mouth Daily.    tiZANidine (ZANAFLEX) 4 MG tablet Take 1 tablet by mouth 3 (Three) Times a Day As Needed for Muscle Spasms.    [DISCONTINUED] diclofenac (VOLTAREN) 50 MG EC tablet     [DISCONTINUED] lisinopril-hydrochlorothiazide (PRINZIDE,ZESTORETIC) 10-12.5 MG per tablet Take 1 tablet by mouth Daily.    [DISCONTINUED] HYDROcodone-acetaminophen (NORCO) 5-325 MG per tablet Take 1 tablet by mouth Daily As Needed. (Patient not taking: Reported on 8/14/2023)     No current facility-administered medications on file prior to visit.      Past Medical, Surgical, Social, and Family History:  No past medical history on file.  Past Surgical History:   Procedure Laterality Date    BACK SURGERY      HERNIA REPAIR       Social History     Socioeconomic History    Marital status: Single   Tobacco Use    Smoking status: Every Day      "Types: Cigars   Substance and Sexual Activity    Alcohol use: No     Family History   Problem Relation Age of Onset    No Known Problems Father     No Known Problems Mother        Prior Visit Notes/Records, Lab, Imaging, and Diagnostic Results Reviewed:  A1C:No results for input(s): HGBA1C in the last 68652 hours.  GLUCOSE:  Lab Results - Last 18 Months   Lab Units 07/31/23  0805   GLUCOSE mg/dL 121*     LIPID:  Lab Results - Last 18 Months   Lab Units 07/31/23  0805   CHOLESTEROL mg/dL 149   LDL CHOL mg/dL 89   HDL CHOL mg/dL 42   TRIGLYCERIDES mg/dL 96     PSA:  Lab Results - Last 18 Months   Lab Units 07/31/23  0805   PSA ng/mL 0.5     CBC:  Lab Results - Last 18 Months   Lab Units 07/31/23  0805   WBC x10E3/uL 6.1   HEMOGLOBIN g/dL 17.9*   HEMATOCRIT % 54.4*   PLATELETS x10E3/uL 236      BMP/CMP:  Lab Results - Last 18 Months   Lab Units 07/31/23  0805   SODIUM mmol/L 138   POTASSIUM mmol/L 4.2   CHLORIDE mmol/L 96   CO2 mmol/L 28   GLUCOSE mg/dL 121*   BUN mg/dL 17   CREATININE mg/dL 1.10   EGFR RESULT mL/min/1.73 70   CALCIUM mg/dL 9.7     HEPATIC:  Lab Results - Last 18 Months   Lab Units 07/31/23  0805   ALT (SGPT) IU/L 11   AST (SGOT) IU/L 22   ALK PHOS IU/L 113     Vit D:No results for input(s): VOVI58PT in the last 71700 hours.  THYROID:No results for input(s): TSH, FREET4, FTI in the last 42678 hours.    Invalid input(s): FREET3, T3, T4, TEUP,  TOTALT4  BMI Trend:  BMI Readings from Last 10 Encounters:   08/14/23 23.48 kg/mý   07/31/23 23.48 kg/mý   12/07/16 26.29 kg/mý     Objective   Vital Signs  /66   Pulse 64   Temp 97.3 øF (36.3 øC)   Ht 175.3 cm (69\")   Wt 72.1 kg (159 lb)   SpO2 99%   BMI 23.48 kg/mý   Physical Exam  Constitutional:       General: He is not in acute distress.  HENT:      Nose: Congestion present. No rhinorrhea.      Mouth/Throat:      Pharynx: No oropharyngeal exudate or posterior oropharyngeal erythema.   Cardiovascular:      Rate and Rhythm: Normal rate and regular " rhythm.      Pulses: Normal pulses.      Heart sounds: No murmur heard.    No friction rub. No gallop.   Pulmonary:      Effort: Pulmonary effort is normal. No respiratory distress.      Breath sounds: Normal breath sounds. No wheezing or rhonchi.   Musculoskeletal:         General: Tenderness present.   Neurological:      Mental Status: He is alert.       Assessment & Plan   Diagnoses and all orders for this visit:    1. Hernia of abdominal cavity (Primary)    2. Chronic bilateral low back pain with bilateral sciatica  -     HYDROcodone-acetaminophen (Norco) 5-325 MG per tablet; Take 1 tablet by mouth Every 6 (Six) Hours As Needed for Severe Pain.  Dispense: 20 tablet; Refill: 0    3. Polyarthritis    4. Seasonal allergies    Other orders  -     lisinopril-hydrochlorothiazide (PRINZIDE,ZESTORETIC) 10-12.5 MG per tablet; Take 1 tablet by mouth Daily.  Dispense: 30 tablet; Refill: 5  -     fluticasone (FLONASE) 50 MCG/ACT nasal spray; 2 sprays into the nostril(s) as directed by provider Daily.  Dispense: 16 g; Refill: 0  -     meloxicam (Mobic) 7.5 MG tablet; Take 1 tablet by mouth Daily.  Dispense: 30 tablet; Refill: 0    Discussion:  Advised and educated plan of care.  Advised Norco can be addictive-offered Narcan-declined       Follow-up:  Return for Next scheduled follow up as already scheduled..    Electronically signed by CONCHITA Brice, 08/14/23, 8:36 AM CDT.

## 2023-08-15 ENCOUNTER — TELEPHONE (OUTPATIENT)
Dept: FAMILY MEDICINE CLINIC | Facility: CLINIC | Age: 73
End: 2023-08-15

## 2023-08-15 NOTE — TELEPHONE ENCOUNTER
Caller: Jagdish Cook    Relationship: Self    Best call back number: [phone]     Caller requesting test results: SELF    What test was performed: BLOOD WORK    When was the test performed: LAST MONTH    Where was the test performed: IN OFFICE     Additional notes: TEST RESULTS

## 2023-09-11 ENCOUNTER — OFFICE VISIT (OUTPATIENT)
Dept: FAMILY MEDICINE CLINIC | Facility: CLINIC | Age: 73
End: 2023-09-11
Payer: MEDICARE

## 2023-09-11 VITALS
OXYGEN SATURATION: 99 % | WEIGHT: 159 LBS | HEIGHT: 69 IN | RESPIRATION RATE: 18 BRPM | TEMPERATURE: 97.3 F | HEART RATE: 71 BPM | BODY MASS INDEX: 23.55 KG/M2 | DIASTOLIC BLOOD PRESSURE: 64 MMHG | SYSTOLIC BLOOD PRESSURE: 124 MMHG

## 2023-09-11 DIAGNOSIS — G89.29 CHRONIC BILATERAL LOW BACK PAIN WITH BILATERAL SCIATICA: ICD-10-CM

## 2023-09-11 DIAGNOSIS — M54.42 CHRONIC BILATERAL LOW BACK PAIN WITH BILATERAL SCIATICA: ICD-10-CM

## 2023-09-11 DIAGNOSIS — M54.41 CHRONIC BILATERAL LOW BACK PAIN WITH BILATERAL SCIATICA: ICD-10-CM

## 2023-09-11 DIAGNOSIS — M25.552 PAIN OF LEFT HIP: Primary | ICD-10-CM

## 2023-09-11 PROCEDURE — 3074F SYST BP LT 130 MM HG: CPT | Performed by: FAMILY MEDICINE

## 2023-09-11 PROCEDURE — 3078F DIAST BP <80 MM HG: CPT | Performed by: FAMILY MEDICINE

## 2023-09-11 PROCEDURE — 99213 OFFICE O/P EST LOW 20 MIN: CPT | Performed by: FAMILY MEDICINE

## 2023-09-11 RX ORDER — DICLOFENAC SODIUM 75 MG/1
TABLET, DELAYED RELEASE ORAL
COMMUNITY
Start: 2023-08-15

## 2023-09-11 RX ORDER — HYDROCODONE BITARTRATE AND ACETAMINOPHEN 5; 325 MG/1; MG/1
1 TABLET ORAL EVERY 6 HOURS PRN
Qty: 20 TABLET | Refills: 0 | Status: SHIPPED | OUTPATIENT
Start: 2023-09-11

## 2023-09-11 RX ORDER — LISINOPRIL AND HYDROCHLOROTHIAZIDE 12.5; 1 MG/1; MG/1
1 TABLET ORAL DAILY
Qty: 30 TABLET | Refills: 5 | Status: SHIPPED | OUTPATIENT
Start: 2023-09-11

## 2023-09-11 RX ORDER — MELOXICAM 7.5 MG/1
7.5 TABLET ORAL DAILY
Qty: 30 TABLET | Refills: 0 | Status: SHIPPED | OUTPATIENT
Start: 2023-09-11 | End: 2023-09-11

## 2023-09-11 NOTE — PROGRESS NOTES
Subjective   Jagdish Cook is a 72 y.o. male.     Chief Complaint   Patient presents with    Hypertension    Stress    Eye Drainage     Eyes watering       Hypertension       He notes having good bp control without cp or ha--- he notes having left hip pain      Current Outpatient Medications:     diclofenac (VOLTAREN) 75 MG EC tablet, , Disp: , Rfl:     fluticasone (FLONASE) 50 MCG/ACT nasal spray, 2 sprays into the nostril(s) as directed by provider Daily., Disp: 16 g, Rfl: 0    HYDROcodone-acetaminophen (Norco) 5-325 MG per tablet, Take 1 tablet by mouth Every 6 (Six) Hours As Needed for Severe Pain., Disp: 20 tablet, Rfl: 0    levothyroxine (SYNTHROID, LEVOTHROID) 100 MCG tablet, Take 1 tablet by mouth Daily., Disp: 30 tablet, Rfl: 1    lisinopril-hydrochlorothiazide (PRINZIDE,ZESTORETIC) 10-12.5 MG per tablet, Take 1 tablet by mouth Daily., Disp: 30 tablet, Rfl: 5    loratadine (Claritin) 10 MG tablet, Take 1 tablet by mouth Daily., Disp: 30 tablet, Rfl: 0    tiZANidine (ZANAFLEX) 4 MG tablet, Take 1 tablet by mouth 3 (Three) Times a Day As Needed for Muscle Spasms., Disp: , Rfl:   Allergies   Allergen Reactions    Meloxicam Hives     Dizzy, skin crawls    Tamsulosin Dizziness     excessive sweating    Penicillins        BMI is within normal parameters. No other follow-up for BMI required.      Facility age limit for growth percentiles is 20 years.    No past medical history on file.  Past Surgical History:   Procedure Laterality Date    BACK SURGERY      HERNIA REPAIR         Review of Systems   Constitutional: Negative.    HENT: Negative.     Eyes: Negative.    Respiratory: Negative.     Cardiovascular: Negative.    Gastrointestinal: Negative.    Endocrine: Negative.    Genitourinary: Negative.    Musculoskeletal:  Positive for back pain.   Skin: Negative.    Allergic/Immunologic: Negative.    Neurological: Negative.    Hematological: Negative.    Psychiatric/Behavioral: Negative.       Objective /64  "(BP Location: Right arm, Patient Position: Sitting, Cuff Size: Adult)   Pulse 71   Temp 97.3 °F (36.3 °C) (Infrared)   Resp 18   Ht 175.3 cm (69\")   Wt 72.1 kg (159 lb)   SpO2 99%   BMI 23.48 kg/m²    Physical Exam  Vitals reviewed.   HENT:      Head: Normocephalic and atraumatic.      Nose: Nose normal.      Mouth/Throat:      Mouth: Mucous membranes are moist.   Eyes:      Pupils: Pupils are equal, round, and reactive to light.   Cardiovascular:      Rate and Rhythm: Normal rate and regular rhythm.      Pulses: Normal pulses.      Heart sounds: Normal heart sounds.   Pulmonary:      Effort: Pulmonary effort is normal.   Abdominal:      General: Abdomen is flat. Bowel sounds are normal.      Palpations: Abdomen is soft.   Musculoskeletal:         General: Tenderness present.      Cervical back: Normal range of motion and neck supple.   Skin:     General: Skin is warm and dry.      Capillary Refill: Capillary refill takes less than 2 seconds.   Neurological:      General: No focal deficit present.      Mental Status: He is oriented to person, place, and time. Mental status is at baseline.   Psychiatric:         Mood and Affect: Mood normal.       Assessment & Plan   Diagnoses and all orders for this visit:    1. Pain of left hip (Primary)  -     Ambulatory Referral to Orthopedic Surgery    2. Chronic bilateral low back pain with bilateral sciatica  -     HYDROcodone-acetaminophen (Norco) 5-325 MG per tablet; Take 1 tablet by mouth Every 6 (Six) Hours As Needed for Severe Pain.  Dispense: 20 tablet; Refill: 0    Other orders  -     Discontinue: meloxicam (Mobic) 7.5 MG tablet; Take 1 tablet by mouth Daily.  Dispense: 30 tablet; Refill: 0  -     lisinopril-hydrochlorothiazide (PRINZIDE,ZESTORETIC) 10-12.5 MG per tablet; Take 1 tablet by mouth Daily.  Dispense: 30 tablet; Refill: 5                 Orders Placed This Encounter   Procedures    Ambulatory Referral to Orthopedic Surgery     Referral Priority:   " Routine     Referral Type:   Consultation     Referral Reason:   Specialty Services Required     Requested Specialty:   Orthopedic Surgery     Number of Visits Requested:   1       Follow up: 2 month(s)

## 2023-09-18 ENCOUNTER — TELEPHONE (OUTPATIENT)
Dept: FAMILY MEDICINE CLINIC | Facility: CLINIC | Age: 73
End: 2023-09-18

## 2023-09-18 NOTE — TELEPHONE ENCOUNTER
Caller: Jagdish Cook    Relationship: Self    Best call back number: [phone]    What medication are you requesting: MEDICATION FOR PATIENT'S EYES - WHICH ARE VERY WATERY; HE ALSO ASKED ABOUT MEDICATION FOR PATIENT'S STRESS    What are your current symptoms: WATERY EYES, STRESS    How long have you been experiencing symptoms: 1 1/2 MONTHS    Have you had these symptoms before:    [] Yes  [] No    Have you been treated for these symptoms before:   [] Yes  [] No    If a prescription is needed, what is your preferred pharmacy and phone number: [facility] [place] [facility] [address] [place] [address] [phone] [facility] [phone]

## 2023-09-20 ENCOUNTER — OFFICE VISIT (OUTPATIENT)
Dept: FAMILY MEDICINE CLINIC | Facility: CLINIC | Age: 73
End: 2023-09-20
Payer: MEDICARE

## 2023-09-20 VITALS
OXYGEN SATURATION: 98 % | TEMPERATURE: 97.5 F | RESPIRATION RATE: 18 BRPM | HEART RATE: 54 BPM | SYSTOLIC BLOOD PRESSURE: 132 MMHG | HEIGHT: 69 IN | BODY MASS INDEX: 23.48 KG/M2 | DIASTOLIC BLOOD PRESSURE: 84 MMHG

## 2023-09-20 DIAGNOSIS — R41.3 MEMORY LOSS: ICD-10-CM

## 2023-09-20 DIAGNOSIS — Z23 NEED FOR STREPTOCOCCUS PNEUMONIAE VACCINATION: ICD-10-CM

## 2023-09-20 DIAGNOSIS — H44.003 INFECTION OF BOTH EYES: ICD-10-CM

## 2023-09-20 DIAGNOSIS — F41.9 ANXIETY: Primary | ICD-10-CM

## 2023-09-20 RX ORDER — VENLAFAXINE HYDROCHLORIDE 37.5 MG/1
37.5 CAPSULE, EXTENDED RELEASE ORAL DAILY
Qty: 30 CAPSULE | Refills: 5 | Status: SHIPPED | OUTPATIENT
Start: 2023-09-20

## 2023-09-20 RX ORDER — CIPROFLOXACIN HYDROCHLORIDE 3.5 MG/ML
1 SOLUTION/ DROPS TOPICAL 4 TIMES DAILY
Qty: 2.5 ML | Refills: 0 | Status: SHIPPED | OUTPATIENT
Start: 2023-09-20

## 2023-09-20 RX ORDER — DICLOFENAC SODIUM 75 MG/1
75 TABLET, DELAYED RELEASE ORAL 2 TIMES DAILY
Qty: 60 TABLET | Refills: 5
Start: 2023-09-20

## 2023-09-20 RX ORDER — OLOPATADINE HYDROCHLORIDE 2 MG/ML
1 SOLUTION/ DROPS OPHTHALMIC DAILY
Qty: 2.5 ML | Refills: 0 | Status: SHIPPED | OUTPATIENT
Start: 2023-09-20 | End: 2023-09-27

## 2023-09-20 NOTE — PROGRESS NOTES
Subjective   Chief Complaint:  Eye drainage and stress.    History of Present Illness:  This 72 y.o. male was seen in the office today.    The patient presents today with eye drainage lasting over 2 weeks. He has a habit of touching his eyes. He is with his caregiver today. They report increased stress. The patient reports being slightly forgetful and it seems to make him worry more. He reports the more he worries, the more he forgets, and vice versa.    Allergies   Allergen Reactions    Meloxicam Hives     Dizzy, skin crawls    Tamsulosin Dizziness     excessive sweating    Penicillins       Current Outpatient Medications on File Prior to Visit   Medication Sig    fluticasone (FLONASE) 50 MCG/ACT nasal spray 2 sprays into the nostril(s) as directed by provider Daily.    HYDROcodone-acetaminophen (Norco) 5-325 MG per tablet Take 1 tablet by mouth Every 6 (Six) Hours As Needed for Severe Pain.    levothyroxine (SYNTHROID, LEVOTHROID) 100 MCG tablet Take 1 tablet by mouth Daily.    lisinopril-hydrochlorothiazide (PRINZIDE,ZESTORETIC) 10-12.5 MG per tablet Take 1 tablet by mouth Daily.    loratadine (Claritin) 10 MG tablet Take 1 tablet by mouth Daily.    tiZANidine (ZANAFLEX) 4 MG tablet Take 1 tablet by mouth 3 (Three) Times a Day As Needed for Muscle Spasms.    [DISCONTINUED] diclofenac (VOLTAREN) 75 MG EC tablet      No current facility-administered medications on file prior to visit.      Past Medical, Surgical, Social, and Family History:  History reviewed. No pertinent past medical history.  Past Surgical History:   Procedure Laterality Date    BACK SURGERY      HERNIA REPAIR       Social History     Socioeconomic History    Marital status: Single   Tobacco Use    Smoking status: Every Day     Types: Cigars   Substance and Sexual Activity    Alcohol use: No     Family History   Problem Relation Age of Onset    No Known Problems Father     No Known Problems Mother        Prior Visit Notes/Records, Lab, Imaging,  "and Diagnostic Results Reviewed:  A1C:No results for input(s): HGBA1C in the last 12667 hours.  GLUCOSE:  Lab Results - Last 18 Months   Lab Units 07/31/23  0805   GLUCOSE mg/dL 121*     LIPID:  Lab Results - Last 18 Months   Lab Units 07/31/23  0805   CHOLESTEROL mg/dL 149   LDL CHOL mg/dL 89   HDL CHOL mg/dL 42   TRIGLYCERIDES mg/dL 96     PSA:  Lab Results - Last 18 Months   Lab Units 07/31/23  0805   PSA ng/mL 0.5     CBC:  Lab Results - Last 18 Months   Lab Units 07/31/23  0805   WBC x10E3/uL 6.1   HEMOGLOBIN g/dL 17.9*   HEMATOCRIT % 54.4*   PLATELETS x10E3/uL 236      BMP/CMP:  Lab Results - Last 18 Months   Lab Units 07/31/23  0805   SODIUM mmol/L 138   POTASSIUM mmol/L 4.2   CHLORIDE mmol/L 96   CO2 mmol/L 28   GLUCOSE mg/dL 121*   BUN mg/dL 17   CREATININE mg/dL 1.10   EGFR RESULT mL/min/1.73 70   CALCIUM mg/dL 9.7     HEPATIC:  Lab Results - Last 18 Months   Lab Units 07/31/23  0805   ALT (SGPT) IU/L 11   AST (SGOT) IU/L 22   ALK PHOS IU/L 113     Vit D:No results for input(s): YKEA37DN in the last 82154 hours.  THYROID:No results for input(s): TSH, FREET4, FTI in the last 93207 hours.    Invalid input(s): FREET3, T3, T4, TEUP,  TOTALT4  BMI Trend:  BMI Readings from Last 10 Encounters:   09/20/23 23.48 kg/m²   09/11/23 23.48 kg/m²   08/14/23 23.48 kg/m²   07/31/23 23.48 kg/m²   12/07/16 26.29 kg/m²     Objective   Vital Signs  /84 (BP Location: Right arm, Patient Position: Sitting, Cuff Size: Adult)   Pulse 54   Temp 97.5 °F (36.4 °C) (Infrared)   Resp 18   Ht 175.3 cm (69\")   SpO2 98%   BMI 23.48 kg/m²   Physical Exam  Constitutional:       General: He is not in acute distress.  Eyes:      General:         Right eye: Discharge (Bilateral eyes with yellow/green discharge. Bilateral lids are puffy.) present.         Left eye: Discharge (Bilateral eyes with yellow/green discharge. Bilateral lids are puffy.) present.  Cardiovascular:      Rate and Rhythm: Normal rate and regular rhythm.      " Pulses: Normal pulses.      Heart sounds: No murmur heard.    No friction rub. No gallop.   Pulmonary:      Effort: Pulmonary effort is normal. No respiratory distress.      Breath sounds: Normal breath sounds. No wheezing or rhonchi.   Neurological:      Mental Status: He is alert.      Comments: Has difficulty focusing and staying on target with conversation. Some scattered thoughts - he redirects questions into stories at times.   Psychiatric:      Comments: Speech slightly pressured, slightly anxious.       Assessment & Plan   Diagnoses and all orders for this visit:    1. Anxiety (Primary)    2. Memory loss    3. Infection of both eyes    4. Need for Streptococcus pneumoniae vaccination  -     Pneumococcal Conjugate Vaccine 20-Valent (PCV20)    Other orders  -     diclofenac (VOLTAREN) 75 MG EC tablet; Take 1 tablet by mouth 2 (Two) Times a Day.  Dispense: 60 tablet; Refill: 5  -     venlafaxine XR (Effexor XR) 37.5 MG 24 hr capsule; Take 1 capsule by mouth Daily.  Dispense: 30 capsule; Refill: 5  -     ciprofloxacin (Ciloxan) 0.3 % ophthalmic solution; Administer 1 drop to both eyes 4 (Four) Times a Day.  Dispense: 2.5 mL; Refill: 0  -     olopatadine (PATADAY) 0.2 % solution ophthalmic solution; Administer 1 drop to both eyes Daily for 7 days.  Dispense: 2.5 mL; Refill: 0    Discussion:  Advised and educated plan of care.    Advised 7 days of Ciloxan followed by 7 days of Pataday along with a trial of Effexor. Follow-up in 1 month.    Follow-up:  Return in about 1 month (around 10/20/2023) for Follow-Up.    Electronically signed by Jacob Wiggins, 09/20/23, 10:44 AM CDT.    Transcribed from ambient dictation for CONCHITA Brice by Johanny Pate.  09/20/23   10:48 CDT    Patient or patient representative verbalized consent to the visit recording.  I have personally performed the services described in this document as transcribed by the above individual, and it is both accurate and complete.

## 2023-09-27 ENCOUNTER — TELEPHONE (OUTPATIENT)
Dept: FAMILY MEDICINE CLINIC | Facility: CLINIC | Age: 73
End: 2023-09-27

## 2023-09-27 NOTE — TELEPHONE ENCOUNTER
Caller: Jagdish Cook    Relationship: Self    Best call back number: [phone]        Who are you requesting to speak with (clinical staff, provider,  specific staff member): CLINICAL STAFF     Do you know the name of the person who called: PATIENT    What was the call regarding: BLOOD PRESSURE TAKEN YESTERDAY, BY [facility] /70 (HE WAS UNSURE OF DIASTOLIC NUMBER)

## 2023-10-20 ENCOUNTER — OFFICE VISIT (OUTPATIENT)
Dept: FAMILY MEDICINE CLINIC | Facility: CLINIC | Age: 73
End: 2023-10-20
Payer: MEDICARE

## 2023-10-20 VITALS
TEMPERATURE: 97.3 F | HEIGHT: 69 IN | BODY MASS INDEX: 23.48 KG/M2 | OXYGEN SATURATION: 98 % | HEART RATE: 61 BPM | RESPIRATION RATE: 18 BRPM | DIASTOLIC BLOOD PRESSURE: 76 MMHG | SYSTOLIC BLOOD PRESSURE: 138 MMHG

## 2023-10-20 DIAGNOSIS — M54.42 CHRONIC BILATERAL LOW BACK PAIN WITH BILATERAL SCIATICA: ICD-10-CM

## 2023-10-20 DIAGNOSIS — M25.50 POLYARTHRALGIA: ICD-10-CM

## 2023-10-20 DIAGNOSIS — M54.41 CHRONIC BILATERAL LOW BACK PAIN WITH BILATERAL SCIATICA: ICD-10-CM

## 2023-10-20 DIAGNOSIS — Z00.00 PREVENTATIVE HEALTH CARE: ICD-10-CM

## 2023-10-20 DIAGNOSIS — G89.29 CHRONIC BILATERAL LOW BACK PAIN WITH BILATERAL SCIATICA: ICD-10-CM

## 2023-10-20 DIAGNOSIS — F41.9 ANXIETY: Primary | ICD-10-CM

## 2023-10-20 DIAGNOSIS — Z23 NEED FOR VACCINATION: ICD-10-CM

## 2023-10-20 RX ORDER — NALOXONE HYDROCHLORIDE 4 MG/.1ML
1 SPRAY NASAL AS NEEDED
Qty: 1 EACH | Refills: 0 | Status: SHIPPED | OUTPATIENT
Start: 2023-10-20

## 2023-10-20 RX ORDER — VENLAFAXINE HYDROCHLORIDE 75 MG/1
75 CAPSULE, EXTENDED RELEASE ORAL DAILY
Qty: 30 CAPSULE | Refills: 5 | Status: SHIPPED | OUTPATIENT
Start: 2023-10-20

## 2023-10-20 RX ORDER — HYDROCODONE BITARTRATE AND ACETAMINOPHEN 5; 325 MG/1; MG/1
1 TABLET ORAL EVERY 6 HOURS PRN
Qty: 20 TABLET | Refills: 0 | Status: CANCELLED | OUTPATIENT
Start: 2023-10-20

## 2023-10-20 RX ORDER — HYDROCODONE BITARTRATE AND ACETAMINOPHEN 5; 325 MG/1; MG/1
TABLET ORAL
Qty: 20 TABLET | Refills: 0 | OUTPATIENT
Start: 2023-10-20

## 2023-10-20 RX ORDER — HYDROCODONE BITARTRATE AND ACETAMINOPHEN 5; 325 MG/1; MG/1
1 TABLET ORAL EVERY 6 HOURS PRN
Qty: 20 TABLET | Refills: 0 | Status: SHIPPED | OUTPATIENT
Start: 2023-10-20

## 2023-10-20 NOTE — PROGRESS NOTES
Subjective   Chief Complaint:  Reevaluation of anxiety    History of Present Illness:  This 72 y.o. male was seen in the office today.  He presents today for reevaluation of anxiety.  Reports overall improved but still having some.  He is started on venlafaxine Exar 37.5 mg p.o. daily.  He is asking today if he can have another prescription of Norco.  This was prescribed short-term for a flare up of back pain.  He is currently not on a pain medication contract.    Allergies   Allergen Reactions    Meloxicam Hives     Dizzy, skin crawls    Tamsulosin Dizziness     excessive sweating    Penicillins       Current Outpatient Medications on File Prior to Visit   Medication Sig    ciprofloxacin (Ciloxan) 0.3 % ophthalmic solution Administer 1 drop to both eyes 4 (Four) Times a Day.    diclofenac (VOLTAREN) 75 MG EC tablet Take 1 tablet by mouth 2 (Two) Times a Day.    fluticasone (FLONASE) 50 MCG/ACT nasal spray 2 sprays into the nostril(s) as directed by provider Daily.    levothyroxine (SYNTHROID, LEVOTHROID) 100 MCG tablet Take 1 tablet by mouth Daily.    lisinopril-hydrochlorothiazide (PRINZIDE,ZESTORETIC) 10-12.5 MG per tablet Take 1 tablet by mouth Daily.    loratadine (Claritin) 10 MG tablet Take 1 tablet by mouth Daily.    tiZANidine (ZANAFLEX) 4 MG tablet Take 1 tablet by mouth 3 (Three) Times a Day As Needed for Muscle Spasms.    [DISCONTINUED] HYDROcodone-acetaminophen (Norco) 5-325 MG per tablet Take 1 tablet by mouth Every 6 (Six) Hours As Needed for Severe Pain.    [DISCONTINUED] venlafaxine XR (Effexor XR) 37.5 MG 24 hr capsule Take 1 capsule by mouth Daily.     No current facility-administered medications on file prior to visit.      Past Medical, Surgical, Social, and Family History:  No past medical history on file.  Past Surgical History:   Procedure Laterality Date    BACK SURGERY      HERNIA REPAIR       Social History     Socioeconomic History    Marital status: Single   Tobacco Use    Smoking  "status: Every Day     Types: Cigars   Substance and Sexual Activity    Alcohol use: No     Family History   Problem Relation Age of Onset    No Known Problems Father     No Known Problems Mother        Prior Visit Notes/Records, Lab, Imaging, and Diagnostic Results Reviewed:  A1C:No results for input(s): \"HGBA1C\" in the last 05001 hours.  GLUCOSE:  Lab Results - Last 18 Months   Lab Units 07/31/23  0805   GLUCOSE mg/dL 121*     LIPID:  Lab Results - Last 18 Months   Lab Units 07/31/23  0805   CHOLESTEROL mg/dL 149   LDL CHOL mg/dL 89   HDL CHOL mg/dL 42   TRIGLYCERIDES mg/dL 96     PSA:  Lab Results - Last 18 Months   Lab Units 07/31/23  0805   PSA ng/mL 0.5     CBC:  Lab Results - Last 18 Months   Lab Units 07/31/23  0805   WBC x10E3/uL 6.1   HEMOGLOBIN g/dL 17.9*   HEMATOCRIT % 54.4*   PLATELETS x10E3/uL 236      BMP/CMP:  Lab Results - Last 18 Months   Lab Units 07/31/23  0805   SODIUM mmol/L 138   POTASSIUM mmol/L 4.2   CHLORIDE mmol/L 96   CO2 mmol/L 28   GLUCOSE mg/dL 121*   BUN mg/dL 17   CREATININE mg/dL 1.10   EGFR RESULT mL/min/1.73 70   CALCIUM mg/dL 9.7     HEPATIC:  Lab Results - Last 18 Months   Lab Units 07/31/23  0805   ALT (SGPT) IU/L 11   AST (SGOT) IU/L 22   ALK PHOS IU/L 113     Vit D:No results for input(s): \"XILI99JA\" in the last 22408 hours.  THYROID:No results for input(s): \"TSH\", \"FREET4\", \"FTI\" in the last 51222 hours.    Invalid input(s): \"FREET3\", \"T3\", \"T4\", \"TEUP\", \"TOTALT4\"  BMI Trend:  BMI Readings from Last 10 Encounters:   10/20/23 23.48 kg/m²   09/20/23 23.48 kg/m²   09/11/23 23.48 kg/m²   08/14/23 23.48 kg/m²   07/31/23 23.48 kg/m²   12/07/16 26.29 kg/m²     Objective   Vital Signs  /76 (BP Location: Right arm, Patient Position: Sitting, Cuff Size: Adult)   Pulse 61   Temp 97.3 °F (36.3 °C) (Infrared)   Resp 18   Ht 175.3 cm (69\")   SpO2 98%   BMI 23.48 kg/m²   Physical Exam  Constitutional:       General: He is not in acute distress.  Cardiovascular:      Rate and " Rhythm: Normal rate and regular rhythm.      Pulses: Normal pulses.      Heart sounds: No murmur heard.     No friction rub. No gallop.   Pulmonary:      Effort: Pulmonary effort is normal. No respiratory distress.      Breath sounds: Normal breath sounds. No wheezing or rhonchi.   Musculoskeletal:         General: Tenderness present.      Comments: Bilateral knees with stiffness, hip joints with stiffness-muscle weakness present-uses wheelchair for mobility.   Neurological:      Mental Status: He is alert.         Assessment & Plan   Diagnoses and all orders for this visit:    1. Anxiety (Primary)  Comments:  improved - titrate effexor    2. Chronic bilateral low back pain with bilateral sciatica  -     Drug Screen 11 w/Conf, Serum  -     HYDROcodone-acetaminophen (Norco) 5-325 MG per tablet; Take 1 tablet by mouth Every 6 (Six) Hours As Needed for Severe Pain.  Dispense: 20 tablet; Refill: 0    3. Need for vaccination  -     Fluzone High-Dose 65+yrs ([id number])    4. Polyarthralgia    Other orders  -     venlafaxine XR (Effexor XR) 75 MG 24 hr capsule; Take 1 capsule by mouth Daily.  Dispense: 30 capsule; Refill: 5  -     naloxone (NARCAN) 4 MG/0.1ML nasal spray; 1 spray into the nostril(s) as directed by provider As Needed (overdose).  Dispense: 1 each; Refill: 0    Discussions & Anticipatory Guidance:  Advised and educated plan of care.  Advise any subsequent fills, would have to do a contract and a drug test but needs to still had over to the orthopedic referral to discuss options.  Advise Norco can be addictive-Narcan prescribed for overdose protection.      Advised originally he did not think you want to go to the orthopedic referral because he reports he does not want to have a hip replacement.  I advised and educated there are so many other nonsurgical options that can be offered and discussed but there is no way to get those options unless he goes for a consultation visit.    Follow-up:  Return in about 6  months (around 4/20/2024) for Follow-Up.    Electronically signed by CONCHITA Brice, 10/20/23, 8:40 AM CDT.

## 2023-10-24 LAB
AMPHETAMINES SERPL QL SCN: NEGATIVE NG/ML
BARBITURATES SERPL QL SCN: NEGATIVE UG/ML
BENZODIAZ SERPL QL SCN: NEGATIVE NG/ML
CANNABINOIDS SERPL QL SCN: NEGATIVE NG/ML
COCAINE+BZE SERPL QL SCN: NEGATIVE NG/ML
ETHANOL SERPL-MCNC: NEGATIVE GM/DL
METHADONE SERPL QL SCN: NEGATIVE NG/ML
OPIATES SERPL QL SCN: NEGATIVE NG/ML
OXYCODONE+OXYMORPHONE SERPLBLD QL SCN: NEGATIVE NG/ML
PCP SERPL QL SCN: NEGATIVE NG/ML
PROPOXYPH SERPL QL SCN: NEGATIVE NG/ML

## 2023-12-21 ENCOUNTER — TELEPHONE (OUTPATIENT)
Dept: FAMILY MEDICINE CLINIC | Facility: CLINIC | Age: 73
End: 2023-12-21

## 2023-12-21 NOTE — TELEPHONE ENCOUNTER
Caller: JULIO AT [facility]    Relationship: Other    Best call back number: [phone]     What is the best time to reach you: ANYTIME    Who are you requesting to speak with (clinical staff, provider,  specific staff member):     What was the call regarding: WANTING TO CHECK ON A FAX THAT WAS SENT ON 12/14/2023

## 2023-12-22 ENCOUNTER — OFFICE VISIT (OUTPATIENT)
Dept: FAMILY MEDICINE CLINIC | Facility: CLINIC | Age: 73
End: 2023-12-22
Payer: MEDICARE

## 2023-12-22 VITALS
OXYGEN SATURATION: 96 % | TEMPERATURE: 96.9 F | HEART RATE: 70 BPM | DIASTOLIC BLOOD PRESSURE: 62 MMHG | HEIGHT: 69 IN | WEIGHT: 156 LBS | SYSTOLIC BLOOD PRESSURE: 126 MMHG | BODY MASS INDEX: 23.11 KG/M2

## 2023-12-22 DIAGNOSIS — G89.29 CHRONIC PAIN OF BOTH KNEES: Primary | ICD-10-CM

## 2023-12-22 DIAGNOSIS — H10.023 PINK EYE DISEASE OF BOTH EYES: ICD-10-CM

## 2023-12-22 DIAGNOSIS — M25.561 CHRONIC PAIN OF BOTH KNEES: Primary | ICD-10-CM

## 2023-12-22 DIAGNOSIS — M25.562 CHRONIC PAIN OF BOTH KNEES: Primary | ICD-10-CM

## 2023-12-22 PROCEDURE — 3074F SYST BP LT 130 MM HG: CPT | Performed by: NURSE PRACTITIONER

## 2023-12-22 PROCEDURE — 1159F MED LIST DOCD IN RCRD: CPT | Performed by: NURSE PRACTITIONER

## 2023-12-22 PROCEDURE — 1160F RVW MEDS BY RX/DR IN RCRD: CPT | Performed by: NURSE PRACTITIONER

## 2023-12-22 PROCEDURE — 99213 OFFICE O/P EST LOW 20 MIN: CPT | Performed by: NURSE PRACTITIONER

## 2023-12-22 PROCEDURE — 3078F DIAST BP <80 MM HG: CPT | Performed by: NURSE PRACTITIONER

## 2023-12-22 RX ORDER — HYDROCODONE BITARTRATE AND ACETAMINOPHEN 5; 325 MG/1; MG/1
1 TABLET ORAL EVERY 6 HOURS PRN
Qty: 20 TABLET | Refills: 0 | Status: SHIPPED | OUTPATIENT
Start: 2023-12-22

## 2023-12-22 RX ORDER — AZITHROMYCIN 250 MG/1
TABLET, FILM COATED ORAL
Qty: 6 TABLET | Refills: 0 | Status: SHIPPED | OUTPATIENT
Start: 2023-12-22 | End: 2023-12-27

## 2023-12-22 RX ORDER — ERYTHROMYCIN 5 MG/G
OINTMENT OPHTHALMIC NIGHTLY
Qty: 3.5 G | Refills: 0 | Status: SHIPPED | OUTPATIENT
Start: 2023-12-22 | End: 2023-12-29

## 2023-12-22 RX ORDER — CIPROFLOXACIN HYDROCHLORIDE 3.5 MG/ML
1 SOLUTION/ DROPS TOPICAL 4 TIMES DAILY
Qty: 2.5 ML | Refills: 0 | Status: SHIPPED | OUTPATIENT
Start: 2023-12-22

## 2023-12-22 NOTE — PROGRESS NOTES
Subjective   Chief Complaint:  Eyes running and knees hurting.    History of Present Illness:  This 73 y.o. male was seen in the office today.      The patient presents today reporting that his eyes are hurting with yellow sticky drainage. He reports no vision changes. He is getting used to new glasses.     The patient reports his bilateral knees have been hurting chronically. He is inquiring what else can be done. To date, I have not seen an x-ray of the knees within the last 2 years. He does use a wheelchair for mobility.    Allergies   Allergen Reactions    Meloxicam Hives     Dizzy, skin crawls    Tamsulosin Dizziness     excessive sweating    Penicillins       Current Outpatient Medications on File Prior to Visit   Medication Sig    diclofenac (VOLTAREN) 75 MG EC tablet Take 1 tablet by mouth 2 (Two) Times a Day.    fluticasone (FLONASE) 50 MCG/ACT nasal spray 2 sprays into the nostril(s) as directed by provider Daily.    levothyroxine (SYNTHROID, LEVOTHROID) 100 MCG tablet Take 1 tablet by mouth Daily.    lisinopril-hydrochlorothiazide (PRINZIDE,ZESTORETIC) 10-12.5 MG per tablet Take 1 tablet by mouth Daily.    loratadine (Claritin) 10 MG tablet Take 1 tablet by mouth Daily.    naloxone (NARCAN) 4 MG/0.1ML nasal spray 1 spray into the nostril(s) as directed by provider As Needed (overdose).    tiZANidine (ZANAFLEX) 4 MG tablet Take 1 tablet by mouth 3 (Three) Times a Day As Needed for Muscle Spasms.    venlafaxine XR (Effexor XR) 75 MG 24 hr capsule Take 1 capsule by mouth Daily.    [DISCONTINUED] ciprofloxacin (Ciloxan) 0.3 % ophthalmic solution Administer 1 drop to both eyes 4 (Four) Times a Day.    [DISCONTINUED] HYDROcodone-acetaminophen (Norco) 5-325 MG per tablet Take 1 tablet by mouth Every 6 (Six) Hours As Needed for Severe Pain.     No current facility-administered medications on file prior to visit.      Past Medical, Surgical, Social, and Family History:  History reviewed. No pertinent past medical  "history.  Past Surgical History:   Procedure Laterality Date    BACK SURGERY      HERNIA REPAIR       Social History     Socioeconomic History    Marital status: Single   Tobacco Use    Smoking status: Every Day     Types: Cigars     Passive exposure: Current   Substance and Sexual Activity    Alcohol use: No    Drug use: Defer    Sexual activity: Defer     Family History   Problem Relation Age of Onset    No Known Problems Father     No Known Problems Mother        Prior Visit Notes/Records, Lab, Imaging, and Diagnostic Results Reviewed:  A1C:No results for input(s): \"HGBA1C\" in the last 92358 hours.  GLUCOSE:  Lab Results - Last 18 Months   Lab Units 07/31/23  0805   GLUCOSE mg/dL 121*     LIPID:  Lab Results - Last 18 Months   Lab Units 07/31/23  0805   CHOLESTEROL mg/dL 149   LDL CHOL mg/dL 89   HDL CHOL mg/dL 42   TRIGLYCERIDES mg/dL 96     PSA:  Lab Results - Last 18 Months   Lab Units 07/31/23  0805   PSA ng/mL 0.5     CBC:  Lab Results - Last 18 Months   Lab Units 07/31/23  0805   WBC x10E3/uL 6.1   HEMOGLOBIN g/dL 17.9*   HEMATOCRIT % 54.4*   PLATELETS x10E3/uL 236      BMP/CMP:  Lab Results - Last 18 Months   Lab Units 07/31/23  0805   SODIUM mmol/L 138   POTASSIUM mmol/L 4.2   CHLORIDE mmol/L 96   CO2 mmol/L 28   GLUCOSE mg/dL 121*   BUN mg/dL 17   CREATININE mg/dL 1.10   EGFR RESULT mL/min/1.73 70   CALCIUM mg/dL 9.7     HEPATIC:  Lab Results - Last 18 Months   Lab Units 07/31/23  0805   ALT (SGPT) IU/L 11   AST (SGOT) IU/L 22   ALK PHOS IU/L 113     Vit D:No results for input(s): \"RKJT57IC\" in the last 80573 hours.  THYROID:No results for input(s): \"TSH\", \"FREET4\", \"FTI\" in the last 16155 hours.    Invalid input(s): \"FREET3\", \"T3\", \"T4\", \"TEUP\", \"TOTALT4\"  BMI Trend:  BMI Readings from Last 10 Encounters:   12/22/23 23.04 kg/m²   10/20/23 23.48 kg/m²   09/20/23 23.48 kg/m²   09/11/23 23.48 kg/m²   08/14/23 23.48 kg/m²   07/31/23 23.48 kg/m²   12/07/16 26.29 kg/m²     Objective   Vital Signs  /62 " "(BP Location: Right arm, Patient Position: Sitting, Cuff Size: Large Adult)   Pulse 70   Temp 96.9 °F (36.1 °C) (Infrared)   Ht 175.3 cm (69\")   Wt 70.8 kg (156 lb)   SpO2 96%   BMI 23.04 kg/m²   Physical Exam  Constitutional:       General: He is not in acute distress.  Eyes:      Comments: Yellow, sticky drainage.   Cardiovascular:      Rate and Rhythm: Normal rate and regular rhythm.      Pulses: Normal pulses.      Heart sounds: No murmur heard.     No friction rub. No gallop.   Pulmonary:      Effort: Pulmonary effort is normal. No respiratory distress.      Breath sounds: Normal breath sounds. No wheezing or rhonchi.   Musculoskeletal:         General: Tenderness (Palpable tenderness bilateral knees, crepitus with flexion.) present.   Neurological:      Mental Status: He is alert.   Psychiatric:      Comments: Behavior: Frequently touches eyes.     Assessment & Plan   Diagnoses and all orders for this visit:    1. Chronic pain of both knees (Primary)  -     XR Knee 1 or 2 View Bilateral; Future  -     HYDROcodone-acetaminophen (Norco) 5-325 MG per tablet; Take 1 tablet by mouth Every 6 (Six) Hours As Needed for Severe Pain.  Dispense: 20 tablet; Refill: 0    2. Pink eye disease of both eyes    Other orders  -     ciprofloxacin (Ciloxan) 0.3 % ophthalmic solution; Administer 1 drop to both eyes 4 (Four) Times a Day.  Dispense: 2.5 mL; Refill: 0  -     azithromycin (Zithromax Z-Elliott) 250 MG tablet; Take 2 tablets the first day, then 1 tablet daily for 4 days.  Dispense: 6 tablet; Refill: 0  -     erythromycin (ROMYCIN) 5 MG/GM ophthalmic ointment; Apply  to eye(s) as directed by provider Every Night for 7 days.  Dispense: 3.5 g; Refill: 0    Discussions & Anticipatory Guidance:  Advised and educated plan of care.    The patient will Return for follow-up as needed.  Advised continue to make efforts to not touch eyes. Will proceed with antibiotic ointment-caregiver reported difficulty with drops in the past. " I plan on injecting his knees after x-rays if appropriate. I will look at the x-rays first and determine the follow-up timeframe. I have refilled the short fill of Norco that we have done in the past for now.    I have personally performed the services described in this document as transcribed by the above individual, and it is both accurate and complete.    Transcribed from ambient dictation for CONCHITA Brice by Johanny Pate.  12/22/23   08:42 CST    Electronically signed by Jacob Wiggins, 12/22/23, 8:42 AM CST.

## 2023-12-26 ENCOUNTER — TELEPHONE (OUTPATIENT)
Dept: FAMILY MEDICINE CLINIC | Facility: CLINIC | Age: 73
End: 2023-12-26
Payer: MEDICARE

## 2023-12-26 NOTE — TELEPHONE ENCOUNTER
Caller: Jagdish Cook    Relationship: Self    Best call back number: [phone]     What is the best time to reach you: ANY    Who are you requesting to speak with (clinical staff, provider,  specific staff member): CLINICAL    Do you know the name of the person who called: SELF    What was the call regarding: WANTS TO CHECK STATUS ON XRAYS THAT [facility] IS SUPPOSED TO BE SETTING UP FOR PATIENT    Is it okay if the provider responds through MyChart: CALL BACK PREFEREED

## 2024-01-01 ENCOUNTER — APPOINTMENT (OUTPATIENT)
Dept: GENERAL RADIOLOGY | Facility: HOSPITAL | Age: 74
End: 2024-01-01
Payer: MEDICARE

## 2024-01-01 ENCOUNTER — APPOINTMENT (OUTPATIENT)
Dept: CT IMAGING | Facility: HOSPITAL | Age: 74
End: 2024-01-01
Payer: MEDICARE

## 2024-01-01 ENCOUNTER — APPOINTMENT (OUTPATIENT)
Dept: CARDIOLOGY | Facility: HOSPITAL | Age: 74
End: 2024-01-01
Payer: MEDICARE

## 2024-01-01 ENCOUNTER — HOSPITAL ENCOUNTER (INPATIENT)
Facility: HOSPITAL | Age: 74
LOS: 9 days | End: 2024-09-25
Attending: EMERGENCY MEDICINE | Admitting: INTERNAL MEDICINE
Payer: MEDICARE

## 2024-01-01 VITALS
DIASTOLIC BLOOD PRESSURE: 50 MMHG | OXYGEN SATURATION: 85 % | BODY MASS INDEX: 20.31 KG/M2 | WEIGHT: 137.1 LBS | HEIGHT: 69 IN | RESPIRATION RATE: 12 BRPM | HEART RATE: 109 BPM | SYSTOLIC BLOOD PRESSURE: 108 MMHG | TEMPERATURE: 97.4 F

## 2024-01-01 DIAGNOSIS — Z97.8 CHRONIC INDWELLING FOLEY CATHETER: ICD-10-CM

## 2024-01-01 DIAGNOSIS — R57.9 SHOCK: ICD-10-CM

## 2024-01-01 DIAGNOSIS — N17.9 ACUTE KIDNEY INJURY: ICD-10-CM

## 2024-01-01 DIAGNOSIS — T79.6XXA TRAUMATIC RHABDOMYOLYSIS, INITIAL ENCOUNTER: Primary | ICD-10-CM

## 2024-01-01 DIAGNOSIS — E87.20 METABOLIC ACIDOSIS: ICD-10-CM

## 2024-01-01 DIAGNOSIS — Z74.09 IMPAIRED TRANSFERS: ICD-10-CM

## 2024-01-01 DIAGNOSIS — L08.9 SKIN INFECTION: ICD-10-CM

## 2024-01-01 DIAGNOSIS — J18.9 PNEUMONIA OF LEFT LUNG DUE TO INFECTIOUS ORGANISM, UNSPECIFIED PART OF LUNG: ICD-10-CM

## 2024-01-01 LAB
ALBUMIN SERPL-MCNC: 1.9 G/DL (ref 3.5–5.2)
ALBUMIN SERPL-MCNC: 2 G/DL (ref 3.5–5.2)
ALBUMIN SERPL-MCNC: 2.1 G/DL (ref 3.5–5.2)
ALBUMIN SERPL-MCNC: 2.3 G/DL (ref 3.5–5.2)
ALBUMIN SERPL-MCNC: 2.3 G/DL (ref 3.5–5.2)
ALBUMIN/GLOB SERPL: 0.6 G/DL
ALBUMIN/GLOB SERPL: 0.7 G/DL
ALBUMIN/GLOB SERPL: 0.8 G/DL
ALBUMIN/GLOB SERPL: 0.8 G/DL
ALBUMIN/GLOB SERPL: 0.9 G/DL
ALBUMIN/GLOB SERPL: 1 G/DL
ALP SERPL-CCNC: 100 U/L (ref 39–117)
ALP SERPL-CCNC: 101 U/L (ref 39–117)
ALP SERPL-CCNC: 103 U/L (ref 39–117)
ALP SERPL-CCNC: 83 U/L (ref 39–117)
ALP SERPL-CCNC: 85 U/L (ref 39–117)
ALP SERPL-CCNC: 89 U/L (ref 39–117)
ALP SERPL-CCNC: 98 U/L (ref 39–117)
ALT SERPL W P-5'-P-CCNC: 18 U/L (ref 1–41)
ALT SERPL W P-5'-P-CCNC: 19 U/L (ref 1–41)
ALT SERPL W P-5'-P-CCNC: 20 U/L (ref 1–41)
ALT SERPL W P-5'-P-CCNC: 21 U/L (ref 1–41)
ALT SERPL W P-5'-P-CCNC: 21 U/L (ref 1–41)
ALT SERPL W P-5'-P-CCNC: 22 U/L (ref 1–41)
ALT SERPL W P-5'-P-CCNC: 23 U/L (ref 1–41)
AMORPH URATE CRY URNS QL MICRO: ABNORMAL /HPF
ANION GAP SERPL CALCULATED.3IONS-SCNC: 11 MMOL/L (ref 5–15)
ANION GAP SERPL CALCULATED.3IONS-SCNC: 13 MMOL/L (ref 5–15)
ANION GAP SERPL CALCULATED.3IONS-SCNC: 13 MMOL/L (ref 5–15)
ANION GAP SERPL CALCULATED.3IONS-SCNC: 16 MMOL/L (ref 5–15)
ANION GAP SERPL CALCULATED.3IONS-SCNC: 5 MMOL/L (ref 5–15)
ANION GAP SERPL CALCULATED.3IONS-SCNC: 6 MMOL/L (ref 5–15)
ANION GAP SERPL CALCULATED.3IONS-SCNC: 7 MMOL/L (ref 5–15)
ANION GAP SERPL CALCULATED.3IONS-SCNC: 7 MMOL/L (ref 5–15)
ANION GAP SERPL CALCULATED.3IONS-SCNC: 9 MMOL/L (ref 5–15)
ANISOCYTOSIS BLD QL: ABNORMAL
ARTERIAL PATENCY WRIST A: POSITIVE
AST SERPL-CCNC: 25 U/L (ref 1–40)
AST SERPL-CCNC: 27 U/L (ref 1–40)
AST SERPL-CCNC: 27 U/L (ref 1–40)
AST SERPL-CCNC: 31 U/L (ref 1–40)
AST SERPL-CCNC: 32 U/L (ref 1–40)
AST SERPL-CCNC: 42 U/L (ref 1–40)
AST SERPL-CCNC: 44 U/L (ref 1–40)
ATMOSPHERIC PRESS: 754 MMHG
B PARAPERT DNA SPEC QL NAA+PROBE: NOT DETECTED
B PERT DNA SPEC QL NAA+PROBE: NOT DETECTED
BACTERIA SPEC AEROBE CULT: ABNORMAL
BACTERIA SPEC AEROBE CULT: ABNORMAL
BACTERIA SPEC AEROBE CULT: NORMAL
BACTERIA SPEC AEROBE CULT: NORMAL
BACTERIA UR QL AUTO: ABNORMAL /HPF
BASE EXCESS BLDA CALC-SCNC: -2.9 MMOL/L (ref 0–2)
BASOPHILS # BLD AUTO: 0.04 10*3/MM3 (ref 0–0.2)
BASOPHILS # BLD AUTO: 0.04 10*3/MM3 (ref 0–0.2)
BASOPHILS # BLD MANUAL: 0 10*3/MM3 (ref 0–0.2)
BASOPHILS NFR BLD AUTO: 0.3 % (ref 0–1.5)
BASOPHILS NFR BLD AUTO: 0.3 % (ref 0–1.5)
BASOPHILS NFR BLD MANUAL: 0 % (ref 0–1.5)
BDY SITE: ABNORMAL
BH CV ECHO MEAS - AO MAX PG: 9.2 MMHG
BH CV ECHO MEAS - AO MEAN PG: 4.2 MMHG
BH CV ECHO MEAS - AO ROOT DIAM: 3.4 CM
BH CV ECHO MEAS - AO V2 MAX: 152 CM/SEC
BH CV ECHO MEAS - AO V2 VTI: 23.4 CM
BH CV ECHO MEAS - EDV(CUBED): 244.1 ML
BH CV ECHO MEAS - EDV(MOD-SP4): 149 ML
BH CV ECHO MEAS - EF(MOD-SP4): 75.7 %
BH CV ECHO MEAS - ESV(CUBED): 88.7 ML
BH CV ECHO MEAS - ESV(MOD-SP4): 36.2 ML
BH CV ECHO MEAS - FS: 28.6 %
BH CV ECHO MEAS - IVS/LVPW: 0.93 CM
BH CV ECHO MEAS - IVSD: 1.02 CM
BH CV ECHO MEAS - LA DIMENSION: 4.4 CM
BH CV ECHO MEAS - LAT PEAK E' VEL: 7.5 CM/SEC
BH CV ECHO MEAS - LV DIASTOLIC VOL/BSA (35-75): 83.7 CM2
BH CV ECHO MEAS - LV MASS(C)D: 285.3 GRAMS
BH CV ECHO MEAS - LV SYSTOLIC VOL/BSA (12-30): 20.3 CM2
BH CV ECHO MEAS - LVIDD: 6.3 CM
BH CV ECHO MEAS - LVIDS: 4.5 CM
BH CV ECHO MEAS - LVOT AREA: 4.2 CM2
BH CV ECHO MEAS - LVOT DIAM: 2.3 CM
BH CV ECHO MEAS - LVPWD: 1.1 CM
BH CV ECHO MEAS - MED PEAK E' VEL: 8.1 CM/SEC
BH CV ECHO MEAS - MR MAX PG: 71.6 MMHG
BH CV ECHO MEAS - MR MAX VEL: 422 CM/SEC
BH CV ECHO MEAS - MV MAX PG: 7.4 MMHG
BH CV ECHO MEAS - MV MEAN PG: 3 MMHG
BH CV ECHO MEAS - MV V2 VTI: 22.7 CM
BH CV ECHO MEAS - PA V2 MAX: 80.1 CM/SEC
BH CV ECHO MEAS - SV(MOD-SP4): 112.8 ML
BH CV ECHO MEAS - SVI(MOD-SP4): 63.4 ML/M2
BH CV ECHO MEAS - TAPSE (>1.6): 4.9 CM
BH CV ECHO MEAS - TR MAX PG: 14.6 MMHG
BH CV ECHO MEAS - TR MAX VEL: 191 CM/SEC
BH CV XLRA - TDI S': 14.5 CM/SEC
BILIRUB CONJ SERPL-MCNC: 0.2 MG/DL (ref 0–0.3)
BILIRUB CONJ SERPL-MCNC: <0.2 MG/DL (ref 0–0.3)
BILIRUB INDIRECT SERPL-MCNC: ABNORMAL MG/DL
BILIRUB SERPL-MCNC: 0.4 MG/DL (ref 0–1.2)
BILIRUB SERPL-MCNC: 0.5 MG/DL (ref 0–1.2)
BILIRUB SERPL-MCNC: 0.6 MG/DL (ref 0–1.2)
BILIRUB UR QL STRIP: ABNORMAL
BODY TEMPERATURE: 37
BUN SERPL-MCNC: 13 MG/DL (ref 8–23)
BUN SERPL-MCNC: 14 MG/DL (ref 8–23)
BUN SERPL-MCNC: 16 MG/DL (ref 8–23)
BUN SERPL-MCNC: 20 MG/DL (ref 8–23)
BUN SERPL-MCNC: 22 MG/DL (ref 8–23)
BUN SERPL-MCNC: 23 MG/DL (ref 8–23)
BUN SERPL-MCNC: 27 MG/DL (ref 8–23)
BUN SERPL-MCNC: 31 MG/DL (ref 8–23)
BUN SERPL-MCNC: 35 MG/DL (ref 8–23)
BUN SERPL-MCNC: 39 MG/DL (ref 8–23)
BUN SERPL-MCNC: 41 MG/DL (ref 8–23)
BUN SERPL-MCNC: 43 MG/DL (ref 8–23)
BUN SERPL-MCNC: 56 MG/DL (ref 8–23)
BUN SERPL-MCNC: 69 MG/DL (ref 8–23)
BUN/CREAT SERPL: 20.3 (ref 7–25)
BUN/CREAT SERPL: 20.6 (ref 7–25)
BUN/CREAT SERPL: 22.4 (ref 7–25)
BUN/CREAT SERPL: 23.2 (ref 7–25)
BUN/CREAT SERPL: 29.6 (ref 7–25)
BUN/CREAT SERPL: 32.3 (ref 7–25)
BUN/CREAT SERPL: 33.8 (ref 7–25)
BUN/CREAT SERPL: 37.9 (ref 7–25)
BUN/CREAT SERPL: 38.6 (ref 7–25)
BUN/CREAT SERPL: 41.9 (ref 7–25)
BUN/CREAT SERPL: 43 (ref 7–25)
BUN/CREAT SERPL: 43.2 (ref 7–25)
BUN/CREAT SERPL: 50 (ref 7–25)
BUN/CREAT SERPL: 52.7 (ref 7–25)
BURR CELLS BLD QL SMEAR: ABNORMAL
BURR CELLS BLD QL SMEAR: ABNORMAL
C PNEUM DNA NPH QL NAA+NON-PROBE: NOT DETECTED
CALCIUM SPEC-SCNC: 8 MG/DL (ref 8.6–10.5)
CALCIUM SPEC-SCNC: 8.2 MG/DL (ref 8.6–10.5)
CALCIUM SPEC-SCNC: 8.2 MG/DL (ref 8.6–10.5)
CALCIUM SPEC-SCNC: 8.3 MG/DL (ref 8.6–10.5)
CALCIUM SPEC-SCNC: 8.5 MG/DL (ref 8.6–10.5)
CALCIUM SPEC-SCNC: 8.6 MG/DL (ref 8.6–10.5)
CALCIUM SPEC-SCNC: 8.6 MG/DL (ref 8.6–10.5)
CALCIUM SPEC-SCNC: 8.7 MG/DL (ref 8.6–10.5)
CALCIUM SPEC-SCNC: 8.7 MG/DL (ref 8.6–10.5)
CALCIUM SPEC-SCNC: 8.8 MG/DL (ref 8.6–10.5)
CALCIUM SPEC-SCNC: 9 MG/DL (ref 8.6–10.5)
CALCIUM SPEC-SCNC: 9 MG/DL (ref 8.6–10.5)
CALCIUM SPEC-SCNC: 9.1 MG/DL (ref 8.6–10.5)
CALCIUM SPEC-SCNC: 9.3 MG/DL (ref 8.6–10.5)
CHLORIDE SERPL-SCNC: 106 MMOL/L (ref 98–107)
CHLORIDE SERPL-SCNC: 107 MMOL/L (ref 98–107)
CHLORIDE SERPL-SCNC: 109 MMOL/L (ref 98–107)
CHLORIDE SERPL-SCNC: 110 MMOL/L (ref 98–107)
CHLORIDE SERPL-SCNC: 110 MMOL/L (ref 98–107)
CHLORIDE SERPL-SCNC: 111 MMOL/L (ref 98–107)
CHLORIDE SERPL-SCNC: 112 MMOL/L (ref 98–107)
CHLORIDE SERPL-SCNC: 112 MMOL/L (ref 98–107)
CHLORIDE SERPL-SCNC: 113 MMOL/L (ref 98–107)
CHLORIDE SERPL-SCNC: 97 MMOL/L (ref 98–107)
CHLORIDE SERPL-SCNC: 99 MMOL/L (ref 98–107)
CHLORIDE SERPL-SCNC: 99 MMOL/L (ref 98–107)
CK SERPL-CCNC: 386 U/L (ref 20–200)
CLARITY UR: ABNORMAL
CLUMPED PLATELETS: PRESENT
CO2 SERPL-SCNC: 22 MMOL/L (ref 22–29)
CO2 SERPL-SCNC: 24 MMOL/L (ref 22–29)
CO2 SERPL-SCNC: 28 MMOL/L (ref 22–29)
CO2 SERPL-SCNC: 28 MMOL/L (ref 22–29)
CO2 SERPL-SCNC: 29 MMOL/L (ref 22–29)
CO2 SERPL-SCNC: 30 MMOL/L (ref 22–29)
CO2 SERPL-SCNC: 31 MMOL/L (ref 22–29)
COHGB MFR BLD: 1 % (ref 0–5)
COLOR UR: YELLOW
CREAT SERPL-MCNC: 0.58 MG/DL (ref 0.76–1.27)
CREAT SERPL-MCNC: 0.62 MG/DL (ref 0.76–1.27)
CREAT SERPL-MCNC: 0.63 MG/DL (ref 0.76–1.27)
CREAT SERPL-MCNC: 0.68 MG/DL (ref 0.76–1.27)
CREAT SERPL-MCNC: 0.69 MG/DL (ref 0.76–1.27)
CREAT SERPL-MCNC: 0.69 MG/DL (ref 0.76–1.27)
CREAT SERPL-MCNC: 0.7 MG/DL (ref 0.76–1.27)
CREAT SERPL-MCNC: 0.74 MG/DL (ref 0.76–1.27)
CREAT SERPL-MCNC: 0.74 MG/DL (ref 0.76–1.27)
CREAT SERPL-MCNC: 0.81 MG/DL (ref 0.76–1.27)
CREAT SERPL-MCNC: 0.82 MG/DL (ref 0.76–1.27)
CREAT SERPL-MCNC: 1 MG/DL (ref 0.76–1.27)
CREAT SERPL-MCNC: 1.89 MG/DL (ref 0.76–1.27)
CREAT SERPL-MCNC: 3.08 MG/DL (ref 0.76–1.27)
CRP SERPL-MCNC: 30.76 MG/DL (ref 0–0.5)
D-LACTATE SERPL-SCNC: 1.4 MMOL/L (ref 0.5–2)
D-LACTATE SERPL-SCNC: 2.3 MMOL/L (ref 0.5–2)
DEPRECATED RDW RBC AUTO: 45.8 FL (ref 37–54)
DEPRECATED RDW RBC AUTO: 46.1 FL (ref 37–54)
DEPRECATED RDW RBC AUTO: 47.6 FL (ref 37–54)
DEPRECATED RDW RBC AUTO: 48.8 FL (ref 37–54)
DEPRECATED RDW RBC AUTO: 49.1 FL (ref 37–54)
DEPRECATED RDW RBC AUTO: 49.7 FL (ref 37–54)
DEPRECATED RDW RBC AUTO: 49.8 FL (ref 37–54)
DEPRECATED RDW RBC AUTO: 50.1 FL (ref 37–54)
EGFRCR SERPLBLD CKD-EPI 2021: 101.7 ML/MIN/1.73
EGFRCR SERPLBLD CKD-EPI 2021: 20.3 ML/MIN/1.73
EGFRCR SERPLBLD CKD-EPI 2021: 36.6 ML/MIN/1.73
EGFRCR SERPLBLD CKD-EPI 2021: 78.5 ML/MIN/1.73
EGFRCR SERPLBLD CKD-EPI 2021: 91.6 ML/MIN/1.73
EGFRCR SERPLBLD CKD-EPI 2021: 91.9 ML/MIN/1.73
EGFRCR SERPLBLD CKD-EPI 2021: 94.5 ML/MIN/1.73
EGFRCR SERPLBLD CKD-EPI 2021: 94.5 ML/MIN/1.73
EGFRCR SERPLBLD CKD-EPI 2021: 96.1 ML/MIN/1.73
EGFRCR SERPLBLD CKD-EPI 2021: 96.5 ML/MIN/1.73
EGFRCR SERPLBLD CKD-EPI 2021: 96.5 ML/MIN/1.73
EGFRCR SERPLBLD CKD-EPI 2021: 96.9 ML/MIN/1.73
EGFRCR SERPLBLD CKD-EPI 2021: 99.2 ML/MIN/1.73
EGFRCR SERPLBLD CKD-EPI 2021: 99.7 ML/MIN/1.73
EOSINOPHIL # BLD AUTO: 0 10*3/MM3 (ref 0–0.4)
EOSINOPHIL # BLD AUTO: 0 10*3/MM3 (ref 0–0.4)
EOSINOPHIL # BLD MANUAL: 0 10*3/MM3 (ref 0–0.4)
EOSINOPHIL NFR BLD AUTO: 0 % (ref 0.3–6.2)
EOSINOPHIL NFR BLD AUTO: 0 % (ref 0.3–6.2)
EOSINOPHIL NFR BLD MANUAL: 0 % (ref 0.3–6.2)
ERYTHROCYTE [DISTWIDTH] IN BLOOD BY AUTOMATED COUNT: 13.4 % (ref 12.3–15.4)
ERYTHROCYTE [DISTWIDTH] IN BLOOD BY AUTOMATED COUNT: 13.7 % (ref 12.3–15.4)
ERYTHROCYTE [DISTWIDTH] IN BLOOD BY AUTOMATED COUNT: 13.8 % (ref 12.3–15.4)
ERYTHROCYTE [DISTWIDTH] IN BLOOD BY AUTOMATED COUNT: 13.9 % (ref 12.3–15.4)
ERYTHROCYTE [DISTWIDTH] IN BLOOD BY AUTOMATED COUNT: 13.9 % (ref 12.3–15.4)
ERYTHROCYTE [DISTWIDTH] IN BLOOD BY AUTOMATED COUNT: 14 % (ref 12.3–15.4)
ERYTHROCYTE [DISTWIDTH] IN BLOOD BY AUTOMATED COUNT: 14 % (ref 12.3–15.4)
ERYTHROCYTE [DISTWIDTH] IN BLOOD BY AUTOMATED COUNT: 14.1 % (ref 12.3–15.4)
FLUAV SUBTYP SPEC NAA+PROBE: NOT DETECTED
FLUBV RNA ISLT QL NAA+PROBE: NOT DETECTED
GAS FLOW AIRWAY: 15 LPM
GEN 5 2HR TROPONIN T REFLEX: 219 NG/L
GLOBULIN UR ELPH-MCNC: 2.4 GM/DL
GLOBULIN UR ELPH-MCNC: 2.4 GM/DL
GLOBULIN UR ELPH-MCNC: 2.6 GM/DL
GLOBULIN UR ELPH-MCNC: 2.8 GM/DL
GLOBULIN UR ELPH-MCNC: 3 GM/DL
GLOBULIN UR ELPH-MCNC: 3.2 GM/DL
GLUCOSE SERPL-MCNC: 103 MG/DL (ref 65–99)
GLUCOSE SERPL-MCNC: 110 MG/DL (ref 65–99)
GLUCOSE SERPL-MCNC: 113 MG/DL (ref 65–99)
GLUCOSE SERPL-MCNC: 117 MG/DL (ref 65–99)
GLUCOSE SERPL-MCNC: 120 MG/DL (ref 65–99)
GLUCOSE SERPL-MCNC: 127 MG/DL (ref 65–99)
GLUCOSE SERPL-MCNC: 138 MG/DL (ref 65–99)
GLUCOSE SERPL-MCNC: 139 MG/DL (ref 65–99)
GLUCOSE SERPL-MCNC: 139 MG/DL (ref 65–99)
GLUCOSE SERPL-MCNC: 140 MG/DL (ref 65–99)
GLUCOSE SERPL-MCNC: 85 MG/DL (ref 65–99)
GLUCOSE SERPL-MCNC: 87 MG/DL (ref 65–99)
GLUCOSE SERPL-MCNC: 93 MG/DL (ref 65–99)
GLUCOSE SERPL-MCNC: 94 MG/DL (ref 65–99)
GLUCOSE UR STRIP-MCNC: NEGATIVE MG/DL
HADV DNA SPEC NAA+PROBE: NOT DETECTED
HCO3 BLDA-SCNC: 23.6 MMOL/L (ref 20–26)
HCOV 229E RNA SPEC QL NAA+PROBE: NOT DETECTED
HCOV HKU1 RNA SPEC QL NAA+PROBE: NOT DETECTED
HCOV NL63 RNA SPEC QL NAA+PROBE: NOT DETECTED
HCOV OC43 RNA SPEC QL NAA+PROBE: NOT DETECTED
HCT VFR BLD AUTO: 36.7 % (ref 37.5–51)
HCT VFR BLD AUTO: 36.8 % (ref 37.5–51)
HCT VFR BLD AUTO: 37.3 % (ref 37.5–51)
HCT VFR BLD AUTO: 37.5 % (ref 37.5–51)
HCT VFR BLD AUTO: 38.2 % (ref 37.5–51)
HCT VFR BLD AUTO: 38.8 % (ref 37.5–51)
HCT VFR BLD AUTO: 40.7 % (ref 37.5–51)
HCT VFR BLD AUTO: 41.3 % (ref 37.5–51)
HCT VFR BLD CALC: 37.8 % (ref 38–51)
HGB BLD-MCNC: 11.9 G/DL (ref 13–17.7)
HGB BLD-MCNC: 11.9 G/DL (ref 13–17.7)
HGB BLD-MCNC: 12 G/DL (ref 13–17.7)
HGB BLD-MCNC: 12.1 G/DL (ref 13–17.7)
HGB BLD-MCNC: 12.6 G/DL (ref 13–17.7)
HGB BLD-MCNC: 13.4 G/DL (ref 13–17.7)
HGB BLDA-MCNC: 12.3 G/DL (ref 14–18)
HGB UR QL STRIP.AUTO: ABNORMAL
HMPV RNA NPH QL NAA+NON-PROBE: NOT DETECTED
HPIV1 RNA ISLT QL NAA+PROBE: NOT DETECTED
HPIV2 RNA SPEC QL NAA+PROBE: NOT DETECTED
HPIV3 RNA NPH QL NAA+PROBE: NOT DETECTED
HPIV4 P GENE NPH QL NAA+PROBE: NOT DETECTED
HYALINE CASTS UR QL AUTO: ABNORMAL /LPF
HYPOCHROMIA BLD QL: ABNORMAL
IMM GRANULOCYTES # BLD AUTO: 0.42 10*3/MM3 (ref 0–0.05)
IMM GRANULOCYTES # BLD AUTO: 0.49 10*3/MM3 (ref 0–0.05)
IMM GRANULOCYTES NFR BLD AUTO: 3.2 % (ref 0–0.5)
IMM GRANULOCYTES NFR BLD AUTO: 3.2 % (ref 0–0.5)
INR PPP: 1.12 (ref 0.91–1.09)
KETONES UR QL STRIP: ABNORMAL
LEUKOCYTE ESTERASE UR QL STRIP.AUTO: ABNORMAL
LYMPHOCYTES # BLD AUTO: 0.27 10*3/MM3 (ref 0.7–3.1)
LYMPHOCYTES # BLD AUTO: 0.39 10*3/MM3 (ref 0.7–3.1)
LYMPHOCYTES # BLD MANUAL: 0.16 10*3/MM3 (ref 0.7–3.1)
LYMPHOCYTES # BLD MANUAL: 0.22 10*3/MM3 (ref 0.7–3.1)
LYMPHOCYTES # BLD MANUAL: 0.46 10*3/MM3 (ref 0.7–3.1)
LYMPHOCYTES # BLD MANUAL: 0.5 10*3/MM3 (ref 0.7–3.1)
LYMPHOCYTES # BLD MANUAL: 0.54 10*3/MM3 (ref 0.7–3.1)
LYMPHOCYTES # BLD MANUAL: 0.94 10*3/MM3 (ref 0.7–3.1)
LYMPHOCYTES NFR BLD AUTO: 1.8 % (ref 19.6–45.3)
LYMPHOCYTES NFR BLD AUTO: 3 % (ref 19.6–45.3)
LYMPHOCYTES NFR BLD MANUAL: 2 % (ref 5–12)
LYMPHOCYTES NFR BLD MANUAL: 2 % (ref 5–12)
LYMPHOCYTES NFR BLD MANUAL: 5 % (ref 5–12)
LYMPHOCYTES NFR BLD MANUAL: 5 % (ref 5–12)
LYMPHOCYTES NFR BLD MANUAL: 5.8 % (ref 5–12)
LYMPHOCYTES NFR BLD MANUAL: 6 % (ref 5–12)
Lab: ABNORMAL
M PNEUMO IGG SER IA-ACNC: NOT DETECTED
MAGNESIUM SERPL-MCNC: 1.4 MG/DL (ref 1.6–2.4)
MAGNESIUM SERPL-MCNC: 1.5 MG/DL (ref 1.6–2.4)
MAGNESIUM SERPL-MCNC: 1.6 MG/DL (ref 1.6–2.4)
MAGNESIUM SERPL-MCNC: 1.6 MG/DL (ref 1.6–2.4)
MAGNESIUM SERPL-MCNC: 1.8 MG/DL (ref 1.6–2.4)
MAGNESIUM SERPL-MCNC: 1.8 MG/DL (ref 1.6–2.4)
MAGNESIUM SERPL-MCNC: 2 MG/DL (ref 1.6–2.4)
MAGNESIUM SERPL-MCNC: 2.1 MG/DL (ref 1.6–2.4)
MAGNESIUM SERPL-MCNC: 2.3 MG/DL (ref 1.6–2.4)
MCH RBC QN AUTO: 30.1 PG (ref 26.6–33)
MCH RBC QN AUTO: 30.3 PG (ref 26.6–33)
MCH RBC QN AUTO: 30.4 PG (ref 26.6–33)
MCH RBC QN AUTO: 30.4 PG (ref 26.6–33)
MCH RBC QN AUTO: 31 PG (ref 26.6–33)
MCH RBC QN AUTO: 31 PG (ref 26.6–33)
MCH RBC QN AUTO: 31.2 PG (ref 26.6–33)
MCH RBC QN AUTO: 31.2 PG (ref 26.6–33)
MCHC RBC AUTO-ENTMCNC: 31 G/DL (ref 31.5–35.7)
MCHC RBC AUTO-ENTMCNC: 31.7 G/DL (ref 31.5–35.7)
MCHC RBC AUTO-ENTMCNC: 32.2 G/DL (ref 31.5–35.7)
MCHC RBC AUTO-ENTMCNC: 32.4 G/DL (ref 31.5–35.7)
MCHC RBC AUTO-ENTMCNC: 32.4 G/DL (ref 31.5–35.7)
MCHC RBC AUTO-ENTMCNC: 32.5 G/DL (ref 31.5–35.7)
MCHC RBC AUTO-ENTMCNC: 32.9 G/DL (ref 31.5–35.7)
MCHC RBC AUTO-ENTMCNC: 33 G/DL (ref 31.5–35.7)
MCV RBC AUTO: 93.3 FL (ref 79–97)
MCV RBC AUTO: 93.7 FL (ref 79–97)
MCV RBC AUTO: 94.4 FL (ref 79–97)
MCV RBC AUTO: 94.6 FL (ref 79–97)
MCV RBC AUTO: 95.7 FL (ref 79–97)
MCV RBC AUTO: 96.3 FL (ref 79–97)
MCV RBC AUTO: 96.4 FL (ref 79–97)
MCV RBC AUTO: 97.1 FL (ref 79–97)
METAMYELOCYTES NFR BLD MANUAL: 1 % (ref 0–0)
METAMYELOCYTES NFR BLD MANUAL: 1 % (ref 0–0)
METAMYELOCYTES NFR BLD MANUAL: 2 % (ref 0–0)
METAMYELOCYTES NFR BLD MANUAL: 2 % (ref 0–0)
METHGB BLD QL: 0.5 % (ref 0–3)
MICROCYTES BLD QL: ABNORMAL
MODALITY: ABNORMAL
MONOCYTES # BLD AUTO: 0.56 10*3/MM3 (ref 0.1–0.9)
MONOCYTES # BLD AUTO: 0.65 10*3/MM3 (ref 0.1–0.9)
MONOCYTES # BLD: 0.22 10*3/MM3 (ref 0.1–0.9)
MONOCYTES # BLD: 0.25 10*3/MM3 (ref 0.1–0.9)
MONOCYTES # BLD: 0.56 10*3/MM3 (ref 0.1–0.9)
MONOCYTES # BLD: 0.68 10*3/MM3 (ref 0.1–0.9)
MONOCYTES # BLD: 0.7 10*3/MM3 (ref 0.1–0.9)
MONOCYTES # BLD: 0.81 10*3/MM3 (ref 0.1–0.9)
MONOCYTES NFR BLD AUTO: 3.7 % (ref 5–12)
MONOCYTES NFR BLD AUTO: 4.9 % (ref 5–12)
MRSA DNA SPEC QL NAA+PROBE: NORMAL
MYELOCYTES NFR BLD MANUAL: 1 % (ref 0–0)
NEUTROPHILS # BLD AUTO: 10.01 10*3/MM3 (ref 1.7–7)
NEUTROPHILS # BLD AUTO: 10.32 10*3/MM3 (ref 1.7–7)
NEUTROPHILS # BLD AUTO: 10.45 10*3/MM3 (ref 1.7–7)
NEUTROPHILS # BLD AUTO: 11.43 10*3/MM3 (ref 1.7–7)
NEUTROPHILS # BLD AUTO: 15.28 10*3/MM3 (ref 1.7–7)
NEUTROPHILS # BLD AUTO: 9.8 10*3/MM3 (ref 1.7–7)
NEUTROPHILS NFR BLD AUTO: 11.65 10*3/MM3 (ref 1.7–7)
NEUTROPHILS NFR BLD AUTO: 13.8 10*3/MM3 (ref 1.7–7)
NEUTROPHILS NFR BLD AUTO: 88.6 % (ref 42.7–76)
NEUTROPHILS NFR BLD AUTO: 91 % (ref 42.7–76)
NEUTROPHILS NFR BLD MANUAL: 84.5 % (ref 42.7–76)
NEUTROPHILS NFR BLD MANUAL: 86 % (ref 42.7–76)
NEUTROPHILS NFR BLD MANUAL: 89 % (ref 42.7–76)
NEUTROPHILS NFR BLD MANUAL: 90 % (ref 42.7–76)
NEUTROPHILS NFR BLD MANUAL: 92 % (ref 42.7–76)
NEUTROPHILS NFR BLD MANUAL: 92.1 % (ref 42.7–76)
NEUTS BAND NFR BLD MANUAL: 1 % (ref 0–5)
NEUTS BAND NFR BLD MANUAL: 1.9 % (ref 0–5)
NEUTS BAND NFR BLD MANUAL: 2 % (ref 0–5)
NEUTS BAND NFR BLD MANUAL: 2 % (ref 0–5)
NEUTS BAND NFR BLD MANUAL: 3 % (ref 0–5)
NITRITE UR QL STRIP: NEGATIVE
NRBC BLD AUTO-RTO: 0 /100 WBC (ref 0–0.2)
NT-PROBNP SERPL-MCNC: ABNORMAL PG/ML (ref 0–1800)
OXYHGB MFR BLDV: 89.4 % (ref 94–99)
PCO2 BLDA: 47.1 MM HG (ref 35–45)
PCO2 TEMP ADJ BLD: 47.1 MM HG (ref 35–45)
PH BLDA: 7.31 PH UNITS (ref 7.35–7.45)
PH UR STRIP.AUTO: <=5 [PH] (ref 5–8)
PH, TEMP CORRECTED: 7.31 PH UNITS (ref 7.35–7.45)
PHOSPHATE SERPL-MCNC: 2 MG/DL (ref 2.5–4.5)
PHOSPHATE SERPL-MCNC: 2.3 MG/DL (ref 2.5–4.5)
PHOSPHATE SERPL-MCNC: 2.4 MG/DL (ref 2.5–4.5)
PHOSPHATE SERPL-MCNC: 2.4 MG/DL (ref 2.5–4.5)
PHOSPHATE SERPL-MCNC: 2.9 MG/DL (ref 2.5–4.5)
PHOSPHATE SERPL-MCNC: 4.2 MG/DL (ref 2.5–4.5)
PHOSPHATE SERPL-MCNC: 4.5 MG/DL (ref 2.5–4.5)
PLAT MORPH BLD: NORMAL
PLATELET # BLD AUTO: 206 10*3/MM3 (ref 140–450)
PLATELET # BLD AUTO: 212 10*3/MM3 (ref 140–450)
PLATELET # BLD AUTO: 217 10*3/MM3 (ref 140–450)
PLATELET # BLD AUTO: 224 10*3/MM3 (ref 140–450)
PLATELET # BLD AUTO: 232 10*3/MM3 (ref 140–450)
PLATELET # BLD AUTO: 233 10*3/MM3 (ref 140–450)
PLATELET # BLD AUTO: 241 10*3/MM3 (ref 140–450)
PLATELET # BLD AUTO: 254 10*3/MM3 (ref 140–450)
PMV BLD AUTO: 10 FL (ref 6–12)
PMV BLD AUTO: 9.6 FL (ref 6–12)
PMV BLD AUTO: 9.7 FL (ref 6–12)
PMV BLD AUTO: 9.8 FL (ref 6–12)
PMV BLD AUTO: 9.8 FL (ref 6–12)
PO2 BLDA: 66.4 MM HG (ref 83–108)
PO2 TEMP ADJ BLD: 66.4 MM HG (ref 83–108)
POIKILOCYTOSIS BLD QL SMEAR: ABNORMAL
POLYCHROMASIA BLD QL SMEAR: ABNORMAL
POTASSIUM BLDA-SCNC: 3.5 MMOL/L (ref 3.5–5.2)
POTASSIUM SERPL-SCNC: 3.1 MMOL/L (ref 3.5–5.2)
POTASSIUM SERPL-SCNC: 3.2 MMOL/L (ref 3.5–5.2)
POTASSIUM SERPL-SCNC: 3.4 MMOL/L (ref 3.5–5.2)
POTASSIUM SERPL-SCNC: 3.5 MMOL/L (ref 3.5–5.2)
POTASSIUM SERPL-SCNC: 3.7 MMOL/L (ref 3.5–5.2)
POTASSIUM SERPL-SCNC: 3.7 MMOL/L (ref 3.5–5.2)
POTASSIUM SERPL-SCNC: 3.9 MMOL/L (ref 3.5–5.2)
POTASSIUM SERPL-SCNC: 4 MMOL/L (ref 3.5–5.2)
POTASSIUM SERPL-SCNC: 4.2 MMOL/L (ref 3.5–5.2)
POTASSIUM SERPL-SCNC: 4.3 MMOL/L (ref 3.5–5.2)
POTASSIUM SERPL-SCNC: 4.8 MMOL/L (ref 3.5–5.2)
PROCALCITONIN SERPL-MCNC: 4.57 NG/ML (ref 0–0.25)
PROT SERPL-MCNC: 4.5 G/DL (ref 6–8.5)
PROT SERPL-MCNC: 4.5 G/DL (ref 6–8.5)
PROT SERPL-MCNC: 4.7 G/DL (ref 6–8.5)
PROT SERPL-MCNC: 4.7 G/DL (ref 6–8.5)
PROT SERPL-MCNC: 5.1 G/DL (ref 6–8.5)
PROT SERPL-MCNC: 5.1 G/DL (ref 6–8.5)
PROT SERPL-MCNC: 5.2 G/DL (ref 6–8.5)
PROT UR QL STRIP: ABNORMAL
PROTHROMBIN TIME: 14.9 SECONDS (ref 11.8–14.8)
QT INTERVAL: 294 MS
QT INTERVAL: 374 MS
QTC INTERVAL: 456 MS
QTC INTERVAL: 494 MS
RBC # BLD AUTO: 3.81 10*6/MM3 (ref 4.14–5.8)
RBC # BLD AUTO: 3.87 10*6/MM3 (ref 4.14–5.8)
RBC # BLD AUTO: 3.9 10*6/MM3 (ref 4.14–5.8)
RBC # BLD AUTO: 3.92 10*6/MM3 (ref 4.14–5.8)
RBC # BLD AUTO: 4.04 10*6/MM3 (ref 4.14–5.8)
RBC # BLD AUTO: 4.16 10*6/MM3 (ref 4.14–5.8)
RBC # BLD AUTO: 4.19 10*6/MM3 (ref 4.14–5.8)
RBC # BLD AUTO: 4.41 10*6/MM3 (ref 4.14–5.8)
RBC # UR STRIP: ABNORMAL /HPF
RBC MORPH BLD: NORMAL
REF LAB TEST METHOD: ABNORMAL
RHINOVIRUS RNA SPEC NAA+PROBE: NOT DETECTED
RSV RNA NPH QL NAA+NON-PROBE: NOT DETECTED
SAO2 % BLDCOA: 90.8 % (ref 94–99)
SARS-COV-2 RNA NPH QL NAA+NON-PROBE: NOT DETECTED
SODIUM BLDA-SCNC: 145 MMOL/L (ref 136–145)
SODIUM SERPL-SCNC: 137 MMOL/L (ref 136–145)
SODIUM SERPL-SCNC: 138 MMOL/L (ref 136–145)
SODIUM SERPL-SCNC: 139 MMOL/L (ref 136–145)
SODIUM SERPL-SCNC: 141 MMOL/L (ref 136–145)
SODIUM SERPL-SCNC: 144 MMOL/L (ref 136–145)
SODIUM SERPL-SCNC: 145 MMOL/L (ref 136–145)
SODIUM SERPL-SCNC: 145 MMOL/L (ref 136–145)
SODIUM SERPL-SCNC: 146 MMOL/L (ref 136–145)
SODIUM SERPL-SCNC: 147 MMOL/L (ref 136–145)
SODIUM SERPL-SCNC: 148 MMOL/L (ref 136–145)
SODIUM SERPL-SCNC: 150 MMOL/L (ref 136–145)
SP GR UR STRIP: 1.02 (ref 1–1.03)
SQUAMOUS #/AREA URNS HPF: ABNORMAL /HPF
TRANS CELLS #/AREA URNS HPF: ABNORMAL /HPF
TROPONIN T DELTA: -89 NG/L
TROPONIN T SERPL HS-MCNC: 308 NG/L
UROBILINOGEN UR QL STRIP: ABNORMAL
VARIANT LYMPHS NFR BLD MANUAL: 1 % (ref 0–5)
VARIANT LYMPHS NFR BLD MANUAL: 1 % (ref 19.6–45.3)
VARIANT LYMPHS NFR BLD MANUAL: 2 % (ref 19.6–45.3)
VARIANT LYMPHS NFR BLD MANUAL: 3 % (ref 19.6–45.3)
VARIANT LYMPHS NFR BLD MANUAL: 4 % (ref 19.6–45.3)
VARIANT LYMPHS NFR BLD MANUAL: 4 % (ref 19.6–45.3)
VARIANT LYMPHS NFR BLD MANUAL: 6.8 % (ref 19.6–45.3)
VENTILATOR MODE: ABNORMAL
WBC # UR STRIP: ABNORMAL /HPF
WBC MORPH BLD: NORMAL
WBC NRBC COR # BLD AUTO: 10.77 10*3/MM3 (ref 3.4–10.8)
WBC NRBC COR # BLD AUTO: 11.22 10*3/MM3 (ref 3.4–10.8)
WBC NRBC COR # BLD AUTO: 11.38 10*3/MM3 (ref 3.4–10.8)
WBC NRBC COR # BLD AUTO: 12.09 10*3/MM3 (ref 3.4–10.8)
WBC NRBC COR # BLD AUTO: 12.41 10*3/MM3 (ref 3.4–10.8)
WBC NRBC COR # BLD AUTO: 13.15 10*3/MM3 (ref 3.4–10.8)
WBC NRBC COR # BLD AUTO: 15.16 10*3/MM3 (ref 3.4–10.8)
WBC NRBC COR # BLD AUTO: 16.25 10*3/MM3 (ref 3.4–10.8)

## 2024-01-01 PROCEDURE — 84100 ASSAY OF PHOSPHORUS: CPT | Performed by: INTERNAL MEDICINE

## 2024-01-01 PROCEDURE — 85025 COMPLETE CBC W/AUTO DIFF WBC: CPT | Performed by: INTERNAL MEDICINE

## 2024-01-01 PROCEDURE — 85007 BL SMEAR W/DIFF WBC COUNT: CPT | Performed by: NURSE PRACTITIONER

## 2024-01-01 PROCEDURE — 80053 COMPREHEN METABOLIC PANEL: CPT | Performed by: EMERGENCY MEDICINE

## 2024-01-01 PROCEDURE — 84100 ASSAY OF PHOSPHORUS: CPT | Performed by: NURSE PRACTITIONER

## 2024-01-01 PROCEDURE — 81001 URINALYSIS AUTO W/SCOPE: CPT | Performed by: EMERGENCY MEDICINE

## 2024-01-01 PROCEDURE — 87040 BLOOD CULTURE FOR BACTERIA: CPT | Performed by: EMERGENCY MEDICINE

## 2024-01-01 PROCEDURE — 25010000002 MAGNESIUM SULFATE 2 GM/50ML SOLUTION: Performed by: INTERNAL MEDICINE

## 2024-01-01 PROCEDURE — 97162 PT EVAL MOD COMPLEX 30 MIN: CPT

## 2024-01-01 PROCEDURE — C1751 CATH, INF, PER/CENT/MIDLINE: HCPCS

## 2024-01-01 PROCEDURE — 25010000002 HEPARIN (PORCINE) PER 1000 UNITS: Performed by: INTERNAL MEDICINE

## 2024-01-01 PROCEDURE — 84132 ASSAY OF SERUM POTASSIUM: CPT | Performed by: INTERNAL MEDICINE

## 2024-01-01 PROCEDURE — 84484 ASSAY OF TROPONIN QUANT: CPT | Performed by: EMERGENCY MEDICINE

## 2024-01-01 PROCEDURE — 80053 COMPREHEN METABOLIC PANEL: CPT | Performed by: INTERNAL MEDICINE

## 2024-01-01 PROCEDURE — 85007 BL SMEAR W/DIFF WBC COUNT: CPT | Performed by: INTERNAL MEDICINE

## 2024-01-01 PROCEDURE — 84145 PROCALCITONIN (PCT): CPT | Performed by: EMERGENCY MEDICINE

## 2024-01-01 PROCEDURE — 97110 THERAPEUTIC EXERCISES: CPT

## 2024-01-01 PROCEDURE — 99232 SBSQ HOSP IP/OBS MODERATE 35: CPT

## 2024-01-01 PROCEDURE — 25510000001 PERFLUTREN 6.52 MG/ML SUSPENSION: Performed by: INTERNAL MEDICINE

## 2024-01-01 PROCEDURE — 25810000003 SODIUM CHLORIDE 0.9 % SOLUTION 250 ML FLEX CONT: Performed by: EMERGENCY MEDICINE

## 2024-01-01 PROCEDURE — 87641 MR-STAPH DNA AMP PROBE: CPT | Performed by: NURSE PRACTITIONER

## 2024-01-01 PROCEDURE — 25810000003 LACTATED RINGERS SOLUTION: Performed by: NURSE PRACTITIONER

## 2024-01-01 PROCEDURE — 25510000001 IOPAMIDOL 61 % SOLUTION: Performed by: INTERNAL MEDICINE

## 2024-01-01 PROCEDURE — 25010000002 ONDANSETRON PER 1 MG: Performed by: INTERNAL MEDICINE

## 2024-01-01 PROCEDURE — 25010000002 POTASSIUM CHLORIDE PER 2 MEQ: Performed by: INTERNAL MEDICINE

## 2024-01-01 PROCEDURE — 82375 ASSAY CARBOXYHB QUANT: CPT

## 2024-01-01 PROCEDURE — 93306 TTE W/DOPPLER COMPLETE: CPT | Performed by: EMERGENCY MEDICINE

## 2024-01-01 PROCEDURE — 25010000002 CEFEPIME PER 500 MG: Performed by: EMERGENCY MEDICINE

## 2024-01-01 PROCEDURE — 85025 COMPLETE CBC W/AUTO DIFF WBC: CPT | Performed by: EMERGENCY MEDICINE

## 2024-01-01 PROCEDURE — 25010000002 CEFEPIME PER 500 MG: Performed by: INTERNAL MEDICINE

## 2024-01-01 PROCEDURE — 93010 ELECTROCARDIOGRAM REPORT: CPT | Performed by: STUDENT IN AN ORGANIZED HEALTH CARE EDUCATION/TRAINING PROGRAM

## 2024-01-01 PROCEDURE — 83735 ASSAY OF MAGNESIUM: CPT | Performed by: INTERNAL MEDICINE

## 2024-01-01 PROCEDURE — 99221 1ST HOSP IP/OBS SF/LOW 40: CPT | Performed by: UROLOGY

## 2024-01-01 PROCEDURE — 85025 COMPLETE CBC W/AUTO DIFF WBC: CPT | Performed by: NURSE PRACTITIONER

## 2024-01-01 PROCEDURE — 87186 SC STD MICRODIL/AGAR DIL: CPT | Performed by: EMERGENCY MEDICINE

## 2024-01-01 PROCEDURE — 80048 BASIC METABOLIC PNL TOTAL CA: CPT | Performed by: INTERNAL MEDICINE

## 2024-01-01 PROCEDURE — 80048 BASIC METABOLIC PNL TOTAL CA: CPT | Performed by: PHYSICIAN ASSISTANT

## 2024-01-01 PROCEDURE — 86140 C-REACTIVE PROTEIN: CPT | Performed by: EMERGENCY MEDICINE

## 2024-01-01 PROCEDURE — 82248 BILIRUBIN DIRECT: CPT | Performed by: INTERNAL MEDICINE

## 2024-01-01 PROCEDURE — 82550 ASSAY OF CK (CPK): CPT | Performed by: NURSE PRACTITIONER

## 2024-01-01 PROCEDURE — 99233 SBSQ HOSP IP/OBS HIGH 50: CPT | Performed by: CLINICAL NURSE SPECIALIST

## 2024-01-01 PROCEDURE — 82805 BLOOD GASES W/O2 SATURATION: CPT

## 2024-01-01 PROCEDURE — 80076 HEPATIC FUNCTION PANEL: CPT | Performed by: INTERNAL MEDICINE

## 2024-01-01 PROCEDURE — 25010000002 POTASSIUM CHLORIDE 10 MEQ/100ML SOLUTION: Performed by: INTERNAL MEDICINE

## 2024-01-01 PROCEDURE — 71045 X-RAY EXAM CHEST 1 VIEW: CPT

## 2024-01-01 PROCEDURE — 74018 RADEX ABDOMEN 1 VIEW: CPT

## 2024-01-01 PROCEDURE — 87076 CULTURE ANAEROBE IDENT EACH: CPT | Performed by: EMERGENCY MEDICINE

## 2024-01-01 PROCEDURE — 93005 ELECTROCARDIOGRAM TRACING: CPT | Performed by: EMERGENCY MEDICINE

## 2024-01-01 PROCEDURE — 25010000002 DEXAMETHASONE PER 1 MG: Performed by: EMERGENCY MEDICINE

## 2024-01-01 PROCEDURE — 25010000002 POTASSIUM CHLORIDE PER 2 MEQ: Performed by: NURSE PRACTITIONER

## 2024-01-01 PROCEDURE — 25810000003 LACTATED RINGERS PER 1000 ML: Performed by: PHYSICIAN ASSISTANT

## 2024-01-01 PROCEDURE — 85007 BL SMEAR W/DIFF WBC COUNT: CPT | Performed by: EMERGENCY MEDICINE

## 2024-01-01 PROCEDURE — 83880 ASSAY OF NATRIURETIC PEPTIDE: CPT | Performed by: EMERGENCY MEDICINE

## 2024-01-01 PROCEDURE — 97166 OT EVAL MOD COMPLEX 45 MIN: CPT | Performed by: OCCUPATIONAL THERAPIST

## 2024-01-01 PROCEDURE — 36415 COLL VENOUS BLD VENIPUNCTURE: CPT | Performed by: INTERNAL MEDICINE

## 2024-01-01 PROCEDURE — 83605 ASSAY OF LACTIC ACID: CPT | Performed by: EMERGENCY MEDICINE

## 2024-01-01 PROCEDURE — 85610 PROTHROMBIN TIME: CPT | Performed by: EMERGENCY MEDICINE

## 2024-01-01 PROCEDURE — 97530 THERAPEUTIC ACTIVITIES: CPT

## 2024-01-01 PROCEDURE — 51702 INSERT TEMP BLADDER CATH: CPT

## 2024-01-01 PROCEDURE — 83050 HGB METHEMOGLOBIN QUAN: CPT

## 2024-01-01 PROCEDURE — 25810000003 LACTATED RINGERS PER 1000 ML: Performed by: INTERNAL MEDICINE

## 2024-01-01 PROCEDURE — 99232 SBSQ HOSP IP/OBS MODERATE 35: CPT | Performed by: CLINICAL NURSE SPECIALIST

## 2024-01-01 PROCEDURE — 93306 TTE W/DOPPLER COMPLETE: CPT

## 2024-01-01 PROCEDURE — 99222 1ST HOSP IP/OBS MODERATE 55: CPT | Performed by: STUDENT IN AN ORGANIZED HEALTH CARE EDUCATION/TRAINING PROGRAM

## 2024-01-01 PROCEDURE — 25010000002 VANCOMYCIN 1 G RECONSTITUTED SOLUTION 1 EACH VIAL: Performed by: EMERGENCY MEDICINE

## 2024-01-01 PROCEDURE — 83735 ASSAY OF MAGNESIUM: CPT | Performed by: EMERGENCY MEDICINE

## 2024-01-01 PROCEDURE — 99222 1ST HOSP IP/OBS MODERATE 55: CPT

## 2024-01-01 PROCEDURE — 87086 URINE CULTURE/COLONY COUNT: CPT | Performed by: EMERGENCY MEDICINE

## 2024-01-01 PROCEDURE — 99291 CRITICAL CARE FIRST HOUR: CPT

## 2024-01-01 PROCEDURE — 74177 CT ABD & PELVIS W/CONTRAST: CPT

## 2024-01-01 PROCEDURE — 0202U NFCT DS 22 TRGT SARS-COV-2: CPT | Performed by: EMERGENCY MEDICINE

## 2024-01-01 PROCEDURE — 0JBM0ZZ EXCISION OF LEFT UPPER LEG SUBCUTANEOUS TISSUE AND FASCIA, OPEN APPROACH: ICD-10-PCS | Performed by: NURSE PRACTITIONER

## 2024-01-01 PROCEDURE — 25010000002 VANCOMYCIN PER 500 MG: Performed by: NURSE PRACTITIONER

## 2024-01-01 PROCEDURE — 02HV33Z INSERTION OF INFUSION DEVICE INTO SUPERIOR VENA CAVA, PERCUTANEOUS APPROACH: ICD-10-PCS | Performed by: EMERGENCY MEDICINE

## 2024-01-01 PROCEDURE — 36600 WITHDRAWAL OF ARTERIAL BLOOD: CPT

## 2024-01-01 RX ORDER — MULTIPLE VITAMINS W/ MINERALS TAB 9MG-400MCG
1 TAB ORAL DAILY
COMMUNITY

## 2024-01-01 RX ORDER — FUROSEMIDE 10 MG/ML
20 INJECTION INTRAMUSCULAR; INTRAVENOUS EVERY 6 HOURS PRN
Status: DISCONTINUED | OUTPATIENT
Start: 2024-01-01 | End: 2024-01-01 | Stop reason: HOSPADM

## 2024-01-01 RX ORDER — LISINOPRIL 10 MG/1
10 TABLET ORAL
Status: DISCONTINUED | OUTPATIENT
Start: 2024-01-01 | End: 2024-01-01

## 2024-01-01 RX ORDER — CHLORHEXIDINE GLUCONATE 500 MG/1
1 CLOTH TOPICAL ONCE
Status: COMPLETED | OUTPATIENT
Start: 2024-01-01 | End: 2024-01-01

## 2024-01-01 RX ORDER — ASCORBIC ACID 500 MG
1000 TABLET ORAL DAILY
Status: DISCONTINUED | OUTPATIENT
Start: 2024-01-01 | End: 2024-01-01

## 2024-01-01 RX ORDER — BISACODYL 5 MG/1
5 TABLET, DELAYED RELEASE ORAL DAILY PRN
Status: DISCONTINUED | OUTPATIENT
Start: 2024-01-01 | End: 2024-01-01

## 2024-01-01 RX ORDER — VENLAFAXINE HYDROCHLORIDE 150 MG/1
150 CAPSULE, EXTENDED RELEASE ORAL DAILY
COMMUNITY

## 2024-01-01 RX ORDER — LORAZEPAM 2 MG/1
2 TABLET ORAL EVERY 6 HOURS PRN
Qty: 20 TABLET | Refills: 0 | Status: SHIPPED | OUTPATIENT
Start: 2024-01-01

## 2024-01-01 RX ORDER — MORPHINE SULFATE 20 MG/ML
10 SOLUTION ORAL
Status: DISCONTINUED | OUTPATIENT
Start: 2024-01-01 | End: 2024-01-01 | Stop reason: HOSPADM

## 2024-01-01 RX ORDER — NITROGLYCERIN 0.4 MG/1
0.4 TABLET SUBLINGUAL
Status: DISCONTINUED | OUTPATIENT
Start: 2024-01-01 | End: 2024-01-01

## 2024-01-01 RX ORDER — TAMSULOSIN HYDROCHLORIDE 0.4 MG/1
0.4 CAPSULE ORAL NIGHTLY
Status: DISCONTINUED | OUTPATIENT
Start: 2024-01-01 | End: 2024-01-01

## 2024-01-01 RX ORDER — HYDROCHLOROTHIAZIDE 25 MG/1
12.5 TABLET ORAL
Status: DISCONTINUED | OUTPATIENT
Start: 2024-01-01 | End: 2024-01-01

## 2024-01-01 RX ORDER — MAGNESIUM SULFATE HEPTAHYDRATE 40 MG/ML
2 INJECTION, SOLUTION INTRAVENOUS
Status: COMPLETED | OUTPATIENT
Start: 2024-01-01 | End: 2024-01-01

## 2024-01-01 RX ORDER — FINASTERIDE 5 MG/1
5 TABLET, FILM COATED ORAL DAILY
Status: DISCONTINUED | OUTPATIENT
Start: 2024-01-01 | End: 2024-01-01

## 2024-01-01 RX ORDER — IPRATROPIUM BROMIDE AND ALBUTEROL SULFATE 2.5; .5 MG/3ML; MG/3ML
3 SOLUTION RESPIRATORY (INHALATION) EVERY 4 HOURS PRN
Status: DISCONTINUED | OUTPATIENT
Start: 2024-01-01 | End: 2024-01-01 | Stop reason: HOSPADM

## 2024-01-01 RX ORDER — AMOXICILLIN 250 MG
2 CAPSULE ORAL 2 TIMES DAILY
Status: DISCONTINUED | OUTPATIENT
Start: 2024-01-01 | End: 2024-01-01

## 2024-01-01 RX ORDER — BUSPIRONE HYDROCHLORIDE 10 MG/1
10 TABLET ORAL 2 TIMES DAILY
Status: DISCONTINUED | OUTPATIENT
Start: 2024-01-01 | End: 2024-01-01

## 2024-01-01 RX ORDER — DIPHENHYDRAMINE HYDROCHLORIDE 50 MG/ML
25 INJECTION INTRAMUSCULAR; INTRAVENOUS EVERY 6 HOURS PRN
Status: DISCONTINUED | OUTPATIENT
Start: 2024-01-01 | End: 2024-01-01 | Stop reason: HOSPADM

## 2024-01-01 RX ORDER — HEPARIN SODIUM 5000 [USP'U]/ML
5000 INJECTION, SOLUTION INTRAVENOUS; SUBCUTANEOUS EVERY 8 HOURS SCHEDULED
Status: DISCONTINUED | OUTPATIENT
Start: 2024-01-01 | End: 2024-01-01

## 2024-01-01 RX ORDER — DIPHENHYDRAMINE HCL 25 MG
25 CAPSULE ORAL EVERY 6 HOURS PRN
Status: DISCONTINUED | OUTPATIENT
Start: 2024-01-01 | End: 2024-01-01 | Stop reason: HOSPADM

## 2024-01-01 RX ORDER — DEXTROSE MONOHYDRATE AND SODIUM CHLORIDE 5; .45 G/100ML; G/100ML
50 INJECTION, SOLUTION INTRAVENOUS CONTINUOUS
Status: DISCONTINUED | OUTPATIENT
Start: 2024-01-01 | End: 2024-01-01

## 2024-01-01 RX ORDER — MUPIROCIN 20 MG/G
1 OINTMENT TOPICAL EVERY 12 HOURS SCHEDULED
Status: DISCONTINUED | OUTPATIENT
Start: 2024-01-01 | End: 2024-01-01

## 2024-01-01 RX ORDER — LORAZEPAM 2 MG/ML
0.5 CONCENTRATE ORAL
Status: DISCONTINUED | OUTPATIENT
Start: 2024-01-01 | End: 2024-01-01 | Stop reason: HOSPADM

## 2024-01-01 RX ORDER — AMOXICILLIN 500 MG/1
500 CAPSULE ORAL EVERY 8 HOURS SCHEDULED
Status: DISCONTINUED | OUTPATIENT
Start: 2024-01-01 | End: 2024-01-01

## 2024-01-01 RX ORDER — LEVOFLOXACIN 750 MG/1
750 TABLET, FILM COATED ORAL EVERY 24 HOURS
Status: DISCONTINUED | OUTPATIENT
Start: 2024-01-01 | End: 2024-01-01

## 2024-01-01 RX ORDER — TERAZOSIN 1 MG/1
1 CAPSULE ORAL NIGHTLY
Status: DISCONTINUED | OUTPATIENT
Start: 2024-01-01 | End: 2024-01-01

## 2024-01-01 RX ORDER — ACETAMINOPHEN 325 MG/1
650 TABLET ORAL EVERY 4 HOURS PRN
Status: DISCONTINUED | OUTPATIENT
Start: 2024-01-01 | End: 2024-01-01 | Stop reason: HOSPADM

## 2024-01-01 RX ORDER — LORAZEPAM 2 MG/ML
1 CONCENTRATE ORAL
Status: DISCONTINUED | OUTPATIENT
Start: 2024-01-01 | End: 2024-01-01 | Stop reason: HOSPADM

## 2024-01-01 RX ORDER — MORPHINE SULFATE 20 MG/ML
10 SOLUTION ORAL ONCE
Status: DISCONTINUED | OUTPATIENT
Start: 2024-01-01 | End: 2024-01-01

## 2024-01-01 RX ORDER — MORPHINE SULFATE 20 MG/ML
20 SOLUTION ORAL EVERY 6 HOURS SCHEDULED
Status: DISCONTINUED | OUTPATIENT
Start: 2024-01-01 | End: 2024-01-01

## 2024-01-01 RX ORDER — GUAIFENESIN 600 MG/1
600 TABLET, EXTENDED RELEASE ORAL 2 TIMES DAILY
Status: DISCONTINUED | OUTPATIENT
Start: 2024-01-01 | End: 2024-01-01

## 2024-01-01 RX ORDER — SODIUM CHLORIDE, SODIUM LACTATE, POTASSIUM CHLORIDE, CALCIUM CHLORIDE 600; 310; 30; 20 MG/100ML; MG/100ML; MG/100ML; MG/100ML
75 INJECTION, SOLUTION INTRAVENOUS CONTINUOUS
Status: DISCONTINUED | OUTPATIENT
Start: 2024-01-01 | End: 2024-01-01

## 2024-01-01 RX ORDER — ACETAMINOPHEN 650 MG/1
650 SUPPOSITORY RECTAL EVERY 4 HOURS PRN
Status: DISCONTINUED | OUTPATIENT
Start: 2024-01-01 | End: 2024-01-01 | Stop reason: HOSPADM

## 2024-01-01 RX ORDER — FLUTICASONE PROPIONATE 50 UG/1
2 SPRAY, METERED NASAL DAILY
Status: DISCONTINUED | OUTPATIENT
Start: 2024-01-01 | End: 2024-01-01

## 2024-01-01 RX ORDER — SODIUM CHLORIDE 9 MG/ML
40 INJECTION, SOLUTION INTRAVENOUS AS NEEDED
Status: DISCONTINUED | OUTPATIENT
Start: 2024-01-01 | End: 2024-01-01 | Stop reason: HOSPADM

## 2024-01-01 RX ORDER — DEXAMETHASONE SODIUM PHOSPHATE 10 MG/ML
10 INJECTION INTRAMUSCULAR; INTRAVENOUS ONCE
Status: COMPLETED | OUTPATIENT
Start: 2024-01-01 | End: 2024-01-01

## 2024-01-01 RX ORDER — MORPHINE SULFATE 20 MG/ML
5 SOLUTION ORAL
Qty: 30 ML | Refills: 0 | Status: SHIPPED | OUTPATIENT
Start: 2024-01-01 | End: 2024-09-28

## 2024-01-01 RX ORDER — MORPHINE SULFATE 20 MG/ML
5 SOLUTION ORAL
Status: DISCONTINUED | OUTPATIENT
Start: 2024-01-01 | End: 2024-01-01 | Stop reason: HOSPADM

## 2024-01-01 RX ORDER — METRONIDAZOLE 500 MG/1
500 TABLET ORAL EVERY 8 HOURS SCHEDULED
Status: DISCONTINUED | OUTPATIENT
Start: 2024-01-01 | End: 2024-01-01

## 2024-01-01 RX ORDER — FLUTICASONE PROPIONATE 50 UG/1
2 SPRAY, METERED NASAL DAILY
COMMUNITY

## 2024-01-01 RX ORDER — SCOLOPAMINE TRANSDERMAL SYSTEM 1 MG/1
1 PATCH, EXTENDED RELEASE TRANSDERMAL
Start: 2024-01-01

## 2024-01-01 RX ORDER — MORPHINE SULFATE 20 MG/ML
15 SOLUTION ORAL
Status: DISCONTINUED | OUTPATIENT
Start: 2024-01-01 | End: 2024-01-01

## 2024-01-01 RX ORDER — SODIUM HYPOCHLORITE 1.25 MG/ML
SOLUTION TOPICAL EVERY 12 HOURS SCHEDULED
Status: DISCONTINUED | OUTPATIENT
Start: 2024-01-01 | End: 2024-01-01 | Stop reason: HOSPADM

## 2024-01-01 RX ORDER — POLYETHYLENE GLYCOL 3350 17 G/17G
17 POWDER, FOR SOLUTION ORAL DAILY PRN
Status: DISCONTINUED | OUTPATIENT
Start: 2024-01-01 | End: 2024-01-01

## 2024-01-01 RX ORDER — GINSENG 100 MG
1 CAPSULE ORAL DAILY
COMMUNITY

## 2024-01-01 RX ORDER — BISACODYL 10 MG
10 SUPPOSITORY, RECTAL RECTAL DAILY PRN
COMMUNITY

## 2024-01-01 RX ORDER — DIPHENOXYLATE HCL/ATROPINE 2.5-.025MG
1 TABLET ORAL
Status: DISCONTINUED | OUTPATIENT
Start: 2024-01-01 | End: 2024-01-01 | Stop reason: HOSPADM

## 2024-01-01 RX ORDER — HALOPERIDOL 2 MG/ML
2 SOLUTION ORAL EVERY 4 HOURS PRN
Status: DISCONTINUED | OUTPATIENT
Start: 2024-01-01 | End: 2024-01-01 | Stop reason: HOSPADM

## 2024-01-01 RX ORDER — MAGNESIUM SULFATE HEPTAHYDRATE 40 MG/ML
2 INJECTION, SOLUTION INTRAVENOUS ONCE
Status: COMPLETED | OUTPATIENT
Start: 2024-01-01 | End: 2024-01-01

## 2024-01-01 RX ORDER — POTASSIUM CHLORIDE 750 MG/1
40 CAPSULE, EXTENDED RELEASE ORAL ONCE
Status: DISCONTINUED | OUTPATIENT
Start: 2024-01-01 | End: 2024-01-01

## 2024-01-01 RX ORDER — NOREPINEPHRINE BITARTRATE 0.03 MG/ML
.02-.3 INJECTION, SOLUTION INTRAVENOUS
Status: DISCONTINUED | OUTPATIENT
Start: 2024-01-01 | End: 2024-01-01

## 2024-01-01 RX ORDER — SCOLOPAMINE TRANSDERMAL SYSTEM 1 MG/1
1 PATCH, EXTENDED RELEASE TRANSDERMAL
Status: DISCONTINUED | OUTPATIENT
Start: 2024-01-01 | End: 2024-01-01 | Stop reason: HOSPADM

## 2024-01-01 RX ORDER — SODIUM CHLORIDE 0.9 % (FLUSH) 0.9 %
10 SYRINGE (ML) INJECTION EVERY 12 HOURS SCHEDULED
Status: DISCONTINUED | OUTPATIENT
Start: 2024-01-01 | End: 2024-01-01 | Stop reason: HOSPADM

## 2024-01-01 RX ORDER — POTASSIUM CHLORIDE 750 MG/1
40 CAPSULE, EXTENDED RELEASE ORAL EVERY 4 HOURS
Status: COMPLETED | OUTPATIENT
Start: 2024-01-01 | End: 2024-01-01

## 2024-01-01 RX ORDER — L.ACID,PARA/B.BIFIDUM/S.THERM 8B CELL
1 CAPSULE ORAL DAILY
Status: DISCONTINUED | OUTPATIENT
Start: 2024-01-01 | End: 2024-01-01

## 2024-01-01 RX ORDER — LORAZEPAM 2 MG/ML
2 CONCENTRATE ORAL
Status: DISCONTINUED | OUTPATIENT
Start: 2024-01-01 | End: 2024-01-01 | Stop reason: HOSPADM

## 2024-01-01 RX ORDER — IOPAMIDOL 612 MG/ML
100 INJECTION, SOLUTION INTRAVASCULAR
Status: COMPLETED | OUTPATIENT
Start: 2024-01-01 | End: 2024-01-01

## 2024-01-01 RX ORDER — MORPHINE SULFATE 20 MG/ML
15 SOLUTION ORAL ONCE
Status: COMPLETED | OUTPATIENT
Start: 2024-01-01 | End: 2024-01-01

## 2024-01-01 RX ORDER — ASCORBIC ACID 500 MG
1000 TABLET ORAL DAILY
COMMUNITY

## 2024-01-01 RX ORDER — MORPHINE SULFATE 20 MG/ML
5 SOLUTION ORAL EVERY 6 HOURS SCHEDULED
Status: DISCONTINUED | OUTPATIENT
Start: 2024-01-01 | End: 2024-01-01

## 2024-01-01 RX ORDER — CETIRIZINE HYDROCHLORIDE 10 MG/1
10 TABLET ORAL DAILY
Status: DISCONTINUED | OUTPATIENT
Start: 2024-01-01 | End: 2024-01-01

## 2024-01-01 RX ORDER — POTASSIUM CHLORIDE 29.8 MG/ML
20 INJECTION INTRAVENOUS
Status: COMPLETED | OUTPATIENT
Start: 2024-01-01 | End: 2024-01-01

## 2024-01-01 RX ORDER — LORAZEPAM 2 MG/ML
2 INJECTION INTRAMUSCULAR
Status: DISCONTINUED | OUTPATIENT
Start: 2024-01-01 | End: 2024-01-01 | Stop reason: HOSPADM

## 2024-01-01 RX ORDER — LIDOCAINE 40 MG/G
1 CREAM TOPICAL ONCE
Status: COMPLETED | OUTPATIENT
Start: 2024-01-01 | End: 2024-01-01

## 2024-01-01 RX ORDER — LEVOTHYROXINE SODIUM 125 UG/1
125 TABLET ORAL DAILY
COMMUNITY

## 2024-01-01 RX ORDER — SODIUM CHLORIDE 0.9 % (FLUSH) 0.9 %
10 SYRINGE (ML) INJECTION AS NEEDED
Status: DISCONTINUED | OUTPATIENT
Start: 2024-01-01 | End: 2024-01-01 | Stop reason: HOSPADM

## 2024-01-01 RX ORDER — POTASSIUM CHLORIDE 7.45 MG/ML
10 INJECTION INTRAVENOUS
Status: COMPLETED | OUTPATIENT
Start: 2024-01-01 | End: 2024-01-01

## 2024-01-01 RX ORDER — MORPHINE SULFATE 20 MG/ML
20 SOLUTION ORAL
Status: DISCONTINUED | OUTPATIENT
Start: 2024-01-01 | End: 2024-01-01 | Stop reason: HOSPADM

## 2024-01-01 RX ORDER — HALOPERIDOL 2 MG/ML
1 SOLUTION ORAL EVERY 4 HOURS PRN
Status: DISCONTINUED | OUTPATIENT
Start: 2024-01-01 | End: 2024-01-01 | Stop reason: HOSPADM

## 2024-01-01 RX ORDER — PROCHLORPERAZINE MALEATE 10 MG
5 TABLET ORAL EVERY 6 HOURS PRN
Status: DISCONTINUED | OUTPATIENT
Start: 2024-01-01 | End: 2024-01-01 | Stop reason: HOSPADM

## 2024-01-01 RX ORDER — ZINC SULFATE 50(220)MG
220 CAPSULE ORAL DAILY
Status: DISCONTINUED | OUTPATIENT
Start: 2024-01-01 | End: 2024-01-01

## 2024-01-01 RX ORDER — BISACODYL 10 MG
10 SUPPOSITORY, RECTAL RECTAL DAILY PRN
Status: DISCONTINUED | OUTPATIENT
Start: 2024-01-01 | End: 2024-01-01

## 2024-01-01 RX ORDER — MULTIPLE VITAMINS W/ MINERALS TAB 9MG-400MCG
1 TAB ORAL DAILY
Status: DISCONTINUED | OUTPATIENT
Start: 2024-01-01 | End: 2024-01-01

## 2024-01-01 RX ORDER — ONDANSETRON 4 MG/1
4 TABLET, FILM COATED ORAL EVERY 6 HOURS PRN
COMMUNITY

## 2024-01-01 RX ORDER — POTASSIUM CHLORIDE 1500 MG/1
20 TABLET, EXTENDED RELEASE ORAL DAILY
COMMUNITY

## 2024-01-01 RX ORDER — ONDANSETRON 2 MG/ML
4 INJECTION INTRAMUSCULAR; INTRAVENOUS EVERY 6 HOURS PRN
Status: DISCONTINUED | OUTPATIENT
Start: 2024-01-01 | End: 2024-01-01 | Stop reason: HOSPADM

## 2024-01-01 RX ORDER — HALOPERIDOL 5 MG/ML
2 INJECTION INTRAMUSCULAR EVERY 4 HOURS PRN
Status: DISCONTINUED | OUTPATIENT
Start: 2024-01-01 | End: 2024-01-01 | Stop reason: HOSPADM

## 2024-01-01 RX ORDER — NICOTINE 21 MG/24HR
1 PATCH, TRANSDERMAL 24 HOURS TRANSDERMAL
Status: DISCONTINUED | OUTPATIENT
Start: 2024-01-01 | End: 2024-01-01

## 2024-01-01 RX ORDER — ACETAMINOPHEN 325 MG/1
650 TABLET ORAL EVERY 4 HOURS PRN
COMMUNITY

## 2024-01-01 RX ORDER — NITROFURANTOIN 25; 75 MG/1; MG/1
100 CAPSULE ORAL EVERY 12 HOURS SCHEDULED
Status: DISCONTINUED | OUTPATIENT
Start: 2024-01-01 | End: 2024-01-01

## 2024-01-01 RX ORDER — ATROPINE SULFATE 10 MG/ML
2 SOLUTION/ DROPS OPHTHALMIC 2 TIMES DAILY PRN
Status: DISCONTINUED | OUTPATIENT
Start: 2024-01-01 | End: 2024-01-01 | Stop reason: HOSPADM

## 2024-01-01 RX ORDER — ERGOCALCIFEROL (VITAMIN D2) 50 MCG
1 CAPSULE ORAL DAILY
COMMUNITY

## 2024-01-01 RX ORDER — HALOPERIDOL 5 MG/ML
1 INJECTION INTRAMUSCULAR EVERY 4 HOURS PRN
Status: DISCONTINUED | OUTPATIENT
Start: 2024-01-01 | End: 2024-01-01 | Stop reason: HOSPADM

## 2024-01-01 RX ORDER — CHLORHEXIDINE GLUCONATE 500 MG/1
1 CLOTH TOPICAL EVERY 24 HOURS
Status: DISCONTINUED | OUTPATIENT
Start: 2024-01-01 | End: 2024-01-01

## 2024-01-01 RX ORDER — VENLAFAXINE HYDROCHLORIDE 75 MG/1
150 CAPSULE, EXTENDED RELEASE ORAL DAILY
Status: DISCONTINUED | OUTPATIENT
Start: 2024-01-01 | End: 2024-01-01

## 2024-01-01 RX ORDER — PROCHLORPERAZINE 25 MG
25 SUPPOSITORY, RECTAL RECTAL EVERY 12 HOURS PRN
Status: DISCONTINUED | OUTPATIENT
Start: 2024-01-01 | End: 2024-01-01 | Stop reason: HOSPADM

## 2024-01-01 RX ORDER — PROCHLORPERAZINE EDISYLATE 5 MG/ML
5 INJECTION INTRAMUSCULAR; INTRAVENOUS EVERY 6 HOURS PRN
Status: DISCONTINUED | OUTPATIENT
Start: 2024-01-01 | End: 2024-01-01 | Stop reason: HOSPADM

## 2024-01-01 RX ADMIN — LORAZEPAM 2 MG: 2 SOLUTION, CONCENTRATE ORAL at 07:27

## 2024-01-01 RX ADMIN — ZINC SULFATE 220 MG (50 MG) CAPSULE 220 MG: CAPSULE at 09:21

## 2024-01-01 RX ADMIN — METRONIDAZOLE 500 MG: 500 TABLET ORAL at 05:17

## 2024-01-01 RX ADMIN — DOCUSATE SODIUM 50 MG AND SENNOSIDES 8.6 MG 2 TABLET: 8.6; 5 TABLET, FILM COATED ORAL at 23:54

## 2024-01-01 RX ADMIN — METRONIDAZOLE 500 MG: 500 TABLET ORAL at 09:37

## 2024-01-01 RX ADMIN — LEVOTHYROXINE SODIUM 125 MCG: 125 TABLET ORAL at 06:08

## 2024-01-01 RX ADMIN — SODIUM HYPOCHLORITE: 1.25 SOLUTION TOPICAL at 21:51

## 2024-01-01 RX ADMIN — BUSPIRONE HYDROCHLORIDE 10 MG: 10 TABLET ORAL at 20:21

## 2024-01-01 RX ADMIN — IOPAMIDOL 100 ML: 612 INJECTION, SOLUTION INTRAVENOUS at 17:11

## 2024-01-01 RX ADMIN — MUPIROCIN 1 APPLICATION: 20 OINTMENT TOPICAL at 21:51

## 2024-01-01 RX ADMIN — CEFEPIME 2000 MG: 2 INJECTION, POWDER, FOR SOLUTION INTRAVENOUS at 06:06

## 2024-01-01 RX ADMIN — METRONIDAZOLE 500 MG: 500 TABLET ORAL at 21:48

## 2024-01-01 RX ADMIN — Medication 1 APPLICATION: at 21:03

## 2024-01-01 RX ADMIN — MUPIROCIN 1 APPLICATION: 20 OINTMENT TOPICAL at 00:01

## 2024-01-01 RX ADMIN — LORAZEPAM 2 MG: 2 SOLUTION, CONCENTRATE ORAL at 17:52

## 2024-01-01 RX ADMIN — Medication 10 ML: at 09:07

## 2024-01-01 RX ADMIN — SODIUM HYPOCHLORITE: 1.25 SOLUTION TOPICAL at 21:00

## 2024-01-01 RX ADMIN — MORPHINE SULFATE 5 MG: 20 SOLUTION ORAL at 17:53

## 2024-01-01 RX ADMIN — COLLAGENASE SANTYL 1 APPLICATION: 250 OINTMENT TOPICAL at 09:22

## 2024-01-01 RX ADMIN — DOCUSATE SODIUM 50 MG AND SENNOSIDES 8.6 MG 2 TABLET: 8.6; 5 TABLET, FILM COATED ORAL at 20:21

## 2024-01-01 RX ADMIN — GUAIFENESIN 600 MG: 600 TABLET, EXTENDED RELEASE ORAL at 20:21

## 2024-01-01 RX ADMIN — POTASSIUM CHLORIDE 40 MEQ: 750 CAPSULE, EXTENDED RELEASE ORAL at 09:11

## 2024-01-01 RX ADMIN — LISINOPRIL 10 MG: 10 TABLET ORAL at 09:12

## 2024-01-01 RX ADMIN — OXYCODONE HYDROCHLORIDE AND ACETAMINOPHEN 1000 MG: 500 TABLET ORAL at 09:12

## 2024-01-01 RX ADMIN — HEPARIN SODIUM 5000 UNITS: 5000 INJECTION INTRAVENOUS; SUBCUTANEOUS at 05:17

## 2024-01-01 RX ADMIN — DEXTROSE AND SODIUM CHLORIDE 150 ML/HR: 5; 450 INJECTION, SOLUTION INTRAVENOUS at 06:35

## 2024-01-01 RX ADMIN — Medication 1 TABLET: at 09:02

## 2024-01-01 RX ADMIN — COLLAGENASE SANTYL 1 APPLICATION: 250 OINTMENT TOPICAL at 00:01

## 2024-01-01 RX ADMIN — DOCUSATE SODIUM 50 MG AND SENNOSIDES 8.6 MG 2 TABLET: 8.6; 5 TABLET, FILM COATED ORAL at 09:21

## 2024-01-01 RX ADMIN — AMOXICILLIN 500 MG: 500 CAPSULE ORAL at 06:08

## 2024-01-01 RX ADMIN — BUSPIRONE HYDROCHLORIDE 10 MG: 10 TABLET ORAL at 09:12

## 2024-01-01 RX ADMIN — MUPIROCIN 1 APPLICATION: 20 OINTMENT TOPICAL at 21:52

## 2024-01-01 RX ADMIN — ACETAMINOPHEN 650 MG: 325 TABLET ORAL at 15:14

## 2024-01-01 RX ADMIN — Medication 10 ML: at 20:49

## 2024-01-01 RX ADMIN — VENLAFAXINE HYDROCHLORIDE 150 MG: 75 CAPSULE, EXTENDED RELEASE ORAL at 09:21

## 2024-01-01 RX ADMIN — POTASSIUM CHLORIDE 20 MEQ: 400 INJECTION, SOLUTION INTRAVENOUS at 18:36

## 2024-01-01 RX ADMIN — FLUTICASONE PROPIONATE 2 SPRAY: 50 SPRAY, METERED NASAL at 09:22

## 2024-01-01 RX ADMIN — Medication 1 CAPSULE: at 09:11

## 2024-01-01 RX ADMIN — HEPARIN SODIUM 5000 UNITS: 5000 INJECTION INTRAVENOUS; SUBCUTANEOUS at 14:36

## 2024-01-01 RX ADMIN — GUAIFENESIN 600 MG: 600 TABLET, EXTENDED RELEASE ORAL at 21:50

## 2024-01-01 RX ADMIN — COLLAGENASE SANTYL 1 APPLICATION: 250 OINTMENT TOPICAL at 20:45

## 2024-01-01 RX ADMIN — CEFEPIME 2000 MG: 2 INJECTION, POWDER, FOR SOLUTION INTRAVENOUS at 14:45

## 2024-01-01 RX ADMIN — FINASTERIDE 5 MG: 5 TABLET, FILM COATED ORAL at 09:37

## 2024-01-01 RX ADMIN — ACETAMINOPHEN 650 MG: 325 TABLET ORAL at 10:04

## 2024-01-01 RX ADMIN — CETIRIZINE HYDROCHLORIDE 10 MG: 10 TABLET ORAL at 09:21

## 2024-01-01 RX ADMIN — CEFEPIME 2000 MG: 2 INJECTION, POWDER, FOR SOLUTION INTRAVENOUS at 14:31

## 2024-01-01 RX ADMIN — MORPHINE SULFATE 5 MG: 20 SOLUTION ORAL at 06:01

## 2024-01-01 RX ADMIN — MUPIROCIN 1 APPLICATION: 20 OINTMENT TOPICAL at 20:45

## 2024-01-01 RX ADMIN — MUPIROCIN 1 APPLICATION: 20 OINTMENT TOPICAL at 09:22

## 2024-01-01 RX ADMIN — CEFEPIME 2000 MG: 2 INJECTION, POWDER, FOR SOLUTION INTRAVENOUS at 14:05

## 2024-01-01 RX ADMIN — Medication 1 APPLICATION: at 08:06

## 2024-01-01 RX ADMIN — MUPIROCIN 1 APPLICATION: 20 OINTMENT TOPICAL at 08:11

## 2024-01-01 RX ADMIN — HYDROCHLOROTHIAZIDE 12.5 MG: 25 TABLET ORAL at 09:37

## 2024-01-01 RX ADMIN — CETIRIZINE HYDROCHLORIDE 10 MG: 10 TABLET ORAL at 09:11

## 2024-01-01 RX ADMIN — LIDOCAINE 4% 1 APPLICATION: 4 CREAM TOPICAL at 12:16

## 2024-01-01 RX ADMIN — FLUTICASONE PROPIONATE 2 SPRAY: 50 SPRAY, METERED NASAL at 09:39

## 2024-01-01 RX ADMIN — MORPHINE SULFATE 20 MG: 100 SOLUTION ORAL at 10:41

## 2024-01-01 RX ADMIN — SODIUM HYPOCHLORITE: 1.25 SOLUTION TOPICAL at 20:45

## 2024-01-01 RX ADMIN — SODIUM HYPOCHLORITE: 1.25 SOLUTION TOPICAL at 14:36

## 2024-01-01 RX ADMIN — VENLAFAXINE HYDROCHLORIDE 150 MG: 75 CAPSULE, EXTENDED RELEASE ORAL at 09:11

## 2024-01-01 RX ADMIN — METRONIDAZOLE 500 MG: 500 TABLET ORAL at 13:34

## 2024-01-01 RX ADMIN — COLLAGENASE SANTYL 1 APPLICATION: 250 OINTMENT TOPICAL at 21:51

## 2024-01-01 RX ADMIN — SODIUM HYPOCHLORITE: 1.25 SOLUTION TOPICAL at 20:28

## 2024-01-01 RX ADMIN — HEPARIN SODIUM 5000 UNITS: 5000 INJECTION INTRAVENOUS; SUBCUTANEOUS at 21:56

## 2024-01-01 RX ADMIN — LEVOFLOXACIN 750 MG: 750 TABLET, FILM COATED ORAL at 09:37

## 2024-01-01 RX ADMIN — GUAIFENESIN 600 MG: 600 TABLET, EXTENDED RELEASE ORAL at 09:12

## 2024-01-01 RX ADMIN — COLLAGENASE SANTYL 1 APPLICATION: 250 OINTMENT TOPICAL at 22:17

## 2024-01-01 RX ADMIN — MORPHINE SULFATE 20 MG: 100 SOLUTION ORAL at 15:47

## 2024-01-01 RX ADMIN — SODIUM CHLORIDE, POTASSIUM CHLORIDE, SODIUM LACTATE AND CALCIUM CHLORIDE 125 ML/HR: 600; 310; 30; 20 INJECTION, SOLUTION INTRAVENOUS at 20:47

## 2024-01-01 RX ADMIN — MAGNESIUM SULFATE HEPTAHYDRATE 2 G: 2 INJECTION, SOLUTION INTRAVENOUS at 09:57

## 2024-01-01 RX ADMIN — BUSPIRONE HYDROCHLORIDE 10 MG: 10 TABLET ORAL at 23:54

## 2024-01-01 RX ADMIN — BUSPIRONE HYDROCHLORIDE 10 MG: 10 TABLET ORAL at 21:49

## 2024-01-01 RX ADMIN — DEXTROSE AND SODIUM CHLORIDE 75 ML/HR: 5; 450 INJECTION, SOLUTION INTRAVENOUS at 06:05

## 2024-01-01 RX ADMIN — ONDANSETRON 4 MG: 2 INJECTION INTRAMUSCULAR; INTRAVENOUS at 20:48

## 2024-01-01 RX ADMIN — TERAZOSIN HYDROCHLORIDE 1 MG: 1 CAPSULE ORAL at 21:48

## 2024-01-01 RX ADMIN — Medication 10 ML: at 08:13

## 2024-01-01 RX ADMIN — POTASSIUM CHLORIDE 20 MEQ: 400 INJECTION, SOLUTION INTRAVENOUS at 10:17

## 2024-01-01 RX ADMIN — MORPHINE SULFATE 20 MG: 100 SOLUTION ORAL at 20:38

## 2024-01-01 RX ADMIN — BUSPIRONE HYDROCHLORIDE 10 MG: 10 TABLET ORAL at 09:21

## 2024-01-01 RX ADMIN — ZINC SULFATE 220 MG (50 MG) CAPSULE 220 MG: CAPSULE at 09:37

## 2024-01-01 RX ADMIN — HYDROCHLOROTHIAZIDE 12.5 MG: 25 TABLET ORAL at 09:02

## 2024-01-01 RX ADMIN — COLLAGENASE SANTYL 1 APPLICATION: 250 OINTMENT TOPICAL at 20:28

## 2024-01-01 RX ADMIN — MORPHINE SULFATE 15 MG: 100 SOLUTION ORAL at 17:53

## 2024-01-01 RX ADMIN — LEVOFLOXACIN 750 MG: 750 TABLET, FILM COATED ORAL at 09:12

## 2024-01-01 RX ADMIN — METRONIDAZOLE 500 MG: 500 TABLET ORAL at 21:51

## 2024-01-01 RX ADMIN — HEPARIN SODIUM 5000 UNITS: 5000 INJECTION INTRAVENOUS; SUBCUTANEOUS at 05:35

## 2024-01-01 RX ADMIN — SCOPALAMINE 1 PATCH: 1 PATCH, EXTENDED RELEASE TRANSDERMAL at 20:38

## 2024-01-01 RX ADMIN — DEXTROSE AND SODIUM CHLORIDE 75 ML/HR: 5; 450 INJECTION, SOLUTION INTRAVENOUS at 18:33

## 2024-01-01 RX ADMIN — HEPARIN SODIUM 5000 UNITS: 5000 INJECTION INTRAVENOUS; SUBCUTANEOUS at 05:15

## 2024-01-01 RX ADMIN — ONDANSETRON 4 MG: 2 INJECTION INTRAMUSCULAR; INTRAVENOUS at 14:51

## 2024-01-01 RX ADMIN — LISINOPRIL 10 MG: 10 TABLET ORAL at 09:37

## 2024-01-01 RX ADMIN — Medication 1 TABLET: at 09:22

## 2024-01-01 RX ADMIN — GUAIFENESIN 600 MG: 600 TABLET, EXTENDED RELEASE ORAL at 09:02

## 2024-01-01 RX ADMIN — MORPHINE SULFATE 5 MG: 20 SOLUTION ORAL at 23:52

## 2024-01-01 RX ADMIN — HEPARIN SODIUM 5000 UNITS: 5000 INJECTION INTRAVENOUS; SUBCUTANEOUS at 06:07

## 2024-01-01 RX ADMIN — CEFEPIME 2000 MG: 2 INJECTION, POWDER, FOR SOLUTION INTRAVENOUS at 22:15

## 2024-01-01 RX ADMIN — LORAZEPAM 2 MG: 2 SOLUTION, CONCENTRATE ORAL at 13:41

## 2024-01-01 RX ADMIN — SODIUM HYPOCHLORITE: 1.25 SOLUTION TOPICAL at 22:16

## 2024-01-01 RX ADMIN — DOCUSATE SODIUM 50 MG AND SENNOSIDES 8.6 MG 2 TABLET: 8.6; 5 TABLET, FILM COATED ORAL at 21:49

## 2024-01-01 RX ADMIN — GUAIFENESIN 600 MG: 600 TABLET, EXTENDED RELEASE ORAL at 23:54

## 2024-01-01 RX ADMIN — FINASTERIDE 5 MG: 5 TABLET, FILM COATED ORAL at 09:12

## 2024-01-01 RX ADMIN — TERAZOSIN HYDROCHLORIDE 1 MG: 1 CAPSULE ORAL at 21:49

## 2024-01-01 RX ADMIN — MORPHINE SULFATE 15 MG: 100 SOLUTION ORAL at 08:34

## 2024-01-01 RX ADMIN — Medication 10 ML: at 20:22

## 2024-01-01 RX ADMIN — MUPIROCIN 1 APPLICATION: 20 OINTMENT TOPICAL at 09:38

## 2024-01-01 RX ADMIN — SODIUM CHLORIDE, POTASSIUM CHLORIDE, SODIUM LACTATE AND CALCIUM CHLORIDE 1905 ML: 600; 310; 30; 20 INJECTION, SOLUTION INTRAVENOUS at 13:41

## 2024-01-01 RX ADMIN — BUSPIRONE HYDROCHLORIDE 10 MG: 10 TABLET ORAL at 09:37

## 2024-01-01 RX ADMIN — NOREPINEPHRINE BITARTRATE 0.08 MCG/KG/MIN: 0.03 INJECTION, SOLUTION INTRAVENOUS at 14:11

## 2024-01-01 RX ADMIN — Medication 1 CAPSULE: at 09:37

## 2024-01-01 RX ADMIN — Medication 1 APPLICATION: at 08:28

## 2024-01-01 RX ADMIN — MORPHINE SULFATE 20 MG: 100 SOLUTION ORAL at 01:17

## 2024-01-01 RX ADMIN — MORPHINE SULFATE 15 MG: 100 SOLUTION ORAL at 09:11

## 2024-01-01 RX ADMIN — POTASSIUM CHLORIDE 10 MEQ: 10 INJECTION, SOLUTION INTRAVENOUS at 04:04

## 2024-01-01 RX ADMIN — NOREPINEPHRINE BITARTRATE 0.02 MCG/KG/MIN: 0.03 INJECTION, SOLUTION INTRAVENOUS at 13:56

## 2024-01-01 RX ADMIN — MORPHINE SULFATE 5 MG: 20 SOLUTION ORAL at 13:41

## 2024-01-01 RX ADMIN — POTASSIUM CHLORIDE 20 MEQ: 400 INJECTION, SOLUTION INTRAVENOUS at 09:09

## 2024-01-01 RX ADMIN — DOCUSATE SODIUM 50 MG AND SENNOSIDES 8.6 MG 2 TABLET: 8.6; 5 TABLET, FILM COATED ORAL at 08:28

## 2024-01-01 RX ADMIN — LISINOPRIL 10 MG: 10 TABLET ORAL at 09:56

## 2024-01-01 RX ADMIN — Medication 10 ML: at 21:04

## 2024-01-01 RX ADMIN — CEFEPIME 2000 MG: 2 INJECTION, POWDER, FOR SOLUTION INTRAVENOUS at 23:13

## 2024-01-01 RX ADMIN — GUAIFENESIN 600 MG: 600 TABLET, EXTENDED RELEASE ORAL at 21:49

## 2024-01-01 RX ADMIN — POLYETHYLENE GLYCOL 3350 17 G: 17 POWDER, FOR SOLUTION ORAL at 10:15

## 2024-01-01 RX ADMIN — CHLORHEXIDINE GLUCONATE 1 APPLICATION: 500 CLOTH TOPICAL at 04:55

## 2024-01-01 RX ADMIN — Medication 1 CAPSULE: at 09:57

## 2024-01-01 RX ADMIN — CETIRIZINE HYDROCHLORIDE 10 MG: 10 TABLET ORAL at 09:37

## 2024-01-01 RX ADMIN — MAGNESIUM SULFATE HEPTAHYDRATE 2 G: 2 INJECTION, SOLUTION INTRAVENOUS at 13:34

## 2024-01-01 RX ADMIN — Medication 10 ML: at 20:44

## 2024-01-01 RX ADMIN — HYDROCHLOROTHIAZIDE 12.5 MG: 25 TABLET ORAL at 09:21

## 2024-01-01 RX ADMIN — SODIUM CHLORIDE, POTASSIUM CHLORIDE, SODIUM LACTATE AND CALCIUM CHLORIDE 75 ML/HR: 600; 310; 30; 20 INJECTION, SOLUTION INTRAVENOUS at 04:55

## 2024-01-01 RX ADMIN — VENLAFAXINE HYDROCHLORIDE 150 MG: 75 CAPSULE, EXTENDED RELEASE ORAL at 09:37

## 2024-01-01 RX ADMIN — Medication 10 ML: at 12:17

## 2024-01-01 RX ADMIN — DEXAMETHASONE SODIUM PHOSPHATE 10 MG: 10 INJECTION INTRAMUSCULAR; INTRAVENOUS at 13:47

## 2024-01-01 RX ADMIN — SODIUM CHLORIDE, POTASSIUM CHLORIDE, SODIUM LACTATE AND CALCIUM CHLORIDE 1000 ML: 600; 310; 30; 20 INJECTION, SOLUTION INTRAVENOUS at 20:48

## 2024-01-01 RX ADMIN — SODIUM HYPOCHLORITE: 1.25 SOLUTION TOPICAL at 00:02

## 2024-01-01 RX ADMIN — LEVOTHYROXINE SODIUM 125 MCG: 125 TABLET ORAL at 06:17

## 2024-01-01 RX ADMIN — SODIUM HYPOCHLORITE: 1.25 SOLUTION TOPICAL at 20:41

## 2024-01-01 RX ADMIN — DOCUSATE SODIUM 50 MG AND SENNOSIDES 8.6 MG 2 TABLET: 8.6; 5 TABLET, FILM COATED ORAL at 20:44

## 2024-01-01 RX ADMIN — COLLAGENASE SANTYL 1 APPLICATION: 250 OINTMENT TOPICAL at 14:36

## 2024-01-01 RX ADMIN — MAGNESIUM SULFATE HEPTAHYDRATE 2 G: 2 INJECTION, SOLUTION INTRAVENOUS at 11:46

## 2024-01-01 RX ADMIN — COLLAGENASE SANTYL 1 APPLICATION: 250 OINTMENT TOPICAL at 09:07

## 2024-01-01 RX ADMIN — HEPARIN SODIUM 5000 UNITS: 5000 INJECTION INTRAVENOUS; SUBCUTANEOUS at 14:31

## 2024-01-01 RX ADMIN — HYDROCHLOROTHIAZIDE 12.5 MG: 25 TABLET ORAL at 09:12

## 2024-01-01 RX ADMIN — LEVOTHYROXINE SODIUM 125 MCG: 125 TABLET ORAL at 05:34

## 2024-01-01 RX ADMIN — CEFEPIME 2000 MG: 2 INJECTION, POWDER, FOR SOLUTION INTRAVENOUS at 02:33

## 2024-01-01 RX ADMIN — SODIUM HYPOCHLORITE: 1.25 SOLUTION TOPICAL at 09:22

## 2024-01-01 RX ADMIN — FINASTERIDE 5 MG: 5 TABLET, FILM COATED ORAL at 09:02

## 2024-01-01 RX ADMIN — MORPHINE SULFATE 20 MG: 100 SOLUTION ORAL at 18:12

## 2024-01-01 RX ADMIN — CHLORHEXIDINE GLUCONATE 1 APPLICATION: 500 CLOTH TOPICAL at 04:10

## 2024-01-01 RX ADMIN — MORPHINE SULFATE 10 MG: 20 SOLUTION ORAL at 15:27

## 2024-01-01 RX ADMIN — BUSPIRONE HYDROCHLORIDE 10 MG: 10 TABLET ORAL at 21:48

## 2024-01-01 RX ADMIN — LEVOTHYROXINE SODIUM 125 MCG: 125 TABLET ORAL at 05:17

## 2024-01-01 RX ADMIN — HEPARIN SODIUM 5000 UNITS: 5000 INJECTION INTRAVENOUS; SUBCUTANEOUS at 21:43

## 2024-01-01 RX ADMIN — FINASTERIDE 5 MG: 5 TABLET, FILM COATED ORAL at 09:21

## 2024-01-01 RX ADMIN — HEPARIN SODIUM 5000 UNITS: 5000 INJECTION INTRAVENOUS; SUBCUTANEOUS at 21:50

## 2024-01-01 RX ADMIN — Medication 1 TABLET: at 09:37

## 2024-01-01 RX ADMIN — GUAIFENESIN 600 MG: 600 TABLET, EXTENDED RELEASE ORAL at 09:37

## 2024-01-01 RX ADMIN — MUPIROCIN 1 APPLICATION: 20 OINTMENT TOPICAL at 21:00

## 2024-01-01 RX ADMIN — CEFEPIME 2000 MG: 2 INJECTION, POWDER, FOR SOLUTION INTRAVENOUS at 14:51

## 2024-01-01 RX ADMIN — MUPIROCIN 1 APPLICATION: 20 OINTMENT TOPICAL at 20:28

## 2024-01-01 RX ADMIN — Medication 1 TABLET: at 09:12

## 2024-01-01 RX ADMIN — VANCOMYCIN HYDROCHLORIDE 1000 MG: 1 INJECTION, POWDER, LYOPHILIZED, FOR SOLUTION INTRAVENOUS at 14:15

## 2024-01-01 RX ADMIN — MORPHINE SULFATE 20 MG: 100 SOLUTION ORAL at 12:20

## 2024-01-01 RX ADMIN — MORPHINE SULFATE 20 MG: 100 SOLUTION ORAL at 13:31

## 2024-01-01 RX ADMIN — LISINOPRIL 10 MG: 10 TABLET ORAL at 09:21

## 2024-01-01 RX ADMIN — DOCUSATE SODIUM 50 MG AND SENNOSIDES 8.6 MG 2 TABLET: 8.6; 5 TABLET, FILM COATED ORAL at 08:06

## 2024-01-01 RX ADMIN — CHLORHEXIDINE GLUCONATE 1 APPLICATION: 500 CLOTH TOPICAL at 03:44

## 2024-01-01 RX ADMIN — PERFLUTREN 6.52 MG: 6.52 INJECTION, SUSPENSION INTRAVENOUS at 12:45

## 2024-01-01 RX ADMIN — MAGNESIUM SULFATE HEPTAHYDRATE 2 G: 2 INJECTION, SOLUTION INTRAVENOUS at 09:12

## 2024-01-01 RX ADMIN — DOCUSATE SODIUM 50 MG AND SENNOSIDES 8.6 MG 2 TABLET: 8.6; 5 TABLET, FILM COATED ORAL at 21:50

## 2024-01-01 RX ADMIN — Medication 1 APPLICATION: at 21:43

## 2024-01-01 RX ADMIN — SODIUM HYPOCHLORITE: 1.25 SOLUTION TOPICAL at 08:13

## 2024-01-01 RX ADMIN — POTASSIUM PHOSPHATE, MONOBASIC AND POTASSIUM PHOSPHATE, DIBASIC 15 MMOL: 224; 236 INJECTION, SOLUTION, CONCENTRATE INTRAVENOUS at 08:23

## 2024-01-01 RX ADMIN — LORAZEPAM 2 MG: 2 SOLUTION, CONCENTRATE ORAL at 15:28

## 2024-01-01 RX ADMIN — MUPIROCIN 1 APPLICATION: 20 OINTMENT TOPICAL at 09:12

## 2024-01-01 RX ADMIN — SODIUM HYPOCHLORITE: 1.25 SOLUTION TOPICAL at 09:07

## 2024-01-01 RX ADMIN — LEVOTHYROXINE SODIUM 125 MCG: 125 TABLET ORAL at 06:30

## 2024-01-01 RX ADMIN — POTASSIUM CHLORIDE 10 MEQ: 10 INJECTION, SOLUTION INTRAVENOUS at 00:34

## 2024-01-01 RX ADMIN — BUSPIRONE HYDROCHLORIDE 10 MG: 10 TABLET ORAL at 21:02

## 2024-01-01 RX ADMIN — SODIUM CHLORIDE, POTASSIUM CHLORIDE, SODIUM LACTATE AND CALCIUM CHLORIDE 125 ML/HR: 600; 310; 30; 20 INJECTION, SOLUTION INTRAVENOUS at 05:59

## 2024-01-01 RX ADMIN — OXYCODONE HYDROCHLORIDE AND ACETAMINOPHEN 1000 MG: 500 TABLET ORAL at 09:02

## 2024-01-01 RX ADMIN — HEPARIN SODIUM 5000 UNITS: 5000 INJECTION INTRAVENOUS; SUBCUTANEOUS at 13:25

## 2024-01-01 RX ADMIN — VANCOMYCIN HYDROCHLORIDE 500 MG: 500 INJECTION, POWDER, LYOPHILIZED, FOR SOLUTION INTRAVENOUS at 21:43

## 2024-01-01 RX ADMIN — CETIRIZINE HYDROCHLORIDE 10 MG: 10 TABLET ORAL at 09:02

## 2024-01-01 RX ADMIN — POTASSIUM CHLORIDE 10 MEQ: 10 INJECTION, SOLUTION INTRAVENOUS at 03:03

## 2024-01-01 RX ADMIN — HEPARIN SODIUM 5000 UNITS: 5000 INJECTION INTRAVENOUS; SUBCUTANEOUS at 14:52

## 2024-01-01 RX ADMIN — Medication 10 ML: at 08:11

## 2024-01-01 RX ADMIN — Medication 1 APPLICATION: at 20:44

## 2024-01-01 RX ADMIN — GUAIFENESIN 600 MG: 600 TABLET, EXTENDED RELEASE ORAL at 21:02

## 2024-01-01 RX ADMIN — CHLORHEXIDINE GLUCONATE 1 APPLICATION: 500 CLOTH TOPICAL at 19:45

## 2024-01-01 RX ADMIN — HEPARIN SODIUM 5000 UNITS: 5000 INJECTION INTRAVENOUS; SUBCUTANEOUS at 22:14

## 2024-01-01 RX ADMIN — HEPARIN SODIUM 5000 UNITS: 5000 INJECTION INTRAVENOUS; SUBCUTANEOUS at 06:06

## 2024-01-01 RX ADMIN — HEPARIN SODIUM 5000 UNITS: 5000 INJECTION INTRAVENOUS; SUBCUTANEOUS at 23:54

## 2024-01-01 RX ADMIN — MUPIROCIN 1 APPLICATION: 20 OINTMENT TOPICAL at 09:57

## 2024-01-01 RX ADMIN — NITROFURANTOIN MONOHYDRATE/MACROCRYSTALLINE 100 MG: 25; 75 CAPSULE ORAL at 23:54

## 2024-01-01 RX ADMIN — SODIUM HYPOCHLORITE: 1.25 SOLUTION TOPICAL at 09:39

## 2024-01-01 RX ADMIN — COLLAGENASE SANTYL 1 APPLICATION: 250 OINTMENT TOPICAL at 09:13

## 2024-01-01 RX ADMIN — POTASSIUM CHLORIDE 20 MEQ: 400 INJECTION, SOLUTION INTRAVENOUS at 17:34

## 2024-01-01 RX ADMIN — BUSPIRONE HYDROCHLORIDE 10 MG: 10 TABLET ORAL at 09:02

## 2024-01-01 RX ADMIN — HEPARIN SODIUM 5000 UNITS: 5000 INJECTION INTRAVENOUS; SUBCUTANEOUS at 21:49

## 2024-01-01 RX ADMIN — POTASSIUM CHLORIDE 40 MEQ: 750 CAPSULE, EXTENDED RELEASE ORAL at 11:49

## 2024-01-01 RX ADMIN — DEXTROSE AND SODIUM CHLORIDE 150 ML/HR: 5; 450 INJECTION, SOLUTION INTRAVENOUS at 13:25

## 2024-01-01 RX ADMIN — POTASSIUM CHLORIDE 10 MEQ: 10 INJECTION, SOLUTION INTRAVENOUS at 02:01

## 2024-01-01 RX ADMIN — MUPIROCIN 1 APPLICATION: 20 OINTMENT TOPICAL at 22:17

## 2024-01-01 RX ADMIN — FLUTICASONE PROPIONATE 2 SPRAY: 50 SPRAY, METERED NASAL at 09:07

## 2024-01-01 RX ADMIN — Medication 1 CAPSULE: at 09:36

## 2024-01-01 RX ADMIN — GUAIFENESIN 600 MG: 600 TABLET, EXTENDED RELEASE ORAL at 09:21

## 2024-01-01 RX ADMIN — FLUTICASONE PROPIONATE 2 SPRAY: 50 SPRAY, METERED NASAL at 09:12

## 2024-01-01 RX ADMIN — NITROFURANTOIN MONOHYDRATE/MACROCRYSTALLINE 100 MG: 25; 75 CAPSULE ORAL at 09:57

## 2024-01-01 RX ADMIN — COLLAGENASE SANTYL 1 APPLICATION: 250 OINTMENT TOPICAL at 21:00

## 2024-01-01 RX ADMIN — COLLAGENASE SANTYL 1 APPLICATION: 250 OINTMENT TOPICAL at 08:14

## 2024-01-01 RX ADMIN — ZINC SULFATE 220 MG (50 MG) CAPSULE 220 MG: CAPSULE at 09:12

## 2024-01-01 RX ADMIN — OXYCODONE HYDROCHLORIDE AND ACETAMINOPHEN 1000 MG: 500 TABLET ORAL at 09:37

## 2024-01-01 RX ADMIN — DOCUSATE SODIUM 50 MG AND SENNOSIDES 8.6 MG 2 TABLET: 8.6; 5 TABLET, FILM COATED ORAL at 21:43

## 2024-01-01 RX ADMIN — HEPARIN SODIUM 5000 UNITS: 5000 INJECTION INTRAVENOUS; SUBCUTANEOUS at 13:34

## 2024-01-01 RX ADMIN — OXYCODONE HYDROCHLORIDE AND ACETAMINOPHEN 1000 MG: 500 TABLET ORAL at 09:21

## 2024-01-01 RX ADMIN — MORPHINE SULFATE 20 MG: 100 SOLUTION ORAL at 23:40

## 2024-01-01 RX ADMIN — ZINC SULFATE 220 MG (50 MG) CAPSULE 220 MG: CAPSULE at 09:03

## 2024-01-01 RX ADMIN — HEPARIN SODIUM 5000 UNITS: 5000 INJECTION INTRAVENOUS; SUBCUTANEOUS at 14:45

## 2024-01-01 RX ADMIN — POTASSIUM CHLORIDE 20 MEQ: 400 INJECTION, SOLUTION INTRAVENOUS at 08:06

## 2024-01-01 RX ADMIN — DOCUSATE SODIUM 50 MG AND SENNOSIDES 8.6 MG 2 TABLET: 8.6; 5 TABLET, FILM COATED ORAL at 09:02

## 2024-01-01 RX ADMIN — Medication 1 APPLICATION: at 20:21

## 2024-01-01 RX ADMIN — MORPHINE SULFATE 20 MG: 100 SOLUTION ORAL at 03:07

## 2024-01-01 RX ADMIN — ACETAMINOPHEN 650 MG: 325 TABLET ORAL at 09:16

## 2024-01-01 RX ADMIN — Medication 10 ML: at 09:13

## 2024-01-01 RX ADMIN — COLLAGENASE SANTYL 1 APPLICATION: 250 OINTMENT TOPICAL at 12:16

## 2024-01-01 RX ADMIN — MORPHINE SULFATE 15 MG: 100 SOLUTION ORAL at 07:27

## 2024-01-01 RX ADMIN — HEPARIN SODIUM 5000 UNITS: 5000 INJECTION INTRAVENOUS; SUBCUTANEOUS at 06:08

## 2024-01-01 RX ADMIN — HEPARIN SODIUM 5000 UNITS: 5000 INJECTION INTRAVENOUS; SUBCUTANEOUS at 21:03

## 2024-01-01 RX ADMIN — METRONIDAZOLE 500 MG: 500 TABLET ORAL at 05:35

## 2024-01-01 RX ADMIN — VENLAFAXINE HYDROCHLORIDE 150 MG: 75 CAPSULE, EXTENDED RELEASE ORAL at 09:03

## 2024-01-01 RX ADMIN — SODIUM HYPOCHLORITE: 1.25 SOLUTION TOPICAL at 12:16

## 2024-01-01 RX ADMIN — DEXTROSE AND SODIUM CHLORIDE 100 ML/HR: 5; 450 INJECTION, SOLUTION INTRAVENOUS at 04:43

## 2024-01-01 RX ADMIN — SODIUM HYPOCHLORITE: 1.25 SOLUTION TOPICAL at 09:12

## 2024-01-01 RX ADMIN — MUPIROCIN 1 APPLICATION: 20 OINTMENT TOPICAL at 14:37

## 2024-01-01 RX ADMIN — COLLAGENASE SANTYL 1 APPLICATION: 250 OINTMENT TOPICAL at 09:39

## 2024-01-02 ENCOUNTER — TELEPHONE (OUTPATIENT)
Dept: FAMILY MEDICINE CLINIC | Facility: CLINIC | Age: 74
End: 2024-01-02
Payer: MEDICARE

## 2024-01-02 NOTE — TELEPHONE ENCOUNTER
Orders were entered on 12/22/2023.  Please advise how/where to get done.    Electronically signed by CONCHITA Brice, 01/02/24, 1:38 PM CST.

## 2024-01-02 NOTE — TELEPHONE ENCOUNTER
Caller: CookJagdish    Relationship: Self    Best call back number: [phone]     What is the best time to reach you: ANYTIME    Who are you requesting to speak with (clinical staff, provider,  specific staff member): CLINICAL    Do you know the name of the person who called: JAGDISH    What was the call regarding: JAGDISH WOULD LIKE A CALL BACK REGARDING GETTING AN XRAY ON HIS KNEES BECAUSE [facility] HASN'T CALLED TO SET ANYTHING UP FOR HIM.    Is it okay if the provider responds through MyChart: NO

## 2024-01-04 ENCOUNTER — HOSPITAL ENCOUNTER (OUTPATIENT)
Dept: GENERAL RADIOLOGY | Facility: HOSPITAL | Age: 74
Discharge: HOME | End: 2024-01-04
Admitting: NURSE PRACTITIONER
Payer: MEDICARE

## 2024-01-04 DIAGNOSIS — M25.561 CHRONIC PAIN OF BOTH KNEES: Primary | ICD-10-CM

## 2024-01-04 DIAGNOSIS — M25.561 CHRONIC PAIN OF BOTH KNEES: ICD-10-CM

## 2024-01-04 DIAGNOSIS — G89.29 CHRONIC PAIN OF BOTH KNEES: ICD-10-CM

## 2024-01-04 DIAGNOSIS — M25.562 CHRONIC PAIN OF BOTH KNEES: ICD-10-CM

## 2024-01-04 DIAGNOSIS — G89.29 CHRONIC PAIN OF BOTH KNEES: Primary | ICD-10-CM

## 2024-01-04 DIAGNOSIS — M25.562 CHRONIC PAIN OF BOTH KNEES: Primary | ICD-10-CM

## 2024-01-04 PROCEDURE — 73560 X-RAY EXAM OF KNEE 1 OR 2: CPT

## 2024-01-04 NOTE — PROGRESS NOTES
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  I will not be able to do a steroid injection myself.  See if patient is willing to do a referral to orthopedics to discuss options to make his knees feel better.    Electronically signed by CONCHITA Brice, 01/04/24, 8:09 AM CST.

## 2024-01-08 ENCOUNTER — TELEPHONE (OUTPATIENT)
Dept: FAMILY MEDICINE CLINIC | Facility: CLINIC | Age: 74
End: 2024-01-08

## 2024-01-08 NOTE — TELEPHONE ENCOUNTER
Caller: Jagdish Cook    Relationship: Self    Best call back number: [phone]     What is the best time to reach you: ANYTIME    Who are you requesting to speak with (clinical staff, provider,  specific staff member): CLINICAL    What was the call regarding: PATIENT STATED THE ORTHOPEDIC PLACE IS WANTING HIM TO HAVE ANOTHER XRAY AND HE JUST HAD SOME DONE AT THE HOSPITAL SO HE DOESN'T KNOW WHY HE NEEDS TO HAVE ANOTHER ONE DONE. HE IS REQUESTING A CALL BACK FROM DR CIFUENTES OR HIS NURSE TO DISCUSS WHAT IS GOING ON.

## 2024-01-08 NOTE — TELEPHONE ENCOUNTER
Given the amount of bone loss in the knee, he really needs to talk to a specialist about this.    Electronically signed by CONCHITA Brice, 01/08/24, 1:45 PM CST.

## 2024-01-08 NOTE — TELEPHONE ENCOUNTER
Patient stated that he does not want to go to ortho and have to get more xr and it was very difficult for him to do. He is wanting to know if there is anything you can do in the office for him.

## 2024-01-09 ENCOUNTER — TELEPHONE (OUTPATIENT)
Dept: FAMILY MEDICINE CLINIC | Facility: CLINIC | Age: 74
End: 2024-01-09
Payer: MEDICARE

## 2024-01-09 NOTE — TELEPHONE ENCOUNTER
Pt called stating he doesn't think he can handle another x-ray due to the last x-ray he got at  left his knees feeling like they are going to break. Please advise    Best call back number :[phone]

## 2024-01-09 NOTE — TELEPHONE ENCOUNTER
This is the patient that is not wanting to go to ortho because he does not want to have to get more imaging done. Suggested to patient to  disc of imaging before his appointment. He stated that he think he will go there and have him follow up and not address his problem at his first visit. He is in a lot of pain. Still suggested patient go to ortho

## 2024-01-10 ENCOUNTER — OFFICE VISIT (OUTPATIENT)
Dept: FAMILY MEDICINE CLINIC | Facility: CLINIC | Age: 74
End: 2024-01-10
Payer: MEDICARE

## 2024-01-10 VITALS
RESPIRATION RATE: 18 BRPM | HEIGHT: 69 IN | SYSTOLIC BLOOD PRESSURE: 130 MMHG | BODY MASS INDEX: 23.04 KG/M2 | TEMPERATURE: 96.9 F | OXYGEN SATURATION: 97 % | HEART RATE: 76 BPM | DIASTOLIC BLOOD PRESSURE: 74 MMHG

## 2024-01-10 DIAGNOSIS — G89.29 CHRONIC PAIN OF BOTH KNEES: Primary | ICD-10-CM

## 2024-01-10 DIAGNOSIS — M25.561 CHRONIC PAIN OF BOTH KNEES: Primary | ICD-10-CM

## 2024-01-10 DIAGNOSIS — M25.562 CHRONIC PAIN OF BOTH KNEES: Primary | ICD-10-CM

## 2024-01-10 PROCEDURE — 3075F SYST BP GE 130 - 139MM HG: CPT | Performed by: NURSE PRACTITIONER

## 2024-01-10 PROCEDURE — 1160F RVW MEDS BY RX/DR IN RCRD: CPT | Performed by: NURSE PRACTITIONER

## 2024-01-10 PROCEDURE — 1159F MED LIST DOCD IN RCRD: CPT | Performed by: NURSE PRACTITIONER

## 2024-01-10 PROCEDURE — 3078F DIAST BP <80 MM HG: CPT | Performed by: NURSE PRACTITIONER

## 2024-01-10 PROCEDURE — 99213 OFFICE O/P EST LOW 20 MIN: CPT | Performed by: NURSE PRACTITIONER

## 2024-01-10 NOTE — PROGRESS NOTES
Subjective   Chief Complaint:  Bilateral knee pain    History of Present Illness:  This 73 y.o. male was seen in the office today.  The patient presents today for follow-up of knee pain. He reports reluctance to go to orthopedic, although his x-rays did show severe bone loss and very little joint space. He has a prior allergy to meloxicam, which also complicates primary care managing this. He reports his reluctance is they want to repeat the x-rays.    Review of Systems    Allergies   Allergen Reactions    Meloxicam Hives     Dizzy, skin crawls    Tamsulosin Dizziness     excessive sweating    Penicillins       Current Outpatient Medications on File Prior to Visit   Medication Sig    ciprofloxacin (Ciloxan) 0.3 % ophthalmic solution Administer 1 drop to both eyes 4 (Four) Times a Day.    diclofenac (VOLTAREN) 75 MG EC tablet Take 1 tablet by mouth 2 (Two) Times a Day.    fluticasone (FLONASE) 50 MCG/ACT nasal spray 2 sprays into the nostril(s) as directed by provider Daily.    HYDROcodone-acetaminophen (Norco) 5-325 MG per tablet Take 1 tablet by mouth Every 6 (Six) Hours As Needed for Severe Pain.    levothyroxine (SYNTHROID, LEVOTHROID) 100 MCG tablet Take 1 tablet by mouth Daily.    lisinopril-hydrochlorothiazide (PRINZIDE,ZESTORETIC) 10-12.5 MG per tablet Take 1 tablet by mouth Daily.    loratadine (Claritin) 10 MG tablet Take 1 tablet by mouth Daily.    naloxone (NARCAN) 4 MG/0.1ML nasal spray 1 spray into the nostril(s) as directed by provider As Needed (overdose).    tiZANidine (ZANAFLEX) 4 MG tablet Take 1 tablet by mouth 3 (Three) Times a Day As Needed for Muscle Spasms.    venlafaxine XR (Effexor XR) 75 MG 24 hr capsule Take 1 capsule by mouth Daily.     No current facility-administered medications on file prior to visit.      Past Medical, Surgical, Social, and Family History:  History reviewed. No pertinent past medical history.  Past Surgical History:   Procedure Laterality Date    BACK SURGERY       "HERNIA REPAIR       Social History     Socioeconomic History    Marital status: Single   Tobacco Use    Smoking status: Every Day     Types: Cigars     Passive exposure: Current    Smokeless tobacco: Never   Substance and Sexual Activity    Alcohol use: No    Drug use: Defer    Sexual activity: Defer     Family History   Problem Relation Age of Onset    No Known Problems Father     No Known Problems Mother        Prior Visit Notes/Records, Lab, Imaging, and Diagnostic Results Reviewed:  A1C:No results for input(s): \"HGBA1C\" in the last 02041 hours.  GLUCOSE:  Lab Results - Last 18 Months   Lab Units 07/31/23  0805   GLUCOSE mg/dL 121*     LIPID:  Lab Results - Last 18 Months   Lab Units 07/31/23  0805   CHOLESTEROL mg/dL 149   LDL CHOL mg/dL 89   HDL CHOL mg/dL 42   TRIGLYCERIDES mg/dL 96     PSA:  Lab Results - Last 18 Months   Lab Units 07/31/23  0805   PSA ng/mL 0.5     CBC:  Lab Results - Last 18 Months   Lab Units 07/31/23  0805   WBC x10E3/uL 6.1   HEMOGLOBIN g/dL 17.9*   HEMATOCRIT % 54.4*   PLATELETS x10E3/uL 236      BMP/CMP:  Lab Results - Last 18 Months   Lab Units 07/31/23  0805   SODIUM mmol/L 138   POTASSIUM mmol/L 4.2   CHLORIDE mmol/L 96   CO2 mmol/L 28   GLUCOSE mg/dL 121*   BUN mg/dL 17   CREATININE mg/dL 1.10   EGFR RESULT mL/min/1.73 70   CALCIUM mg/dL 9.7     HEPATIC:  Lab Results - Last 18 Months   Lab Units 07/31/23  0805   ALT (SGPT) IU/L 11   AST (SGOT) IU/L 22   ALK PHOS IU/L 113     Vit D:No results for input(s): \"HHGQ22MH\" in the last 06275 hours.  THYROID:No results for input(s): \"TSH\", \"FREET4\", \"FTI\" in the last 45378 hours.    Invalid input(s): \"FREET3\", \"T3\", \"T4\", \"TEUP\", \"TOTALT4\"  BMI Trend:  BMI Readings from Last 10 Encounters:   01/10/24 23.04 kg/m²   12/22/23 23.04 kg/m²   10/20/23 23.48 kg/m²   09/20/23 23.48 kg/m²   09/11/23 23.48 kg/m²   08/14/23 23.48 kg/m²   07/31/23 23.48 kg/m²   12/07/16 26.29 kg/m²     Objective   Vital Signs  /74 (BP Location: Right arm, Patient " "Position: Sitting, Cuff Size: Adult)   Pulse 76   Temp 96.9 °F (36.1 °C) (Infrared)   Resp 18   Ht 175.3 cm (69\")   SpO2 97%   BMI 23.04 kg/m²   Physical Exam  Constitutional:       General: He is not in acute distress.  Cardiovascular:      Rate and Rhythm: Normal rate and regular rhythm.      Pulses: Normal pulses.      Heart sounds: No murmur heard.     No friction rub. No gallop.   Pulmonary:      Effort: Pulmonary effort is normal. No respiratory distress.      Breath sounds: Normal breath sounds. No wheezing or rhonchi.   Musculoskeletal:      Comments: Bilateral knees palpable tenderness. Limitations with extension present.   Neurological:      Mental Status: He is alert.     Assessment & Plan   Diagnoses and all orders for this visit:    1. Chronic pain of both knees (Primary)  -     Ambulatory Referral to Pain Management    Discussions & Anticipatory Guidance:  Advised and educated plan of care.    The patient will Return for Next scheduled follow up as already scheduled..     Advised due to allergy list and other risk factors including the bone loss and not wanting to use steroids with this, he really does need to go to orthopedics. He agreed. Advised him to have his caregiver advocate for him and go get a thumb drive of the x-rays and take with him. He is also agreeable to a pain management referral for subsequent pain medications.    Electronically signed by CONCHITA Brice 01/10/24, 10:39 AM CST.    "

## 2024-01-18 ENCOUNTER — TELEPHONE (OUTPATIENT)
Dept: FAMILY MEDICINE CLINIC | Facility: CLINIC | Age: 74
End: 2024-01-18

## 2024-02-09 ENCOUNTER — OFFICE VISIT (OUTPATIENT)
Dept: FAMILY MEDICINE CLINIC | Facility: CLINIC | Age: 74
End: 2024-02-09
Payer: MEDICARE

## 2024-02-09 VITALS
BODY MASS INDEX: 23.04 KG/M2 | SYSTOLIC BLOOD PRESSURE: 140 MMHG | OXYGEN SATURATION: 96 % | TEMPERATURE: 97.1 F | HEIGHT: 69 IN | HEART RATE: 66 BPM | RESPIRATION RATE: 18 BRPM | DIASTOLIC BLOOD PRESSURE: 60 MMHG

## 2024-02-09 DIAGNOSIS — L97.529 ULCER OF LEFT FOOT, UNSPECIFIED ULCER STAGE: ICD-10-CM

## 2024-02-09 DIAGNOSIS — F41.9 ANXIETY: ICD-10-CM

## 2024-02-09 DIAGNOSIS — M25.561 CHRONIC PAIN OF BOTH KNEES: Primary | ICD-10-CM

## 2024-02-09 DIAGNOSIS — M25.562 CHRONIC PAIN OF BOTH KNEES: Primary | ICD-10-CM

## 2024-02-09 DIAGNOSIS — G89.29 CHRONIC PAIN OF BOTH KNEES: Primary | ICD-10-CM

## 2024-02-09 DIAGNOSIS — Z00.00 WELLNESS EXAMINATION: ICD-10-CM

## 2024-02-09 RX ORDER — VENLAFAXINE HYDROCHLORIDE 150 MG/1
150 CAPSULE, EXTENDED RELEASE ORAL DAILY
Qty: 30 CAPSULE | Refills: 5 | Status: SHIPPED | OUTPATIENT
Start: 2024-02-09

## 2024-02-09 RX ORDER — DOXYCYCLINE HYCLATE 100 MG/1
100 CAPSULE ORAL 2 TIMES DAILY
Qty: 20 CAPSULE | Refills: 0 | Status: SHIPPED | OUTPATIENT
Start: 2024-02-09 | End: 2024-02-19

## 2024-02-10 LAB
ALBUMIN SERPL-MCNC: 4.2 G/DL (ref 3.5–5.2)
ALBUMIN/GLOB SERPL: 1.9 G/DL
ALP SERPL-CCNC: 101 U/L (ref 39–117)
ALT SERPL-CCNC: 12 U/L (ref 1–41)
AST SERPL-CCNC: 21 U/L (ref 1–40)
BASOPHILS # BLD AUTO: 0.06 10*3/MM3 (ref 0–0.2)
BASOPHILS NFR BLD AUTO: 1.1 % (ref 0–1.5)
BILIRUB SERPL-MCNC: 0.5 MG/DL (ref 0–1.2)
BUN SERPL-MCNC: 27 MG/DL (ref 8–23)
BUN/CREAT SERPL: 14.5 (ref 7–25)
CALCIUM SERPL-MCNC: 9.5 MG/DL (ref 8.6–10.5)
CHLORIDE SERPL-SCNC: 102 MMOL/L (ref 98–107)
CHOLEST SERPL-MCNC: 154 MG/DL (ref 0–200)
CO2 SERPL-SCNC: 27.5 MMOL/L (ref 22–29)
CREAT SERPL-MCNC: 1.86 MG/DL (ref 0.76–1.27)
EGFRCR SERPLBLD CKD-EPI 2021: 37.3 ML/MIN/1.73
EOSINOPHIL # BLD AUTO: 0.13 10*3/MM3 (ref 0–0.4)
EOSINOPHIL NFR BLD AUTO: 2.3 % (ref 0.3–6.2)
ERYTHROCYTE [DISTWIDTH] IN BLOOD BY AUTOMATED COUNT: 12.2 % (ref 12.3–15.4)
GLOBULIN SER CALC-MCNC: 2.2 GM/DL
GLUCOSE SERPL-MCNC: 75 MG/DL (ref 65–99)
HCT VFR BLD AUTO: 51.3 % (ref 37.5–51)
HDLC SERPL-MCNC: 43 MG/DL (ref 40–60)
HGB BLD-MCNC: 17.6 G/DL (ref 13–17.7)
IMM GRANULOCYTES # BLD AUTO: 0.02 10*3/MM3 (ref 0–0.05)
IMM GRANULOCYTES NFR BLD AUTO: 0.4 % (ref 0–0.5)
LDLC SERPL CALC-MCNC: 94 MG/DL (ref 0–100)
LYMPHOCYTES # BLD AUTO: 1.24 10*3/MM3 (ref 0.7–3.1)
LYMPHOCYTES NFR BLD AUTO: 22.2 % (ref 19.6–45.3)
MCH RBC QN AUTO: 32.4 PG (ref 26.6–33)
MCHC RBC AUTO-ENTMCNC: 34.3 G/DL (ref 31.5–35.7)
MCV RBC AUTO: 94.5 FL (ref 79–97)
MONOCYTES # BLD AUTO: 0.61 10*3/MM3 (ref 0.1–0.9)
MONOCYTES NFR BLD AUTO: 10.9 % (ref 5–12)
NEUTROPHILS # BLD AUTO: 3.52 10*3/MM3 (ref 1.7–7)
NEUTROPHILS NFR BLD AUTO: 63.1 % (ref 42.7–76)
NRBC BLD AUTO-RTO: 0 /100 WBC (ref 0–0.2)
PLATELET # BLD AUTO: 269 10*3/MM3 (ref 140–450)
POTASSIUM SERPL-SCNC: 4.8 MMOL/L (ref 3.5–5.2)
PROT SERPL-MCNC: 6.4 G/DL (ref 6–8.5)
RBC # BLD AUTO: 5.43 10*6/MM3 (ref 4.14–5.8)
SODIUM SERPL-SCNC: 140 MMOL/L (ref 136–145)
TRIGL SERPL-MCNC: 92 MG/DL (ref 0–150)
TSH SERPL DL<=0.005 MIU/L-ACNC: 2.02 UIU/ML (ref 0.27–4.2)
VLDLC SERPL CALC-MCNC: 17 MG/DL (ref 5–40)
WBC # BLD AUTO: 5.58 10*3/MM3 (ref 3.4–10.8)

## 2024-02-12 DIAGNOSIS — N28.9 RENAL INSUFFICIENCY: Primary | ICD-10-CM

## 2024-02-13 ENCOUNTER — NURSE TRIAGE (OUTPATIENT)
Dept: CALL CENTER | Facility: HOSPITAL | Age: 74
End: 2024-02-13
Payer: MEDICARE

## 2024-02-15 ENCOUNTER — LAB REQUISITION (OUTPATIENT)
Dept: LAB | Facility: HOSPITAL | Age: 74
End: 2024-02-15
Payer: MEDICARE

## 2024-02-15 ENCOUNTER — OFFICE VISIT (OUTPATIENT)
Dept: WOUND CARE | Facility: HOSPITAL | Age: 74
End: 2024-02-15
Payer: MEDICARE

## 2024-02-15 DIAGNOSIS — L97.522 NON-PRESSURE CHRONIC ULCER OF OTHER PART OF LEFT FOOT WITH FAT LAYER EXPOSED: ICD-10-CM

## 2024-02-15 PROCEDURE — 87205 SMEAR GRAM STAIN: CPT | Performed by: NURSE PRACTITIONER

## 2024-02-15 PROCEDURE — G0463 HOSPITAL OUTPT CLINIC VISIT: HCPCS

## 2024-02-15 PROCEDURE — 87070 CULTURE OTHR SPECIMN AEROBIC: CPT | Performed by: NURSE PRACTITIONER

## 2024-02-16 ENCOUNTER — TRANSCRIBE ORDERS (OUTPATIENT)
Dept: ADMINISTRATIVE | Facility: HOSPITAL | Age: 74
End: 2024-02-16
Payer: MEDICARE

## 2024-02-16 DIAGNOSIS — I73.9 PERIPHERAL VASCULAR DISEASE, UNSPECIFIED: ICD-10-CM

## 2024-02-16 DIAGNOSIS — R60.0 LOCALIZED EDEMA: Primary | ICD-10-CM

## 2024-02-17 ENCOUNTER — HOSPITAL ENCOUNTER (INPATIENT)
Age: 74
LOS: 2 days | Discharge: HOME HEALTH | DRG: 393 | End: 2024-02-19
Attending: PEDIATRICS | Admitting: INTERNAL MEDICINE
Payer: MEDICARE

## 2024-02-17 ENCOUNTER — APPOINTMENT (OUTPATIENT)
Dept: CT IMAGING | Age: 74
DRG: 393 | End: 2024-02-17
Payer: MEDICARE

## 2024-02-17 DIAGNOSIS — N13.30 BILATERAL HYDRONEPHROSIS: ICD-10-CM

## 2024-02-17 DIAGNOSIS — R33.9 URINARY RETENTION: ICD-10-CM

## 2024-02-17 DIAGNOSIS — N28.9 ACUTE RENAL INSUFFICIENCY: ICD-10-CM

## 2024-02-17 DIAGNOSIS — K43.2 VENTRAL HERNIA, RECURRENT: ICD-10-CM

## 2024-02-17 DIAGNOSIS — J18.9 PNEUMONIA OF BOTH LOWER LOBES DUE TO INFECTIOUS ORGANISM: Primary | ICD-10-CM

## 2024-02-17 DIAGNOSIS — K43.9 VENTRAL HERNIA WITHOUT OBSTRUCTION OR GANGRENE: ICD-10-CM

## 2024-02-17 DIAGNOSIS — R10.9 ABDOMINAL PAIN, UNSPECIFIED ABDOMINAL LOCATION: ICD-10-CM

## 2024-02-17 LAB
25(OH)D3 SERPL-MCNC: 18.9 NG/ML
ALBUMIN SERPL-MCNC: 4 G/DL (ref 3.5–5.2)
ALP SERPL-CCNC: 105 U/L (ref 40–130)
ALT SERPL-CCNC: 12 U/L (ref 5–41)
ANION GAP SERPL CALCULATED.3IONS-SCNC: 14 MMOL/L (ref 7–19)
AST SERPL-CCNC: 21 U/L (ref 5–40)
B PARAP IS1001 DNA NPH QL NAA+NON-PROBE: NOT DETECTED
B PERT.PT PRMT NPH QL NAA+NON-PROBE: NOT DETECTED
BACTERIA SPEC AEROBE CULT: NORMAL
BACTERIA URNS QL MICRO: NEGATIVE /HPF
BASOPHILS # BLD: 0.1 K/UL (ref 0–0.2)
BASOPHILS NFR BLD: 0.8 % (ref 0–1)
BILIRUB SERPL-MCNC: 0.6 MG/DL (ref 0.2–1.2)
BILIRUB UR QL STRIP: NEGATIVE
BUN SERPL-MCNC: 28 MG/DL (ref 8–23)
C PNEUM DNA NPH QL NAA+NON-PROBE: NOT DETECTED
CALCIUM SERPL-MCNC: 9.3 MG/DL (ref 8.8–10.2)
CHLORIDE SERPL-SCNC: 98 MMOL/L (ref 98–111)
CLARITY UR: CLEAR
CO2 SERPL-SCNC: 24 MMOL/L (ref 22–29)
COLOR UR: YELLOW
CREAT SERPL-MCNC: 1.6 MG/DL (ref 0.5–1.2)
CRYSTALS URNS MICRO: ABNORMAL /HPF
EOSINOPHIL # BLD: 0.2 K/UL (ref 0–0.6)
EOSINOPHIL NFR BLD: 1.1 % (ref 0–5)
EPI CELLS #/AREA URNS AUTO: 0 /HPF (ref 0–5)
ERYTHROCYTE [DISTWIDTH] IN BLOOD BY AUTOMATED COUNT: 12.7 % (ref 11.5–14.5)
FLUAV RNA NPH QL NAA+NON-PROBE: NOT DETECTED
FLUBV RNA NPH QL NAA+NON-PROBE: NOT DETECTED
GLUCOSE BLD-MCNC: 139 MG/DL (ref 70–99)
GLUCOSE SERPL-MCNC: 130 MG/DL (ref 74–109)
GLUCOSE UR STRIP.AUTO-MCNC: NEGATIVE MG/DL
GRAM STN SPEC: NORMAL
HADV DNA NPH QL NAA+NON-PROBE: NOT DETECTED
HCOV 229E RNA NPH QL NAA+NON-PROBE: NOT DETECTED
HCOV HKU1 RNA NPH QL NAA+NON-PROBE: NOT DETECTED
HCOV NL63 RNA NPH QL NAA+NON-PROBE: NOT DETECTED
HCOV OC43 RNA NPH QL NAA+NON-PROBE: NOT DETECTED
HCT VFR BLD AUTO: 52 % (ref 42–52)
HGB BLD-MCNC: 17.5 G/DL (ref 14–18)
HGB UR STRIP.AUTO-MCNC: ABNORMAL MG/L
HMPV RNA NPH QL NAA+NON-PROBE: NOT DETECTED
HPIV1 RNA NPH QL NAA+NON-PROBE: NOT DETECTED
HPIV2 RNA NPH QL NAA+NON-PROBE: NOT DETECTED
HPIV3 RNA NPH QL NAA+NON-PROBE: NOT DETECTED
HPIV4 RNA NPH QL NAA+NON-PROBE: NOT DETECTED
HYALINE CASTS #/AREA URNS AUTO: 3 /HPF (ref 0–8)
IMM GRANULOCYTES # BLD: 0.1 K/UL
KETONES UR STRIP.AUTO-MCNC: ABNORMAL MG/DL
LACTATE BLDV-SCNC: 1.4 MMOL/L (ref 0.5–1.9)
LEUKOCYTE ESTERASE UR QL STRIP.AUTO: NEGATIVE
LIPASE SERPL-CCNC: 18 U/L (ref 13–60)
LYMPHOCYTES # BLD: 3.6 K/UL (ref 1.1–4.5)
LYMPHOCYTES NFR BLD: 25.6 % (ref 20–40)
M PNEUMO DNA NPH QL NAA+NON-PROBE: NOT DETECTED
MAGNESIUM SERPL-MCNC: 2 MG/DL (ref 1.6–2.4)
MCH RBC QN AUTO: 32.1 PG (ref 27–31)
MCHC RBC AUTO-ENTMCNC: 33.7 G/DL (ref 33–37)
MCV RBC AUTO: 95.2 FL (ref 80–94)
MONOCYTES # BLD: 1.5 K/UL (ref 0–0.9)
MONOCYTES NFR BLD: 10.6 % (ref 0–10)
NEUTROPHILS # BLD: 8.6 K/UL (ref 1.5–7.5)
NEUTS SEG NFR BLD: 61.3 % (ref 50–65)
NITRITE UR QL STRIP.AUTO: NEGATIVE
PERFORMED ON: ABNORMAL
PH UR STRIP.AUTO: 5 [PH] (ref 5–8)
PLATELET # BLD AUTO: 279 K/UL (ref 130–400)
PMV BLD AUTO: 9.5 FL (ref 9.4–12.4)
POTASSIUM SERPL-SCNC: 4 MMOL/L (ref 3.5–5)
PROT SERPL-MCNC: 6.5 G/DL (ref 6.6–8.7)
PROT UR STRIP.AUTO-MCNC: NEGATIVE MG/DL
RBC # BLD AUTO: 5.46 M/UL (ref 4.7–6.1)
RBC #/AREA URNS AUTO: 6 /HPF (ref 0–4)
RSV RNA NPH QL NAA+NON-PROBE: NOT DETECTED
RV+EV RNA NPH QL NAA+NON-PROBE: NOT DETECTED
SARS-COV-2 RNA NPH QL NAA+NON-PROBE: NOT DETECTED
SODIUM SERPL-SCNC: 136 MMOL/L (ref 136–145)
SP GR UR STRIP.AUTO: 1.01 (ref 1–1.03)
TSH SERPL DL<=0.005 MIU/L-ACNC: 3.31 UIU/ML (ref 0.27–4.2)
UROBILINOGEN UR STRIP.AUTO-MCNC: 0.2 E.U./DL
WBC # BLD AUTO: 14.1 K/UL (ref 4.8–10.8)
WBC #/AREA URNS AUTO: 3 /HPF (ref 0–5)

## 2024-02-17 PROCEDURE — 94640 AIRWAY INHALATION TREATMENT: CPT

## 2024-02-17 PROCEDURE — 36415 COLL VENOUS BLD VENIPUNCTURE: CPT

## 2024-02-17 PROCEDURE — 85025 COMPLETE CBC W/AUTO DIFF WBC: CPT

## 2024-02-17 PROCEDURE — 6370000000 HC RX 637 (ALT 250 FOR IP): Performed by: HOSPITALIST

## 2024-02-17 PROCEDURE — 84443 ASSAY THYROID STIM HORMONE: CPT

## 2024-02-17 PROCEDURE — 1210000000 HC MED SURG R&B

## 2024-02-17 PROCEDURE — 6360000002 HC RX W HCPCS: Performed by: PEDIATRICS

## 2024-02-17 PROCEDURE — 99222 1ST HOSP IP/OBS MODERATE 55: CPT | Performed by: SURGERY

## 2024-02-17 PROCEDURE — 94669 MECHANICAL CHEST WALL OSCILL: CPT

## 2024-02-17 PROCEDURE — 94150 VITAL CAPACITY TEST: CPT

## 2024-02-17 PROCEDURE — 82306 VITAMIN D 25 HYDROXY: CPT

## 2024-02-17 PROCEDURE — 74176 CT ABD & PELVIS W/O CONTRAST: CPT

## 2024-02-17 PROCEDURE — 83735 ASSAY OF MAGNESIUM: CPT

## 2024-02-17 PROCEDURE — 51702 INSERT TEMP BLADDER CATH: CPT

## 2024-02-17 PROCEDURE — 6360000002 HC RX W HCPCS: Performed by: HOSPITALIST

## 2024-02-17 PROCEDURE — 82962 GLUCOSE BLOOD TEST: CPT

## 2024-02-17 PROCEDURE — 94760 N-INVAS EAR/PLS OXIMETRY 1: CPT

## 2024-02-17 PROCEDURE — 99285 EMERGENCY DEPT VISIT HI MDM: CPT

## 2024-02-17 PROCEDURE — 87040 BLOOD CULTURE FOR BACTERIA: CPT

## 2024-02-17 PROCEDURE — 81001 URINALYSIS AUTO W/SCOPE: CPT

## 2024-02-17 PROCEDURE — 0202U NFCT DS 22 TRGT SARS-COV-2: CPT

## 2024-02-17 PROCEDURE — 6360000002 HC RX W HCPCS: Performed by: NURSE PRACTITIONER

## 2024-02-17 PROCEDURE — 83605 ASSAY OF LACTIC ACID: CPT

## 2024-02-17 PROCEDURE — 80053 COMPREHEN METABOLIC PANEL: CPT

## 2024-02-17 PROCEDURE — 2700000000 HC OXYGEN THERAPY PER DAY

## 2024-02-17 PROCEDURE — 2580000003 HC RX 258: Performed by: NURSE PRACTITIONER

## 2024-02-17 PROCEDURE — 83690 ASSAY OF LIPASE: CPT

## 2024-02-17 PROCEDURE — 93005 ELECTROCARDIOGRAM TRACING: CPT | Performed by: PEDIATRICS

## 2024-02-17 PROCEDURE — 2580000003 HC RX 258: Performed by: PEDIATRICS

## 2024-02-17 RX ORDER — BENZONATATE 100 MG/1
100 CAPSULE ORAL 3 TIMES DAILY PRN
Status: DISCONTINUED | OUTPATIENT
Start: 2024-02-17 | End: 2024-02-19 | Stop reason: HOSPADM

## 2024-02-17 RX ORDER — 0.9 % SODIUM CHLORIDE 0.9 %
1000 INTRAVENOUS SOLUTION INTRAVENOUS ONCE
Status: COMPLETED | OUTPATIENT
Start: 2024-02-17 | End: 2024-02-17

## 2024-02-17 RX ORDER — ACETYLCYSTEINE 200 MG/ML
600 SOLUTION ORAL; RESPIRATORY (INHALATION)
Status: DISCONTINUED | OUTPATIENT
Start: 2024-02-17 | End: 2024-02-19 | Stop reason: HOSPADM

## 2024-02-17 RX ORDER — POTASSIUM CHLORIDE 7.45 MG/ML
10 INJECTION INTRAVENOUS PRN
Status: DISCONTINUED | OUTPATIENT
Start: 2024-02-17 | End: 2024-02-19 | Stop reason: HOSPADM

## 2024-02-17 RX ORDER — LISINOPRIL AND HYDROCHLOROTHIAZIDE 12.5; 1 MG/1; MG/1
1 TABLET ORAL DAILY
Status: ON HOLD | COMMUNITY
End: 2024-02-19 | Stop reason: HOSPADM

## 2024-02-17 RX ORDER — CLINDAMYCIN PHOSPHATE 600 MG/50ML
600 INJECTION, SOLUTION INTRAVENOUS ONCE
Status: DISCONTINUED | OUTPATIENT
Start: 2024-02-17 | End: 2024-02-17

## 2024-02-17 RX ORDER — BUDESONIDE AND FORMOTEROL FUMARATE DIHYDRATE 160; 4.5 UG/1; UG/1
2 AEROSOL RESPIRATORY (INHALATION)
Status: DISCONTINUED | OUTPATIENT
Start: 2024-02-17 | End: 2024-02-17

## 2024-02-17 RX ORDER — LEVOTHYROXINE SODIUM 0.1 MG/1
100 TABLET ORAL DAILY
Status: DISCONTINUED | OUTPATIENT
Start: 2024-02-17 | End: 2024-02-19 | Stop reason: HOSPADM

## 2024-02-17 RX ORDER — ENOXAPARIN SODIUM 100 MG/ML
40 INJECTION SUBCUTANEOUS DAILY
Status: DISCONTINUED | OUTPATIENT
Start: 2024-02-17 | End: 2024-02-19 | Stop reason: HOSPADM

## 2024-02-17 RX ORDER — ERGOCALCIFEROL 1.25 MG/1
50000 CAPSULE ORAL WEEKLY
Status: DISCONTINUED | OUTPATIENT
Start: 2024-02-17 | End: 2024-02-19 | Stop reason: HOSPADM

## 2024-02-17 RX ORDER — GUAIFENESIN/DEXTROMETHORPHAN 100-10MG/5
5 SYRUP ORAL EVERY 4 HOURS PRN
Status: DISCONTINUED | OUTPATIENT
Start: 2024-02-17 | End: 2024-02-19 | Stop reason: HOSPADM

## 2024-02-17 RX ORDER — LANOLIN ALCOHOL/MO/W.PET/CERES
3 CREAM (GRAM) TOPICAL NIGHTLY
Status: DISCONTINUED | OUTPATIENT
Start: 2024-02-17 | End: 2024-02-19 | Stop reason: HOSPADM

## 2024-02-17 RX ORDER — ALBUTEROL SULFATE 2.5 MG/3ML
2.5 SOLUTION RESPIRATORY (INHALATION) EVERY 4 HOURS PRN
Status: DISCONTINUED | OUTPATIENT
Start: 2024-02-17 | End: 2024-02-19 | Stop reason: HOSPADM

## 2024-02-17 RX ORDER — IPRATROPIUM BROMIDE AND ALBUTEROL SULFATE 2.5; .5 MG/3ML; MG/3ML
1 SOLUTION RESPIRATORY (INHALATION)
Status: DISCONTINUED | OUTPATIENT
Start: 2024-02-17 | End: 2024-02-17

## 2024-02-17 RX ORDER — MAGNESIUM SULFATE IN WATER 40 MG/ML
2000 INJECTION, SOLUTION INTRAVENOUS PRN
Status: DISCONTINUED | OUTPATIENT
Start: 2024-02-17 | End: 2024-02-19 | Stop reason: HOSPADM

## 2024-02-17 RX ORDER — LACTOBACILLUS RHAMNOSUS GG 10B CELL
1 CAPSULE ORAL
Status: DISCONTINUED | OUTPATIENT
Start: 2024-02-17 | End: 2024-02-19 | Stop reason: HOSPADM

## 2024-02-17 RX ORDER — POTASSIUM CHLORIDE 20 MEQ/1
40 TABLET, EXTENDED RELEASE ORAL PRN
Status: DISCONTINUED | OUTPATIENT
Start: 2024-02-17 | End: 2024-02-19 | Stop reason: HOSPADM

## 2024-02-17 RX ORDER — CLINDAMYCIN PHOSPHATE 300 MG/50ML
300 INJECTION, SOLUTION INTRAVENOUS EVERY 8 HOURS
Status: DISCONTINUED | OUTPATIENT
Start: 2024-02-17 | End: 2024-02-17

## 2024-02-17 RX ORDER — ONDANSETRON 2 MG/ML
4 INJECTION INTRAMUSCULAR; INTRAVENOUS ONCE
Status: COMPLETED | OUTPATIENT
Start: 2024-02-17 | End: 2024-02-17

## 2024-02-17 RX ORDER — DICLOFENAC SODIUM 75 MG/1
75 TABLET, DELAYED RELEASE ORAL 2 TIMES DAILY PRN
Status: ON HOLD | COMMUNITY
End: 2024-02-19 | Stop reason: HOSPADM

## 2024-02-17 RX ORDER — ACETAMINOPHEN 325 MG/1
650 TABLET ORAL EVERY 6 HOURS PRN
Status: DISCONTINUED | OUTPATIENT
Start: 2024-02-17 | End: 2024-02-19 | Stop reason: HOSPADM

## 2024-02-17 RX ORDER — GUAIFENESIN 600 MG/1
600 TABLET, EXTENDED RELEASE ORAL 2 TIMES DAILY
Status: DISCONTINUED | OUTPATIENT
Start: 2024-02-17 | End: 2024-02-19 | Stop reason: HOSPADM

## 2024-02-17 RX ORDER — ACETAMINOPHEN 650 MG/1
650 SUPPOSITORY RECTAL EVERY 6 HOURS PRN
Status: DISCONTINUED | OUTPATIENT
Start: 2024-02-17 | End: 2024-02-19 | Stop reason: HOSPADM

## 2024-02-17 RX ORDER — SODIUM CHLORIDE 9 MG/ML
INJECTION, SOLUTION INTRAVENOUS PRN
Status: DISCONTINUED | OUTPATIENT
Start: 2024-02-17 | End: 2024-02-19 | Stop reason: HOSPADM

## 2024-02-17 RX ORDER — ONDANSETRON 4 MG/1
4 TABLET, ORALLY DISINTEGRATING ORAL EVERY 8 HOURS PRN
Status: DISCONTINUED | OUTPATIENT
Start: 2024-02-17 | End: 2024-02-19 | Stop reason: HOSPADM

## 2024-02-17 RX ORDER — ONDANSETRON 2 MG/ML
4 INJECTION INTRAMUSCULAR; INTRAVENOUS EVERY 6 HOURS PRN
Status: DISCONTINUED | OUTPATIENT
Start: 2024-02-17 | End: 2024-02-19 | Stop reason: HOSPADM

## 2024-02-17 RX ORDER — POLYETHYLENE GLYCOL 3350 17 G/17G
17 POWDER, FOR SOLUTION ORAL DAILY PRN
Status: DISCONTINUED | OUTPATIENT
Start: 2024-02-17 | End: 2024-02-19 | Stop reason: HOSPADM

## 2024-02-17 RX ORDER — HYDROMORPHONE HYDROCHLORIDE 1 MG/ML
1 INJECTION, SOLUTION INTRAMUSCULAR; INTRAVENOUS; SUBCUTANEOUS ONCE
Status: COMPLETED | OUTPATIENT
Start: 2024-02-17 | End: 2024-02-17

## 2024-02-17 RX ORDER — METRONIDAZOLE 500 MG/100ML
500 INJECTION, SOLUTION INTRAVENOUS EVERY 8 HOURS
Status: DISCONTINUED | OUTPATIENT
Start: 2024-02-17 | End: 2024-02-18

## 2024-02-17 RX ORDER — SODIUM CHLORIDE 0.9 % (FLUSH) 0.9 %
5-40 SYRINGE (ML) INJECTION PRN
Status: DISCONTINUED | OUTPATIENT
Start: 2024-02-17 | End: 2024-02-19 | Stop reason: HOSPADM

## 2024-02-17 RX ORDER — ARFORMOTEROL TARTRATE 15 UG/2ML
15 SOLUTION RESPIRATORY (INHALATION)
Status: DISCONTINUED | OUTPATIENT
Start: 2024-02-17 | End: 2024-02-19 | Stop reason: HOSPADM

## 2024-02-17 RX ORDER — VENLAFAXINE HYDROCHLORIDE 150 MG/1
150 CAPSULE, EXTENDED RELEASE ORAL DAILY
COMMUNITY

## 2024-02-17 RX ORDER — SODIUM CHLORIDE 9 MG/ML
INJECTION, SOLUTION INTRAVENOUS CONTINUOUS
Status: DISCONTINUED | OUTPATIENT
Start: 2024-02-17 | End: 2024-02-18

## 2024-02-17 RX ORDER — SODIUM CHLORIDE 0.9 % (FLUSH) 0.9 %
5-40 SYRINGE (ML) INJECTION EVERY 12 HOURS SCHEDULED
Status: DISCONTINUED | OUTPATIENT
Start: 2024-02-17 | End: 2024-02-19 | Stop reason: HOSPADM

## 2024-02-17 RX ORDER — BUDESONIDE 0.25 MG/2ML
0.25 INHALANT ORAL
Status: DISCONTINUED | OUTPATIENT
Start: 2024-02-17 | End: 2024-02-19 | Stop reason: HOSPADM

## 2024-02-17 RX ORDER — SODIUM CHLORIDE 9 MG/ML
INJECTION, SOLUTION INTRAVENOUS CONTINUOUS
Status: DISCONTINUED | OUTPATIENT
Start: 2024-02-17 | End: 2024-02-17

## 2024-02-17 RX ADMIN — IPRATROPIUM BROMIDE 0.5 MG: 0.5 SOLUTION RESPIRATORY (INHALATION) at 19:26

## 2024-02-17 RX ADMIN — GUAIFENESIN 600 MG: 600 TABLET ORAL at 19:20

## 2024-02-17 RX ADMIN — WATER 2000 MG: 1 INJECTION INTRAMUSCULAR; INTRAVENOUS; SUBCUTANEOUS at 10:21

## 2024-02-17 RX ADMIN — ENOXAPARIN SODIUM 40 MG: 100 INJECTION SUBCUTANEOUS at 10:24

## 2024-02-17 RX ADMIN — ACETYLCYSTEINE 600 MG: 200 SOLUTION ORAL; RESPIRATORY (INHALATION) at 10:13

## 2024-02-17 RX ADMIN — BUDESONIDE 250 MCG: 0.25 SUSPENSION RESPIRATORY (INHALATION) at 19:26

## 2024-02-17 RX ADMIN — ERGOCALCIFEROL 50000 UNITS: 1.25 CAPSULE ORAL at 10:23

## 2024-02-17 RX ADMIN — SODIUM CHLORIDE: 9 INJECTION, SOLUTION INTRAVENOUS at 19:12

## 2024-02-17 RX ADMIN — SODIUM CHLORIDE: 9 INJECTION, SOLUTION INTRAVENOUS at 09:59

## 2024-02-17 RX ADMIN — ARFORMOTEROL TARTRATE 15 MCG: 15 SOLUTION RESPIRATORY (INHALATION) at 19:26

## 2024-02-17 RX ADMIN — BUDESONIDE 250 MCG: 0.25 SUSPENSION RESPIRATORY (INHALATION) at 10:20

## 2024-02-17 RX ADMIN — Medication 1 CAPSULE: at 10:22

## 2024-02-17 RX ADMIN — SODIUM CHLORIDE 1000 ML: 9 INJECTION, SOLUTION INTRAVENOUS at 05:04

## 2024-02-17 RX ADMIN — ACETYLCYSTEINE 600 MG: 200 SOLUTION ORAL; RESPIRATORY (INHALATION) at 19:27

## 2024-02-17 RX ADMIN — ACETAMINOPHEN 650 MG: 325 TABLET ORAL at 19:22

## 2024-02-17 RX ADMIN — ONDANSETRON 4 MG: 2 INJECTION INTRAMUSCULAR; INTRAVENOUS at 05:04

## 2024-02-17 RX ADMIN — HYDROMORPHONE HYDROCHLORIDE 1 MG: 1 INJECTION, SOLUTION INTRAMUSCULAR; INTRAVENOUS; SUBCUTANEOUS at 05:00

## 2024-02-17 RX ADMIN — METRONIDAZOLE 500 MG: 500 INJECTION, SOLUTION INTRAVENOUS at 17:49

## 2024-02-17 RX ADMIN — IPRATROPIUM BROMIDE 0.5 MG: 0.5 SOLUTION RESPIRATORY (INHALATION) at 15:17

## 2024-02-17 RX ADMIN — ARFORMOTEROL TARTRATE 15 MCG: 15 SOLUTION RESPIRATORY (INHALATION) at 10:20

## 2024-02-17 RX ADMIN — LEVOTHYROXINE SODIUM 100 MCG: 100 TABLET ORAL at 10:02

## 2024-02-17 RX ADMIN — Medication 3 MG: at 19:20

## 2024-02-17 RX ADMIN — IPRATROPIUM BROMIDE AND ALBUTEROL SULFATE 1 DOSE: 2.5; .5 SOLUTION RESPIRATORY (INHALATION) at 10:14

## 2024-02-17 RX ADMIN — GUAIFENESIN 600 MG: 600 TABLET ORAL at 10:23

## 2024-02-17 RX ADMIN — METRONIDAZOLE 500 MG: 500 INJECTION, SOLUTION INTRAVENOUS at 10:20

## 2024-02-17 SDOH — ECONOMIC STABILITY: INCOME INSECURITY: HOW HARD IS IT FOR YOU TO PAY FOR THE VERY BASICS LIKE FOOD, HOUSING, MEDICAL CARE, AND HEATING?: NOT HARD AT ALL

## 2024-02-17 SDOH — ECONOMIC STABILITY: FOOD INSECURITY: WITHIN THE PAST 12 MONTHS, YOU WORRIED THAT YOUR FOOD WOULD RUN OUT BEFORE YOU GOT MONEY TO BUY MORE.: NEVER TRUE

## 2024-02-17 SDOH — ECONOMIC STABILITY: INCOME INSECURITY: IN THE PAST 12 MONTHS, HAS THE ELECTRIC, GAS, OIL, OR WATER COMPANY THREATENED TO SHUT OFF SERVICE IN YOUR HOME?: NO

## 2024-02-17 ASSESSMENT — PAIN SCALES - GENERAL
PAINLEVEL_OUTOF10: 4
PAINLEVEL_OUTOF10: 10
PAINLEVEL_OUTOF10: 10
PAINLEVEL_OUTOF10: 0
PAINLEVEL_OUTOF10: 0
PAINLEVEL_OUTOF10: 3

## 2024-02-17 ASSESSMENT — PATIENT HEALTH QUESTIONNAIRE - PHQ9
SUM OF ALL RESPONSES TO PHQ QUESTIONS 1-9: 0
SUM OF ALL RESPONSES TO PHQ9 QUESTIONS 1 & 2: 0
SUM OF ALL RESPONSES TO PHQ QUESTIONS 1-9: 0
2. FEELING DOWN, DEPRESSED OR HOPELESS: 0
1. LITTLE INTEREST OR PLEASURE IN DOING THINGS: 0

## 2024-02-17 ASSESSMENT — PAIN DESCRIPTION - FREQUENCY: FREQUENCY: INTERMITTENT

## 2024-02-17 ASSESSMENT — PAIN DESCRIPTION - PAIN TYPE: TYPE: ACUTE PAIN

## 2024-02-17 ASSESSMENT — PAIN DESCRIPTION - ONSET: ONSET: ON-GOING

## 2024-02-17 ASSESSMENT — PAIN DESCRIPTION - DESCRIPTORS: DESCRIPTORS: ACHING

## 2024-02-17 ASSESSMENT — PAIN DESCRIPTION - LOCATION: LOCATION: HEAD

## 2024-02-17 ASSESSMENT — PAIN DESCRIPTION - ORIENTATION: ORIENTATION: OTHER (COMMENT)

## 2024-02-17 ASSESSMENT — PAIN DESCRIPTION - DIRECTION: RADIATING_TOWARDS: NO

## 2024-02-17 ASSESSMENT — PAIN - FUNCTIONAL ASSESSMENT
PAIN_FUNCTIONAL_ASSESSMENT: 0-10
PAIN_FUNCTIONAL_ASSESSMENT: PREVENTS OR INTERFERES SOME ACTIVE ACTIVITIES AND ADLS

## 2024-02-17 NOTE — ED PROVIDER NOTES
Socioeconomic History    Marital status:      Spouse name: None    Number of children: None    Years of education: None    Highest education level: None   Tobacco Use    Smoking status: Every Day     Current packs/day: 0.00     Average packs/day: 1 pack/day for 30.0 years (30.0 ttl pk-yrs)     Types: Cigarettes, Cigars     Start date: 1989     Last attempt to quit: 2019     Years since quittin.6    Smokeless tobacco: Never    Tobacco comments:     smokes a few cigars now   Vaping Use    Vaping Use: Never used   Substance and Sexual Activity    Alcohol use: No     Comment: used to drink heavy in the past, 9 years    Drug use: No       SCREENINGS    [place] Coma Scale  Eye Opening: Spontaneous  Best Verbal Response: Oriented  Best Motor Response: Obeys commands  [place] Coma Scale Score: 15        PHYSICAL EXAM    (up to 7 for level 4, 8 or more for level 5)     ED Triage Vitals   BP Temp Temp Source Pulse Respirations SpO2 Height Weight - Scale   24 0446 24 0446 24 0446 24 0446 24 0446 24 0446 24 0445 24 0445   (!) 177/84 97.8 °F (36.6 °C) Oral (!) 101 18 94 % 1.753 m (5' 9\") 70.8 kg (156 lb)       Physical Exam  Vitals and nursing note reviewed.   Constitutional:       General: He is not in acute distress.     Appearance: He is ill-appearing.      Comments: Patient appears in pain.  Patient's forehead is sweaty.   HENT:      Head: Normocephalic and atraumatic.      Right Ear: External ear normal.      Left Ear: External ear normal.      Nose: Nose normal. No congestion or rhinorrhea.      Mouth/Throat:      Pharynx: Oropharynx is clear. No oropharyngeal exudate or posterior oropharyngeal erythema.      Comments: Tacky mucous membrane  Eyes:      General: No scleral icterus.     Conjunctiva/sclera: Conjunctivae normal.      Pupils: Pupils are equal, round, and reactive to light.   Cardiovascular:      Rate and Rhythm: Normal rate and regular

## 2024-02-17 NOTE — CONSULTS
(PROVENTIL) (2.5 MG/3ML) 0.083% nebulizer solution 2.5 mg  2.5 mg Nebulization Q4H PRN Earnestine Goodman APRN - CNP        ipratropium (ATROVENT) 0.02 % nebulizer solution 0.5 mg  0.5 mg Nebulization 4x Daily RT Earnestine Goodman APRN - CNP         Allergies: Meloxicam, Tamsulosin, and Pcn [penicillins]    Family History   Problem Relation Age of Onset    Cancer Sister        Social History     Tobacco Use    Smoking status: Every Day     Current packs/day: 0.00     Average packs/day: 1 pack/day for 30.0 years (30.0 ttl pk-yrs)     Types: Cigarettes, Cigars     Start date: 1989     Last attempt to quit: 2019     Years since quittin.6    Smokeless tobacco: Never    Tobacco comments:     smokes a few cigars now   Substance Use Topics    Alcohol use: No     Comment: used to drink heavy in the past, 9 years       Review of Systems   Constitutional:  Positive for activity change, appetite change and fatigue.   Respiratory:  Positive for cough and shortness of breath.    Gastrointestinal:  Positive for abdominal pain.       Objective:   /69   Pulse 79   Temp 97.2 °F (36.2 °C) (Temporal)   Resp 18   Ht 1.753 m (5' 9\")   Wt 70.8 kg (156 lb)   SpO2 97%   BMI 23.04 kg/m²     Intake/Output Summary (Last 24 hours) at 2024 1237  Last data filed at 2024 0838  Gross per 24 hour   Intake --   Output 1475 ml   Net -1475 ml     Physical Exam  Vitals reviewed.   Constitutional:       Appearance: He is well-developed.   HENT:      Head: Normocephalic and atraumatic.   Eyes:      Pupils: Pupils are equal, round, and reactive to light.   Cardiovascular:      Rate and Rhythm: Normal rate.   Pulmonary:      Effort: Pulmonary effort is normal.      Breath sounds: No wheezing or rales.   Abdominal:      General: There is no distension.      Palpations: Abdomen is soft. There is no mass.      Tenderness: There is no abdominal tenderness. There is no guarding or rebound.      Hernia: A hernia is

## 2024-02-17 NOTE — PLAN OF CARE
After midnight admission, care assumed this morning.  Patient's chief complaint was abdominal pain from abdominal hernia.  CT reviewed.  General surgery consultation and clear liquid diet.  Wyatt catheter placed for bilateral hydronephrosis and bladder outlet obstruction.  Repeat labs ordered for in a.m. antibiotic coverage adjusted for possible pneumonia.  Currently maintained on room air and in no obvious sign of distress.

## 2024-02-17 NOTE — ED NOTES
Glucose 138, pt changed into gown, cardiac monitor, NIBP and continuous pulse ox applied. Call light within reach.  
Handoff accepted by KEVIN Jackson on 5th floor.   
PT attempted to void in urinal without success, call light within reach.   
Pt placed in trendelenburg position and hernia reduced by Dr. Zuñiga accompanied by this RN. Pt reported immediate pain relief.   
Pt positioned to void into urinal while in bed by this RN and JEANETTE Polk. Call light within reach.   
Electronically signed by Komal Moyer RN on 2/17/2024 at 8:26 AM

## 2024-02-18 ENCOUNTER — APPOINTMENT (OUTPATIENT)
Dept: CT IMAGING | Age: 74
DRG: 393 | End: 2024-02-18
Payer: MEDICARE

## 2024-02-18 PROBLEM — J18.9 BILATERAL PNEUMONIA: Status: ACTIVE | Noted: 2024-02-18

## 2024-02-18 PROBLEM — N13.30 BILATERAL HYDRONEPHROSIS: Status: ACTIVE | Noted: 2024-02-18

## 2024-02-18 LAB
ANION GAP SERPL CALCULATED.3IONS-SCNC: 9 MMOL/L (ref 7–19)
BUN SERPL-MCNC: 18 MG/DL (ref 8–23)
CALCIUM SERPL-MCNC: 8.4 MG/DL (ref 8.8–10.2)
CHLORIDE SERPL-SCNC: 107 MMOL/L (ref 98–111)
CO2 SERPL-SCNC: 26 MMOL/L (ref 22–29)
CREAT SERPL-MCNC: 1 MG/DL (ref 0.5–1.2)
ERYTHROCYTE [DISTWIDTH] IN BLOOD BY AUTOMATED COUNT: 12.7 % (ref 11.5–14.5)
GLUCOSE SERPL-MCNC: 88 MG/DL (ref 74–109)
HCT VFR BLD AUTO: 43.2 % (ref 42–52)
HGB BLD-MCNC: 14.4 G/DL (ref 14–18)
MCH RBC QN AUTO: 32.3 PG (ref 27–31)
MCHC RBC AUTO-ENTMCNC: 33.3 G/DL (ref 33–37)
MCV RBC AUTO: 96.9 FL (ref 80–94)
PLATELET # BLD AUTO: 219 K/UL (ref 130–400)
PMV BLD AUTO: 9.9 FL (ref 9.4–12.4)
POTASSIUM SERPL-SCNC: 4.6 MMOL/L (ref 3.5–5)
RBC # BLD AUTO: 4.46 M/UL (ref 4.7–6.1)
SODIUM SERPL-SCNC: 142 MMOL/L (ref 136–145)
WBC # BLD AUTO: 8.6 K/UL (ref 4.8–10.8)

## 2024-02-18 PROCEDURE — 6360000002 HC RX W HCPCS: Performed by: NURSE PRACTITIONER

## 2024-02-18 PROCEDURE — 2700000000 HC OXYGEN THERAPY PER DAY

## 2024-02-18 PROCEDURE — 36415 COLL VENOUS BLD VENIPUNCTURE: CPT

## 2024-02-18 PROCEDURE — 2580000003 HC RX 258: Performed by: NURSE PRACTITIONER

## 2024-02-18 PROCEDURE — 1210000000 HC MED SURG R&B

## 2024-02-18 PROCEDURE — 6360000002 HC RX W HCPCS: Performed by: HOSPITALIST

## 2024-02-18 PROCEDURE — 80048 BASIC METABOLIC PNL TOTAL CA: CPT

## 2024-02-18 PROCEDURE — 94640 AIRWAY INHALATION TREATMENT: CPT

## 2024-02-18 PROCEDURE — 6370000000 HC RX 637 (ALT 250 FOR IP): Performed by: HOSPITALIST

## 2024-02-18 PROCEDURE — 71250 CT THORAX DX C-: CPT

## 2024-02-18 PROCEDURE — 87641 MR-STAPH DNA AMP PROBE: CPT

## 2024-02-18 PROCEDURE — 6370000000 HC RX 637 (ALT 250 FOR IP): Performed by: NURSE PRACTITIONER

## 2024-02-18 PROCEDURE — 94760 N-INVAS EAR/PLS OXIMETRY 1: CPT

## 2024-02-18 PROCEDURE — 85027 COMPLETE CBC AUTOMATED: CPT

## 2024-02-18 RX ORDER — LEVOFLOXACIN 500 MG/1
500 TABLET, FILM COATED ORAL EVERY 24 HOURS
Status: DISCONTINUED | OUTPATIENT
Start: 2024-02-18 | End: 2024-02-19 | Stop reason: HOSPADM

## 2024-02-18 RX ORDER — SODIUM CHLORIDE 9 MG/ML
INJECTION, SOLUTION INTRAVENOUS CONTINUOUS
Status: DISCONTINUED | OUTPATIENT
Start: 2024-02-18 | End: 2024-02-19 | Stop reason: HOSPADM

## 2024-02-18 RX ORDER — PHENAZOPYRIDINE HYDROCHLORIDE 200 MG/1
200 TABLET, FILM COATED ORAL
Status: DISCONTINUED | OUTPATIENT
Start: 2024-02-18 | End: 2024-02-19 | Stop reason: HOSPADM

## 2024-02-18 RX ORDER — BACITRACIN ZINC AND POLYMYXIN B SULFATE 500; 1000 [USP'U]/G; [USP'U]/G
OINTMENT TOPICAL 2 TIMES DAILY
Status: DISCONTINUED | OUTPATIENT
Start: 2024-02-18 | End: 2024-02-19 | Stop reason: HOSPADM

## 2024-02-18 RX ADMIN — BUDESONIDE 250 MCG: 0.25 SUSPENSION RESPIRATORY (INHALATION) at 07:23

## 2024-02-18 RX ADMIN — IPRATROPIUM BROMIDE 0.5 MG: 0.5 SOLUTION RESPIRATORY (INHALATION) at 07:12

## 2024-02-18 RX ADMIN — GUAIFENESIN 600 MG: 600 TABLET ORAL at 09:08

## 2024-02-18 RX ADMIN — GUAIFENESIN 600 MG: 600 TABLET ORAL at 20:22

## 2024-02-18 RX ADMIN — PHENAZOPYRIDINE HYDROCHLORIDE 200 MG: 200 TABLET ORAL at 18:09

## 2024-02-18 RX ADMIN — ACETYLCYSTEINE 600 MG: 200 SOLUTION ORAL; RESPIRATORY (INHALATION) at 07:12

## 2024-02-18 RX ADMIN — WATER 2000 MG: 1 INJECTION INTRAMUSCULAR; INTRAVENOUS; SUBCUTANEOUS at 09:08

## 2024-02-18 RX ADMIN — LEVOTHYROXINE SODIUM 100 MCG: 100 TABLET ORAL at 05:23

## 2024-02-18 RX ADMIN — METRONIDAZOLE 500 MG: 500 INJECTION, SOLUTION INTRAVENOUS at 02:17

## 2024-02-18 RX ADMIN — Medication 1 CAPSULE: at 09:07

## 2024-02-18 RX ADMIN — ACETYLCYSTEINE 600 MG: 200 SOLUTION ORAL; RESPIRATORY (INHALATION) at 18:54

## 2024-02-18 RX ADMIN — LEVOFLOXACIN 500 MG: 500 TABLET, FILM COATED ORAL at 13:18

## 2024-02-18 RX ADMIN — BACITRACIN ZINC AND POLYMYXIN B SULFATE: at 20:22

## 2024-02-18 RX ADMIN — Medication 3 MG: at 20:22

## 2024-02-18 RX ADMIN — IPRATROPIUM BROMIDE 0.5 MG: 0.5 SOLUTION RESPIRATORY (INHALATION) at 18:54

## 2024-02-18 RX ADMIN — PHENAZOPYRIDINE HYDROCHLORIDE 200 MG: 200 TABLET ORAL at 13:17

## 2024-02-18 RX ADMIN — SODIUM CHLORIDE: 9 INJECTION, SOLUTION INTRAVENOUS at 02:16

## 2024-02-18 RX ADMIN — METRONIDAZOLE 500 MG: 500 INJECTION, SOLUTION INTRAVENOUS at 09:13

## 2024-02-18 RX ADMIN — ARFORMOTEROL TARTRATE 15 MCG: 15 SOLUTION RESPIRATORY (INHALATION) at 18:54

## 2024-02-18 RX ADMIN — IPRATROPIUM BROMIDE 0.5 MG: 0.5 SOLUTION RESPIRATORY (INHALATION) at 11:01

## 2024-02-18 RX ADMIN — ENOXAPARIN SODIUM 40 MG: 100 INJECTION SUBCUTANEOUS at 09:08

## 2024-02-18 RX ADMIN — ACETAMINOPHEN 650 MG: 325 TABLET ORAL at 11:30

## 2024-02-18 RX ADMIN — ARFORMOTEROL TARTRATE 15 MCG: 15 SOLUTION RESPIRATORY (INHALATION) at 07:23

## 2024-02-18 RX ADMIN — BUDESONIDE 250 MCG: 0.25 SUSPENSION RESPIRATORY (INHALATION) at 18:54

## 2024-02-18 NOTE — PROGRESS NOTES
[facility]      Patient:  Bradley Schuler  YOB: 1950  Date of Service: 2/18/2024  MRN: [id number]   Acct: [id number]   Primary Care Physician: Sammi Bauman APRN - CNP  Advance Directive: Full Code  Admit Date: 2/17/2024       Hospital Day: 1  Portions of this note have been copied forward, however, changed to reflect the most current clinical status of this patient.  CHIEF COMPLAINT abd pain    SUBJECTIVE:  Patient reports no further abd pain. Reports hernia is soft today. Reports catheter is bothersome. Denies fever and chills. Reports chronic cough. Patient reports he is wheelchair bound at baseline.     CUMULATIVE HOSPITAL COURSE:  The patient is a 73 YOM with PMH of chronic back pain, COPD, GERD, hypothyroidism, and ventral hernia who presented to [facility] ED with complaints of abd pain. Patient reports hisotry of ventral hernia. He states he has seen surgery before but decided against having surgery. He reports the hernia is normally soft and he can normally push it back in. He reports prior to admission the hernia was hard and very painful. In the ED the hernia was reduced and pain resolved. CT abd pelvis confirmed ventral hernia, bilateral pneumonia, bilateral hydronephrosis, and chronic bladder outlet obstruction. Laboratory findings revealed Cr 1.6 previously 1.1 9/23, BUN 28, WBC 14.1, hemoglobin 17.5.  Patient was started on antibiotic therapy and admitted to hospitalist service. Scales cath was placed as patient was unable to empty bladder. Renal functions normalized with IV hydration and insertion of scales catheter. PT/OT evals in place. Patient reports he lives alone and is wheelchair bound at baseline. High resolution CT chest requested to assess lungs better. Antibiotic therapy simplified to Levaquin. IV hydration slowed. Social work consult to assist with DC planning.     Review of Systems:   Review of Systems   Constitutional:  Negative for activity change, appetite change,

## 2024-02-18 NOTE — PROGRESS NOTES
Physical Therapy  Attempted PT eval, but Pt was our for testing.  Electronically signed by Aster Suárez PT on 2/18/2024 at 1:35 PM

## 2024-02-18 NOTE — PROGRESS NOTES
Patient is refusing IV fluids at his time, states \"I'm going to leave tomorrow morning I do not need fluids.\"

## 2024-02-18 NOTE — PLAN OF CARE
Problem: Discharge Planning  Goal: Discharge to home or other facility with appropriate resources  2/18/2024 0325 by Tamika Li LPN  Outcome: Progressing  Flowsheets (Taken 2/17/2024 2315)  Discharge to home or other facility with appropriate resources:   Identify barriers to discharge with patient and caregiver   Arrange for needed discharge resources and transportation as appropriate   Identify discharge learning needs (meds, wound care, etc)   Refer to discharge planning if patient needs post-hospital services based on physician order or complex needs related to functional status, cognitive ability or social support system  2/17/2024 1550 by Freida López RN  Outcome: Progressing     Problem: Pain  Goal: Verbalizes/displays adequate comfort level or baseline comfort level  2/18/2024 0325 by Tamika Li LPN  Outcome: Progressing  Flowsheets (Taken 2/17/2024 2315)  Verbalizes/displays adequate comfort level or baseline comfort level:   Encourage patient to monitor pain and request assistance   Assess pain using appropriate pain scale   Administer analgesics based on type and severity of pain and evaluate response   Implement non-pharmacological measures as appropriate and evaluate response   Consider cultural and social influences on pain and pain management   Notify Licensed Independent Practitioner if interventions unsuccessful or patient reports new pain  2/17/2024 1550 by Freida López RN  Outcome: Progressing     Problem: Safety - Adult  Goal: Free from fall injury  2/18/2024 0325 by Tamika Li LPN  Outcome: Progressing  Flowsheets (Taken 2/17/2024 2340)  Free From Fall Injury: Instruct family/caregiver on patient safety  2/17/2024 1550 by Freida López RN  Outcome: Progressing     Problem: ABCDS Injury Assessment  Goal: Absence of physical injury  2/18/2024 0325 by Tamika Li LPN  Outcome: Progressing  Flowsheets (Taken 2/17/2024 2340)  Absence of Physical Injury:

## 2024-02-18 NOTE — PROGRESS NOTES
Patient has repeatedly called out complaining about his scales. Patient keeps stating \"Please take this thing out of me it is hurting me.\" I have attempted to educate him on the reasons he has a scales. He stated that he is getting it out one way or another tomorrow, whether I cut it out or you take it out.\" He states that he has not consented to anything that has been done to him in the last two days. He wants to know why we are testing him for things that do not pertain to the reason he came in. His only reason for coming in was the hernia. He states \"I have Been to multiple doctors none of them have told me I have pneumonia.\"

## 2024-02-18 NOTE — PROGRESS NOTES
Pt. Removed IV. He confabulated a story as to why the IV was out. Stated \"the lady came and took it out.\" I asked him if I could insert a new IV he refused at this time. Pt.was left without IV access.

## 2024-02-19 ENCOUNTER — HOME HEALTH ADMISSION (OUTPATIENT)
Dept: HOME HEALTH SERVICES | Facility: HOME HEALTHCARE | Age: 74
End: 2024-02-19
Payer: MEDICARE

## 2024-02-19 ENCOUNTER — TRANSCRIBE ORDERS (OUTPATIENT)
Dept: HOME HEALTH SERVICES | Facility: HOME HEALTHCARE | Age: 74
End: 2024-02-19
Payer: MEDICARE

## 2024-02-19 VITALS
HEIGHT: 69 IN | OXYGEN SATURATION: 99 % | WEIGHT: 156 LBS | DIASTOLIC BLOOD PRESSURE: 97 MMHG | SYSTOLIC BLOOD PRESSURE: 140 MMHG | BODY MASS INDEX: 23.11 KG/M2 | TEMPERATURE: 98.1 F | RESPIRATION RATE: 18 BRPM | HEART RATE: 90 BPM

## 2024-02-19 DIAGNOSIS — Z48.815 ENCOUNTER FOR SURGICAL AFTERCARE FOLLOWING SURGERY OF DIGESTIVE SYSTEM: Primary | ICD-10-CM

## 2024-02-19 LAB
ANION GAP SERPL CALCULATED.3IONS-SCNC: 12 MMOL/L (ref 7–19)
BUN SERPL-MCNC: 10 MG/DL (ref 8–23)
CALCIUM SERPL-MCNC: 8.6 MG/DL (ref 8.8–10.2)
CHLORIDE SERPL-SCNC: 106 MMOL/L (ref 98–111)
CO2 SERPL-SCNC: 24 MMOL/L (ref 22–29)
CREAT SERPL-MCNC: 0.8 MG/DL (ref 0.5–1.2)
ERYTHROCYTE [DISTWIDTH] IN BLOOD BY AUTOMATED COUNT: 12.4 % (ref 11.5–14.5)
GLUCOSE SERPL-MCNC: 86 MG/DL (ref 74–109)
HCT VFR BLD AUTO: 43.4 % (ref 42–52)
HGB BLD-MCNC: 14.9 G/DL (ref 14–18)
MCH RBC QN AUTO: 32.3 PG (ref 27–31)
MCHC RBC AUTO-ENTMCNC: 34.3 G/DL (ref 33–37)
MCV RBC AUTO: 94.1 FL (ref 80–94)
MRSA DNA SPEC QL NAA+PROBE: NOT DETECTED
PLATELET # BLD AUTO: 216 K/UL (ref 130–400)
PMV BLD AUTO: 9.9 FL (ref 9.4–12.4)
POTASSIUM SERPL-SCNC: 3.8 MMOL/L (ref 3.5–5)
RBC # BLD AUTO: 4.61 M/UL (ref 4.7–6.1)
SODIUM SERPL-SCNC: 142 MMOL/L (ref 136–145)
WBC # BLD AUTO: 8.2 K/UL (ref 4.8–10.8)

## 2024-02-19 PROCEDURE — 94760 N-INVAS EAR/PLS OXIMETRY 1: CPT

## 2024-02-19 PROCEDURE — 85027 COMPLETE CBC AUTOMATED: CPT

## 2024-02-19 PROCEDURE — 6360000002 HC RX W HCPCS: Performed by: HOSPITALIST

## 2024-02-19 PROCEDURE — 6370000000 HC RX 637 (ALT 250 FOR IP): Performed by: HOSPITALIST

## 2024-02-19 PROCEDURE — 80048 BASIC METABOLIC PNL TOTAL CA: CPT

## 2024-02-19 PROCEDURE — 6360000002 HC RX W HCPCS: Performed by: NURSE PRACTITIONER

## 2024-02-19 PROCEDURE — 94640 AIRWAY INHALATION TREATMENT: CPT

## 2024-02-19 PROCEDURE — 6370000000 HC RX 637 (ALT 250 FOR IP): Performed by: NURSE PRACTITIONER

## 2024-02-19 PROCEDURE — 36415 COLL VENOUS BLD VENIPUNCTURE: CPT

## 2024-02-19 RX ORDER — LISINOPRIL 10 MG/1
10 TABLET ORAL DAILY
Qty: 30 TABLET | Refills: 0 | Status: SHIPPED | OUTPATIENT
Start: 2024-02-19

## 2024-02-19 RX ORDER — ERGOCALCIFEROL 1.25 MG/1
50000 CAPSULE ORAL WEEKLY
Qty: 5 CAPSULE | Refills: 0 | Status: SHIPPED | OUTPATIENT
Start: 2024-02-24

## 2024-02-19 RX ORDER — BENZONATATE 100 MG/1
100 CAPSULE ORAL 3 TIMES DAILY PRN
Qty: 21 CAPSULE | Refills: 0 | Status: SHIPPED | OUTPATIENT
Start: 2024-02-19 | End: 2024-02-26

## 2024-02-19 RX ORDER — PHENAZOPYRIDINE HYDROCHLORIDE 200 MG/1
200 TABLET, FILM COATED ORAL
Qty: 9 TABLET | Refills: 0 | Status: SHIPPED | OUTPATIENT
Start: 2024-02-19 | End: 2024-02-22

## 2024-02-19 RX ORDER — LEVOFLOXACIN 500 MG/1
500 TABLET, FILM COATED ORAL EVERY 24 HOURS
Qty: 7 TABLET | Refills: 0 | Status: SHIPPED | OUTPATIENT
Start: 2024-02-19 | End: 2024-02-26

## 2024-02-19 RX ORDER — GUAIFENESIN 600 MG/1
600 TABLET, EXTENDED RELEASE ORAL 2 TIMES DAILY
Qty: 60 TABLET | Refills: 0 | Status: SHIPPED | OUTPATIENT
Start: 2024-02-19

## 2024-02-19 RX ORDER — BACITRACIN ZINC AND POLYMYXIN B SULFATE 500; 1000 [USP'U]/G; [USP'U]/G
OINTMENT TOPICAL
Qty: 28.4 G | Refills: 0 | Status: SHIPPED | OUTPATIENT
Start: 2024-02-19 | End: 2024-02-26

## 2024-02-19 RX ORDER — LACTOBACILLUS RHAMNOSUS GG 10B CELL
1 CAPSULE ORAL
Qty: 30 CAPSULE | Refills: 0 | Status: SHIPPED | OUTPATIENT
Start: 2024-02-19

## 2024-02-19 RX ADMIN — PHENAZOPYRIDINE HYDROCHLORIDE 200 MG: 200 TABLET ORAL at 08:50

## 2024-02-19 RX ADMIN — GUAIFENESIN 600 MG: 600 TABLET ORAL at 08:49

## 2024-02-19 RX ADMIN — BUDESONIDE 250 MCG: 0.25 SUSPENSION RESPIRATORY (INHALATION) at 06:18

## 2024-02-19 RX ADMIN — ACETYLCYSTEINE 600 MG: 200 SOLUTION ORAL; RESPIRATORY (INHALATION) at 06:18

## 2024-02-19 RX ADMIN — Medication 1 CAPSULE: at 08:50

## 2024-02-19 RX ADMIN — IPRATROPIUM BROMIDE 0.5 MG: 0.5 SOLUTION RESPIRATORY (INHALATION) at 06:18

## 2024-02-19 RX ADMIN — BACITRACIN ZINC AND POLYMYXIN B SULFATE: at 08:50

## 2024-02-19 RX ADMIN — ARFORMOTEROL TARTRATE 15 MCG: 15 SOLUTION RESPIRATORY (INHALATION) at 06:18

## 2024-02-19 RX ADMIN — LEVOTHYROXINE SODIUM 100 MCG: 100 TABLET ORAL at 05:31

## 2024-02-19 NOTE — PROGRESS NOTES
Physical Therapy      Pt preparing for d/c. Nurse states no need for therapy eval.    Electronically signed by Kristan Concepcion PT on 2/19/2024 at 9:16 AM

## 2024-02-19 NOTE — DISCHARGE SUMMARY
2/18/2024 which shows left greater than right lower lobe pneumonia mildly increased on the left probable mucous plugging of some of the left lower lobe locules.  Mild emphysema.  Cardiomegaly.  Ascending aortic ectasia measuring up to 4 cm with dilatation of the main pulmonary suggesting pulmonary arterial hypertension.  He was started on broad-spectrum antibiotics and has been de-escalated to Levaquin.  He is currently saturating well on room air.  He will be discharged on completion of course of antibiotics.  The patient is anxious for discharge.  He is medically stable for discharge.  Home health has been consulted to assist him with his Wyatt catheter and ADLs.  He is to follow-up with his PCP within 1 week of discharge for hospital follow-up.  He will be discharged home with home health in stable condition.    Significant Diagnostic Studies:   CT CHEST HIGH RESOLUTION    Result Date: 2/18/2024  EXAM: CHEST CT WITHOUT CONTRAST, HIGH RESOLUTION CHEST CT HISTORY: Better assess emphysema and pneumonia  TECHNIQUE: CT acquisition of the chest from the thoracic inlet to the upper abdomen without IV contrast administration.  Inspiratory/expiratory phase imaging at 10mm intervals through the lungs IV Contrast: None CT Dose Reduction Techniques Performed: Yes.  COMPARISON: 02/17/2024 CT  FINDINGS: Evaluation is suboptimal secondary patient motion.  There is likely opacification of some left lower lobe bronchioles.  Left lower lobe airspace consolidation is seen and mildly increased compared to the prior exam.  Mild right lower lobe consolidation is also seen without significant interval change.  Mild emphysematous changes are identified.  No evidence of interstitial lung disease is seen within the limitations of the motion artifact.  Right middle lobe calcified granulomas are noted.  The heart is enlarged.  There is no pericardial effusion or pericardial thickening.  Atherosclerotic calcifications are present including

## 2024-02-19 NOTE — DISCHARGE INSTRUCTIONS
Follow up with urology regarding scales catheter. Office will reach out to you with an appointment. If office does not contact you call them to follow up.  Scales catheter-follow all instructions given to you, read information attached regarding your scales catheter. Always keep catheter collection device lower than the level of your bladder! Home health will help you with scales catheter at home.

## 2024-02-19 NOTE — CARE COORDINATION
[facility] unable to accept due to staffing, per Lizy.  Patient set up with [facility]   Electronically signed by Sonia Bhakta on 2/19/24 at 3:50 PM CST    Spoke with patient regarding MD orders for HH services.  Pt agreeable and chose [facility]. Referral faxed and PENDING. Please notify HH when patient discharges and Fax DC Summary,  DC med list and any new HH orders.     P. [phone]  F. [phone]  The Patient and/or patient representative was provided with a choice of provider and agrees   with the discharge plan. [x] Yes [] No    Freedom of choice list was provided with basic dialogue that supports the patient's individualized plan of care/goals, treatment preferences and shares the quality data associated with the providers. [x] Yes [] No  Electronically signed by Sonia Bhakta on 2/19/24 at 9:21 AM CST

## 2024-02-19 NOTE — PROGRESS NOTES
Patient was upset this morning at bed shift shift report stating he wanted to be discharged or he was going to leave again medical advice. Patient visibly upset and stated he had things he needed to take care of outside the hospital like feeding his dog and paying his mortgage. Patient stated he was going to pull out his catheter and leave. Patient instructed on the importance and need for his scales catheter. Reached out to DESTINI [facility] to see if patient was stable for discharge home today. Patient is able to be discharged home today with his scales catheter. Instructed patient on scales catheter care. Leg bag applied to patient. Patient stated his neighbor was taking him home. David Levine came to floor to take patient home. Discharge instructions went over the patient and David. David stated he was his caregiver and he comes into his home for 3 hours a day. Sonia Bailey home care nurse liaison notified for the need for patient to have home health set up. Patient stated he has used [facility] before and that's who he preferred. Scales catheter supplies sent with patient. Urology referral faxed to office as they instructed me to do and office will call patient with appointment. Appointment made with general surgery- patient stated he wont go because he doesn't want surgery and stated who wants to have surgery when they tell you there is chance you wont wake up again. Instructed patient he will need to make appointment with his primary care physician because unable to get through to office. Patient and David, caregiver verbalized understanding of all instructions. David requested to me change patients leg bag back to standard collection bag because he thought it may be easier for patient. Leg bag changed back to standard collection bag. Patient stable and waiting on transportation for wheelchair to private auto. All questions and concerns addressed prior to discharge.

## 2024-02-19 NOTE — DISCHARGE INSTR - DIET
Good nutrition is important when healing from an illness, injury, or surgery.  Follow any nutrition recommendations given to you during your hospital stay.   If you were given an oral nutrition supplement while in the hospital, continue to take this supplement at home.  You can take it with meals, in-between meals, and/or before bedtime. These supplements can be purchased at most local grocery stores, pharmacies, and chain [employer]-stores.   If you have any questions about your diet or nutrition, call the hospital and ask for the dietitian.    Regular Diet

## 2024-02-20 ENCOUNTER — READMISSION MANAGEMENT (OUTPATIENT)
Dept: CALL CENTER | Facility: HOSPITAL | Age: 74
End: 2024-02-20
Payer: MEDICARE

## 2024-02-20 LAB
EKG P AXIS: 53 DEGREES
EKG P-R INTERVAL: 154 MS
EKG Q-T INTERVAL: 380 MS
EKG QRS DURATION: 144 MS
EKG QTC CALCULATION (BAZETT): 451 MS
EKG T AXIS: 44 DEGREES

## 2024-02-20 PROCEDURE — 93010 ELECTROCARDIOGRAM REPORT: CPT | Performed by: INTERNAL MEDICINE

## 2024-02-20 NOTE — OUTREACH NOTE
Prep Survey      Flowsheet Row Responses   [facility] facility patient discharged from? Non-BH   Is LACE score < 7 ? Non- Discharge   Eligibility [facility]   Date of Admission 02/17/24   Date of Discharge 02/19/24   Discharge Disposition Home or Self Care   Discharge diagnosis Pneumonia of both lower lobes due to infectious organism   Does the patient have one of the following disease processes/diagnoses(primary or secondary)? Pneumonia   Prep survey completed? Yes            Magali Caro Registered Nurse

## 2024-02-21 ENCOUNTER — HOSPITAL ENCOUNTER (OUTPATIENT)
Dept: ULTRASOUND IMAGING | Facility: HOSPITAL | Age: 74
Discharge: HOME | End: 2024-02-21
Payer: MEDICARE

## 2024-02-21 ENCOUNTER — TRANSITIONAL CARE MANAGEMENT TELEPHONE ENCOUNTER (OUTPATIENT)
Dept: CALL CENTER | Facility: HOSPITAL | Age: 74
End: 2024-02-21
Payer: MEDICARE

## 2024-02-21 ENCOUNTER — TELEPHONE (OUTPATIENT)
Dept: FAMILY MEDICINE CLINIC | Facility: CLINIC | Age: 74
End: 2024-02-21
Payer: MEDICARE

## 2024-02-21 DIAGNOSIS — R26.81 GAIT INSTABILITY: Primary | ICD-10-CM

## 2024-02-21 NOTE — TELEPHONE ENCOUNTER
I have placed a DME order for the walker.      Electronically signed by CONCHITA Brice, 02/21/24, 2:23 PM CST.

## 2024-02-21 NOTE — TELEPHONE ENCOUNTER
Caller: [facility]    Relationship: [facility]    Best call back number: [phone]    What is the best time to reach you: ANYTIME    Who are you requesting to speak with (clinical staff, provider,  specific staff member): CLINICAL    What was the call regarding: PHYSICAL THERAPIST STATES PATIENT NEEDS TWICE A WEEK FOR FOUR WEEKS., ALSO PATIENT NEEDS A ORDER FOR FRONT WHEEL WALKER.

## 2024-02-21 NOTE — OUTREACH NOTE
Call Center TCM Note      Flowsheet Row Responses   [facility] patient discharged from? Non-BH   Does the patient have one of the following disease processes/diagnoses(primary or secondary)? Pneumonia   TCM attempt successful? No   Unsuccessful attempts Attempt 2            Lilian Berrios RN    2/21/2024, 13:42 CST

## 2024-02-21 NOTE — OUTREACH NOTE
Call Center TCM Note      Flowsheet Row Responses   [facility] patient discharged from? Non-BH   Does the patient have one of the following disease processes/diagnoses(primary or secondary)? Pneumonia   TCM attempt successful? No   Unsuccessful attempts Attempt 1  [No person listed on verbal release]            Lilian Berrios RN    2/21/2024, 11:36 CST

## 2024-02-22 ENCOUNTER — TRANSCRIBE ORDERS (OUTPATIENT)
Dept: ADMINISTRATIVE | Facility: HOSPITAL | Age: 74
End: 2024-02-22
Payer: MEDICARE

## 2024-02-22 ENCOUNTER — TRANSITIONAL CARE MANAGEMENT TELEPHONE ENCOUNTER (OUTPATIENT)
Dept: CALL CENTER | Facility: HOSPITAL | Age: 74
End: 2024-02-22
Payer: MEDICARE

## 2024-02-22 ENCOUNTER — HOSPITAL ENCOUNTER (OUTPATIENT)
Dept: GENERAL RADIOLOGY | Facility: HOSPITAL | Age: 74
Discharge: HOME | End: 2024-02-22
Payer: MEDICARE

## 2024-02-22 ENCOUNTER — OFFICE VISIT (OUTPATIENT)
Dept: WOUND CARE | Facility: HOSPITAL | Age: 74
End: 2024-02-22
Payer: MEDICARE

## 2024-02-22 DIAGNOSIS — R60.0 LOCALIZED EDEMA: Primary | ICD-10-CM

## 2024-02-22 DIAGNOSIS — I73.9 PERIPHERAL VASCULAR DISEASE, UNSPECIFIED: ICD-10-CM

## 2024-02-22 DIAGNOSIS — L97.522 NON-PRESSURE CHRONIC ULCER OF OTHER PART OF LEFT FOOT WITH FAT LAYER EXPOSED: ICD-10-CM

## 2024-02-22 DIAGNOSIS — L97.522 NON-PRESSURE CHRONIC ULCER OF OTHER PART OF LEFT FOOT WITH FAT LAYER EXPOSED: Primary | ICD-10-CM

## 2024-02-22 LAB
BACTERIA BLD CULT ORG #2: NORMAL
BACTERIA BLD CULT: NORMAL

## 2024-02-22 PROCEDURE — 73660 X-RAY EXAM OF TOE(S): CPT

## 2024-02-22 NOTE — OUTREACH NOTE
Call Center TCM Note      Flowsheet Row Responses   [facility] patient discharged from? Non-   Does the patient have one of the following disease processes/diagnoses(primary or secondary)? Other   TCM attempt successful? Yes   Call start time 1402   Call end time 1407   Discharge diagnosis Pneumonia of both lower lobes due to infectious organism   Meds reviewed with patient/caregiver? Yes   Is the patient having any side effects they believe may be caused by any medication additions or changes? No   Does the patient have all medications ordered at discharge? Yes   Is the patient taking all medications as directed (includes completed medication regime)? Yes   Comments HOSP DC FU appt 2/23/24 830 am   Does the patient have an appointment with their PCP within 7-14 days of discharge? Yes   Has home health visited the patient within 72 hours of discharge? Call prior to 72 hours   Psychosocial issues? No   Did the patient receive a copy of their discharge instructions? Yes   Nursing interventions Reviewed instructions with patient   What is the patient's perception of their health status since discharge? Same  [Breathing is better, but still hurting in abd]   Is the patient/caregiver able to teach back signs and symptoms related to disease process for when to call PCP? Yes   Is the patient/caregiver able to teach back signs and symptoms related to disease process for when to call 911? Yes   Is the patient/caregiver able to teach back the hierarchy of who to call/visit for symptoms/problems? PCP, Specialist, Home health nurse, Urgent Care, ED, 911 Yes   TCM call completed? Yes   Wrap up additional comments Pt reports his breathing is good he is just still coughing and having pain in abd. Pt is requesting meds for pain as that was not ordered on DC from hospital.   Call end time 1407            Johanny Carson RN    2/22/2024, 14:07 CST

## 2024-02-23 ENCOUNTER — OFFICE VISIT (OUTPATIENT)
Dept: FAMILY MEDICINE CLINIC | Facility: CLINIC | Age: 74
End: 2024-02-23
Payer: MEDICARE

## 2024-02-23 ENCOUNTER — TELEPHONE (OUTPATIENT)
Dept: UROLOGY | Age: 74
End: 2024-02-23

## 2024-02-23 VITALS
HEART RATE: 88 BPM | BODY MASS INDEX: 23.04 KG/M2 | TEMPERATURE: 96.9 F | SYSTOLIC BLOOD PRESSURE: 110 MMHG | DIASTOLIC BLOOD PRESSURE: 68 MMHG | HEIGHT: 69 IN | RESPIRATION RATE: 18 BRPM | OXYGEN SATURATION: 95 %

## 2024-02-23 DIAGNOSIS — G89.29 CHRONIC PAIN OF BOTH KNEES: ICD-10-CM

## 2024-02-23 DIAGNOSIS — M25.561 CHRONIC PAIN OF BOTH KNEES: ICD-10-CM

## 2024-02-23 DIAGNOSIS — N13.9 OBSTRUCTED, UROPATHY: ICD-10-CM

## 2024-02-23 DIAGNOSIS — N39.0 URINARY TRACT INFECTION WITHOUT HEMATURIA, SITE UNSPECIFIED: ICD-10-CM

## 2024-02-23 DIAGNOSIS — R97.20 ELEVATED PSA: Primary | ICD-10-CM

## 2024-02-23 DIAGNOSIS — J18.9 PNEUMONIA DUE TO INFECTIOUS ORGANISM, UNSPECIFIED LATERALITY, UNSPECIFIED PART OF LUNG: Primary | ICD-10-CM

## 2024-02-23 DIAGNOSIS — M25.562 CHRONIC PAIN OF BOTH KNEES: ICD-10-CM

## 2024-02-23 RX ORDER — BENZONATATE 100 MG/1
100 CAPSULE ORAL
COMMUNITY
Start: 2024-02-19 | End: 2024-02-27

## 2024-02-23 RX ORDER — GUAIFENESIN 600 MG/1
1 TABLET, EXTENDED RELEASE ORAL 2 TIMES DAILY
COMMUNITY
Start: 2024-02-19

## 2024-02-23 RX ORDER — LACTOBACILLUS RHAMNOSUS GG 10B CELL
1 CAPSULE ORAL
COMMUNITY
Start: 2024-02-19

## 2024-02-23 RX ORDER — PHENAZOPYRIDINE HYDROCHLORIDE 200 MG/1
200 TABLET, FILM COATED ORAL
COMMUNITY
Start: 2024-02-19 | End: 2024-02-23

## 2024-02-23 RX ORDER — BACITRACIN ZINC AND POLYMYXIN B SULFATE 500; 1000 [USP'U]/G; [USP'U]/G
OINTMENT TOPICAL
COMMUNITY
Start: 2024-02-19 | End: 2024-02-27

## 2024-02-23 RX ORDER — LEVOFLOXACIN 500 MG/1
500 TABLET, FILM COATED ORAL DAILY
Qty: 7 TABLET | Refills: 0 | Status: SHIPPED | OUTPATIENT
Start: 2024-02-23 | End: 2024-03-01

## 2024-02-23 RX ORDER — ERGOCALCIFEROL 1.25 MG/1
50000 CAPSULE ORAL
COMMUNITY
Start: 2024-02-24

## 2024-02-23 NOTE — TELEPHONE ENCOUNTER
Tried to reach patient to set up New Patient appointment - no answer, unable to leave VM. I set patient appointment for next available AM appointment since he has a catheter and will likely need a voiding trail.    Per the provider, he needs to see if his PCP will prescribe him alfuzosin. He has an allergy to flomax. The alfuzosin will be helpful when he comes in for voiding trial. He will also need a PSA prior to this visit.    I was unable to leave this information in VM but will try contacting patient again.

## 2024-02-23 NOTE — TELEPHONE ENCOUNTER
Patient called to reschedule New pt appt. He stated that date wouldn't work for him and needing something for March. Please advise.

## 2024-02-23 NOTE — PROGRESS NOTES
Subjective   Chief Complaint:  Reevaluation after hospitalization.    History of Present Illness:  This 73 y.o. male was seen in the office today.    He is accompanied by his caregiver. He had a hospitalization at [facility]. He was admitted on 02/17/2024 and subsequently discharged on 02/19/2024 with UTI, urinary retention, and pneumonia. He was to go home on Levaquin for 7 days and he was discharged with a catheter. They are unsure if he started the Levaquin.    Allergies   Allergen Reactions    Meloxicam Hives     Dizzy, skin crawls    Tamsulosin Dizziness     excessive sweating    Penicillins       Current Outpatient Medications on File Prior to Visit   Medication Sig    bacitracin-polymyxin b (POLYSPORIN) 500-24492 UNIT/GM ointment Apply topically 2 times daily.    benzonatate (TESSALON) 100 MG capsule Take 1 capsule by mouth.    ciprofloxacin (Ciloxan) 0.3 % ophthalmic solution Administer 1 drop to both eyes 4 (Four) Times a Day.    diclofenac (VOLTAREN) 75 MG EC tablet Take 1 tablet by mouth 2 (Two) Times a Day.    fluticasone (FLONASE) 50 MCG/ACT nasal spray 2 sprays into the nostril(s) as directed by provider Daily.    guaiFENesin (MUCINEX) 600 MG 12 hr tablet Take 1 tablet by mouth 2 (Two) Times a Day.    HYDROcodone-acetaminophen (Norco) 5-325 MG per tablet Take 1 tablet by mouth Every 6 (Six) Hours As Needed for Severe Pain.    ipratropium-albuterol (COMBIVENT RESPIMAT)  MCG/ACT inhaler Inhale 1 puff.    levothyroxine (SYNTHROID, LEVOTHROID) 100 MCG tablet Take 1 tablet by mouth Daily.    lisinopril-hydrochlorothiazide (PRINZIDE,ZESTORETIC) 10-12.5 MG per tablet Take 1 tablet by mouth Daily.    loratadine (Claritin) 10 MG tablet Take 1 tablet by mouth Daily.    naloxone (NARCAN) 4 MG/0.1ML nasal spray 1 spray into the nostril(s) as directed by provider As Needed (overdose).    phenazopyridine (PYRIDIUM) 200 MG tablet Take 1 tablet by mouth.    probiotic (CULTURELLE) capsule capsule Take 1  "capsule by mouth.    tiZANidine (ZANAFLEX) 4 MG tablet Take 1 tablet by mouth 3 (Three) Times a Day As Needed for Muscle Spasms.    venlafaxine XR (Effexor XR) 150 MG 24 hr capsule Take 1 capsule by mouth Daily.    [START ON 2/24/2024] Vitamin D, Ergocalciferol, 40710 units capsule Take 1 capsule by mouth.     No current facility-administered medications on file prior to visit.      Past Medical, Surgical, Social, and Family History:  History reviewed. No pertinent past medical history.  Past Surgical History:   Procedure Laterality Date    BACK SURGERY      HERNIA REPAIR       Social History     Socioeconomic History    Marital status: Single   Tobacco Use    Smoking status: Every Day     Types: Cigars     Passive exposure: Current    Smokeless tobacco: Never   Substance and Sexual Activity    Alcohol use: No    Drug use: Defer    Sexual activity: Defer     Family History   Problem Relation Age of Onset    No Known Problems Father     No Known Problems Mother        Prior Visit Notes/Records, Lab, Imaging, and Diagnostic Results Reviewed:  A1C:No results for input(s): \"HGBA1C\" in the last 15833 hours.  GLUCOSE:  Lab Results - Last 18 Months   Lab Units 02/09/24  0819 07/31/23  0805   GLUCOSE mg/dL 75 121*     LIPID:  Lab Results - Last 18 Months   Lab Units 02/09/24  0819 07/31/23  0805   CHOLESTEROL mg/dL 154 149   LDL CHOL mg/dL 94 89   HDL CHOL mg/dL 43 42   TRIGLYCERIDES mg/dL 92 96     PSA:  Lab Results - Last 18 Months   Lab Units 07/31/23  0805   PSA ng/mL 0.5     CBC:  Lab Results - Last 18 Months   Lab Units 02/19/24  0250 02/18/24  0309 02/17/24  0448 02/09/24  0819 07/31/23  0805   WBC K/uL 8.2 8.6 14.1* 5.58 6.1   HEMOGLOBIN g/dL 14.9 14.4 17.5 17.6 17.9*   HEMATOCRIT % 43.4 43.2 52.0 51.3* 54.4*   PLATELETS K/uL 216 219 279 269 236      BMP/CMP:  Lab Results - Last 18 Months   Lab Units 02/09/24  0819 07/31/23  0805   SODIUM mmol/L 140 138   POTASSIUM mmol/L 4.8 4.2   CHLORIDE mmol/L 102 96   CO2 " "mmol/L 27.5 28   GLUCOSE mg/dL 75 121*   BUN mg/dL 27* 17   CREATININE mg/dL 1.86* 1.10   EGFR RESULT mL/min/1.73 37.3* 70   CALCIUM mg/dL 9.5 9.7     HEPATIC:  Lab Results - Last 18 Months   Lab Units 02/09/24  0819 07/31/23  0805   ALT (SGPT) U/L 12 11   AST (SGOT) U/L 21 22   ALK PHOS U/L 101 113     Vit D:  Lab Results - Last 18 Months   Lab Units 02/17/24  0448   VIT D 25 HYDROXY ng/mL 18.9*     THYROID:  Lab Results - Last 18 Months   Lab Units 02/17/24  0448 02/09/24  0819   TSH uIU/mL 3.310 2.020     BMI Trend:  BMI Readings from Last 10 Encounters:   02/23/24 23.04 kg/m²   02/09/24 23.04 kg/m²   01/10/24 23.04 kg/m²   12/22/23 23.04 kg/m²   10/20/23 23.48 kg/m²   09/20/23 23.48 kg/m²   09/11/23 23.48 kg/m²   08/14/23 23.48 kg/m²   07/31/23 23.48 kg/m²   12/07/16 26.29 kg/m²     Objective   Vital Signs  /68 (BP Location: Left arm, Patient Position: Sitting, Cuff Size: Adult)   Pulse 88   Temp 96.9 °F (36.1 °C) (Infrared)   Resp 18   Ht 175.3 cm (69\")   SpO2 95%   BMI 23.04 kg/m²   Physical Exam  Constitutional:       General: He is not in acute distress.     Comments: Debility present - wheelchair bound.   Cardiovascular:      Rate and Rhythm: Normal rate and regular rhythm.      Pulses: Normal pulses.      Heart sounds: No murmur heard.     No friction rub. No gallop.   Pulmonary:      Effort: Pulmonary effort is normal. No respiratory distress.      Breath sounds: Normal breath sounds. No wheezing or rhonchi.      Comments: Diminishing in the bases with the lung sounds.    Neurological:      Mental Status: He is alert.     Assessment & Plan   Diagnoses and all orders for this visit:    1. Pneumonia due to infectious organism, unspecified laterality, unspecified part of lung (Primary)    2. Chronic pain of both knees    3. Obstructed, uropathy    4. Urinary tract infection without hematuria, site unspecified    Other orders  -     levoFLOXacin (Levaquin) 500 MG tablet; Take 1 tablet by mouth " Daily for 7 days.  Dispense: 7 tablet; Refill: 0    Discussions & Anticipatory Guidance:  Advised and educated plan of care.    The patient will Return for Next scheduled follow up as already scheduled..  Advised keep follow-up with urology. I will go ahead and send the Levaquin in to make sure he is on it, but they are going to check before picking it up. I advised that he is clinically better from a respiratory standpoint and to finish antibiotics as ordered.    I have personally performed the services described in this document as transcribed by the above individual, and it is both accurate and complete.    Transcribed from ambient dictation for CONCHITA Brice by Charli Zhou.  02/23/24   09:14 CST    Electronically signed by CONCHITA Brice, 02/23/24, 9:14 AM CST.

## 2024-02-27 ENCOUNTER — TELEPHONE (OUTPATIENT)
Dept: FAMILY MEDICINE CLINIC | Facility: CLINIC | Age: 74
End: 2024-02-27
Payer: MEDICARE

## 2024-02-27 NOTE — TELEPHONE ENCOUNTER
[facility] nurse needing a approval for multiple Prn visit pt's cath fell out and he is in need of another nelson (nurse) needs a call back with the okay for this. Please advise   [phone]

## 2024-02-27 NOTE — TELEPHONE ENCOUNTER
Ok to put back in till seen by urology    Electronically signed by CONCHITA Brice, 02/27/24, 10:25 AM CST.

## 2024-02-27 NOTE — TELEPHONE ENCOUNTER
"Attempted to return call to nurse (nelson) x3, I kept getting a no service/disconnect tone with an automated line stating \"your call did not go through\"  "

## 2024-02-29 ENCOUNTER — OFFICE VISIT (OUTPATIENT)
Dept: UROLOGY | Age: 74
End: 2024-02-29
Payer: MEDICARE

## 2024-02-29 VITALS — BODY MASS INDEX: 21.62 KG/M2 | TEMPERATURE: 98.6 F | WEIGHT: 146 LBS | HEIGHT: 69 IN

## 2024-02-29 DIAGNOSIS — N40.1 BPH WITH OBSTRUCTION/LOWER URINARY TRACT SYMPTOMS: Primary | ICD-10-CM

## 2024-02-29 DIAGNOSIS — R33.9 RETENTION OF URINE: ICD-10-CM

## 2024-02-29 DIAGNOSIS — N13.8 BPH WITH OBSTRUCTION/LOWER URINARY TRACT SYMPTOMS: Primary | ICD-10-CM

## 2024-02-29 PROCEDURE — 99205 OFFICE O/P NEW HI 60 MIN: CPT | Performed by: NURSE PRACTITIONER

## 2024-02-29 PROCEDURE — 1111F DSCHRG MED/CURRENT MED MERGE: CPT | Performed by: NURSE PRACTITIONER

## 2024-02-29 PROCEDURE — 3017F COLORECTAL CA SCREEN DOC REV: CPT | Performed by: NURSE PRACTITIONER

## 2024-02-29 PROCEDURE — 4004F PT TOBACCO SCREEN RCVD TLK: CPT | Performed by: NURSE PRACTITIONER

## 2024-02-29 PROCEDURE — 1123F ACP DISCUSS/DSCN MKR DOCD: CPT | Performed by: NURSE PRACTITIONER

## 2024-02-29 PROCEDURE — G8484 FLU IMMUNIZE NO ADMIN: HCPCS | Performed by: NURSE PRACTITIONER

## 2024-02-29 PROCEDURE — 51700 IRRIGATION OF BLADDER: CPT | Performed by: NURSE PRACTITIONER

## 2024-02-29 PROCEDURE — G8420 CALC BMI NORM PARAMETERS: HCPCS | Performed by: NURSE PRACTITIONER

## 2024-02-29 PROCEDURE — G8427 DOCREV CUR MEDS BY ELIG CLIN: HCPCS | Performed by: NURSE PRACTITIONER

## 2024-02-29 RX ORDER — TIZANIDINE 4 MG/1
4 TABLET ORAL 3 TIMES DAILY PRN
COMMUNITY

## 2024-02-29 RX ORDER — ALFUZOSIN HYDROCHLORIDE 10 MG/1
10 TABLET, EXTENDED RELEASE ORAL NIGHTLY
Qty: 90 TABLET | Refills: 3 | Status: SHIPPED | OUTPATIENT
Start: 2024-02-29

## 2024-02-29 RX ORDER — LISINOPRIL AND HYDROCHLOROTHIAZIDE 12.5; 1 MG/1; MG/1
1 TABLET ORAL DAILY
Qty: 30 TABLET | Refills: 5 | Status: SHIPPED | OUTPATIENT
Start: 2024-02-29

## 2024-02-29 RX ORDER — LISINOPRIL AND HYDROCHLOROTHIAZIDE 12.5; 1 MG/1; MG/1
1 TABLET ORAL DAILY
COMMUNITY
Start: 2023-09-11

## 2024-02-29 RX ORDER — HYDROCODONE BITARTRATE AND ACETAMINOPHEN 5; 325 MG/1; MG/1
1 TABLET ORAL EVERY 6 HOURS PRN
COMMUNITY
Start: 2023-09-11

## 2024-02-29 RX ORDER — VENLAFAXINE HYDROCHLORIDE 150 MG/1
150 CAPSULE, EXTENDED RELEASE ORAL DAILY
COMMUNITY
Start: 2024-02-09

## 2024-02-29 RX ORDER — FINASTERIDE 5 MG/1
5 TABLET, FILM COATED ORAL DAILY
Qty: 30 TABLET | Refills: 2 | Status: SHIPPED | OUTPATIENT
Start: 2024-02-29

## 2024-02-29 ASSESSMENT — ENCOUNTER SYMPTOMS
VOMITING: 0
ABDOMINAL PAIN: 0
NAUSEA: 0
ABDOMINAL DISTENTION: 0
BACK PAIN: 0

## 2024-02-29 NOTE — PROGRESS NOTES
Bradley Schuler is a 73 y.o., male, New patient who presents today   Chief Complaint   Patient presents with    Procedure     I am here today for my fill & pull.     Patient presents for evaluation of urinary retention.  This was noted upon admission to the hospital from the emergency room on 2/17/2024.  The patient presented with complaints of abdominal pain with a known ventral hernia.  Hernia was easily reduced in the emergency room but further workup revealed bilateral pneumonia, bilateral hydroureteronephrosis down to the level of a very distended bladder with several diverticulum and a significantly enlarged prostate.  Patient was also noted to have mild KIERA with creatinine up to 1.6 from a baseline of 1.  Wyatt catheter was placed during hospitalization and has continued until this visit today.  Renal functions improved after Wyatt decompression and IV fluids.    I have reviewed previous outside medical records including an office visit from Dr. Morris in 2016.  Patient seen at that time for BPH with elevated postvoid residuals nearing 500 mL.  Prostate estimated to be about 50 g at that time.  He had been scheduled to follow-up with cystoscopic evaluation, but did not return.  He is not currently maintained on alpha blockers as he has listed Flomax as an allergy, but from Dr. Morris's previous note, it appears that the patient did not believe Flomax was effective.    Unfortunately, patient is a poor historian.  He does have a \"housemate\" with him that comes to his house for about 3 hours each day.  He assists in collecting historical information.  Patient states that he does not even know why the catheter was placed because he was not having trouble urinating and he felt like he had been urinating the same way for all his life.  He states he has been reluctant to follow-up with any medical care.    PSA 7/31/23 0.5     REVIEW OF SYSTEMS:  Review of Systems   Constitutional:  Negative for chills and fever.

## 2024-02-29 NOTE — PROGRESS NOTES
Patient of Ila Powers presents today for voiding trial post urinary retention. The patient denies any fever, chills or  N&V. After patient had given consent, using the catheter in place, 240cc of sterile water was installed into the bladder with no complications. Patient was able to void 0.     Ila Powers was in office at time of procedure.     Patient is to follow up as scheduled.   Procedure Note (2/29/24):  After receiving verbal orders from Ila Powers, I was instructed to place urethral scales catheter. Using sterile technique, patient was cleaned with Hibiclens and sterile water solution. A 18f coude catheter was inserted into the bladder, bladder was drained of 400cc of urine, 10 mL of sterile water was used to fill up the balloon.  The catheter was then hooked up to a drainage bag.   The patient tolerated the procedure well.    Patient should follow up as scheduled.

## 2024-03-01 ENCOUNTER — TELEPHONE (OUTPATIENT)
Dept: FAMILY MEDICINE CLINIC | Facility: CLINIC | Age: 74
End: 2024-03-01
Payer: MEDICARE

## 2024-03-01 NOTE — TELEPHONE ENCOUNTER
Hub staff attempted to follow warm transfer process and was unsuccessful     Caller: Jagdish Cook    Relationship to patient: Self    Best call back number: [phone]     Patient is needing:     PATIENT WAS RETURNING A PHONE CALL TO [facility].

## 2024-03-01 NOTE — TELEPHONE ENCOUNTER
Spoke with [facility] nurse, [place] and advised her it is okay for cath to be put back in. She stated that they went to urology yesterday and a new one was placed

## 2024-03-04 ENCOUNTER — TELEPHONE (OUTPATIENT)
Dept: FAMILY MEDICINE CLINIC | Facility: CLINIC | Age: 74
End: 2024-03-04

## 2024-03-04 RX ORDER — HYDROCODONE BITARTRATE AND ACETAMINOPHEN 5; 325 MG/1; MG/1
1 TABLET ORAL EVERY 6 HOURS PRN
Qty: 9 TABLET | Refills: 0 | Status: CANCELLED | OUTPATIENT
Start: 2024-03-04

## 2024-03-04 NOTE — TELEPHONE ENCOUNTER
I have pended #9 norcos that I am willing to sign but I need an update on him going to the referrals I ordered.  I need him to see ortho about his knees and pain management about pain as a chronic issue.  Can we get update on his compliance with these appointments and let me know.    Electronically signed by CONCHITA Brice, 03/04/24, 1:57 PM CST.

## 2024-03-04 NOTE — TELEPHONE ENCOUNTER
Caller: Jagdish Cook    Relationship: Self    Best call back number: [phone]     What medication are you requesting: PAIN MEDICATION     What are your current symptoms: LEG AND JOINT SWELLING, TROUBLE WITH MOBILITY     How long have you been experiencing symptoms: FEW DAYS     Have you had these symptoms before:    [x] Yes  [] No    Have you been treated for these symptoms before:   [x] Yes  [] No    If a prescription is needed, what is your preferred pharmacy and phone number: [facility] - [place], [place] - [address] [place] [address] [phone] [facility] [phone]

## 2024-03-04 NOTE — TELEPHONE ENCOUNTER
He needs to reach out to his pain management about needs for pain medication.  I have unpended the order knowing that he has been to pain management.  Contractually, he needs to go though them first.    Electronically signed by CONCHITA Brice, 03/04/24, 4:14 PM CST.

## 2024-03-05 NOTE — TELEPHONE ENCOUNTER
Advised patient he needs to call pain management regarding pain medication, patient was confused and not understanding much. I told him what office I was calling from multiple times, he says he does not have the number for pain management and that he has had to cancel multiple appointments due to his caregiver not having enough time to take him. I informed patient I would call in the morning when caregiver is there so we can discuss what is going on.

## 2024-03-06 ENCOUNTER — HOSPITAL ENCOUNTER (OUTPATIENT)
Dept: ULTRASOUND IMAGING | Facility: HOSPITAL | Age: 74
Discharge: HOME | End: 2024-03-06
Payer: MEDICARE

## 2024-03-06 ENCOUNTER — TELEPHONE (OUTPATIENT)
Dept: FAMILY MEDICINE CLINIC | Facility: CLINIC | Age: 74
End: 2024-03-06
Payer: MEDICARE

## 2024-03-06 DIAGNOSIS — R60.0 LOCALIZED EDEMA: ICD-10-CM

## 2024-03-06 DIAGNOSIS — G89.29 CHRONIC PAIN OF BOTH KNEES: Primary | ICD-10-CM

## 2024-03-06 DIAGNOSIS — I73.9 PERIPHERAL VASCULAR DISEASE, UNSPECIFIED: ICD-10-CM

## 2024-03-06 DIAGNOSIS — M25.562 CHRONIC PAIN OF BOTH KNEES: Primary | ICD-10-CM

## 2024-03-06 DIAGNOSIS — M25.561 CHRONIC PAIN OF BOTH KNEES: Primary | ICD-10-CM

## 2024-03-06 PROCEDURE — 93970 EXTREMITY STUDY: CPT

## 2024-03-06 PROCEDURE — 93923 UPR/LXTR ART STDY 3+ LVLS: CPT

## 2024-03-06 RX ORDER — HYDROCODONE BITARTRATE AND ACETAMINOPHEN 5; 325 MG/1; MG/1
1 TABLET ORAL EVERY 12 HOURS PRN
Qty: 60 TABLET | Refills: 0 | Status: SHIPPED | OUTPATIENT
Start: 2024-03-06

## 2024-03-06 NOTE — TELEPHONE ENCOUNTER
Due to the extensiveness of his underlying issues, I was pushing for pain management so that they could explore all modalities rather than just medication to feel better.  Since this is kind of slow, I'm going to start him out on Norco 5 mg - twice a day PRN.  If he doesn't desire to explore any other the other options, we talk about a contract through us but let's start here and still try to get him in.  He has an appointment in a couple days.    Electronically signed by CONCHITA Brice, 03/06/24, 4:20 PM CST.

## 2024-03-06 NOTE — TELEPHONE ENCOUNTER
Pt called a stated he is out of pain meds and hurting bad, also stated pain management wont take his insurance.he would like a call back today when possible. Please advise

## 2024-03-06 NOTE — TELEPHONE ENCOUNTER
Caller: Jagdish Cook    Relationship: Self    Best call back number: [phone]     What is the best time to reach you: ANY    Who are you requesting to speak with (clinical staff, provider,  specific staff member): NONE SPECIFIED     What was the call regarding:     PATIENT WOULD LIKE TO CHECK ON THE STATUS OF HIS MEDICATION REQUEST.     Is it okay if the provider responds through MyChart: PLEASE CALL

## 2024-03-06 NOTE — TELEPHONE ENCOUNTER
Called pain management and left a voicemail to call back to confirm they have gotten our referral on pt and if they have scheduled with him

## 2024-03-07 ENCOUNTER — OFFICE VISIT (OUTPATIENT)
Dept: WOUND CARE | Facility: HOSPITAL | Age: 74
End: 2024-03-07
Payer: MEDICARE

## 2024-03-07 NOTE — TELEPHONE ENCOUNTER
3/6/24 Whit reached out to Pain Management in [place] about patients appointment, she had to leave a . I spoke with patient he is aware medication was sent in and we are checking on referral.

## 2024-03-08 ENCOUNTER — OFFICE VISIT (OUTPATIENT)
Dept: UROLOGY | Age: 74
End: 2024-03-08

## 2024-03-08 ENCOUNTER — TELEPHONE (OUTPATIENT)
Dept: UROLOGY | Age: 74
End: 2024-03-08

## 2024-03-08 ENCOUNTER — TELEPHONE (OUTPATIENT)
Dept: FAMILY MEDICINE CLINIC | Facility: CLINIC | Age: 74
End: 2024-03-08

## 2024-03-08 DIAGNOSIS — N13.8 BPH WITH OBSTRUCTION/LOWER URINARY TRACT SYMPTOMS: ICD-10-CM

## 2024-03-08 DIAGNOSIS — R33.9 URINARY RETENTION: Primary | ICD-10-CM

## 2024-03-08 DIAGNOSIS — N40.1 BPH WITH OBSTRUCTION/LOWER URINARY TRACT SYMPTOMS: ICD-10-CM

## 2024-03-08 ASSESSMENT — ENCOUNTER SYMPTOMS
BACK PAIN: 0
ABDOMINAL PAIN: 0
NAUSEA: 0
VOMITING: 0
ABDOMINAL DISTENTION: 0

## 2024-03-08 NOTE — TELEPHONE ENCOUNTER
Pt called said some medical responders fixed his cathter, a tube was bent. I asked if wanted to talk with someone and he said, no but you will be hearing from me.

## 2024-03-08 NOTE — TELEPHONE ENCOUNTER
Patient called after being seen this morning in office stating he's not seeing urine drain into his catheter bag. Patient does not have a way to get to office. Please contact patient

## 2024-03-08 NOTE — TELEPHONE ENCOUNTER
I called the patient back to make sure everything was going okay now. He said his ky \"cut the cath out of him and put in a new one\". He said its draining good now, but that the cord was bend, and that's why it wasn't draining. He said he's seeing urine output now.

## 2024-03-08 NOTE — TELEPHONE ENCOUNTER
I called the patient to figure out what is going on, as the catheter was draining when he left earlier. He said he has no ride, and that he isn't seeing any urine, but feels the urge to go. I verbalized if he has stopped seeing urine, and he's drinking an adequate about since leaving, he needs to be seen ASAP. We stop seeing patients at 12:00 on Fridays, leaving the ER to be his best option. I verbalized if its a transportation situation, he may need to call the ambulance to transport him.  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 Then told me he would be calling his , because no one is taking care of him. He said he had many things to do this weekend and can't do this right now. He then hung up the phone.

## 2024-03-08 NOTE — TELEPHONE ENCOUNTER
Thank you.  I have sent in meds for this month, we'll hash it all out when I see him on 3/13/2024.    Electronically signed by CONCHITA Brice, 03/08/24, 2:22 PM CST.

## 2024-03-08 NOTE — TELEPHONE ENCOUNTER
Got a hold of pain management of [facility] about pt and they stated they called him and he said they were to far for him and he wanted an appointment closer to home. They sent his referral to [facility] management of [facility] called pt to schedule multiple times and sent a letter to get an appointment with them. Please advise    Dates this all happened   Feb 14th referred to [place]  Feb 19th [place] PM called no answer   Feb 21st [place] PM called no answer   March 1st [facility] sent a letter to pt

## 2024-03-08 NOTE — PROGRESS NOTES
Patient of Ila Powers presents today for voiding trial post Retention of urine. The patient denies any fever, chills or  N&V. After patient had given consent, using the catheter in place, 240cc of sterile water was installed into the bladder with no complications. Patient was able to void 0.     Ila Powers was in office at time of procedure.     Patient was advised to drink clear fluids for the next couple hours and urinate. Patient was advised they may experience some blood in the urine and burning with urination for the next couple days. If the patient is unable to urinate or develops fever, chills, N&V or suprapubic pain, they will call office for an appt at clinic or seek medical treatment at nearest ER, PCP or Urgent Care if after hours. Patient verbalized understanding and all questions were answered.Patient advised to call the office with any questions or concerns.     Patient is to follow up as scheduled.     Procedure Note (3/8/24):  After receiving verbal orders from Ila Powers, I was instructed to place urethral scales catheter. Using sterile technique, patient was cleaned with Hibiclens and sterile water solution. A 18f Coude catheter was inserted into the bladder, bladder was drained of 300cc of urine, 10 mL of sterile water was used to fill up the balloon.  The catheter was then hooked up to a drainage bag.   The patient tolerated the procedure well.    Patient should follow up as scheduled.

## 2024-03-08 NOTE — PROGRESS NOTES
Bradley Schuler is a 73 y.o., male, Established patient who presents today   Chief Complaint   Patient presents with    Benign Prostatic Hypertrophy    Urinary Retention     Patient presents for follow-up of urinary retention.  This was noted upon admission to the hospital from the emergency room on 2/17/2024.  The patient presented with complaints of abdominal pain with a known ventral hernia.  Hernia was easily reduced in the emergency room but further workup revealed bilateral pneumonia, bilateral hydroureteronephrosis down to the level of a very distended bladder with several diverticulum and a significantly enlarged prostate.  Patient was also noted to have mild KIERA with creatinine up to 1.6 from a baseline of 1.  Wyatt catheter was placed during hospitalization and has continued until this visit today.  Renal functions improved after Wyatt decompression and IV fluids.     I have reviewed previous outside medical records including an office visit from Dr. Morris in 2016.  Patient seen at that time for BPH with elevated postvoid residuals nearing 500 mL.  Prostate estimated to be about 50 g at that time.  He had been scheduled to follow-up with cystoscopic evaluation, but did not return.  He is not currently maintained on alpha blockers as he has listed Flomax as an allergy, but from Dr. Morris's previous note, it appears that the patient did not believe Flomax was effective.  We initiated him on finasteride as well as Proscar at his last office visit.     Unfortunately, patient is a poor historian that also seems to have limited understanding of his current situation.  He does have a \"housemate\" with him that comes to his house for about 3 hours each day.  He assists in collecting historical information and in relating the medical information to the patient.  Patient states that he does not even know why the catheter was placed because he was not having trouble urinating and he felt like he had been urinating the

## 2024-03-11 RX ORDER — ERGOCALCIFEROL 1.25 MG/1
1 CAPSULE ORAL WEEKLY
Qty: 5 CAPSULE | Refills: 0 | OUTPATIENT
Start: 2024-03-11

## 2024-03-12 DIAGNOSIS — G89.29 CHRONIC PAIN OF BOTH KNEES: ICD-10-CM

## 2024-03-12 DIAGNOSIS — M25.561 CHRONIC PAIN OF BOTH KNEES: ICD-10-CM

## 2024-03-12 DIAGNOSIS — M25.562 CHRONIC PAIN OF BOTH KNEES: ICD-10-CM

## 2024-03-12 RX ORDER — HYDROCODONE BITARTRATE AND ACETAMINOPHEN 5; 325 MG/1; MG/1
1 TABLET ORAL EVERY 12 HOURS PRN
Qty: 46 TABLET | Refills: 0 | Status: SHIPPED | OUTPATIENT
Start: 2024-03-12

## 2024-03-13 ENCOUNTER — TELEPHONE (OUTPATIENT)
Dept: VASCULAR SURGERY | Facility: CLINIC | Age: 74
End: 2024-03-13
Payer: MEDICARE

## 2024-03-14 ENCOUNTER — OFFICE VISIT (OUTPATIENT)
Dept: VASCULAR SURGERY | Facility: CLINIC | Age: 74
End: 2024-03-14
Payer: MEDICARE

## 2024-03-14 ENCOUNTER — OFFICE VISIT (OUTPATIENT)
Dept: WOUND CARE | Facility: HOSPITAL | Age: 74
End: 2024-03-14
Payer: MEDICARE

## 2024-03-14 VITALS
DIASTOLIC BLOOD PRESSURE: 80 MMHG | WEIGHT: 152 LBS | SYSTOLIC BLOOD PRESSURE: 130 MMHG | HEART RATE: 62 BPM | HEIGHT: 69 IN | OXYGEN SATURATION: 97 % | BODY MASS INDEX: 22.51 KG/M2

## 2024-03-14 DIAGNOSIS — I65.23 BILATERAL CAROTID ARTERY STENOSIS: ICD-10-CM

## 2024-03-14 DIAGNOSIS — I73.9 PAD (PERIPHERAL ARTERY DISEASE): Primary | ICD-10-CM

## 2024-03-14 PROCEDURE — G0463 HOSPITAL OUTPT CLINIC VISIT: HCPCS

## 2024-03-14 RX ORDER — ALFUZOSIN HYDROCHLORIDE 10 MG/1
1 TABLET, EXTENDED RELEASE ORAL
COMMUNITY
Start: 2024-02-29

## 2024-03-14 RX ORDER — FINASTERIDE 5 MG/1
1 TABLET, FILM COATED ORAL DAILY
COMMUNITY
Start: 2024-02-29

## 2024-03-14 NOTE — PROGRESS NOTES
3/14/2024        Chanel Castro, APRN  [address]  [address]  [place] [address]      Jagdish Cook  1950    Chief Complaint   Patient presents with    Establish Care     Patient is here to establish care due to decreased RAYRAY. Patient had testing preformed at [facility] on 03/06/24. Patient states both legs are hurting patient is currently an everyday smoker.  Patient denies any stroke like symptoms.        Dear CONCHITA Mattson:      HPI  I had the pleasure of seeing your patient Jagdish Cook in the office today.  Thank you kindly for this consultation.  As you recall, Jagdish Cook is a 73 y.o.  male who you are currently following for left fourth toe wound.  He is here today with complaints of pain in his left upper thigh while at rest.  He does not ambulate.  He is unable to extend his left lower extremity fully.  He did see wound care today and was told that the wound is healed and he was released from wound care, per his home health aides report.  He denies any pain to his right lower extremity.  He is accompanied by a home health aide.  He does receive home PT twice a week.  He denies any strokelike symptoms.  He does have a Mario catheter in place that is overseen by urology.  He is a current daily cigar smoker.  He had noninvasive testing performed on 3/6/2024, which I did review in office today.    History reviewed. No pertinent past medical history.    Past Surgical History:   Procedure Laterality Date    BACK SURGERY      HERNIA REPAIR         Family History   Problem Relation Age of Onset    No Known Problems Father     No Known Problems Mother        Social History     Socioeconomic History    Marital status: Single   Tobacco Use    Smoking status: Every Day     Types: Cigars     Passive exposure: Current    Smokeless tobacco: Never   Substance and Sexual Activity    Alcohol use: No    Drug use: Defer    Sexual activity: Defer       Allergies   Allergen Reactions     Meloxicam Hives     Dizzy, skin crawls    Tamsulosin Dizziness     excessive sweating    Penicillins        Prior to Admission medications    Medication Sig Start Date End Date Taking? Authorizing Provider   alfuzosin (UROXATRAL) 10 MG 24 hr tablet Take 1 tablet by mouth every night at bedtime. 2/29/24  Yes Nilton Roberts MD   ciprofloxacin (Ciloxan) 0.3 % ophthalmic solution Administer 1 drop to both eyes 4 (Four) Times a Day. 12/22/23  Yes Jacob Wiggins APRN   diclofenac (VOLTAREN) 75 MG EC tablet Take 1 tablet by mouth 2 (Two) Times a Day. 9/20/23  Yes Jacob Wiggins APRN   finasteride (PROSCAR) 5 MG tablet Take 1 tablet by mouth Daily. 2/29/24  Yes Nilton Roberts MD   fluticasone (FLONASE) 50 MCG/ACT nasal spray 2 sprays into the nostril(s) as directed by provider Daily. 8/14/23  Yes Jacob Wiggins APRN   guaiFENesin (MUCINEX) 600 MG 12 hr tablet Take 1 tablet by mouth 2 (Two) Times a Day. 2/19/24  Yes Nilton Roberts MD   HYDROcodone-acetaminophen (Norco) 5-325 MG per tablet Take 1 tablet by mouth Every 12 (Twelve) Hours As Needed for Severe Pain. 3/12/24  Yes Jacob Wiggins APRN   ipratropium-albuterol (COMBIVENT RESPIMAT)  MCG/ACT inhaler Inhale 1 puff. 2/19/24  Yes Nilton Roberts MD   levothyroxine (SYNTHROID, LEVOTHROID) 100 MCG tablet Take 1 tablet by mouth Daily. 7/31/23  Yes Deon Damon MD   lisinopril-hydrochlorothiazide (PRINZIDE,ZESTORETIC) 10-12.5 MG per tablet TAKE ONE TABLET DAILY 2/29/24  Yes Deon Damon MD   loratadine (Claritin) 10 MG tablet Take 1 tablet by mouth Daily. 8/1/23  Yes Deon Damon MD   naloxone (NARCAN) 4 MG/0.1ML nasal spray 1 spray into the nostril(s) as directed by provider As Needed (overdose). 10/20/23  Yes Jacob Wiggins APRN   probiotic (CULTURELLE) capsule capsule Take 1 capsule by mouth. 2/19/24  Yes Provider, MD Nilton   tiZANidine (ZANAFLEX) 4 MG tablet Take 1 tablet by mouth 3 (Three) Times a  "Day As Needed for Muscle Spasms.   Yes Provider, MD Nilton   venlafaxine XR (Effexor XR) 150 MG 24 hr capsule Take 1 capsule by mouth Daily. 2/9/24  Yes Jacob Wiggins APRN   Vitamin D, Ergocalciferol, 97813 units capsule Take 1 capsule by mouth. 2/24/24  Yes ProviderNilton MD       Review of Systems   Constitutional: Negative.  Negative for diaphoresis and fever.   HENT: Negative.     Eyes: Negative.    Respiratory: Negative.  Negative for shortness of breath and wheezing.    Cardiovascular:  Negative for chest pain.   Gastrointestinal: Negative.  Negative for abdominal pain.   Endocrine: Negative.    Genitourinary:  Positive for difficulty urinating.   Musculoskeletal:  Positive for arthralgias, gait problem and joint swelling.        Left knee swelling   Skin: Negative.    Allergic/Immunologic: Negative.    Neurological:  Negative for dizziness and weakness.   Hematological: Negative.    Psychiatric/Behavioral: Negative.         /80   Pulse 62   Ht 175.3 cm (69\")   Wt 68.9 kg (152 lb)   SpO2 97%   BMI 22.45 kg/m²       Physical Exam  Vitals and nursing note reviewed.   Constitutional:       General: He is not in acute distress.     Appearance: Normal appearance. He is not diaphoretic.   HENT:      Head: Normocephalic. No right periorbital erythema or left periorbital erythema.      Nose: Nose normal.   Eyes:      General: No scleral icterus.     Pupils: Pupils are equal.   Cardiovascular:      Rate and Rhythm: Normal rate and regular rhythm.      Pulses: Normal pulses.           Carotid pulses are 2+ on the right side and 2+ on the left side.       Femoral pulses are 2+ on the right side and 2+ on the left side.       Popliteal pulses are 2+ on the right side and 2+ on the left side.        Dorsalis pedis pulses are detected w/ Doppler on the right side and detected w/ Doppler on the left side.        Posterior tibial pulses are detected w/ Doppler on the right side and detected w/ Doppler " on the left side.      Heart sounds: Normal heart sounds. No murmur heard.  Pulmonary:      Effort: Pulmonary effort is normal. No respiratory distress.      Breath sounds: Normal breath sounds.   Abdominal:      General: Bowel sounds are normal. There is no distension.      Palpations: Abdomen is soft.      Tenderness: There is no abdominal tenderness. There is no guarding.   Musculoskeletal:         General: No swelling or tenderness. Normal range of motion.      Cervical back: Normal range of motion and neck supple.      Right lower leg: No edema.      Left lower leg: No edema.   Feet:      Right foot:      Skin integrity: Skin integrity normal.      Left foot:      Skin integrity: Skin integrity normal.   Skin:     General: Skin is warm and dry.      Findings: No erythema or rash.   Neurological:      General: No focal deficit present.      Mental Status: He is alert and oriented to person, place, and time. Mental status is at baseline.      Cranial Nerves: No cranial nerve deficit.      Gait: Gait normal.   Psychiatric:         Attention and Perception: Attention normal.         Mood and Affect: Mood normal.         US Ankle / Brachial Indices Extremity Complete    Result Date: 3/6/2024  Narrative: LOWER EXTREMITY ANKLE/BRACHIAL INDEX DOPPLER STUDY 3/6/2024 8:33 AM  HISTORY: R60.0; I73.9-Peripheral vascular disease, unspecified  COMPARISON: NONE  FINDINGS: Systolic pressures were obtained from bilateral brachial, posterior tibial and dorsalis pedis arteries. Systolic pressure within the right brachial artery was measured at 120 mmHg, while pressure within the left brachial artery was measured at 108 mmHg.  Systolic pressures within bilateral posterior tibial and dorsalis pedis arteries are lesser than the corresponding brachial pressures. Monophasic waveforms are seen within the bilateral posterior tibial and dorsalis pedis arteries.  Calculated ankle/brachial index is 0.68 on the right and 0.81 on the left.       Impression: 1. Abnormal RAYRAY Doppler study. Vascular consultation recommended.   This report was signed and finalized on 3/6/2024 10:18 AM by Dr. Tish Guevara MD.      US Venous Doppler Lower Extremity Bilateral (duplex)    Result Date: 3/6/2024  Narrative: US VENOUS DOPPLER LOWER EXTREMITY BILATERAL (DUPLEX)- 3/6/2024 7:13 AM  HISTORY: r60.0; R60.0-Localized edema  COMPARISON: NONE  FINDINGS: Transverse and longitudinal grayscale, Doppler, and duplex sonographic images of bilateral lower extremities were obtained.  There is normal flow, augmentation and compressibility of bilateral common femoral,, saphenous femoral junction, proximal deep femoral, superficial femoral, and popliteal veins. There is normal color flow of the peroneal, anterior tibial, and posterior tibial veins.  The measurements of the greater and lesser saphenous veins obtained. Right greater and lesser saphenous vein measurements: GSV at SFJ 6 mm, mid thigh 3 mm, knee 3 mm, mid calf 3 mm, ankle 2 mm LSV at the 3 mm, mid calf 2 mm, ankle 2 mm. No venous reflux identified within the right greater or lesser saphenous veins.  Left greater and lesser saphenous vein measurements: GSV at SFJ 6 mm, mid thigh 3 mm, knee 3 mm, mid calf 3 mm, ankle 2.6 mm LSV knee 4 mm, mid calf 3 mm, ankle 3 mm No venous reflux identified within the left greater or lesser saphenous veins       Impression: 1. Normal duplex ultrasound of bilateral lower extremities without evidence of DVT. No evidence of venous insufficiency.    This report was signed and finalized on 3/6/2024 10:17 AM by Dr. Tish Guevara MD.      XR Toe 2+ View Left    Result Date: 2/22/2024  Narrative: EXAMINATION:  XR TOE 2+ VW LEFT-  2/22/2024 9:13 AM  HISTORY: L97.522-Non-pressure chronic ulcer of other part of left foot with fat layer exposed. Nonhealing wound fourth toe.  COMPARISON: No comparison study.  TECHNIQUE: 3 views were obtained of the left toes.  FINDINGS: There are hammertoe versus  claw toe deformities of the second through fifth digits which makes evaluation of the toes somewhat difficult as they are not straight. There is irregularity of the soft tissues of the distal fourth toe. I suspect that there is at least a mild degree of erosion of the tuft of the distal phalanx of the fourth toe. There is narrowing of the first MTP joint with moderate to severe hallux valgus deformity. There is bunion formation along the distal medial first metatarsal.       Impression: 1. Hammertoe versus claw toe deformities of the second through fifth toes which makes these toes very difficult to fully evaluate as they are not straight. 2. There does appear to be some erosive change involving the tuft of the distal phalanx of the fourth toe. Osteomyelitis cannot be ruled out. 3. Moderate to severe hallux valgus deformity at the first MTP joint with bunion formation along the distal medial first metatarsal.  This report was signed and finalized on 2/22/2024 10:39 AM by Dr. Frantz Cardenas MD.      CT CHEST HIGH RESOLUTION    Result Date: 2/18/2024  Narrative: EXAM: CHEST CT WITHOUT CONTRAST, HIGH RESOLUTION CHEST CT HISTORY: Better assess emphysema and pneumonia TECHNIQUE: CT acquisition of the chest from the thoracic inlet to the upper abdomen without IV contrast administration.  Inspiratory/expiratory phase imaging at 10mm intervals through the lungs IV Contrast: None CT Dose Reduction Techniques Performed: Yes. COMPARISON: 02/17/2024 CT FINDINGS: Evaluation is suboptimal secondary patient motion.  There is likely opacification of some left lower lobe bronchioles.  Left lower lobe airspace consolidation is seen and mildly increased compared to the prior exam.  Mild right lower lobe consolidation is also seen without significant interval change.  Mild emphysematous changes are identified.  No evidence of interstitial lung disease is seen within the limitations of the motion artifact.  Right middle lobe calcified  granulomas are noted. The heart is enlarged.  There is no pericardial effusion or pericardial thickening.  Atherosclerotic calcifications are present including coronary arteries.  The ascending aorta is mildly ectatic measuring 4.0 cm. Dilation of the main pulmonary is seen measuring 3.8 cm. No mediastinal lymphadenopathy is seen. There is a moderate hiatal hernia.  A gastric lipoma is seen measuring 1.8 cm.  Pneumobilia is identified and significantly increased compared to 02/17/2024.  Pneumobilia has also been seen on a prior exam dated 03/30/2023. Degenerative changes of the spine are noted.  Left renal cysts are seen.    Impression: Left greater than right lower lobe pneumonia, mildly increased on the left.  Probable mucous plugging of some left lower lobe bronchioles. Mild emphysema. Atherosclerosis including coronary arteries. Cardiomegaly.  Ascending aorta ectasia measuring 4.0 cm.  Dilation of the main pulmonary suggesting pulmonary arterial hypertension. Pneumobilia. Moderate hiatal hernia. All CT scans are performed using dose optimization techniques as appropriate to the performed exam and include at least one of the following: Automated exposure control, adjustment of the mA and/or kV according to size, and the use of iterative reconstruction technique. ______________________________________ Electronically signed by: YOSEF GIRON M.D. Date:     02/18/2024 Time:    14:23     CT Abdomen Pelvis Without Contrast    Result Date: 2/17/2024  Narrative: EXAM:  CT OF THE ABDOMEN AND PELVIS WITHOUT CONTRAST. TECHNIQUE:  CT of the abdomen and pelvis was performed without the use of contrast.  Multiplanar reformats were performed. HISTORY:  Abdominal pain. COMPARISON:  CT abdomen pelvis 03/30/2023. FINDINGS: Evaluation of solid organs and blood vessels is suboptimal without the benefit of contrast. Imaged lower thorax: Patchy consolidation, airway plugging, and airway thickening in the lower lobes.  Emphysema.   Coronary calcifications. Liver: Unremarkable. Gallbladder/Bile Ducts: Post cholecystectomy.  Chronic dilation of the common bile duct and pneumobilia. Spleen: Unremarkable. Pancreas: Moderate atrophy. Adrenals: Unremarkable. Kidneys/Ureters: Bilateral renal cysts.  Moderate right and mild left hydronephrosis.  Dilation of both ureters. Bowel/mesentery/peritoneum: Moderate hiatal hernia.  Bowel containing ventral hernia without evidence of bowel obstruction or strangulation. Normal appendix. Diffuse colonic diverticulosis without evidence of acute diverticulitis. Retroperitoneum/vessels: No aortic aneurysm. Multifocal atherosclerotic calcifications. Pelvis: Dilation of the bladder with wall thickening, trabeculation, and multiple large bladder diverticulum.  Enlarged prostate. Bones/body wall: No aggressive lesion identified. Advanced multilevel degenerative spondylosis.  Osteoarthritis of the hips, severe on the left and moderate to severe on the right.  Diffuse osseous demineralization.    Impression: Pneumonia and/or aspiration at the lung bases bilaterally. Bowel containing ventral hernia without evidence of obstruction or strangulation of the bowel. Chronic urinary bladder outlet obstruction with prostatomegaly, dilation of the bladder, wall thickening, and multiple bladder diverticula.  Moderate right mild left hydronephrosis. Colonic diverticulosis without acute diverticulitis. Moderate hiatal hernia. Atherosclerosis with coronary calcifications. Emphysema. All CT scans are performed using dose optimization techniques as appropriate to the performed exam and include at least one of the following: Automated exposure control, adjustment of the mA and/or kV according to size, and the use of iterative reconstruction technique. ______________________________________ Electronically signed by: ERMA YANEZ M.D. Date:     02/17/2024 Time:    05:51      Patient Active Problem List   Diagnosis    Primary hypertension     Hypothyroidism    Pain of left hip    Chronic bilateral low back pain with bilateral sciatica         ICD-10-CM ICD-9-CM   1. PAD (peripheral artery disease)  I73.9 443.9   2. Bilateral carotid artery stenosis  I65.23 433.10     433.30           Plan: After thoroughly evaluating Jagdish Cook, I believe the best course of action is to remain conservative from vascular surgery standpoint.  Patient states he was not going to have surgery again.  Venous duplex showed no venous insufficiency and no DVT present, bilaterally.  RAYRAY shows moderate arterial insufficiency to right lower extremity and mild arterial insufficiency to left lower extremity.  Left lower extremity leg pain may be musculoskeletal.  Patient has history of chronic bilateral low back pain with bilateral sciatica.  We will see him back in 1 year with carotid duplex and repeat ABIs, for continued surveillance.  Feel free to call the office if anything changes prior to 1 year. Patient does not ambulate, and has not for 6 months.  Home health aide states that his muscle in his left upper leg is tight and he is unable to extend it from the knee out.  He does have a Mario catheter in place, which urology follows.  I did discuss vascular risk factors as they pertain to the progression of vascular disease including controlling hypertension and smoking cessation.  Blood pressure is stable in office today.  Unfortunately, he is an every day cigar desire to quit at this time.  And has no intentionthe patient can continue taking their current medication regimen as previously planned.  This was all discussed in full with complete understanding.    Thank you for allowing me to participate in the care of your patient.  Please do not hesitate with any questions or concerns.  I will keep you aware of any further encounters with Jagdish Cook.        Sincerely yours,         CONCHITA Mccormack

## 2024-03-20 ENCOUNTER — OFFICE VISIT (OUTPATIENT)
Dept: FAMILY MEDICINE CLINIC | Facility: CLINIC | Age: 74
End: 2024-03-20
Payer: MEDICARE

## 2024-03-20 VITALS
OXYGEN SATURATION: 96 % | RESPIRATION RATE: 18 BRPM | SYSTOLIC BLOOD PRESSURE: 100 MMHG | HEIGHT: 69 IN | HEART RATE: 83 BPM | TEMPERATURE: 96.8 F | DIASTOLIC BLOOD PRESSURE: 70 MMHG | BODY MASS INDEX: 22.45 KG/M2

## 2024-03-20 DIAGNOSIS — G89.29 CHRONIC PAIN OF BOTH KNEES: Primary | ICD-10-CM

## 2024-03-20 DIAGNOSIS — M25.562 CHRONIC PAIN OF BOTH KNEES: Primary | ICD-10-CM

## 2024-03-20 DIAGNOSIS — M25.561 CHRONIC PAIN OF BOTH KNEES: Primary | ICD-10-CM

## 2024-03-20 PROCEDURE — 1160F RVW MEDS BY RX/DR IN RCRD: CPT | Performed by: NURSE PRACTITIONER

## 2024-03-20 PROCEDURE — 99213 OFFICE O/P EST LOW 20 MIN: CPT | Performed by: NURSE PRACTITIONER

## 2024-03-20 PROCEDURE — 3074F SYST BP LT 130 MM HG: CPT | Performed by: NURSE PRACTITIONER

## 2024-03-20 PROCEDURE — 1159F MED LIST DOCD IN RCRD: CPT | Performed by: NURSE PRACTITIONER

## 2024-03-20 PROCEDURE — 3078F DIAST BP <80 MM HG: CPT | Performed by: NURSE PRACTITIONER

## 2024-03-20 NOTE — PROGRESS NOTES
Subjective   Chief Complaint:  Knee pain.    History of Present Illness:  This 73 y.o. male was seen in the office today.    He reports the injections he got 2 months ago did not work any longer than it took him to get from the office to the parking lot. He reports feeling much better since starting Norco 5 mg as needed. He has signed an updated contract today. He reports that he does not want to do any kind of urological surgery for the obstruction and right now he is staying on a chronic catheter. He inquired if he still needs cardiac and pulmonary consultation for surgery clearance.    Allergies   Allergen Reactions    Meloxicam Hives     Dizzy, skin crawls    Tamsulosin Dizziness     excessive sweating    Penicillins       Current Outpatient Medications on File Prior to Visit   Medication Sig    alfuzosin (UROXATRAL) 10 MG 24 hr tablet Take 1 tablet by mouth every night at bedtime.    ciprofloxacin (Ciloxan) 0.3 % ophthalmic solution Administer 1 drop to both eyes 4 (Four) Times a Day.    diclofenac (VOLTAREN) 75 MG EC tablet Take 1 tablet by mouth 2 (Two) Times a Day.    finasteride (PROSCAR) 5 MG tablet Take 1 tablet by mouth Daily.    fluticasone (FLONASE) 50 MCG/ACT nasal spray 2 sprays into the nostril(s) as directed by provider Daily.    guaiFENesin (MUCINEX) 600 MG 12 hr tablet Take 1 tablet by mouth 2 (Two) Times a Day.    HYDROcodone-acetaminophen (Norco) 5-325 MG per tablet Take 1 tablet by mouth Every 12 (Twelve) Hours As Needed for Severe Pain.    ipratropium-albuterol (COMBIVENT RESPIMAT)  MCG/ACT inhaler Inhale 1 puff.    levothyroxine (SYNTHROID, LEVOTHROID) 100 MCG tablet Take 1 tablet by mouth Daily.    lisinopril-hydrochlorothiazide (PRINZIDE,ZESTORETIC) 10-12.5 MG per tablet TAKE ONE TABLET DAILY    loratadine (Claritin) 10 MG tablet Take 1 tablet by mouth Daily.    naloxone (NARCAN) 4 MG/0.1ML nasal spray 1 spray into the nostril(s) as directed by provider As Needed (overdose).     "probiotic (CULTURELLE) capsule capsule Take 1 capsule by mouth.    tiZANidine (ZANAFLEX) 4 MG tablet Take 1 tablet by mouth 3 (Three) Times a Day As Needed for Muscle Spasms.    venlafaxine XR (Effexor XR) 150 MG 24 hr capsule Take 1 capsule by mouth Daily.    Vitamin D, Ergocalciferol, 92677 units capsule Take 1 capsule by mouth.     No current facility-administered medications on file prior to visit.      Past Medical, Surgical, Social, and Family History:  History reviewed. No pertinent past medical history.  Past Surgical History:   Procedure Laterality Date    BACK SURGERY      HERNIA REPAIR       Social History     Socioeconomic History    Marital status: Single   Tobacco Use    Smoking status: Every Day     Types: Cigars     Passive exposure: Current    Smokeless tobacco: Never   Substance and Sexual Activity    Alcohol use: No    Drug use: Defer    Sexual activity: Defer     Family History   Problem Relation Age of Onset    No Known Problems Father     No Known Problems Mother        Prior Visit Notes/Records, Lab, Imaging, and Diagnostic Results Reviewed:  A1C:No results for input(s): \"HGBA1C\" in the last 79476 hours.  GLUCOSE:  Lab Results - Last 18 Months   Lab Units 02/09/24  0819 07/31/23  0805   GLUCOSE mg/dL 75 121*     LIPID:  Lab Results - Last 18 Months   Lab Units 02/09/24  0819 07/31/23  0805   CHOLESTEROL mg/dL 154 149   LDL CHOL mg/dL 94 89   HDL CHOL mg/dL 43 42   TRIGLYCERIDES mg/dL 92 96     PSA:  Lab Results - Last 18 Months   Lab Units 07/31/23  0805   PSA ng/mL 0.5     CBC:  Lab Results - Last 18 Months   Lab Units 02/19/24  0250 02/18/24  0309 02/17/24  0448 02/09/24  0819 07/31/23  0805   WBC K/uL 8.2 8.6 14.1* 5.58 6.1   HEMOGLOBIN g/dL 14.9 14.4 17.5 17.6 17.9*   HEMATOCRIT % 43.4 43.2 52.0 51.3* 54.4*   PLATELETS K/uL 216 219 279 269 236      BMP/CMP:  Lab Results - Last 18 Months   Lab Units 02/09/24  0819 07/31/23  0805   SODIUM mmol/L 140 138   POTASSIUM mmol/L 4.8 4.2   CHLORIDE " "mmol/L 102 96   CO2 mmol/L 27.5 28   GLUCOSE mg/dL 75 121*   BUN mg/dL 27* 17   CREATININE mg/dL 1.86* 1.10   EGFR RESULT mL/min/1.73 37.3* 70   CALCIUM mg/dL 9.5 9.7     HEPATIC:  Lab Results - Last 18 Months   Lab Units 02/09/24  0819 07/31/23  0805   ALT (SGPT) U/L 12 11   AST (SGOT) U/L 21 22   ALK PHOS U/L 101 113     Vit D:  Lab Results - Last 18 Months   Lab Units 02/17/24  0448   VIT D 25 HYDROXY ng/mL 18.9*     THYROID:  Lab Results - Last 18 Months   Lab Units 02/17/24  0448 02/09/24  0819   TSH uIU/mL 3.310 2.020     BMI Trend:  BMI Readings from Last 10 Encounters:   03/20/24 22.45 kg/m²   03/14/24 22.45 kg/m²   02/23/24 23.04 kg/m²   02/09/24 23.04 kg/m²   01/10/24 23.04 kg/m²   12/22/23 23.04 kg/m²   10/20/23 23.48 kg/m²   09/20/23 23.48 kg/m²   09/11/23 23.48 kg/m²   08/14/23 23.48 kg/m²     Objective   Vital Signs  /70 (BP Location: Right arm, Patient Position: Sitting, Cuff Size: Adult)   Pulse 83   Temp 96.8 °F (36 °C) (Infrared)   Resp 18   Ht 175.3 cm (69\")   SpO2 96%   BMI 22.45 kg/m²   Physical Exam  Constitutional:       General: He is not in acute distress.  Cardiovascular:      Rate and Rhythm: Normal rate and regular rhythm.      Pulses: Normal pulses.      Heart sounds: No murmur heard.     No friction rub. No gallop.   Pulmonary:      Effort: Pulmonary effort is normal. No respiratory distress.      Breath sounds: Normal breath sounds. No wheezing or rhonchi.   Musculoskeletal:      Right knee: Tenderness present.      Left knee: Tenderness present.      Comments: Flexion angle is less than 90 degrees on the left, slightly more than 90 degrees on the right.    Neurological:      Mental Status: He is alert.     Assessment & Plan   Diagnoses and all orders for this visit:    1. Chronic pain of both knees (Primary)  -     Urine Drug Screen - Urine, Clean Catch    Discussions & Anticipatory Guidance:  Advised and educated plan of care.    The patient will Return in about 1 month " (around 4/20/2024) for Bilateral Knee Injections.  Advised again Norco can be addictive, offered Narcan -declined. I advised him if he is not having surgery, he does not need surgical clearance visits. He is wanting to keep a more conservative approach. We discussed reason for prior referrals. We are going to plan on seeing him back in 1 month for possibly reinjecting both knees.    I have personally performed the services described in this document as transcribed by the above individual, and it is both accurate and complete.    Transcribed from ambient dictation for CONCHITA Brice by Charli Zhou.  03/20/24   12:45 CDT    Electronically signed by CONCHITA Brice, 03/20/24, 12:45 PM CDT.

## 2024-03-21 ENCOUNTER — TELEPHONE (OUTPATIENT)
Dept: UROLOGY | Age: 74
End: 2024-03-21

## 2024-03-21 LAB
AMPHETAMINES UR QL SCN: NEGATIVE NG/ML
BARBITURATES UR QL SCN: NEGATIVE NG/ML
BENZODIAZ UR QL SCN: NEGATIVE NG/ML
BZE UR QL SCN: NEGATIVE NG/ML
CANNABINOIDS UR QL SCN: NEGATIVE NG/ML
CREAT UR-MCNC: 75 MG/DL (ref 20–300)
LABORATORY COMMENT REPORT: NORMAL
METHADONE UR QL SCN: NEGATIVE NG/ML
OPIATES UR QL SCN: NEGATIVE NG/ML
OXYCODONE+OXYMORPHONE UR QL SCN: NEGATIVE NG/ML
PCP UR QL: NEGATIVE NG/ML
PH UR: 6.6 [PH] (ref 4.5–8.9)
PROPOXYPH UR QL SCN: NEGATIVE NG/ML

## 2024-03-21 NOTE — TELEPHONE ENCOUNTER
Kirstie called regarding the patients catheter. She stated that she requested an order to be placed by another provider with no luck. She asking if we can place an order for Home Care to change and upkeep the catheter. She stated that right now they aren't allowed to do any kind of preventative measures as far as flushing or any kind of care.

## 2024-03-21 NOTE — TELEPHONE ENCOUNTER
After calling alex back it was determined the patient does till ant to try to get the catheter out. She verbalizes she will discharge him from home health next week. He will keep following up with us.

## 2024-03-28 ENCOUNTER — TELEPHONE (OUTPATIENT)
Dept: CARDIOLOGY CLINIC | Age: 74
End: 2024-03-28

## 2024-03-28 NOTE — TELEPHONE ENCOUNTER
Called to schedule an appt from a referral the office received, patient stated his heart is in good condition and he does not want to be seen. Send back to the office.

## 2024-04-10 ENCOUNTER — NURSE ONLY (OUTPATIENT)
Dept: UROLOGY | Age: 74
End: 2024-04-10
Payer: MEDICARE

## 2024-04-10 VITALS — WEIGHT: 146 LBS | TEMPERATURE: 98.7 F | HEIGHT: 69 IN | BODY MASS INDEX: 21.62 KG/M2

## 2024-04-10 DIAGNOSIS — R33.9 URINARY RETENTION: Primary | ICD-10-CM

## 2024-04-10 PROCEDURE — 51700 IRRIGATION OF BLADDER: CPT | Performed by: NURSE PRACTITIONER

## 2024-04-10 NOTE — PROGRESS NOTES
Patient of Ila Powers presents today for voiding trial post Retention of urine. The patient denies any fever, chills or N&V. After patient had given consent, using the catheter in place, 240cc of sterile water was installed into the bladder with no complications. Patient was able to void 0.     Ila Powers was in office at time of procedure.     Patient is to follow up as scheduled.     Procedure Note (4/10/24):  After receiving verbal orders from Ila Powers, I was instructed to place urethral scales catheter. Using sterile technique, patient was cleaned with Hibiclens and sterile water solution. A 18f Coude catheter was inserted into the bladder, bladder was drained of 310cc of urine, 10 mL of sterile water was used to fill up the balloon.  The catheter was then hooked up to a drainage bag.   The patient tolerated the procedure well.    Patient should follow up as scheduled.

## 2024-04-17 ENCOUNTER — OFFICE VISIT (OUTPATIENT)
Dept: FAMILY MEDICINE CLINIC | Facility: CLINIC | Age: 74
End: 2024-04-17
Payer: MEDICARE

## 2024-04-17 VITALS
HEART RATE: 73 BPM | SYSTOLIC BLOOD PRESSURE: 110 MMHG | BODY MASS INDEX: 22.45 KG/M2 | DIASTOLIC BLOOD PRESSURE: 60 MMHG | OXYGEN SATURATION: 99 % | HEIGHT: 69 IN | TEMPERATURE: 97.1 F

## 2024-04-17 DIAGNOSIS — M25.562 CHRONIC PAIN OF BOTH KNEES: ICD-10-CM

## 2024-04-17 DIAGNOSIS — G89.29 CHRONIC PAIN OF BOTH KNEES: ICD-10-CM

## 2024-04-17 DIAGNOSIS — F41.9 ANXIETY: ICD-10-CM

## 2024-04-17 DIAGNOSIS — M17.0 PRIMARY OSTEOARTHRITIS OF BOTH KNEES: Primary | ICD-10-CM

## 2024-04-17 DIAGNOSIS — M25.561 CHRONIC PAIN OF BOTH KNEES: ICD-10-CM

## 2024-04-17 RX ORDER — BUSPIRONE HYDROCHLORIDE 10 MG/1
10 TABLET ORAL 2 TIMES DAILY
Qty: 60 TABLET | Refills: 5 | Status: SHIPPED | OUTPATIENT
Start: 2024-04-17

## 2024-04-18 NOTE — PROGRESS NOTES
Subjective   Chief Complaint:  Reevaluation of pain    History of Present Illness  This is a 73-year-old male presenting today for 1 month follow-up after starting a pain medication contract. He has a history of pain in both knees. He will have a male caregiver present in the room.    The patient continues to experience severe pain in his left knee, occasionally giving way, reminiscent of his previous experience with the left knee. Approximately 3 months ago, he received an injection into the joint space, which provided temporary relief while seated. However, upon standing, the pain returned. He has previously undergone self-administration of injections in both knees by a specialist, which unfortunately did not yield the desired relief. His current medication regimen includes nightly tizanidine and 1 hydrocodone tablet.    He expresses desire to keep things conservative and at this clinic rather than doing any specialty referrals unless absolutely necessary.    Additionally: He expresses desire to have his medication looked at for stress.  He is currently on Effexor.  Reports has episodes during the day where he feels more stress and anxiety.    Past Medical, Surgical, Social, and Family History:  Allergies   Allergen Reactions    Meloxicam Hives     Dizzy, skin crawls    Tamsulosin Dizziness     excessive sweating    Penicillins       Current Outpatient Medications on File Prior to Visit   Medication Sig    alfuzosin (UROXATRAL) 10 MG 24 hr tablet Take 1 tablet by mouth every night at bedtime.    ciprofloxacin (Ciloxan) 0.3 % ophthalmic solution Administer 1 drop to both eyes 4 (Four) Times a Day.    finasteride (PROSCAR) 5 MG tablet Take 1 tablet by mouth Daily.    fluticasone (FLONASE) 50 MCG/ACT nasal spray 2 sprays into the nostril(s) as directed by provider Daily.    guaiFENesin (MUCINEX) 600 MG 12 hr tablet Take 1 tablet by mouth 2 (Two) Times a Day.    HYDROcodone-acetaminophen (Norco) 5-325 MG per tablet  "Take 1 tablet by mouth Every 12 (Twelve) Hours As Needed for Severe Pain.    ipratropium-albuterol (COMBIVENT RESPIMAT)  MCG/ACT inhaler Inhale 1 puff.    levothyroxine (SYNTHROID, LEVOTHROID) 100 MCG tablet Take 1 tablet by mouth Daily.    lisinopril-hydrochlorothiazide (PRINZIDE,ZESTORETIC) 10-12.5 MG per tablet TAKE ONE TABLET DAILY    loratadine (Claritin) 10 MG tablet Take 1 tablet by mouth Daily.    naloxone (NARCAN) 4 MG/0.1ML nasal spray 1 spray into the nostril(s) as directed by provider As Needed (overdose).    probiotic (CULTURELLE) capsule capsule Take 1 capsule by mouth.    tiZANidine (ZANAFLEX) 4 MG tablet Take 1 tablet by mouth 3 (Three) Times a Day As Needed for Muscle Spasms.    venlafaxine XR (Effexor XR) 150 MG 24 hr capsule Take 1 capsule by mouth Daily.    Vitamin D, Ergocalciferol, 19770 units capsule Take 1 capsule by mouth.     No current facility-administered medications on file prior to visit.    History reviewed. No pertinent past medical history.  Past Surgical History:   Procedure Laterality Date    BACK SURGERY      HERNIA REPAIR       Social History     Socioeconomic History    Marital status: Single   Tobacco Use    Smoking status: Every Day     Types: Cigars     Passive exposure: Current    Smokeless tobacco: Never   Substance and Sexual Activity    Alcohol use: No    Drug use: Defer    Sexual activity: Defer     Family History   Problem Relation Age of Onset    No Known Problems Father     No Known Problems Mother        Prior Visit Notes/Records, Lab, Imaging, and Diagnostic Results Reviewed:  A1C:No results for input(s): \"HGBA1C\" in the last 11736 hours.  GLUCOSE:  Lab Results - Last 18 Months   Lab Units 02/09/24  0819 07/31/23  0805   GLUCOSE mg/dL 75 121*     LIPID:  Lab Results - Last 18 Months   Lab Units 02/09/24  0819 07/31/23  0805   CHOLESTEROL mg/dL 154 149   LDL CHOL mg/dL 94 89   HDL CHOL mg/dL 43 42   TRIGLYCERIDES mg/dL 92 96     PSA:  Lab Results - Last 18 " "Months   Lab Units 07/31/23  0805   PSA ng/mL 0.5     CBC:  Lab Results - Last 18 Months   Lab Units 02/19/24  0250 02/18/24  0309 02/17/24  0448 02/09/24  0819 07/31/23  0805   WBC K/uL 8.2 8.6 14.1* 5.58 6.1   HEMOGLOBIN g/dL 14.9 14.4 17.5 17.6 17.9*   HEMATOCRIT % 43.4 43.2 52.0 51.3* 54.4*   PLATELETS K/uL 216 219 279 269 236      BMP/CMP:  Lab Results - Last 18 Months   Lab Units 02/09/24  0819 07/31/23  0805   SODIUM mmol/L 140 138   POTASSIUM mmol/L 4.8 4.2   CHLORIDE mmol/L 102 96   CO2 mmol/L 27.5 28   GLUCOSE mg/dL 75 121*   BUN mg/dL 27* 17   CREATININE mg/dL 1.86* 1.10   EGFR RESULT mL/min/1.73 37.3* 70   CALCIUM mg/dL 9.5 9.7     HEPATIC:  Lab Results - Last 18 Months   Lab Units 02/09/24  0819 07/31/23  0805   ALT (SGPT) U/L 12 11   AST (SGOT) U/L 21 22   ALK PHOS U/L 101 113     Vit D:  Lab Results - Last 18 Months   Lab Units 02/17/24  0448   VIT D 25 HYDROXY ng/mL 18.9*     THYROID:  Lab Results - Last 18 Months   Lab Units 02/17/24  0448 02/09/24  0819   TSH uIU/mL 3.310 2.020     BMI Trend:  BMI Readings from Last 10 Encounters:   04/17/24 22.45 kg/m²   03/20/24 22.45 kg/m²   03/14/24 22.45 kg/m²   02/23/24 23.04 kg/m²   02/09/24 23.04 kg/m²   01/10/24 23.04 kg/m²   12/22/23 23.04 kg/m²   10/20/23 23.48 kg/m²   09/20/23 23.48 kg/m²   09/11/23 23.48 kg/m²     Objective   Vital Signs  /60 (BP Location: Right arm, Patient Position: Sitting, Cuff Size: Adult)   Pulse 73   Temp 97.1 °F (36.2 °C) (Infrared)   Ht 175.3 cm (69\")   SpO2 99%   BMI 22.45 kg/m²   Physical Exam  Constitutional:       General: He is not in acute distress.  Cardiovascular:      Rate and Rhythm: Normal rate and regular rhythm.      Pulses: Normal pulses.      Heart sounds: No murmur heard.     No friction rub. No gallop.   Pulmonary:      Effort: Pulmonary effort is normal. No respiratory distress.      Breath sounds: Normal breath sounds. No wheezing or rhonchi.   Musculoskeletal:      Comments: Tenderness to both " knees.  Slight swelling to left knee.  Both knees with palpable arthritic deformities.   Neurological:      Mental Status: He is alert.   Psychiatric:      Comments: At baseline cognitively-openly voices feelings.       Assessment & Plan   Diagnoses and all orders for this visit:    1. Primary osteoarthritis of both knees (Primary)    2. Chronic pain of both knees    3. Anxiety    Other orders  -     busPIRone (BUSPAR) 10 MG tablet; Take 1 tablet by mouth 2 (Two) Times a Day.  Dispense: 60 tablet; Refill: 5    Discussions & Anticipatory Guidance:  Advised and educated plan of care.      PROCEDURE: Intra-articular injection of the left knee    Patient was placed in supine position.  The lateral midpatellar approach was used.  Injection site was cleansed with Betadine twice taking care not to touch the site injection site after.  The patella was pulled medially, the injection site was utilized assisted down from the superior aspect of patella at the posterior margin of patella.  Angle of injection with a 15 degree horizontal approach toward the center of the joint.  A preparation of lidocaine 2% 3 mL mixed with Kenalog 40 mg/mL 1.5 mL was injected into the joint space.  Upon needle withdrawal, the site was covered with a sterile bandage.  The knee was taken through range of motion to distribute the medication.  No CMS deficits post injection.  No blood loss.  No complication.    PROCEDURE: Intra-articular injection of the right knee    Patient was placed in supine position.  The lateral midpatellar approach was used.  Injection site was cleansed with Betadine twice taking care not to touch the site injection site after.  The patella was pulled medially, the injection site was utilized assisted down from the superior aspect of patella at the posterior margin of patella.  Angle of injection with a 15 degree horizontal approach toward the center of the joint.  A preparation of lidocaine 2% 3 mL mixed with Kenalog 40 mg/mL  1.5 mL was injected into the joint space.  Upon needle withdrawal, the site was covered with a sterile bandage.  The knee was taken through range of motion to distribute the medication.  No CMS deficits post injection.  No blood loss.  No complication.    The patient will Return in about 3 months (around 7/17/2024) for Follow-Up.      Patient or patient representative verbalized consent for the use of Ambient Listening during the visit with  CONCHITA Brice for chart documentation. 4/18/2024  07:37 CDT    Electronically signed by CONCHITA Brice, 04/18/24, 7:37 AM CDT.

## 2024-05-15 ENCOUNTER — NURSE ONLY (OUTPATIENT)
Dept: UROLOGY | Age: 74
End: 2024-05-15

## 2024-05-15 DIAGNOSIS — R33.9 URINARY RETENTION: Primary | ICD-10-CM

## 2024-05-15 NOTE — PROGRESS NOTES
Procedure Note (5/15/24):  After receiving verbal orders from Ila Powers, I was instructed to place urethral scales catheter. Using sterile technique, patient was cleaned with Hibiclens and sterile water solution. A 18f Coude catheter was inserted into the bladder, bladder was drained of 310cc of urine, 10 mL of sterile water was used to fill up the balloon.  The catheter was then hooked up to a drainage bag.   The patient tolerated the procedure well.    Patient should follow up as scheduled.

## 2024-05-20 DIAGNOSIS — R33.9 RETENTION OF URINE: ICD-10-CM

## 2024-05-20 DIAGNOSIS — N40.1 BPH WITH OBSTRUCTION/LOWER URINARY TRACT SYMPTOMS: ICD-10-CM

## 2024-05-20 DIAGNOSIS — N13.8 BPH WITH OBSTRUCTION/LOWER URINARY TRACT SYMPTOMS: ICD-10-CM

## 2024-05-20 RX ORDER — FINASTERIDE 5 MG/1
5 TABLET, FILM COATED ORAL DAILY
Qty: 90 TABLET | Refills: 3 | Status: SHIPPED | OUTPATIENT
Start: 2024-05-20

## 2024-05-30 ENCOUNTER — TELEPHONE (OUTPATIENT)
Dept: FAMILY MEDICINE CLINIC | Facility: CLINIC | Age: 74
End: 2024-05-30
Payer: MEDICARE

## 2024-05-30 NOTE — TELEPHONE ENCOUNTER
Spoke with Jacob, states to ask the patient if he has tugged on this. If so this is normal, if not then patient needs to go to the ED to be worked up.

## 2024-05-30 NOTE — TELEPHONE ENCOUNTER
Pt called to let us know that he had a little blood in his catheter this morning and wasn't sure what he needed to do about it, but was wanting to speak with someone clinical.

## 2024-05-30 NOTE — TELEPHONE ENCOUNTER
Pt called stated he has blood in his catheter and would like to know what he should do,please advise     Hub to relay-I attempted to call pt back to advise an apt or urgent care

## 2024-05-30 NOTE — TELEPHONE ENCOUNTER
Spoke with patient-he reports there was no blood he reports that there is soreness in his penile shaft where the catheter runs.  I advised him to talk to call his urologist at [facility].    Electronically signed by CONCHITA Brice, 05/30/24, 2:32 PM CDT.

## 2024-06-04 RX ORDER — LEVOTHYROXINE SODIUM 0.1 MG/1
100 TABLET ORAL DAILY
Qty: 30 TABLET | Refills: 1 | Status: SHIPPED | OUTPATIENT
Start: 2024-06-04

## 2024-06-21 DIAGNOSIS — M25.561 CHRONIC PAIN OF BOTH KNEES: ICD-10-CM

## 2024-06-21 DIAGNOSIS — M25.562 CHRONIC PAIN OF BOTH KNEES: ICD-10-CM

## 2024-06-21 DIAGNOSIS — G89.29 CHRONIC PAIN OF BOTH KNEES: ICD-10-CM

## 2024-06-21 RX ORDER — HYDROCODONE BITARTRATE AND ACETAMINOPHEN 5; 325 MG/1; MG/1
1 TABLET ORAL EVERY 12 HOURS PRN
Qty: 46 TABLET | Refills: 0 | Status: SHIPPED | OUTPATIENT
Start: 2024-06-21

## 2024-06-21 NOTE — TELEPHONE ENCOUNTER
Caller: Jagdish Cook    Relationship: Self    Best call back number: [phone]     Requested Prescriptions:   Requested Prescriptions     Pending Prescriptions Disp Refills    HYDROcodone-acetaminophen (Norco) 5-325 MG per tablet 46 tablet 0     Sig: Take 1 tablet by mouth Every 12 (Twelve) Hours As Needed for Severe Pain.        Pharmacy where request should be sent: [facility] - [facility] [address] [phone] [facility] [phone]      Last office visit with prescribing clinician: 4/17/2024   Last telemedicine visit with prescribing clinician: Visit date not found   Next office visit with prescribing clinician: 7/17/2024     Additional details provided by patient: PATIENT STATES HE IS OUT OF MEDICATION.    Does the patient have less than a 3 day supply:  [x] Yes  [] No    Would you like a call back once the refill request has been completed: [] Yes [x] No    If the office needs to give you a call back, can they leave a voicemail: [] Yes [x] No    Leesa Shin Rep   06/21/24 08:49 CDT

## 2024-06-21 NOTE — TELEPHONE ENCOUNTER
Rx Refill Note  Requested Prescriptions     Pending Prescriptions Disp Refills    HYDROcodone-acetaminophen (Norco) 5-325 MG per tablet 46 tablet 0     Sig: Take 1 tablet by mouth Every 12 (Twelve) Hours As Needed for Severe Pain.      Last office visit with office: 04/17/2024  Next office visit with office: 07/17/2024    UDS: 03/20/2024    DATE OF LAST REFILL: 03/16/2024    Controlled Substance Agreement: up to date    ELENITA OR ROSHANP: zaheer         {TIP  Is Refill Pharmacy correct?:  Melany Jenkins MA  06/21/24, 12:54 CDT

## 2024-06-28 ENCOUNTER — NURSE TRIAGE (OUTPATIENT)
Dept: CALL CENTER | Facility: HOSPITAL | Age: 74
End: 2024-06-28
Payer: MEDICARE

## 2024-06-28 ENCOUNTER — NURSE ONLY (OUTPATIENT)
Dept: UROLOGY | Age: 74
End: 2024-06-28
Payer: MEDICARE

## 2024-06-28 DIAGNOSIS — R33.9 URINARY RETENTION: Primary | ICD-10-CM

## 2024-06-28 PROCEDURE — 51702 INSERT TEMP BLADDER CATH: CPT | Performed by: NURSE PRACTITIONER

## 2024-06-28 RX ORDER — TIZANIDINE 4 MG/1
TABLET ORAL
Qty: 90 TABLET | Refills: 5 | Status: SHIPPED | OUTPATIENT
Start: 2024-06-28

## 2024-06-28 NOTE — TELEPHONE ENCOUNTER
Reason for Disposition   Urinary catheter care, questions about    Additional Information   Negative: Shock suspected (e.g., cold/pale/clammy skin, too weak to stand, low BP, rapid pulse)   Negative: Sounds like a life-threatening emergency to the triager   Negative: Urinary catheter and spinal cord injury, questions about   Negative: Urinary catheter in a hospice patient   Negative: [1] Catheter was accidentally pulled-out AND [2] bright red continuous bleeding (or trickling)   Negative: SEVERE abdominal pain   Negative: Fever > 100.4 F (38.0 C)   Negative: [1] Drinking very little AND [2] dehydration suspected (e.g., no urine > 12 hours, very dry mouth, very lightheaded)   Negative: Patient sounds very sick or weak to the triager   Negative: Catheter was accidentally pulled-out   Negative: [1] Catheter is broken AND [2] is not working (does not function normally)   Negative: Bleeding around catheter (e.g., from penis or female urethra)   Negative: New-onset MILD-MODERATE lower abdominal pain or swelling (distention)   Negative: [1] No urine in bag > 4 hours AND [2] catheter is not kinked   Negative: [1] Pink, slightly red, or tea-colored urine lasts > 24 hours AND [2] not cleared by increased fluid intake AND [3] no recent prostate or bladder surgery   Negative: [1] Cloudy urine lasts > 24 hours AND [2] not cleared by increased fluid intake   Negative: [1] Bloody or red-colored urine AND [2] no recent prostate or bladder surgery  (Exception: Brief episode and urine now clear.)   Negative: [1] Bloody or red-colored urine AND [2] prostate or bladder surgery > 3 days (72 hours) ago   Negative: Foul-smelling urine (urine smells bad)   Negative: [1] Catheter is broken or cracked AND [2] still works (functioning normally)   Negative: Leakage of urine around catheter   Negative: [1] Pink, slightly red, or tea-colored urine AND [2] present < 24 hours   Negative: [1] Cloudy urine AND [2] present < 24 hours   Negative: [1]  "Prostate surgery or other urologic surgery < 3 days (72 hours) ago AND [2] blood present in urine   Negative: No urine in bag < 4 hours    Answer Assessment - Initial Assessment Questions  1. SYMPTOMS: \"What symptoms are you concerned about?\"      Urinary spasm  2. ONSET:  \"When did the symptoms start?\"      today  3. FEVER: \"Is there a fever?\" If Yes, ask: \"What is the temperature, how was it measured, and when did it start?\"      denies  4. ABDOMEN PAIN: \"Is there any abdomen pain?\" (e.g., Scale 1-10; or mild, moderate, severe)      denies  5. URINE COLOR: \"What color is the urine?\"  \"Is there blood present in the urine?\" (e.g., clear, yellow, cloudy, tea-colored, blood streaks, bright red)      yellow  6. URINE AMOUNT: \"When did you last empty the urine from the collection bag?\" \"How much urine was in the bag at that time?\" How much urine is in the collection bag now?\"      draining  7. INSERTION: \"How long have you (they) had the catheter?\"      today  8. OTHER SYMPTOMS: \"Are there any other symptoms?\" (e.g., abdomen swelling, back pain, bladder spasms, constipation, foul smelling urine, leaking of urine)       denies  9. MEDICINES: \"Are you taking any medicines to treat urinary problems?\" (e.g., antibiotics for a urinary tract infection, medicines to treat bladder spasms)       Antibiotic for infection  10. PREGNANCY: \"Is there any chance you are pregnant?\" \"When was your last menstrual period?\"        na    Protocols used: Urinary Catheter (e.g., Mario) Symptoms and Questions-ADULT-AH    "

## 2024-06-28 NOTE — PROGRESS NOTES
Chronic Indwelling Scales Catheter  Patient presents today with a history of a chronic indwelling urethral scales catheter for several months.  The reason for the indwelling scales is due to: retention.  Overall, the problem(s) are unchanged.  Any problems since last scales catheter change: no    Procedure Note (6/28/24):  The patient's scales catheter was removed.  Using sterile technique, patient was cleaned with Hibiclens and sterile water solution. A 18f coude catheter was inserted into the bladder, and the bladder was drained of 50cc of urine, and 10 mL of sterile water was used to fill up the balloon.  The catheter was then hooked up to a bedside drainage bag.   The patient tolerated the procedure well.    Patient should follow up in 1 month with Dr. Gardner to discuss possible procedure. .

## 2024-07-10 ENCOUNTER — OFFICE VISIT (OUTPATIENT)
Dept: PRIMARY CARE CLINIC | Age: 74
End: 2024-07-10
Payer: MEDICARE

## 2024-07-10 VITALS
TEMPERATURE: 97 F | SYSTOLIC BLOOD PRESSURE: 122 MMHG | BODY MASS INDEX: 23.25 KG/M2 | OXYGEN SATURATION: 100 % | HEART RATE: 68 BPM | DIASTOLIC BLOOD PRESSURE: 72 MMHG | RESPIRATION RATE: 16 BRPM | WEIGHT: 157 LBS | HEIGHT: 69 IN

## 2024-07-10 DIAGNOSIS — Z76.89 ENCOUNTER TO ESTABLISH CARE: ICD-10-CM

## 2024-07-10 DIAGNOSIS — J44.9 CHRONIC OBSTRUCTIVE PULMONARY DISEASE, UNSPECIFIED COPD TYPE (HCC): ICD-10-CM

## 2024-07-10 DIAGNOSIS — E03.9 HYPOTHYROIDISM, UNSPECIFIED TYPE: ICD-10-CM

## 2024-07-10 DIAGNOSIS — M54.9 CHRONIC BACK PAIN, UNSPECIFIED BACK LOCATION, UNSPECIFIED BACK PAIN LATERALITY: ICD-10-CM

## 2024-07-10 DIAGNOSIS — G89.29 CHRONIC BACK PAIN, UNSPECIFIED BACK LOCATION, UNSPECIFIED BACK PAIN LATERALITY: ICD-10-CM

## 2024-07-10 DIAGNOSIS — F33.1 MODERATE EPISODE OF RECURRENT MAJOR DEPRESSIVE DISORDER (HCC): Primary | ICD-10-CM

## 2024-07-10 PROCEDURE — 99204 OFFICE O/P NEW MOD 45 MIN: CPT | Performed by: FAMILY MEDICINE

## 2024-07-10 PROCEDURE — 4004F PT TOBACCO SCREEN RCVD TLK: CPT | Performed by: FAMILY MEDICINE

## 2024-07-10 PROCEDURE — 1123F ACP DISCUSS/DSCN MKR DOCD: CPT | Performed by: FAMILY MEDICINE

## 2024-07-10 PROCEDURE — G8420 CALC BMI NORM PARAMETERS: HCPCS | Performed by: FAMILY MEDICINE

## 2024-07-10 PROCEDURE — 3023F SPIROM DOC REV: CPT | Performed by: FAMILY MEDICINE

## 2024-07-10 PROCEDURE — G8427 DOCREV CUR MEDS BY ELIG CLIN: HCPCS | Performed by: FAMILY MEDICINE

## 2024-07-10 PROCEDURE — 3017F COLORECTAL CA SCREEN DOC REV: CPT | Performed by: FAMILY MEDICINE

## 2024-07-10 RX ORDER — BUSPIRONE HYDROCHLORIDE 10 MG/1
10 TABLET ORAL 2 TIMES DAILY
COMMUNITY
Start: 2024-04-17

## 2024-07-10 SDOH — ECONOMIC STABILITY: FOOD INSECURITY: WITHIN THE PAST 12 MONTHS, YOU WORRIED THAT YOUR FOOD WOULD RUN OUT BEFORE YOU GOT MONEY TO BUY MORE.: NEVER TRUE

## 2024-07-10 SDOH — ECONOMIC STABILITY: HOUSING INSECURITY
IN THE LAST 12 MONTHS, WAS THERE A TIME WHEN YOU DID NOT HAVE A STEADY PLACE TO SLEEP OR SLEPT IN A SHELTER (INCLUDING NOW)?: NO

## 2024-07-10 SDOH — ECONOMIC STABILITY: INCOME INSECURITY: HOW HARD IS IT FOR YOU TO PAY FOR THE VERY BASICS LIKE FOOD, HOUSING, MEDICAL CARE, AND HEATING?: NOT HARD AT ALL

## 2024-07-10 SDOH — ECONOMIC STABILITY: FOOD INSECURITY: WITHIN THE PAST 12 MONTHS, THE FOOD YOU BOUGHT JUST DIDN'T LAST AND YOU DIDN'T HAVE MONEY TO GET MORE.: NEVER TRUE

## 2024-07-10 ASSESSMENT — ANXIETY QUESTIONNAIRES
7. FEELING AFRAID AS IF SOMETHING AWFUL MIGHT HAPPEN: SEVERAL DAYS
1. FEELING NERVOUS, ANXIOUS, OR ON EDGE: MORE THAN HALF THE DAYS
2. NOT BEING ABLE TO STOP OR CONTROL WORRYING: MORE THAN HALF THE DAYS
5. BEING SO RESTLESS THAT IT IS HARD TO SIT STILL: SEVERAL DAYS
3. WORRYING TOO MUCH ABOUT DIFFERENT THINGS: MORE THAN HALF THE DAYS
6. BECOMING EASILY ANNOYED OR IRRITABLE: MORE THAN HALF THE DAYS
4. TROUBLE RELAXING: MORE THAN HALF THE DAYS
GAD7 TOTAL SCORE: 12
IF YOU CHECKED OFF ANY PROBLEMS ON THIS QUESTIONNAIRE, HOW DIFFICULT HAVE THESE PROBLEMS MADE IT FOR YOU TO DO YOUR WORK, TAKE CARE OF THINGS AT HOME, OR GET ALONG WITH OTHER PEOPLE: SOMEWHAT DIFFICULT

## 2024-07-10 ASSESSMENT — ENCOUNTER SYMPTOMS
CHEST TIGHTNESS: 0
NAUSEA: 0
VOMITING: 0
BACK PAIN: 0
BLOOD IN STOOL: 0
ABDOMINAL DISTENTION: 0
SHORTNESS OF BREATH: 0
WHEEZING: 0
ABDOMINAL PAIN: 0

## 2024-07-10 ASSESSMENT — PATIENT HEALTH QUESTIONNAIRE - PHQ9
5. POOR APPETITE OR OVEREATING: MORE THAN HALF THE DAYS
7. TROUBLE CONCENTRATING ON THINGS, SUCH AS READING THE NEWSPAPER OR WATCHING TELEVISION: SEVERAL DAYS
SUM OF ALL RESPONSES TO PHQ9 QUESTIONS 1 & 2: 3
2. FEELING DOWN, DEPRESSED OR HOPELESS: MORE THAN HALF THE DAYS
9. THOUGHTS THAT YOU WOULD BE BETTER OFF DEAD, OR OF HURTING YOURSELF: SEVERAL DAYS
SUM OF ALL RESPONSES TO PHQ QUESTIONS 1-9: 14
SUM OF ALL RESPONSES TO PHQ QUESTIONS 1-9: 14
8. MOVING OR SPEAKING SO SLOWLY THAT OTHER PEOPLE COULD HAVE NOTICED. OR THE OPPOSITE, BEING SO FIGETY OR RESTLESS THAT YOU HAVE BEEN MOVING AROUND A LOT MORE THAN USUAL: MORE THAN HALF THE DAYS
SUM OF ALL RESPONSES TO PHQ QUESTIONS 1-9: 13
6. FEELING BAD ABOUT YOURSELF - OR THAT YOU ARE A FAILURE OR HAVE LET YOURSELF OR YOUR FAMILY DOWN: MORE THAN HALF THE DAYS
4. FEELING TIRED OR HAVING LITTLE ENERGY: MORE THAN HALF THE DAYS
1. LITTLE INTEREST OR PLEASURE IN DOING THINGS: SEVERAL DAYS
SUM OF ALL RESPONSES TO PHQ QUESTIONS 1-9: 14
3. TROUBLE FALLING OR STAYING ASLEEP: SEVERAL DAYS
10. IF YOU CHECKED OFF ANY PROBLEMS, HOW DIFFICULT HAVE THESE PROBLEMS MADE IT FOR YOU TO DO YOUR WORK, TAKE CARE OF THINGS AT HOME, OR GET ALONG WITH OTHER PEOPLE: SOMEWHAT DIFFICULT

## 2024-07-10 ASSESSMENT — COLUMBIA-SUICIDE SEVERITY RATING SCALE - C-SSRS
2. HAVE YOU ACTUALLY HAD ANY THOUGHTS OF KILLING YOURSELF?: NO
6. HAVE YOU EVER DONE ANYTHING, STARTED TO DO ANYTHING, OR PREPARED TO DO ANYTHING TO END YOUR LIFE?: NO
1. WITHIN THE PAST MONTH, HAVE YOU WISHED YOU WERE DEAD OR WISHED YOU COULD GO TO SLEEP AND NOT WAKE UP?: NO

## 2024-07-10 NOTE — PROGRESS NOTES
Bradley Schuler (:  1950) is a 73 y.o. male,New patient, here for evaluation of the following chief complaint(s):  New Patient (Pt her to [facility]. Pt does not have any concerns today. He sees urology for bladder. Pt does have a catheter/)      Assessment & Plan   ASSESSMENT/PLAN:  1. Moderate episode of recurrent major depressive disorder (HCC)  -     cariprazine hcl (VRAYLAR) 3 MG CAPS capsule; Take 1 capsule by mouth daily, Disp-30 capsule, R-1Normal  2. Encounter to establish care  3. Chronic obstructive pulmonary disease, unspecified COPD type (HCC)  4. Hypothyroidism, unspecified type  5. Chronic back pain, unspecified back location, unspecified back pain laterality      PHQ-9 score is elevated at today's appointment.  Depression seems to be under poor control and I think that a lot of this comes from patient's poor social support and living situation.  I do think that counseling may be beneficial for patient and I encouraged him to reach out to either [facility] or [facility] who will take self-referral for this.  I would also like to start patient on Vraylar for additional support.  I did offer patient to switch him from Effexor or however because this will need to be titrated down would prefer to try adding something and rather than switching up things initially.  Patient has been on Norco chronically though is not in need of a refill at this time.  I did discuss that he will need a second appointment before refilling this.  COPD well-controlled without medication  Last TSH stable and hypothyroidism controlled  Pain controlled on Norco  Blood pressure well-controlled    Return in about 2 weeks (around 2024).         Subjective   SUBJECTIVE/OBJECTIVE:  Bradley Schuler is a 73 y.o. male who presents to [facility].  Patient has a history of COPD though states he does not take or need any medicines to control this at this time.  Patient also has a history of BPH and urinary retention who he

## 2024-07-11 ENCOUNTER — TELEPHONE (OUTPATIENT)
Dept: PRIMARY CARE CLINIC | Age: 74
End: 2024-07-11

## 2024-07-31 ENCOUNTER — OFFICE VISIT (OUTPATIENT)
Dept: PRIMARY CARE CLINIC | Age: 74
End: 2024-07-31
Payer: MEDICARE

## 2024-07-31 VITALS
SYSTOLIC BLOOD PRESSURE: 118 MMHG | BODY MASS INDEX: 23.11 KG/M2 | WEIGHT: 156 LBS | HEIGHT: 69 IN | TEMPERATURE: 97.2 F | DIASTOLIC BLOOD PRESSURE: 72 MMHG | HEART RATE: 59 BPM | OXYGEN SATURATION: 100 % | RESPIRATION RATE: 18 BRPM

## 2024-07-31 DIAGNOSIS — G89.29 CHRONIC BACK PAIN, UNSPECIFIED BACK LOCATION, UNSPECIFIED BACK PAIN LATERALITY: ICD-10-CM

## 2024-07-31 DIAGNOSIS — E03.9 HYPOTHYROIDISM, UNSPECIFIED TYPE: ICD-10-CM

## 2024-07-31 DIAGNOSIS — Z79.899 MEDICATION MANAGEMENT: ICD-10-CM

## 2024-07-31 DIAGNOSIS — M54.9 CHRONIC BACK PAIN, UNSPECIFIED BACK LOCATION, UNSPECIFIED BACK PAIN LATERALITY: ICD-10-CM

## 2024-07-31 DIAGNOSIS — J44.9 CHRONIC OBSTRUCTIVE PULMONARY DISEASE, UNSPECIFIED COPD TYPE (HCC): Primary | ICD-10-CM

## 2024-07-31 DIAGNOSIS — R97.20 ELEVATED PSA: ICD-10-CM

## 2024-07-31 DIAGNOSIS — E78.5 HYPERLIPIDEMIA, UNSPECIFIED HYPERLIPIDEMIA TYPE: ICD-10-CM

## 2024-07-31 LAB
ALBUMIN SERPL-MCNC: 3.8 G/DL (ref 3.5–5.2)
ALCOHOL URINE: ABNORMAL
ALP SERPL-CCNC: 114 U/L (ref 40–130)
ALT SERPL-CCNC: 10 U/L (ref 5–41)
AMPHETAMINE SCREEN URINE: ABNORMAL
ANION GAP SERPL CALCULATED.3IONS-SCNC: 17 MMOL/L (ref 7–19)
AST SERPL-CCNC: 19 U/L (ref 5–40)
BARBITURATE SCREEN URINE: ABNORMAL
BASOPHILS # BLD: 0.1 K/UL (ref 0–0.2)
BASOPHILS NFR BLD: 0.7 % (ref 0–1)
BENZODIAZEPINE SCREEN, URINE: ABNORMAL
BILIRUB SERPL-MCNC: 0.5 MG/DL (ref 0.2–1.2)
BUN SERPL-MCNC: 13 MG/DL (ref 8–23)
BUPRENORPHINE URINE: ABNORMAL
CALCIUM SERPL-MCNC: 9.5 MG/DL (ref 8.8–10.2)
CHLORIDE SERPL-SCNC: 99 MMOL/L (ref 98–111)
CHOLEST SERPL-MCNC: 155 MG/DL (ref 160–199)
CO2 SERPL-SCNC: 26 MMOL/L (ref 22–29)
COCAINE METABOLITE SCREEN URINE: ABNORMAL
CREAT SERPL-MCNC: 0.9 MG/DL (ref 0.5–1.2)
EOSINOPHIL # BLD: 0.4 K/UL (ref 0–0.6)
EOSINOPHIL NFR BLD: 4 % (ref 0–5)
ERYTHROCYTE [DISTWIDTH] IN BLOOD BY AUTOMATED COUNT: 13.4 % (ref 11.5–14.5)
GABAPENTIN SCREEN, URINE: ABNORMAL
GLUCOSE SERPL-MCNC: 108 MG/DL (ref 74–109)
HCT VFR BLD AUTO: 51.3 % (ref 42–52)
HDLC SERPL-MCNC: 54 MG/DL (ref 55–121)
HGB BLD-MCNC: 16.6 G/DL (ref 14–18)
IMM GRANULOCYTES # BLD: 0.1 K/UL
LDLC SERPL CALC-MCNC: 80 MG/DL
LYMPHOCYTES # BLD: 1.7 K/UL (ref 1.1–4.5)
LYMPHOCYTES NFR BLD: 17.7 % (ref 20–40)
MCH RBC QN AUTO: 32.1 PG (ref 27–31)
MCHC RBC AUTO-ENTMCNC: 32.4 G/DL (ref 33–37)
MCV RBC AUTO: 99.2 FL (ref 80–94)
MDMA URINE: ABNORMAL
METHADONE SCREEN, URINE: ABNORMAL
METHAMPHETAMINE, URINE: ABNORMAL
MONOCYTES # BLD: 0.8 K/UL (ref 0–0.9)
MONOCYTES NFR BLD: 8.2 % (ref 0–10)
NEUTROPHILS # BLD: 6.7 K/UL (ref 1.5–7.5)
NEUTS SEG NFR BLD: 68.8 % (ref 50–65)
OPIATE SCREEN URINE: ABNORMAL
OXYCODONE SCREEN URINE: ABNORMAL
PHENCYCLIDINE SCREEN URINE: ABNORMAL
PLATELET # BLD AUTO: 298 K/UL (ref 130–400)
PMV BLD AUTO: 9.3 FL (ref 9.4–12.4)
POTASSIUM SERPL-SCNC: 4 MMOL/L (ref 3.5–5)
PROPOXYPHENE SCREEN, URINE: ABNORMAL
PROT SERPL-MCNC: 6.9 G/DL (ref 6.6–8.7)
PSA SERPL-MCNC: 0.35 NG/ML (ref 0–4)
RBC # BLD AUTO: 5.17 M/UL (ref 4.7–6.1)
SODIUM SERPL-SCNC: 142 MMOL/L (ref 136–145)
SYNTHETIC CANNABINOIDS(K2) SCREEN, URINE: ABNORMAL
T4 FREE SERPL-MCNC: 1.3 NG/DL (ref 0.93–1.7)
THC SCREEN, URINE: POSITIVE
TRAMADOL SCREEN URINE: ABNORMAL
TRIGL SERPL-MCNC: 106 MG/DL (ref 0–149)
TSH SERPL DL<=0.005 MIU/L-ACNC: 9.52 UIU/ML (ref 0.27–4.2)
WBC # BLD AUTO: 9.7 K/UL (ref 4.8–10.8)

## 2024-07-31 PROCEDURE — G8427 DOCREV CUR MEDS BY ELIG CLIN: HCPCS | Performed by: FAMILY MEDICINE

## 2024-07-31 PROCEDURE — 99214 OFFICE O/P EST MOD 30 MIN: CPT | Performed by: FAMILY MEDICINE

## 2024-07-31 PROCEDURE — 80305 DRUG TEST PRSMV DIR OPT OBS: CPT | Performed by: FAMILY MEDICINE

## 2024-07-31 PROCEDURE — 4004F PT TOBACCO SCREEN RCVD TLK: CPT | Performed by: FAMILY MEDICINE

## 2024-07-31 PROCEDURE — 1123F ACP DISCUSS/DSCN MKR DOCD: CPT | Performed by: FAMILY MEDICINE

## 2024-07-31 PROCEDURE — 3023F SPIROM DOC REV: CPT | Performed by: FAMILY MEDICINE

## 2024-07-31 PROCEDURE — 3017F COLORECTAL CA SCREEN DOC REV: CPT | Performed by: FAMILY MEDICINE

## 2024-07-31 PROCEDURE — G8420 CALC BMI NORM PARAMETERS: HCPCS | Performed by: FAMILY MEDICINE

## 2024-07-31 RX ORDER — ALBUTEROL SULFATE 2.5 MG/3ML
2.5 SOLUTION RESPIRATORY (INHALATION) 4 TIMES DAILY PRN
Qty: 120 EACH | Refills: 3 | Status: SHIPPED | OUTPATIENT
Start: 2024-07-31

## 2024-07-31 RX ORDER — FINASTERIDE 5 MG/1
5 TABLET, FILM COATED ORAL DAILY
Qty: 30 TABLET | Refills: 3 | Status: SHIPPED | OUTPATIENT
Start: 2024-07-31

## 2024-07-31 RX ORDER — HYDROCODONE BITARTRATE AND ACETAMINOPHEN 5; 325 MG/1; MG/1
1 TABLET ORAL EVERY 6 HOURS PRN
Qty: 120 TABLET | Refills: 0 | Status: SHIPPED | OUTPATIENT
Start: 2024-07-31 | End: 2024-08-30

## 2024-07-31 ASSESSMENT — ENCOUNTER SYMPTOMS
ABDOMINAL PAIN: 0
VOMITING: 0
ABDOMINAL DISTENTION: 0
NAUSEA: 0
CHEST TIGHTNESS: 0
BACK PAIN: 0
SHORTNESS OF BREATH: 0
BLOOD IN STOOL: 0
WHEEZING: 0

## 2024-07-31 NOTE — PROGRESS NOTES
Bradley Schuler (:  1950) is a 73 y.o. male,Established patient, here for evaluation of the following chief complaint(s):  Follow-up (Pt here for 2 week f/u after new patient visit. )      Assessment & Plan   ASSESSMENT/PLAN:  1. Chronic obstructive pulmonary disease, unspecified COPD type (HCC)  -     DME Order for Nebulizer as OP  2. Hypothyroidism, unspecified type  -     TSH with Reflex to FT4; Future  -     Comprehensive Metabolic Panel; Future  -     CBC with Auto Differential; Future  3. Chronic back pain, unspecified back location, unspecified back pain laterality  -     HYDROcodone-acetaminophen (NORCO) 5-325 MG per tablet; Take 1 tablet by mouth every 6 hours as needed for Pain for up to 30 days. Max Daily Amount: 4 tablets, Disp-120 tablet, R-0Normal  4. Hyperlipidemia, unspecified hyperlipidemia type  -     Lipid Panel; Future  5. Medication management  -     POCT Rapid Drug Screen      CSA signed and UDS obtained.  Will start patient back on Norco fives.  Patient advised to only use this when absolutely necessary.  I did discuss with patient that going forward his UDS is will happen at random and will need to be negative for marijuana otherwise we will discontinue use of Norco.  Patient's chest is clear to auscultation.  My suspicion is that his cough may be more in the setting of his underlying COPD or allergies.  We encouraged him to use Mucinex and I am also going to send through some nebulized albuterol for him to try during his coughing fits.  Discussed with patient the Vraylar takes time to work and may take up to 6 weeks to reach a more peak effect.  Patient is okay remaining on the medicine for the time being.  Patient is fasting and will obtain labs    Return in about 3 months (around 10/31/2024).         Subjective   SUBJECTIVE/OBJECTIVE:  Bradley Schuler is a 73 y.o. male who presents for follow-up.  Patient has not had much effect from Vraylar at this point though has been

## 2024-08-01 ENCOUNTER — OFFICE VISIT (OUTPATIENT)
Dept: UROLOGY | Age: 74
End: 2024-08-01
Payer: MEDICARE

## 2024-08-01 DIAGNOSIS — N13.8 BPH WITH OBSTRUCTION/LOWER URINARY TRACT SYMPTOMS: ICD-10-CM

## 2024-08-01 DIAGNOSIS — N40.1 BPH WITH OBSTRUCTION/LOWER URINARY TRACT SYMPTOMS: ICD-10-CM

## 2024-08-01 DIAGNOSIS — N47.1 PHIMOSIS: ICD-10-CM

## 2024-08-01 DIAGNOSIS — R33.9 URINARY RETENTION: Primary | ICD-10-CM

## 2024-08-01 PROCEDURE — 99214 OFFICE O/P EST MOD 30 MIN: CPT | Performed by: UROLOGY

## 2024-08-01 PROCEDURE — G8427 DOCREV CUR MEDS BY ELIG CLIN: HCPCS | Performed by: UROLOGY

## 2024-08-01 PROCEDURE — 3017F COLORECTAL CA SCREEN DOC REV: CPT | Performed by: UROLOGY

## 2024-08-01 PROCEDURE — G8420 CALC BMI NORM PARAMETERS: HCPCS | Performed by: UROLOGY

## 2024-08-01 PROCEDURE — 4004F PT TOBACCO SCREEN RCVD TLK: CPT | Performed by: UROLOGY

## 2024-08-01 PROCEDURE — 1123F ACP DISCUSS/DSCN MKR DOCD: CPT | Performed by: UROLOGY

## 2024-08-01 ASSESSMENT — ENCOUNTER SYMPTOMS
SHORTNESS OF BREATH: 0
DIARRHEA: 0
BACK PAIN: 0
EYE DISCHARGE: 0
VOMITING: 0
COUGH: 0
TROUBLE SWALLOWING: 0
ABDOMINAL DISTENTION: 0
NAUSEA: 0
ABDOMINAL PAIN: 0
EYE REDNESS: 0
CONSTIPATION: 0

## 2024-08-01 NOTE — PROGRESS NOTES
Bradley Schuler is a 73 y.o. male who presents today   Chief Complaint   Patient presents with    Follow-up     I am here today to discuss a possible dorsal slit and cath change.      Urinary Retention:  Patient is here today for Acute Urinary Retention which occured 5 month(s) ago though he has documentation of having prior urinary tension back in 2016 when he saw Dr. Morris at [facility] residual was 500.  Estimated prostate was 50 g at that time patient has refused PATSY here.  and was sudden in onset.  Patient presented to emergency room 2/17/2024 complaining of abdominal pain and was found to have urinary retention.  Wyatt catheter was placed he failed voiding trial and was sent home with Wyatt catheter.  Patient currently does have a urinary catheter in place.  He comes into the office for catheter change once a month.  He has had multiple voiding trials which he has failed.  Catheter changes have been increasingly more difficult due to phimosis and inability to retract the foreskin and poor personal hygiene.  Prior to this event voiding symptoms consisted of decreased urinary stream and urine retention, chronic:    Prior treatments include: Tamsulosin which she could not take, alfuzosin and finasteride currently    Patient canceled appointment to see pulmonology as he has severe COPD and cardiology for surgical clearance for general anesthetic.  So currently he is not a surgical candidate      Past Medical History:   Diagnosis Date    Arthritis     Bacteremia     Chronic back pain     COPD (chronic obstructive pulmonary disease) (HCC)     GERD (gastroesophageal reflux disease)     Hypothyroidism     Inguinal hernia     Neuropathy     LLE    Umbilical hernia     Urinary retention        Past Surgical History:   Procedure Laterality Date    BACK SURGERY      lumbar    CHOLECYSTECTOMY, LAPAROSCOPIC N/A 2/26/2019    CHOLECYSTECTOMY LAPAROSCOPIC WITH GRAM performed by Anjel Gomes MD at [facility] OR    CYST

## 2024-08-01 NOTE — PROGRESS NOTES
Chronic Indwelling Scales Catheter  Patient presents today with a history of a chronic indwelling urethral scales catheter for several months.  The reason for the indwelling scales is due to: retention.  Overall, the problem(s) are unchanged.  Any problems since last scales catheter change: no    Procedure Note (08/01/24):  The patient's scales catheter was removed.  Using sterile technique, patient was cleaned with Hibiclens and sterile water solution. A 18f coude catheter was inserted into the bladder, and the bladder was drained of 50cc of urine, and 10 mL of sterile water was used to fill up the balloon.  The catheter was then hooked up to a bedside drainage bag.   The patient tolerated the procedure well.    Patient should follow up in 1 month for a cath change.

## 2024-08-05 RX ORDER — LEVOTHYROXINE SODIUM 0.1 MG/1
100 TABLET ORAL DAILY
Qty: 30 TABLET | Refills: 1 | Status: SHIPPED | OUTPATIENT
Start: 2024-08-05

## 2024-08-12 RX ORDER — VENLAFAXINE HYDROCHLORIDE 150 MG/1
150 CAPSULE, EXTENDED RELEASE ORAL DAILY
Qty: 30 CAPSULE | Refills: 5 | Status: SHIPPED | OUTPATIENT
Start: 2024-08-12

## 2024-08-15 RX ORDER — LISINOPRIL AND HYDROCHLOROTHIAZIDE 12.5; 1 MG/1; MG/1
1 TABLET ORAL DAILY
Qty: 30 TABLET | Refills: 5 | Status: SHIPPED | OUTPATIENT
Start: 2024-08-15

## 2024-08-19 DIAGNOSIS — F33.1 MODERATE EPISODE OF RECURRENT MAJOR DEPRESSIVE DISORDER (HCC): ICD-10-CM

## 2024-08-19 RX ORDER — CARIPRAZINE 3 MG/1
CAPSULE, GELATIN COATED ORAL
Qty: 30 CAPSULE | Refills: 1 | Status: SHIPPED | OUTPATIENT
Start: 2024-08-19

## 2024-09-02 ENCOUNTER — APPOINTMENT (OUTPATIENT)
Dept: GENERAL RADIOLOGY | Facility: HOSPITAL | Age: 74
End: 2024-09-02
Payer: MEDICARE

## 2024-09-02 ENCOUNTER — APPOINTMENT (OUTPATIENT)
Dept: CT IMAGING | Facility: HOSPITAL | Age: 74
End: 2024-09-02
Payer: MEDICARE

## 2024-09-02 ENCOUNTER — HOSPITAL ENCOUNTER (INPATIENT)
Facility: HOSPITAL | Age: 74
LOS: 1 days | Discharge: LEFT BEFORE DISCHARGE | End: 2024-09-03
Attending: FAMILY MEDICINE | Admitting: FAMILY MEDICINE
Payer: MEDICARE

## 2024-09-02 DIAGNOSIS — S00.03XA HEMATOMA OF LEFT PARIETAL SCALP, INITIAL ENCOUNTER: ICD-10-CM
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
## 2024-09-02 DIAGNOSIS — A41.89 OTHER SPECIFIED SEPSIS: Primary | ICD-10-CM

## 2024-09-02 DIAGNOSIS — S43.402A SPRAIN OF LEFT SHOULDER, UNSPECIFIED SHOULDER SPRAIN TYPE, INITIAL ENCOUNTER: ICD-10-CM

## 2024-09-02 DIAGNOSIS — Z74.09 IMPAIRED MOBILITY: ICD-10-CM

## 2024-09-02 PROBLEM — R26.81 GAIT INSTABILITY: Status: ACTIVE | Noted: 2024-09-02

## 2024-09-02 PROBLEM — R65.20 SEVERE SEPSIS WITHOUT SEPTIC SHOCK: Status: ACTIVE | Noted: 2024-09-02

## 2024-09-02 PROBLEM — T83.511A URINARY TRACT INFECTION ASSOCIATED WITH INDWELLING URETHRAL CATHETER: Status: ACTIVE | Noted: 2024-09-02

## 2024-09-02 PROBLEM — Z97.8 CHRONIC INDWELLING FOLEY CATHETER: Status: ACTIVE | Noted: 2024-09-02

## 2024-09-02 PROBLEM — E87.6 HYPOKALEMIA: Status: ACTIVE | Noted: 2024-09-02

## 2024-09-02 PROBLEM — T79.6XXA TRAUMATIC RHABDOMYOLYSIS: Status: ACTIVE | Noted: 2024-01-01

## 2024-09-02 PROBLEM — R74.01 TRANSAMINITIS: Status: ACTIVE | Noted: 2024-09-02

## 2024-09-02 PROBLEM — N39.0 URINARY TRACT INFECTION ASSOCIATED WITH INDWELLING URETHRAL CATHETER: Status: ACTIVE | Noted: 2024-01-01

## 2024-09-02 PROBLEM — R33.9 ACUTE ON CHRONIC URINARY RETENTION: Status: ACTIVE | Noted: 2024-01-01

## 2024-09-02 PROBLEM — K59.00 CONSTIPATION: Status: ACTIVE | Noted: 2024-09-02

## 2024-09-02 PROBLEM — R55 SYNCOPE: Status: ACTIVE | Noted: 2024-01-01

## 2024-09-02 PROBLEM — A41.9 SEPSIS WITHOUT ACUTE ORGAN DYSFUNCTION: Status: ACTIVE | Noted: 2024-09-02

## 2024-09-02 PROBLEM — M25.551 RIGHT HIP PAIN: Status: ACTIVE | Noted: 2023-09-11

## 2024-09-02 LAB
ALBUMIN SERPL-MCNC: 2.9 G/DL (ref 3.5–5.2)
ALBUMIN SERPL-MCNC: 3.5 G/DL (ref 3.5–5.2)
ALBUMIN/GLOB SERPL: 1.1 G/DL
ALBUMIN/GLOB SERPL: 1.1 G/DL
ALP SERPL-CCNC: 104 U/L (ref 39–117)
ALP SERPL-CCNC: 120 U/L (ref 39–117)
ALT SERPL W P-5'-P-CCNC: 58 U/L (ref 1–41)
ALT SERPL W P-5'-P-CCNC: 63 U/L (ref 1–41)
AMORPH URATE CRY URNS QL MICRO: ABNORMAL /HPF
ANION GAP SERPL CALCULATED.3IONS-SCNC: 14 MMOL/L (ref 5–15)
ANION GAP SERPL CALCULATED.3IONS-SCNC: 15 MMOL/L (ref 5–15)
APTT PPP: 25.8 SECONDS (ref 24.5–36)
AST SERPL-CCNC: 141 U/L (ref 1–40)
AST SERPL-CCNC: 145 U/L (ref 1–40)
BACTERIA UR QL AUTO: ABNORMAL /HPF
BASOPHILS # BLD AUTO: 0.06 10*3/MM3 (ref 0–0.2)
BASOPHILS # BLD AUTO: 0.07 10*3/MM3 (ref 0–0.2)
BASOPHILS NFR BLD AUTO: 0.3 % (ref 0–1.5)
BASOPHILS NFR BLD AUTO: 0.3 % (ref 0–1.5)
BILIRUB SERPL-MCNC: 1 MG/DL (ref 0–1.2)
BILIRUB SERPL-MCNC: 1.5 MG/DL (ref 0–1.2)
BILIRUB UR QL STRIP: ABNORMAL
BUN SERPL-MCNC: 20 MG/DL (ref 8–23)
BUN SERPL-MCNC: 23 MG/DL (ref 8–23)
BUN/CREAT SERPL: 27.7 (ref 7–25)
BUN/CREAT SERPL: 29.4 (ref 7–25)
CALCIUM SPEC-SCNC: 8.4 MG/DL (ref 8.6–10.5)
CALCIUM SPEC-SCNC: 9.4 MG/DL (ref 8.6–10.5)
CHLORIDE SERPL-SCNC: 104 MMOL/L (ref 98–107)
CHLORIDE SERPL-SCNC: 99 MMOL/L (ref 98–107)
CK SERPL-CCNC: 2942 U/L (ref 20–200)
CLARITY UR: ABNORMAL
CO2 SERPL-SCNC: 29 MMOL/L (ref 22–29)
CO2 SERPL-SCNC: 32 MMOL/L (ref 22–29)
COLOR UR: ABNORMAL
CREAT SERPL-MCNC: 0.68 MG/DL (ref 0.76–1.27)
CREAT SERPL-MCNC: 0.83 MG/DL (ref 0.76–1.27)
D-LACTATE SERPL-SCNC: 1.8 MMOL/L (ref 0.5–2)
D-LACTATE SERPL-SCNC: 2.1 MMOL/L (ref 0.5–2)
DEPRECATED RDW RBC AUTO: 44.5 FL (ref 37–54)
DEPRECATED RDW RBC AUTO: 45.1 FL (ref 37–54)
EGFRCR SERPLBLD CKD-EPI 2021: 91.3 ML/MIN/1.73
EGFRCR SERPLBLD CKD-EPI 2021: 96.9 ML/MIN/1.73
EOSINOPHIL # BLD AUTO: 0 10*3/MM3 (ref 0–0.4)
EOSINOPHIL # BLD AUTO: 0.01 10*3/MM3 (ref 0–0.4)
EOSINOPHIL NFR BLD AUTO: 0 % (ref 0.3–6.2)
EOSINOPHIL NFR BLD AUTO: 0 % (ref 0.3–6.2)
ERYTHROCYTE [DISTWIDTH] IN BLOOD BY AUTOMATED COUNT: 12.8 % (ref 12.3–15.4)
ERYTHROCYTE [DISTWIDTH] IN BLOOD BY AUTOMATED COUNT: 12.8 % (ref 12.3–15.4)
GLOBULIN UR ELPH-MCNC: 2.7 GM/DL
GLOBULIN UR ELPH-MCNC: 3.1 GM/DL
GLUCOSE SERPL-MCNC: 86 MG/DL (ref 65–99)
GLUCOSE SERPL-MCNC: 98 MG/DL (ref 65–99)
GLUCOSE UR STRIP-MCNC: NEGATIVE MG/DL
HCT VFR BLD AUTO: 49.9 % (ref 37.5–51)
HCT VFR BLD AUTO: 53.3 % (ref 37.5–51)
HGB BLD-MCNC: 15.9 G/DL (ref 13–17.7)
HGB BLD-MCNC: 17.4 G/DL (ref 13–17.7)
HGB UR QL STRIP.AUTO: ABNORMAL
HYALINE CASTS UR QL AUTO: ABNORMAL /LPF
IMM GRANULOCYTES # BLD AUTO: 0.22 10*3/MM3 (ref 0–0.05)
IMM GRANULOCYTES # BLD AUTO: 0.28 10*3/MM3 (ref 0–0.05)
IMM GRANULOCYTES NFR BLD AUTO: 1 % (ref 0–0.5)
IMM GRANULOCYTES NFR BLD AUTO: 1.1 % (ref 0–0.5)
INR PPP: 1.03 (ref 0.91–1.09)
KETONES UR QL STRIP: ABNORMAL
LEUKOCYTE ESTERASE UR QL STRIP.AUTO: ABNORMAL
LYMPHOCYTES # BLD AUTO: 0.71 10*3/MM3 (ref 0.7–3.1)
LYMPHOCYTES # BLD AUTO: 0.78 10*3/MM3 (ref 0.7–3.1)
LYMPHOCYTES NFR BLD AUTO: 2.7 % (ref 19.6–45.3)
LYMPHOCYTES NFR BLD AUTO: 3.5 % (ref 19.6–45.3)
MAGNESIUM SERPL-MCNC: 2 MG/DL (ref 1.6–2.4)
MCH RBC QN AUTO: 30.4 PG (ref 26.6–33)
MCH RBC QN AUTO: 30.7 PG (ref 26.6–33)
MCHC RBC AUTO-ENTMCNC: 31.9 G/DL (ref 31.5–35.7)
MCHC RBC AUTO-ENTMCNC: 32.6 G/DL (ref 31.5–35.7)
MCV RBC AUTO: 94.2 FL (ref 79–97)
MCV RBC AUTO: 95.4 FL (ref 79–97)
MONOCYTES # BLD AUTO: 1.5 10*3/MM3 (ref 0.1–0.9)
MONOCYTES # BLD AUTO: 1.58 10*3/MM3 (ref 0.1–0.9)
MONOCYTES NFR BLD AUTO: 6.1 % (ref 5–12)
MONOCYTES NFR BLD AUTO: 6.7 % (ref 5–12)
NEUTROPHILS NFR BLD AUTO: 19.8 10*3/MM3 (ref 1.7–7)
NEUTROPHILS NFR BLD AUTO: 23.37 10*3/MM3 (ref 1.7–7)
NEUTROPHILS NFR BLD AUTO: 88.5 % (ref 42.7–76)
NEUTROPHILS NFR BLD AUTO: 89.8 % (ref 42.7–76)
NITRITE UR QL STRIP: POSITIVE
NRBC BLD AUTO-RTO: 0 /100 WBC (ref 0–0.2)
NRBC BLD AUTO-RTO: 0 /100 WBC (ref 0–0.2)
PH UR STRIP.AUTO: 7 [PH] (ref 5–8)
PLATELET # BLD AUTO: 283 10*3/MM3 (ref 140–450)
PLATELET # BLD AUTO: 287 10*3/MM3 (ref 140–450)
PMV BLD AUTO: 9.3 FL (ref 6–12)
PMV BLD AUTO: 9.5 FL (ref 6–12)
POTASSIUM SERPL-SCNC: 3.2 MMOL/L (ref 3.5–5.2)
POTASSIUM SERPL-SCNC: 3.4 MMOL/L (ref 3.5–5.2)
PROT SERPL-MCNC: 5.6 G/DL (ref 6–8.5)
PROT SERPL-MCNC: 6.6 G/DL (ref 6–8.5)
PROT UR QL STRIP: ABNORMAL
PROTHROMBIN TIME: 13.9 SECONDS (ref 11.8–14.8)
RBC # BLD AUTO: 5.23 10*6/MM3 (ref 4.14–5.8)
RBC # BLD AUTO: 5.66 10*6/MM3 (ref 4.14–5.8)
RBC # UR STRIP: ABNORMAL /HPF
REF LAB TEST METHOD: ABNORMAL
SODIUM SERPL-SCNC: 146 MMOL/L (ref 136–145)
SODIUM SERPL-SCNC: 147 MMOL/L (ref 136–145)
SP GR UR STRIP: 1.02 (ref 1–1.03)
SQUAMOUS #/AREA URNS HPF: ABNORMAL /HPF
TRI-PHOS CRY URNS QL MICRO: ABNORMAL /HPF
TROPONIN T SERPL HS-MCNC: 30 NG/L
UROBILINOGEN UR QL STRIP: ABNORMAL
WBC # UR STRIP: ABNORMAL /HPF
WBC NRBC COR # BLD AUTO: 22.37 10*3/MM3 (ref 3.4–10.8)
WBC NRBC COR # BLD AUTO: 26.01 10*3/MM3 (ref 3.4–10.8)

## 2024-09-02 PROCEDURE — 85025 COMPLETE CBC W/AUTO DIFF WBC: CPT | Performed by: FAMILY MEDICINE

## 2024-09-02 PROCEDURE — 25010000002 CEFTRIAXONE PER 250 MG: Performed by: FAMILY MEDICINE

## 2024-09-02 PROCEDURE — 85025 COMPLETE CBC W/AUTO DIFF WBC: CPT

## 2024-09-02 PROCEDURE — 80053 COMPREHEN METABOLIC PANEL: CPT

## 2024-09-02 PROCEDURE — 25810000003 SODIUM CHLORIDE 0.9 % SOLUTION: Performed by: FAMILY MEDICINE

## 2024-09-02 PROCEDURE — 99285 EMERGENCY DEPT VISIT HI MDM: CPT

## 2024-09-02 PROCEDURE — 94799 UNLISTED PULMONARY SVC/PX: CPT

## 2024-09-02 PROCEDURE — 73030 X-RAY EXAM OF SHOULDER: CPT

## 2024-09-02 PROCEDURE — 85730 THROMBOPLASTIN TIME PARTIAL: CPT | Performed by: FAMILY MEDICINE

## 2024-09-02 PROCEDURE — 36415 COLL VENOUS BLD VENIPUNCTURE: CPT | Performed by: FAMILY MEDICINE

## 2024-09-02 PROCEDURE — 87077 CULTURE AEROBIC IDENTIFY: CPT | Performed by: FAMILY MEDICINE

## 2024-09-02 PROCEDURE — 72192 CT PELVIS W/O DYE: CPT

## 2024-09-02 PROCEDURE — 80053 COMPREHEN METABOLIC PANEL: CPT | Performed by: FAMILY MEDICINE

## 2024-09-02 PROCEDURE — 87040 BLOOD CULTURE FOR BACTERIA: CPT | Performed by: FAMILY MEDICINE

## 2024-09-02 PROCEDURE — 82550 ASSAY OF CK (CPK): CPT

## 2024-09-02 PROCEDURE — P9612 CATHETERIZE FOR URINE SPEC: HCPCS

## 2024-09-02 PROCEDURE — 83605 ASSAY OF LACTIC ACID: CPT | Performed by: FAMILY MEDICINE

## 2024-09-02 PROCEDURE — 73502 X-RAY EXAM HIP UNI 2-3 VIEWS: CPT

## 2024-09-02 PROCEDURE — 25810000003 SODIUM CHLORIDE 0.9 % SOLUTION

## 2024-09-02 PROCEDURE — 81001 URINALYSIS AUTO W/SCOPE: CPT | Performed by: FAMILY MEDICINE

## 2024-09-02 PROCEDURE — 87186 SC STD MICRODIL/AGAR DIL: CPT | Performed by: FAMILY MEDICINE

## 2024-09-02 PROCEDURE — 84484 ASSAY OF TROPONIN QUANT: CPT | Performed by: FAMILY MEDICINE

## 2024-09-02 PROCEDURE — 25010000002 MEROPENEM PER 100 MG

## 2024-09-02 PROCEDURE — 87086 URINE CULTURE/COLONY COUNT: CPT | Performed by: FAMILY MEDICINE

## 2024-09-02 PROCEDURE — 85610 PROTHROMBIN TIME: CPT | Performed by: FAMILY MEDICINE

## 2024-09-02 PROCEDURE — 83735 ASSAY OF MAGNESIUM: CPT | Performed by: FAMILY MEDICINE

## 2024-09-02 PROCEDURE — 71101 X-RAY EXAM UNILAT RIBS/CHEST: CPT

## 2024-09-02 PROCEDURE — 25010000002 ENOXAPARIN PER 10 MG

## 2024-09-02 PROCEDURE — 97166 OT EVAL MOD COMPLEX 45 MIN: CPT

## 2024-09-02 PROCEDURE — 93010 ELECTROCARDIOGRAM REPORT: CPT | Performed by: EMERGENCY MEDICINE

## 2024-09-02 PROCEDURE — 93005 ELECTROCARDIOGRAM TRACING: CPT | Performed by: FAMILY MEDICINE

## 2024-09-02 PROCEDURE — 70450 CT HEAD/BRAIN W/O DYE: CPT

## 2024-09-02 RX ORDER — ALFUZOSIN HYDROCHLORIDE 10 MG/1
10 TABLET, EXTENDED RELEASE ORAL
Status: DISCONTINUED | OUTPATIENT
Start: 2024-09-03 | End: 2024-09-03 | Stop reason: CLARIF

## 2024-09-02 RX ORDER — ONDANSETRON 2 MG/ML
4 INJECTION INTRAMUSCULAR; INTRAVENOUS EVERY 6 HOURS PRN
Status: DISCONTINUED | OUTPATIENT
Start: 2024-09-02 | End: 2024-09-03 | Stop reason: HOSPADM

## 2024-09-02 RX ORDER — FLUTICASONE PROPIONATE 50 MCG
2 SPRAY, SUSPENSION (ML) NASAL DAILY
Status: DISCONTINUED | OUTPATIENT
Start: 2024-09-03 | End: 2024-09-03 | Stop reason: HOSPADM

## 2024-09-02 RX ORDER — TAMSULOSIN HYDROCHLORIDE 0.4 MG/1
0.4 CAPSULE ORAL NIGHTLY
Status: DISCONTINUED | OUTPATIENT
Start: 2024-09-02 | End: 2024-09-02

## 2024-09-02 RX ORDER — ACETAMINOPHEN 325 MG/1
650 TABLET ORAL EVERY 4 HOURS PRN
Status: DISCONTINUED | OUTPATIENT
Start: 2024-09-02 | End: 2024-09-03 | Stop reason: HOSPADM

## 2024-09-02 RX ORDER — AMOXICILLIN 250 MG
2 CAPSULE ORAL 2 TIMES DAILY PRN
Status: DISCONTINUED | OUTPATIENT
Start: 2024-09-02 | End: 2024-09-03 | Stop reason: HOSPADM

## 2024-09-02 RX ORDER — VENLAFAXINE HYDROCHLORIDE 75 MG/1
150 CAPSULE, EXTENDED RELEASE ORAL DAILY
Status: DISCONTINUED | OUTPATIENT
Start: 2024-09-03 | End: 2024-09-03 | Stop reason: HOSPADM

## 2024-09-02 RX ORDER — BUSPIRONE HYDROCHLORIDE 10 MG/1
10 TABLET ORAL 2 TIMES DAILY
Status: DISCONTINUED | OUTPATIENT
Start: 2024-09-02 | End: 2024-09-03 | Stop reason: HOSPADM

## 2024-09-02 RX ORDER — SODIUM CHLORIDE 9 MG/ML
75 INJECTION, SOLUTION INTRAVENOUS CONTINUOUS
Status: DISCONTINUED | OUTPATIENT
Start: 2024-09-02 | End: 2024-09-03 | Stop reason: HOSPADM

## 2024-09-02 RX ORDER — POLYETHYLENE GLYCOL 3350 17 G/17G
17 POWDER, FOR SOLUTION ORAL DAILY PRN
Status: DISCONTINUED | OUTPATIENT
Start: 2024-09-02 | End: 2024-09-03 | Stop reason: HOSPADM

## 2024-09-02 RX ORDER — ACETAMINOPHEN 160 MG/5ML
650 SOLUTION ORAL EVERY 4 HOURS PRN
Status: DISCONTINUED | OUTPATIENT
Start: 2024-09-02 | End: 2024-09-03 | Stop reason: HOSPADM

## 2024-09-02 RX ORDER — ONDANSETRON 4 MG/1
4 TABLET, ORALLY DISINTEGRATING ORAL EVERY 6 HOURS PRN
Status: DISCONTINUED | OUTPATIENT
Start: 2024-09-02 | End: 2024-09-03 | Stop reason: HOSPADM

## 2024-09-02 RX ORDER — BISACODYL 5 MG/1
5 TABLET, DELAYED RELEASE ORAL DAILY PRN
Status: DISCONTINUED | OUTPATIENT
Start: 2024-09-02 | End: 2024-09-03 | Stop reason: HOSPADM

## 2024-09-02 RX ORDER — ACETAMINOPHEN 650 MG/1
650 SUPPOSITORY RECTAL EVERY 4 HOURS PRN
Status: DISCONTINUED | OUTPATIENT
Start: 2024-09-02 | End: 2024-09-03 | Stop reason: HOSPADM

## 2024-09-02 RX ORDER — SODIUM CHLORIDE 0.9 % (FLUSH) 0.9 %
10 SYRINGE (ML) INJECTION AS NEEDED
Status: DISCONTINUED | OUTPATIENT
Start: 2024-09-02 | End: 2024-09-03 | Stop reason: HOSPADM

## 2024-09-02 RX ORDER — SODIUM CHLORIDE 0.9 % (FLUSH) 0.9 %
10 SYRINGE (ML) INJECTION EVERY 12 HOURS SCHEDULED
Status: DISCONTINUED | OUTPATIENT
Start: 2024-09-02 | End: 2024-09-03 | Stop reason: HOSPADM

## 2024-09-02 RX ORDER — ENOXAPARIN SODIUM 100 MG/ML
40 INJECTION SUBCUTANEOUS DAILY
Status: DISCONTINUED | OUTPATIENT
Start: 2024-09-02 | End: 2024-09-03 | Stop reason: HOSPADM

## 2024-09-02 RX ORDER — BISACODYL 10 MG
10 SUPPOSITORY, RECTAL RECTAL DAILY PRN
Status: DISCONTINUED | OUTPATIENT
Start: 2024-09-02 | End: 2024-09-03 | Stop reason: HOSPADM

## 2024-09-02 RX ORDER — IPRATROPIUM BROMIDE AND ALBUTEROL SULFATE 2.5; .5 MG/3ML; MG/3ML
3 SOLUTION RESPIRATORY (INHALATION) EVERY 6 HOURS PRN
Status: DISCONTINUED | OUTPATIENT
Start: 2024-09-02 | End: 2024-09-03 | Stop reason: HOSPADM

## 2024-09-02 RX ORDER — LEVOTHYROXINE SODIUM 100 UG/1
100 TABLET ORAL
Status: DISCONTINUED | OUTPATIENT
Start: 2024-09-03 | End: 2024-09-03 | Stop reason: HOSPADM

## 2024-09-02 RX ORDER — GUAIFENESIN 600 MG/1
600 TABLET, EXTENDED RELEASE ORAL 2 TIMES DAILY
Status: DISCONTINUED | OUTPATIENT
Start: 2024-09-02 | End: 2024-09-03 | Stop reason: HOSPADM

## 2024-09-02 RX ORDER — POTASSIUM CHLORIDE 750 MG/1
40 CAPSULE, EXTENDED RELEASE ORAL EVERY 4 HOURS
Status: COMPLETED | OUTPATIENT
Start: 2024-09-02 | End: 2024-09-02

## 2024-09-02 RX ORDER — SODIUM CHLORIDE 9 MG/ML
40 INJECTION, SOLUTION INTRAVENOUS AS NEEDED
Status: DISCONTINUED | OUTPATIENT
Start: 2024-09-02 | End: 2024-09-03 | Stop reason: HOSPADM

## 2024-09-02 RX ORDER — CIPROFLOXACIN HYDROCHLORIDE 3.5 MG/ML
1 SOLUTION/ DROPS TOPICAL 4 TIMES DAILY
Status: DISCONTINUED | OUTPATIENT
Start: 2024-09-02 | End: 2024-09-02

## 2024-09-02 RX ADMIN — POTASSIUM CHLORIDE 40 MEQ: 750 CAPSULE, EXTENDED RELEASE ORAL at 18:37

## 2024-09-02 RX ADMIN — SODIUM CHLORIDE 75 ML/HR: 9 INJECTION, SOLUTION INTRAVENOUS at 15:02

## 2024-09-02 RX ADMIN — MEROPENEM 500 MG: 500 INJECTION, POWDER, FOR SOLUTION INTRAVENOUS at 21:02

## 2024-09-02 RX ADMIN — BUSPIRONE HYDROCHLORIDE 10 MG: 10 TABLET ORAL at 21:02

## 2024-09-02 RX ADMIN — Medication 10 ML: at 15:03

## 2024-09-02 RX ADMIN — POTASSIUM CHLORIDE 40 MEQ: 750 CAPSULE, EXTENDED RELEASE ORAL at 15:03

## 2024-09-02 RX ADMIN — CEFTRIAXONE SODIUM 2000 MG: 2 INJECTION, POWDER, FOR SOLUTION INTRAMUSCULAR; INTRAVENOUS at 10:39

## 2024-09-02 RX ADMIN — MEROPENEM 500 MG: 500 INJECTION, POWDER, FOR SOLUTION INTRAVENOUS at 15:02

## 2024-09-02 RX ADMIN — SODIUM CHLORIDE 2000 ML: 9 INJECTION, SOLUTION INTRAVENOUS at 10:39

## 2024-09-02 RX ADMIN — ENOXAPARIN SODIUM 40 MG: 100 INJECTION SUBCUTANEOUS at 15:03

## 2024-09-02 RX ADMIN — GUAIFENESIN 600 MG: 600 TABLET, EXTENDED RELEASE ORAL at 21:02

## 2024-09-02 NOTE — PLAN OF CARE
Goal Outcome Evaluation:  Plan of Care Reviewed With: patient        Progress: no change  Outcome Evaluation: OT eval completed. Pt presents A&Ox4 c/ogeneralized weakness. He is difficult to understand at times but reports living at home alone with a caregiver 5 days a week. He requires modA for ADLs, independently transfers into a wheelchair in which he can manually propel himself. He demos significant BLE flexion contractures. He required mod-maxA for sup>sit d/t this. Once EOB, he can maintain sitting balance with CGA after given vcs for hand placement. Pt able to minimally get his B feet on the ground, denies attempts to scoot EOB d/t fear of falling. Fxnal transfers deferred at this time. Pt demos chronic B shoulder ROM impairments as well as decreased strength. Mod-maxA for dressing tasks. Pt returned to supine with modA & was able to roll with min-modA to place glide pad under pt. Dep for scooting up in bed. Pt left in L sidelying with pillow between legs. Educated pt on need to turn to reduce pressure injury risk. Skilled OT indicated to progress strength, balance, & act lauren needed for ADLs as well as fxnal transfers to shower chair, toilet, & wheelchair. Recommend SNF at d/c vs home w 24/7 assist.      Anticipated Discharge Disposition (OT): skilled nursing facility

## 2024-09-02 NOTE — THERAPY EVALUATION
Acute Care - Occupational Therapy Initial Evaluation  [facility]     Patient Name: Jagdish Cook  : 1950  MRN: [id number]  Today's Date: 2024     Date of Referral to OT: 24       Admit Date: 2024       ICD-10-CM ICD-9-CM   1. Other specified sepsis  A41.89 038.8     995.91   2. Fall on same level, initial encounter  W18.30XA E888.9   3. Hematoma of left parietal scalp, initial encounter  S00.03XA 920   4. Sprain of left shoulder, unspecified shoulder sprain type, initial encounter  S43.402A 840.9     Patient Active Problem List   Diagnosis    Primary hypertension    Hypothyroidism    Right hip pain    Chronic bilateral low back pain with bilateral sciatica    Acute on chronic urinary retention    Severe sepsis without septic shock    Gait instability    Transaminitis    Chronic indwelling Maroi catheter    Urinary tract infection associated with indwelling urethral catheter    Hypokalemia    Constipation    Traumatic rhabdomyolysis    Syncope     Past Medical History:   Diagnosis Date    Bacteremia     Chronic back pain     COPD (chronic obstructive pulmonary disease)     Mario catheter in place     GERD (gastroesophageal reflux disease)     Memory deficits     Neuropathy     Umbilical hernia     Urinary retention      Past Surgical History:   Procedure Laterality Date    BACK SURGERY      HERNIA REPAIR           OT ASSESSMENT FLOWSHEET (Last 12 Hours)       OT Evaluation and Treatment       Row Name 24 1455                   OT Time and Intention    Subjective Information complains of;weakness;pain  -EC        Document Type evaluation  -EC        Mode of Treatment occupational therapy  -EC           General Information    Patient Profile Reviewed yes  -EC        Prior Level of Function independent:;feeding;grooming;transfer;mod assist:;dressing;bathing;dependent:;community mobility;driving;home management;shopping  uses w/c for household mobility  -EC        Equipment Currently Used  at Home shower chair;wheelchair  -EC        Pertinent History of Current Functional Problem s/p fall at home imaging unremarkable for acute fractures Dx: possible rhabdomyolis PMH: chronic ewing, severe L hip osteoarthritis  -EC        Existing Precautions/Restrictions fall  BLE contractures, LLE>RLE  -EC        Barriers to Rehab previous functional deficit;physical barrier   BLE flexion contractures  -EC           Living Environment    Current Living Arrangements home  -EC        Home Accessibility wheelchair accessible  -EC        People in Home alone  caregiver 5 days a week  -EC           Pain Assessment    Pretreatment Pain Rating --  -EC        Posttreatment Pain Rating --  -EC        Pain Intervention(s) Repositioned;Ambulation/increased activity;Medication (See MAR)  -EC        Additional Documentation Pain Scale: FACES Pre/Post-Treatment (Group)  -EC           Pain Scale: FACES Pre/Post-Treatment    Pain: FACES Scale, Pretreatment 4-->hurts little more  -EC        Posttreatment Pain Rating 4-->hurts little more  -EC        Pain Location generalized  -EC        Pain Location - abdomen  -EC           Cognition    Orientation Status (Cognition) oriented x 4  -EC           Range of Motion Comprehensive    Comment, General Range of Motion B shoulders impaired 50%, all other WFL  -EC           Strength Comprehensive (MMT)    Comment, General Manual Muscle Testing (MMT) Assessment distal BUE 4-/5  -EC           Sensory    Additional Documentation Sensory Assessment (Somatosensory) (Group)  denies n/t  -EC           Activities of Daily Living    BADL Assessment/Intervention lower body dressing;upper body dressing  -EC           Upper Body Dressing Assessment/Training    [place] Level (Upper Body Dressing) doff;don;moderate assist (50% patient effort)  -EC        Position (Upper Body Dressing) edge of bed sitting  -EC        Comment, (Upper Body Dressing) hospital gown  -EC           Lower Body Dressing  Assessment/Training    [place] Level (Lower Body Dressing) doff;don;socks;dependent (less than 25% patient effort)  -EC        Position (Lower Body Dressing) edge of bed sitting  -EC           BADL Safety/Performance    Impairments, BADL Safety/Performance balance;endurance/activity tolerance;muscle tone abnormality;range of motion;strength;trunk/postural control  -EC           Bed Mobility    Bed Mobility supine-sit;sit-supine;rolling left;rolling right;scooting/bridging  -EC        Rolling Left [place] (Bed Mobility) minimum assist (75% patient effort)  -EC        Rolling Right [place] (Bed Mobility) moderate assist (50% patient effort)  -EC        Scooting/Bridging [place] (Bed Mobility) dependent (less than 25% patient effort);2 person assist  -EC        Supine-Sit [place] (Bed Mobility) moderate assist (50% patient effort);verbal cues;maximum assist (25% patient effort)  -EC        Sit-Supine [place] (Bed Mobility) verbal cues;moderate assist (50% patient effort)  -EC        Bed Mobility, Safety Issues decreased use of legs for bridging/pushing;impaired trunk control for bed mobility  -EC        Assistive Device (Bed Mobility) bed rails;draw sheet;head of bed elevated  -EC           Transfer Assessment/Treatment    Transfers --  -EC        Comment, (Transfers) defer d/t pt inability to sustain B feet on floor, denies attempts to scoot EOB d/t fear of falling  -EC           Safety Issues, Functional Mobility    Safety Issues Affecting Function (Mobility) friction/shear risk;insight into deficits/self-awareness;judgment;safety precaution awareness;safety precautions follow-through/compliance  -EC        Impairments Affecting Function (Mobility) balance;endurance/activity tolerance;muscle tone abnormal;postural/trunk control;range of motion (ROM);strength  -EC           Balance    Balance Assessment sitting static balance;sitting dynamic balance  -EC        Static Sitting Balance  verbal cues;contact guard;moderate assist  -EC        Dynamic Sitting Balance minimal assist  -EC        Position, Sitting Balance supported;sitting edge of bed  -EC        Comment, Balance initally modA upon sitting but with vcs for hand placement maintains static sitting w CGA  -EC           Wound 09/02/24 1320 gluteal    Wound - Properties Group Placement Date: 09/02/24  -MS Placement Time: 1320  -MS Present on Original Admission: Y  -MS Location: gluteal  -MS    Retired Wound - Properties Group Placement Date: 09/02/24  -MS Placement Time: 1320  -MS Present on Original Admission: Y  -MS Location: gluteal  -MS    Retired Wound - Properties Group Date first assessed: 09/02/24  -MS Time first assessed: 1320  -MS Present on Original Admission: Y  -MS Location: gluteal  -MS       Wound 09/02/24 1320 Left gluteal    Wound - Properties Group Placement Date: 09/02/24  -MS Placement Time: 1320  -MS Present on Original Admission: Y  -MS Side: Left  -MS Location: gluteal  -MS    Retired Wound - Properties Group Placement Date: 09/02/24  -MS Placement Time: 1320  -MS Present on Original Admission: Y  -MS Side: Left  -MS Location: gluteal  -MS    Retired Wound - Properties Group Date first assessed: 09/02/24  -MS Time first assessed: 1320  -MS Present on Original Admission: Y  -MS Side: Left  -MS Location: gluteal  -MS       Plan of Care Review    Plan of Care Reviewed With patient  -EC        Progress no change  -EC        Outcome Evaluation OT eval completed. Pt presents A&Ox4 c/ogeneralized weakness. He is difficult to understand at times but reports living at home alone with a caregiver 5 days a week. He requires modA for ADLs, independently transfers into a wheelchair in which he can manually propel himself. He demos significant BLE flexion contractures. He required mod-maxA for sup>sit d/t this. Once EOB, he can maintain sitting balance with CGA after given vcs for hand placement. Pt able to minimally get his B feet on  the ground, denies attempts to scoot EOB d/t fear of falling. Fxnal transfers deferred at this time. Pt demos chronic B shoulder ROM impairments as well as decreased strength. Mod-maxA for dressing tasks. Pt returned to supine with modA & was able to roll with min-modA to place glide pad under pt. Dep for scooting up in bed. Pt left in L sidelying with pillow between legs. Educated pt on need to turn to reduce pressure injury risk. Skilled OT indicated to progress strength, balance, & act lauren needed for ADLs as well as fxnal transfers to shower chair, toilet, & wheelchair. Recommend SNF at d/c vs home w 24/7 assist.  -EC           Positioning and Restraints    Pre-Treatment Position in bed  -EC        Post Treatment Position bed  -EC        In Bed side lying left;call light within reach;encouraged to call for assist;exit alarm on;with nsg;side rails up x2;pillow between legs  -EC           Therapy Assessment/Plan (OT)    Date of Referral to OT 09/02/24  -EC        OT Diagnosis decreased ADLs  -EC        Rehab Potential (OT) fair, will monitor progress closely  -EC        Criteria for Skilled Therapeutic Interventions Met (OT) yes;meets criteria;skilled treatment is necessary  -EC        Therapy Frequency (OT) 5 times/wk  -EC        Predicted Duration of Therapy Intervention (OT) 10 days  -EC        Planned Therapy Interventions (OT) activity tolerance training;adaptive equipment training;BADL retraining;functional balance retraining;occupation/activity based interventions;passive ROM/stretching;patient/caregiver education/training;ROM/therapeutic exercise;strengthening exercise;transfer/mobility retraining  -EC           Therapy Plan Review/Discharge Plan (OT)    Anticipated Discharge Disposition (OT) skilled nursing facility  -EC           OT Goals    Transfer Goal Selection (OT) transfer, OT goal 1  -EC        Bathing Goal Selection (OT) bathing, OT goal 1  -EC        Toileting Goal Selection (OT) toileting, OT  goal 1  -EC        Problem Specific Goal Selection (OT) problem specific goal 1, OT  -EC           Transfer Goal 1 (OT)    Activity/Assistive Device (Transfer Goal 1, OT) sit-to-stand/stand-to-sit;bed-to-chair/chair-to-bed;toilet;shower chair;wheelchair transfer  -EC        [place] Level/Cues Needed (Transfer Goal 1, OT) minimum assist (75% or more patient effort)  -EC        Time Frame (Transfer Goal 1, OT) long term goal (LTG)  -EC        Progress/Outcome (Transfer Goal 1, OT) new goal  -EC           Bathing Goal 1 (OT)    Activity/Device (Bathing Goal 1, OT) upper body bathing;lower body bathing  -EC        [place] Level/Cues Needed (Bathing Goal 1, OT) moderate assist (50-74% patient effort)  -EC        Time Frame (Bathing Goal 1, OT) long term goal (LTG)  -EC        Progress/Outcomes (Bathing Goal 1, OT) new goal  -EC           Toileting Goal 1 (OT)    Activity/Device (Toileting Goal 1, OT) adjust/manage clothing;perform perineal hygiene;commode, bedside without drop arms  -EC        [place] Level/Cues Needed (Toileting Goal 1, OT) moderate assist (50-74% patient effort)  -EC        Time Frame (Toileting Goal 1, OT) long term goal (LTG)  -EC        Progress/Outcome (Toileting Goal 1, OT) new goal  -EC           Problem Specific Goal 1 (OT)    Problem Specific Goal 1 (OT) Pt will independently implement 1 pain management technique to reduce pain & increase functional performance.  -EC        Time Frame (Problem Specific Goal 1, OT) long term goal (LTG)  -EC        Progress/Outcome (Problem Specific Goal 1, OT) new goal  -EC                  User Key  (r) = Recorded By, (t) = Taken By, (c) = Cosigned By      Initials Name Effective Dates    Catherine Martinez, OSMANI 12/28/23 -     EC Jennifer Lopez OTR/L 10/13/23 -                      Occupational Therapy Education       Title: PT OT SLP Therapies (In Progress)       Topic: Occupational Therapy (In Progress)       Point: ADL training (Done)        Description:   Instruct learner(s) on proper safety adaptation and remediation techniques during self care or transfers.   Instruct in proper use of assistive devices.                  Learning Progress Summary             Patient Acceptance, E, VU,NR by  at 9/2/2024 1551                         Point: Home exercise program (Not Started)       Description:   Instruct learner(s) on appropriate technique for monitoring, assisting and/or progressing therapeutic exercises/activities.                  Learner Progress:  Not documented in this visit.              Point: Precautions (Done)       Description:   Instruct learner(s) on prescribed precautions during self-care and functional transfers.                  Learning Progress Summary             Patient Acceptance, E, VU,NR by  at 9/2/2024 1551                         Point: Body mechanics (Done)       Description:   Instruct learner(s) on proper positioning and spine alignment during self-care, functional mobility activities and/or exercises.                  Learning Progress Summary             Patient Acceptance, E, VU,NR by  at 9/2/2024 1551                                         User Key       Initials Effective Dates Name Provider Type Discipline     10/13/23 -  Jennifer Lopez, OTR/L Occupational Therapist OT                      OT Recommendation and Plan  Planned Therapy Interventions (OT): activity tolerance training, adaptive equipment training, BADL retraining, functional balance retraining, occupation/activity based interventions, passive ROM/stretching, patient/caregiver education/training, ROM/therapeutic exercise, strengthening exercise, transfer/mobility retraining  Therapy Frequency (OT): 5 times/wk  Plan of Care Review  Plan of Care Reviewed With: patient  Progress: no change  Outcome Evaluation: OT eval completed. Pt presents A&Ox4 c/ogeneralized weakness. He is difficult to understand at times but reports living at home alone with a  caregiver 5 days a week. He requires modA for ADLs, independently transfers into a wheelchair in which he can manually propel himself. He demos significant BLE flexion contractures. He required mod-maxA for sup>sit d/t this. Once EOB, he can maintain sitting balance with CGA after given vcs for hand placement. Pt able to minimally get his B feet on the ground, denies attempts to scoot EOB d/t fear of falling. Fxnal transfers deferred at this time. Pt demos chronic B shoulder ROM impairments as well as decreased strength. Mod-maxA for dressing tasks. Pt returned to supine with modA & was able to roll with min-modA to place glide pad under pt. Dep for scooting up in bed. Pt left in L sidelying with pillow between legs. Educated pt on need to turn to reduce pressure injury risk. Skilled OT indicated to progress strength, balance, & act lauren needed for ADLs as well as fxnal transfers to shower chair, toilet, & wheelchair. Recommend SNF at d/c vs home w 24/7 assist.  Plan of Care Reviewed With: patient  Outcome Evaluation: OT linda completed. Pt presents A&Ox4 c/ogeneralized weakness. He is difficult to understand at times but reports living at home alone with a caregiver 5 days a week. He requires modA for ADLs, independently transfers into a wheelchair in which he can manually propel himself. He demos significant BLE flexion contractures. He required mod-maxA for sup>sit d/t this. Once EOB, he can maintain sitting balance with CGA after given vcs for hand placement. Pt able to minimally get his B feet on the ground, denies attempts to scoot EOB d/t fear of falling. Fxnal transfers deferred at this time. Pt demos chronic B shoulder ROM impairments as well as decreased strength. Mod-maxA for dressing tasks. Pt returned to supine with modA & was able to roll with min-modA to place glide pad under pt. Dep for scooting up in bed. Pt left in L sidelying with pillow between legs. Educated pt on need to turn to reduce pressure  injury risk. Skilled OT indicated to progress strength, balance, & act lauren needed for ADLs as well as fxnal transfers to shower chair, toilet, & wheelchair. Recommend SNF at d/c vs home w 24/7 assist.     Outcome Measures       Row Name 09/02/24 1500             How much help from another is currently needed...    Putting on and taking off regular lower body clothing? 1  -EC      Bathing (including washing, rinsing, and drying) 2  -EC      Toileting (which includes using toilet bed pan or urinal) 2  -EC      Putting on and taking off regular upper body clothing 2  -EC      Taking care of personal grooming (such as brushing teeth) 2  -EC      Eating meals 3  -EC      AM-PAC 6 Clicks Score (OT) 12  -EC         Functional Assessment    Outcome Measure Options AM-PAC 6 Clicks Daily Activity (OT)  -EC                User Key  (r) = Recorded By, (t) = Taken By, (c) = Cosigned By      Initials Name Provider Type    Jennifer Moss, OTR/L Occupational Therapist                    Time Calculation:    Time Calculation- OT       Row Name 09/02/24 1535             Time Calculation- OT    OT Start Time 1455  +15 min CR  -EC      OT Stop Time 1534  -EC      OT Time Calculation (min) 39 min  -EC      OT Received On 09/02/24  -EC      OT Goal Re-Cert Due Date 09/12/24  -EC         Untimed Charges    OT Eval/Re-eval Minutes 54  -EC         Total Minutes    Untimed Charges Total Minutes 54  -EC       Total Minutes 54  -EC                User Key  (r) = Recorded By, (t) = Taken By, (c) = Cosigned By      Initials Name Provider Type    EC Jennifer Lopez OTR/L Occupational Therapist                  Therapy Charges for Today       Code Description Service Date Service Provider Modifiers Qty    [id number] HC OT EVAL MOD COMPLEXITY 4 9/2/2024 Jennifer Lopez OTR/L GO 1                 FRANCK Llanes/MARTY  9/2/2024

## 2024-09-02 NOTE — ED PROVIDER NOTES
HPI:    Patient is a 73-year-old white male who presents to the emergency room after being found on the floor from a fall.  Patient complains of left-sided he left shoulder and left-sided chest and ribs and hip pain.  Patient rates his pain 6 out of 10 and that is primarily in the left shoulder.  Patient states he does not recall how he got in another room and on the floor.  Patient denies any shortness of breath or diaphoresis.      REVIEW OF SYSTEMS  CONSTITUTIONAL:  No complaints of fever, chills,or weakness  EYES:  No complaints of discharge   ENT: No complaints of sore throat or ear pain  CARDIOVASCULAR:  No complaints of chest pain, palpitations, or swelling  RESPIRATORY:  No complaints of cough or shortness of breath  GI:  No complaints of abdominal pain, nausea, vomiting, or diarrhea  MUSCULOSKELETAL: Positive for status post fall unknown calls with pain in head, left shoulder, left-sided ribs, and left hip SKIN: Positive for left-sided head contusion NEUROLOGIC: Positive for mild headache and confusion   ENDOCRINE:  No complaints of polyuria or polydipsia  LYMPHATIC:  No complaints of swollen glands  GENITOURINARY: No complaints of urinary frequency or hematuria        PAST MEDICAL HISTORY  Past Medical History:   Diagnosis Date    Mario catheter in place     Memory deficits        FAMILY HISTORY  Family History   Problem Relation Age of Onset    No Known Problems Father     No Known Problems Mother        SOCIAL HISTORY  Social History     Socioeconomic History    Marital status: Single   Tobacco Use    Smoking status: Every Day     Types: Cigars     Passive exposure: Current    Smokeless tobacco: Never   Substance and Sexual Activity    Alcohol use: No    Drug use: Defer    Sexual activity: Defer       IMMUNIZATION HISTORY  Deferred to primary care physician.    SURGICAL HISTORY  Past Surgical History:   Procedure Laterality Date    BACK SURGERY      HERNIA REPAIR         CURRENT MEDICATIONS  No current  facility-administered medications for this encounter.    Current Outpatient Medications:     alfuzosin (UROXATRAL) 10 MG 24 hr tablet, Take 1 tablet by mouth every night at bedtime., Disp: , Rfl:     busPIRone (BUSPAR) 10 MG tablet, Take 1 tablet by mouth 2 (Two) Times a Day., Disp: 60 tablet, Rfl: 5    ciprofloxacin (Ciloxan) 0.3 % ophthalmic solution, Administer 1 drop to both eyes 4 (Four) Times a Day., Disp: 2.5 mL, Rfl: 0    finasteride (PROSCAR) 5 MG tablet, Take 1 tablet by mouth Daily., Disp: , Rfl:     fluticasone (FLONASE) 50 MCG/ACT nasal spray, 2 sprays into the nostril(s) as directed by provider Daily., Disp: 16 g, Rfl: 0    guaiFENesin (MUCINEX) 600 MG 12 hr tablet, Take 1 tablet by mouth 2 (Two) Times a Day., Disp: , Rfl:     HYDROcodone-acetaminophen (Norco) 5-325 MG per tablet, Take 1 tablet by mouth Every 12 (Twelve) Hours As Needed for Severe Pain., Disp: 46 tablet, Rfl: 0    ipratropium-albuterol (COMBIVENT RESPIMAT)  MCG/ACT inhaler, Inhale 1 puff., Disp: , Rfl:     levothyroxine (SYNTHROID, LEVOTHROID) 100 MCG tablet, TAKE ONE TABLET DAILY, Disp: 30 tablet, Rfl: 1    lisinopril-hydrochlorothiazide (PRINZIDE,ZESTORETIC) 10-12.5 MG per tablet, Take 1 tablet by mouth Daily., Disp: 30 tablet, Rfl: 5    loratadine (Claritin) 10 MG tablet, Take 1 tablet by mouth Daily., Disp: 30 tablet, Rfl: 0    naloxone (NARCAN) 4 MG/0.1ML nasal spray, 1 spray into the nostril(s) as directed by provider As Needed (overdose)., Disp: 1 each, Rfl: 0    probiotic (CULTURELLE) capsule capsule, Take 1 capsule by mouth., Disp: , Rfl:     tiZANidine (ZANAFLEX) 4 MG tablet, TAKE ONE TABLET THREE TIMES A DAY AS NEEDED, Disp: 90 tablet, Rfl: 5    venlafaxine XR (EFFEXOR-XR) 150 MG 24 hr capsule, TAKE ONE CAPSULE DAILY, Disp: 30 capsule, Rfl: 5    Vitamin D, Ergocalciferol, 42597 units capsule, Take 1 capsule by mouth., Disp: , Rfl:     ALLERGIES  Allergies   Allergen Reactions    Meloxicam Hives     Dizzy, skin crawls  "   Tamsulosin Dizziness     excessive sweating    Penicillins        Musculoskeletal exam    VITAL SIGNS:   /59   Pulse 112   Temp 98 °F (36.7 °C)   Resp 20   Ht 175 cm (68.9\")   Wt 66.7 kg (147 lb)   SpO2 93%   BMI 21.77 kg/m²     Constitutional: Patient is alert and in no distress.  Patient with mild head and moderate left shoulder and left-sided ribs and left hip discomfort.    Head: Positive contusion noted on the left parietal portion of the scalp.  There is no laceration appreciated.  No bleeding.    ENT: There is a normal pharynx with no acute erythema or exudate and oral mucosa is moist.  Nose is clear with no drainage.  Tympanic membranes intact and nonerythemic    Cardiovascular: S1-S2 regular rate and rhythm. No murmurs, rubs or gallops are noted.    Respiratory: Patient is clear to auscultation bilaterally with no wheezing or rhonchi.  Chest wall is mild tenderness to palpation of the left lateral rib cage.  No obvious deformity..  There are no external lesions on the chest.  There is no crepitance    Abdomen: Soft, nontender. Bowel sounds are normal in all 4 quadrants. There is no rebound or guarding noted.  There is no abdominal distention or hepatosplenomegaly.    Neck: No tenderness to palpation or step-off is noted.    Back: No tenderness to palpation of the thoracic or lumbar spines with no step-off.    Musculoskeletal: Left shoulder: Less tender palpation over the anterior proximal surface of the left shoulder.  However there is no deformity.  Pain is worse with Fatima sign then Neer's.  Negative drop arm sign.  Positive open can sign.    Left hip: There is mild tenderness palpation over the left lateral hip but there is no leg length discrepancy noted.  Logroll causes mild pain but patient is able to raise the left leg.      Integumentary: Positive for contusion and hematoma left parietal scalp    Genitourinary: Patient is voiding appropriately.    Psychiatric: Normal affect and " mood      RADIOLOGY/PROCEDURES        CT Pelvis Without Contrast   Final Result       1.  No acute hip or pelvic fracture. The finding on same-day radiograph   is artifactual.       2.  Severe left hip osteoarthritis.       3.  Large volume stool in the rectum.       4.  Enlarged prostate. Diffuse urinary bladder wall thickening with   Mario catheter in place.           This report was signed and finalized on 9/2/2024 11:45 AM by Dr. Rey Shukla MD.          CT Head Without Contrast   Final Result   1. No acute intracranial finding.   2. Left parietal scalp contusion/hematoma.       This report was signed and finalized on 9/2/2024 9:06 AM by Dr. Rey Shukla MD.          XR Shoulder 2+ View Left   Final Result   No acute osseous findings.       This report was signed and finalized on 9/2/2024 8:58 AM by Dr. Rey Shukla MD.          XR Ribs Left With PA Chest   Final Result   1.  No definite acute displaced rib fracture.   2.  Hazy left basilar opacity, likely atelectasis.       This report was signed and finalized on 9/2/2024 9:15 AM by Dr. Rey Shukla MD.          XR Hip With or Without Pelvis 2 - 3 View Left   Final Result   Addendum (preliminary) 1 of 1   ADDENDUM:       Follow-up CT was performed. There is no pubic rami fracture present on   the CT. The lucency extending through the left superior pubic ramus on   this radiograph is likely artifactual related to overlying bowel gas.       End addendum.       This report was signed and finalized on 9/2/2024 11:46 AM by Dr. Rey Shukla MD.          Final   1. Acute nondisplaced left superior pubic rami fracture.   2. No evidence of hip fracture or dislocation.       This report was signed and finalized on 9/2/2024 9:01 AM by Dr. Rey Shukla MD.          US Liver    (Results Pending)          FUTURE APPOINTMENTS     Future Appointments   Date Time Provider Department Center   3/6/2025  7:00 AM [facility] CART 1  [facility]  [facility]   3/6/2025  8:00 AM [facility] CART 1 [facility]   3/6/2025  9:15 AM Mara Albright, APRN [facility]              COURSE & MEDICAL DECISION MAKING    Patient's partial differential diagnosis can include:    Head contusion, skull fracture, concussion, UTI, electrolyte abnormality, shoulder fracture, shoulder sprain, hip fracture, hip sprain, and others    X-ray of the hip shows what could be a possible pubic fracture called and spoke with orthopedist Dr. Thayer who recommended a CT scan.  CT scan of the pelvis did not show a fracture.  However the patient's laboratory shows a significant urinary tract infection and a highly elevated white blood cell count.  I do believe that the patient's altered mental state is more likely to be related to this urinary tract infection.  CT scan of the head did not show any type of intracranial bleed or process however did show a scalp hematoma.  Patient's liver enzymes were moderately elevated as well.  Will get a ultrasound of the liver itself.  However the patient will need to be admitted to the hospital for early urosepsis.    Spoke with Alize Narvaez who is Covering for the hospitalist service who states that she will come see the patient and the patient will be admitted to attending physician Dr. Steve ortez.        Patient's level of risk: Moderate      CRITICAL CARE    CRITICAL CARE: No    CRITICAL CARE TIME: None      Recent Results (from the past 24 hour(s))   ECG 12 Lead Syncope    Collection Time: 09/02/24  8:13 AM   Result Value Ref Range    QT Interval 432 ms    QTC Interval 565 ms   Urinalysis With Culture If Indicated - Indwelling Urethral Catheter    Collection Time: 09/02/24  8:48 AM    Specimen: Indwelling Urethral Catheter; Urine   Result Value Ref Range    Color, UA Dark Yellow (A) Yellow, Straw    Appearance, UA Turbid (A) Clear    pH, UA 7.0 5.0 - 8.0    Specific Gravity, UA 1.022 1.005 - 1.030    Glucose, UA Negative Negative    Ketones, UA 40 mg/dL  (2+) (A) Negative    Bilirubin, UA Small (1+) (A) Negative    Blood, UA Large (3+) (A) Negative    Protein,  mg/dL (2+) (A) Negative    Leuk Esterase, UA Large (3+) (A) Negative    Nitrite, UA Positive (A) Negative    Urobilinogen, UA 1.0 E.U./dL 0.2 - 1.0 E.U./dL   Urinalysis, Microscopic Only - Indwelling Urethral Catheter    Collection Time: 09/02/24  8:48 AM    Specimen: Indwelling Urethral Catheter; Urine   Result Value Ref Range    RBC, UA 0-2 None Seen, 0-2 /HPF    WBC, UA Too Numerous to Count (A) None Seen, 0-2 /HPF    Bacteria, UA 1+ (A) None Seen /HPF    Squamous Epithelial Cells, UA 0-2 None Seen, 0-2 /HPF    Hyaline Casts, UA None Seen None Seen /LPF    Triple Phosphate Crystals, UA Large/3+ None Seen /HPF    Amorphous Crystals, UA Small/1+ None Seen /HPF    Methodology Manual Light Microscopy    Comprehensive Metabolic Panel    Collection Time: 09/02/24  8:58 AM    Specimen: Blood   Result Value Ref Range    Glucose 98 65 - 99 mg/dL    BUN 23 8 - 23 mg/dL    Creatinine 0.83 0.76 - 1.27 mg/dL    Sodium 146 (H) 136 - 145 mmol/L    Potassium 3.4 (L) 3.5 - 5.2 mmol/L    Chloride 99 98 - 107 mmol/L    CO2 32.0 (H) 22.0 - 29.0 mmol/L    Calcium 9.4 8.6 - 10.5 mg/dL    Total Protein 6.6 6.0 - 8.5 g/dL    Albumin 3.5 3.5 - 5.2 g/dL    ALT (SGPT) 63 (H) 1 - 41 U/L    AST (SGOT) 145 (H) 1 - 40 U/L    Alkaline Phosphatase 120 (H) 39 - 117 U/L    Total Bilirubin 1.5 (H) 0.0 - 1.2 mg/dL    Globulin 3.1 gm/dL    A/G Ratio 1.1 g/dL    BUN/Creatinine Ratio 27.7 (H) 7.0 - 25.0    Anion Gap 15.0 5.0 - 15.0 mmol/L    eGFR 91.3 >60.0 mL/min/1.73   Protime-INR    Collection Time: 09/02/24  8:58 AM    Specimen: Blood   Result Value Ref Range    Protime 13.9 11.8 - 14.8 Seconds    INR 1.03 0.91 - 1.09   aPTT    Collection Time: 09/02/24  8:58 AM    Specimen: Blood   Result Value Ref Range    PTT 25.8 24.5 - 36.0 seconds   Magnesium    Collection Time: 09/02/24  8:58 AM    Specimen: Blood   Result Value Ref Range     Magnesium 2.0 1.6 - 2.4 mg/dL   Single High Sensitivity Troponin T    Collection Time: 09/02/24  8:58 AM    Specimen: Blood   Result Value Ref Range    HS Troponin T 30 (H) <22 ng/L   CBC Auto Differential    Collection Time: 09/02/24  8:58 AM    Specimen: Blood   Result Value Ref Range    WBC 26.01 (H) 3.40 - 10.80 10*3/mm3    RBC 5.66 4.14 - 5.80 10*6/mm3    Hemoglobin 17.4 13.0 - 17.7 g/dL    Hematocrit 53.3 (H) 37.5 - 51.0 %    MCV 94.2 79.0 - 97.0 fL    MCH 30.7 26.6 - 33.0 pg    MCHC 32.6 31.5 - 35.7 g/dL    RDW 12.8 12.3 - 15.4 %    RDW-SD 44.5 37.0 - 54.0 fl    MPV 9.3 6.0 - 12.0 fL    Platelets 287 140 - 450 10*3/mm3    Neutrophil % 89.8 (H) 42.7 - 76.0 %    Lymphocyte % 2.7 (L) 19.6 - 45.3 %    Monocyte % 6.1 5.0 - 12.0 %    Eosinophil % 0.0 (L) 0.3 - 6.2 %    Basophil % 0.3 0.0 - 1.5 %    Immature Grans % 1.1 (H) 0.0 - 0.5 %    Neutrophils, Absolute 23.37 (H) 1.70 - 7.00 10*3/mm3    Lymphocytes, Absolute 0.71 0.70 - 3.10 10*3/mm3    Monocytes, Absolute 1.58 (H) 0.10 - 0.90 10*3/mm3    Eosinophils, Absolute 0.00 0.00 - 0.40 10*3/mm3    Basophils, Absolute 0.07 0.00 - 0.20 10*3/mm3    Immature Grans, Absolute 0.28 (H) 0.00 - 0.05 10*3/mm3    nRBC 0.0 0.0 - 0.2 /100 WBC   Lactic Acid, Plasma    Collection Time: 09/02/24 10:22 AM    Specimen: Arm, Right; Blood   Result Value Ref Range    Lactate 2.1 (C) 0.5 - 2.0 mmol/L          Also  Old charts were reviewed per [facility] EMR.  Pertinent details are summarized above.  All laboratory, radiologic, and EKG studies that were performed in the Emergency Department were a necessary part of the evaluation needed to exclude unstable or  emergent medical conditions.     Patient was hemodynamically and neurologically stable in the ED.   Pertinent studies were reviewed as above.     The patient received:  Medications   cefTRIAXone (ROCEPHIN) 2,000 mg in sodium chloride 0.9 % 100 mL MBP (0 mg Intravenous Stopped 9/2/24 1115)   sodium chloride 0.9 % bolus 2,001 mL (2,000 mL  Intravenous New Bag 9/2/24 1039)            ED Disposition       ED Disposition   Decision to Admit    Condition   --    Comment   Level of Care: Med/Surg [1]   Diagnosis: Other specified sepsis [[id number]]   Admitting Physician: RENU GRANADOS [[id number]]   Attending Physician: RENU GRANADOS [[id number]]   Certification: I Certify That Inpatient Hospital Services Are Medically Necessary For Greater Than 2 Midnights                   Dragon disclaimer:  Part of this note may be an electronic transcription/translation of spoken language to printed text using the Dragon Dictation System.     I have reviewed the patient’s prescription history via a prescription monitoring program.  This information is consistent with my knowledge of the patient’s controlled substance use history.    Patient evaluated during Coronavirus Pandemic. Isolation practices followed according to [facility] policy.    FINAL IMPRESSION   Diagnosis Plan   1. Other specified sepsis      Urosepsis      2. Fall on same level, initial encounter        3. Hematoma of left parietal scalp, initial encounter        4. Sprain of left shoulder, unspecified shoulder sprain type, initial encounter              MD Alfredo Peter Jr, Thomas Mark Jr., MD  09/02/24 [id number]

## 2024-09-02 NOTE — PLAN OF CARE
Goal Outcome Evaluation:      Pt is A&Ox3 disoriented to time at times. HE is on room air and has a chronic ewing. His ewing was changed. He hasn't complained of pain. He has fluids going per order. He is on a cardiac diet. VSS.

## 2024-09-02 NOTE — H&P
[facility] ER Medicine Services  HISTORY AND PHYSICAL    Date of Admission: 9/2/2024  Primary Care Physician: Provider, No Known    Subjective   Primary Historian: Patient and his caregiver Ever Vazquez by phone    Chief Complaint: Found down at home, confused    History of Present Illness  Jagdish Cook is a 73-year-old male with a history of arthritis, bacteremia, chronic back pain, COPD, GERD, hypothyroidism, inguinal hernia, neuropathy, chronic urinary retention, followed by Dr. Mittal and Dr. North.  Patient's last catheter change was 8/1/2024.  Patient was brought in per EMS after his caregiver Ever found him in the floor this morning when he came to do his morning medicine.  Patient appeared lethargic and confused.  Ever stated that he had spoke with him by phone on Saturday night and he had seemed a little tired with complaints of mild catheter pain however according to Ever this is not unusual for him.  Difficult to ascertain how long patient has been down Ever had not spoken to him since that evening so it is possible that patient has been down since Saturday evening.  Patient is alert and able to participate somewhat in assessment for current symptoms and complaints.  However patient is very disoriented to his duration as he asked me to phone his wife of which she has none.  Patient presented with a chronic Mario catheter with excessive debris and dark yellow urine, however no hematuria present.  Patient venous sepsis protocol due to low BP, high heart rate, source of infection, lactic acidosis and confusion.  Patient was given a sepsis bolus of 2001 mL and 2 g of Rocephin.  Additionally patient was found to have an at bedtime troponin of 30, sodium of 146, K of 3.4, gap of 32, alk phos of 120, AST of 145, ALT of 63, total bilirubin 1.5, lactate of 2.1, WBC of 26.01, no bandemia present, urinalysis revealed dark turbid 40 mg of ketones, large blood, positive  nitrates, large leukocytes, small protein, small bilirubin, WBC too numerous to count and 1+ bacteria, urine culture pending, blood cultures additionally pending.  Due to patient's fall, rib x-ray, shoulders bilateral, and hip x-rays were performed with hip x-ray showing a possible acute fracture however after Dr. Thayer reviewed and ordered a secondary CT of the pelvis this was ruled out.  CT the head negative for any acute abnormalities.  Patient's antibiotics were changed to Merrem due to possible MDRO from chronic Mario catheter, normal saline at 75 and electrolyte protocol initiated.  Patient was admitted for continued evaluation and treatment.`      Review of Systems   Constitutional:  Positive for fatigue.   Respiratory:  Negative for shortness of breath.    Cardiovascular:  Negative for chest pain.   Gastrointestinal:  Positive for constipation.   Genitourinary:  Positive for dysuria. Negative for hematuria.   Skin:  Positive for pallor.   Neurological:  Positive for weakness.   Psychiatric/Behavioral:  Positive for agitation.       Otherwise complete ROS reviewed and negative except as mentioned in the HPI.    Past Medical History:   Past Medical History:   Diagnosis Date    Mario catheter in place     Memory deficits      Past Surgical History:  Past Surgical History:   Procedure Laterality Date    BACK SURGERY      HERNIA REPAIR       Social History:  reports that he has been smoking cigars. He has been exposed to tobacco smoke. He has never used smokeless tobacco. Drug use questions deferred to the physician. He reports that he does not drink alcohol.    Family History: family history includes No Known Problems in his father and mother.       Allergies:  Allergies   Allergen Reactions    Meloxicam Hives     Dizzy, skin crawls    Tamsulosin Dizziness     excessive sweating    Penicillins        Medications:  Prior to Admission medications    Medication Sig Start Date End Date Taking? Authorizing Provider    alfuzosin (UROXATRAL) 10 MG 24 hr tablet Take 1 tablet by mouth every night at bedtime. 2/29/24   Nilton Roberts MD   busPIRone (BUSPAR) 10 MG tablet Take 1 tablet by mouth 2 (Two) Times a Day. 4/17/24   Jacob Wiggins APRN   ciprofloxacin (Ciloxan) 0.3 % ophthalmic solution Administer 1 drop to both eyes 4 (Four) Times a Day. 12/22/23   Jacob Wiggins APRN   finasteride (PROSCAR) 5 MG tablet Take 1 tablet by mouth Daily. 2/29/24   Nilton Roberts MD   fluticasone (FLONASE) 50 MCG/ACT nasal spray 2 sprays into the nostril(s) as directed by provider Daily. 8/14/23   Jacob Wiggins APRN   guaiFENesin (MUCINEX) 600 MG 12 hr tablet Take 1 tablet by mouth 2 (Two) Times a Day. 2/19/24   Nilton Roberts MD   HYDROcodone-acetaminophen (Norco) 5-325 MG per tablet Take 1 tablet by mouth Every 12 (Twelve) Hours As Needed for Severe Pain. 6/21/24   Jamel Jhaveri MD   ipratropium-albuterol (COMBIVENT RESPIMAT)  MCG/ACT inhaler Inhale 1 puff. 2/19/24   Nilton Roberts MD   levothyroxine (SYNTHROID, LEVOTHROID) 100 MCG tablet TAKE ONE TABLET DAILY 8/5/24   Jacob Wiggins APRN   lisinopril-hydrochlorothiazide (PRINZIDE,ZESTORETIC) 10-12.5 MG per tablet Take 1 tablet by mouth Daily. 8/15/24   Jacob Wiggins APRN   loratadine (Claritin) 10 MG tablet Take 1 tablet by mouth Daily. 8/1/23   Deon Damon MD   naloxone (NARCAN) 4 MG/0.1ML nasal spray 1 spray into the nostril(s) as directed by provider As Needed (overdose). 10/20/23   Jacob Wiggins APRN   probiotic (CULTURELLE) capsule capsule Take 1 capsule by mouth. 2/19/24   Nilton Roberts MD   tiZANidine (ZANAFLEX) 4 MG tablet TAKE ONE TABLET THREE TIMES A DAY AS NEEDED 6/28/24   Jacob Wiggins APRN   venlafaxine XR (EFFEXOR-XR) 150 MG 24 hr capsule TAKE ONE CAPSULE DAILY 8/12/24   Jacob Wiggins APRN   Vitamin D, Ergocalciferol, 17060 units capsule Take 1 capsule by mouth. 2/24/24   Provider, MD Nilton     I have  "utilized all available immediate resources to obtain, update, or review the patient's current medications (including all prescriptions, over-the-counter products, herbals, cannabis/cannabidiol products, and vitamin/mineral/dietary (nutritional) supplements).    Objective     Vital Signs: BP 96/65 (BP Location: Right arm, Patient Position: Lying)   Pulse 101   Temp 98.2 °F (36.8 °C) (Oral)   Resp 18   Ht 68.9 cm (27.13\")   Wt 66.7 kg (147 lb)   SpO2 95%   .46 kg/m²   Physical Exam  Vitals and nursing note reviewed.   Constitutional:       Appearance: He is cachectic. He is ill-appearing.      Comments: Patient is resting in bed on 2 L of oxygen with no family at bedside.   HENT:      Mouth/Throat:      Mouth: Mucous membranes are dry.      Comments: Continually complaining of thirst  Eyes:      Pupils: Pupils are equal, round, and reactive to light.   Pulmonary:      Effort: No tachypnea, accessory muscle usage or respiratory distress.      Breath sounds: Examination of the right-middle field reveals rhonchi. Examination of the left-middle field reveals rhonchi. Examination of the right-lower field reveals decreased breath sounds. Examination of the left-lower field reveals decreased breath sounds. Decreased breath sounds present.   Abdominal:      General: Abdomen is flat. Bowel sounds are decreased.      Palpations: Abdomen is soft.      Tenderness: There is no abdominal tenderness.   Genitourinary:     Comments: Chronic Mario catheter, last changed on 8/1/2024, ordered to be changed today  Musculoskeletal:         General: Tenderness present.      Right hip: Tenderness and bony tenderness present. Decreased strength.      Right lower leg: Edema present.      Left lower leg: Edema present.      Comments: Patient is chronically in a wheelchair due to severe deconditioning over the last 2 years.   Skin:     General: Skin is dry.      Capillary Refill: Capillary refill takes 2 to 3 seconds.      " Findings: Bruising and erythema present.      Nails: There is clubbing.      Comments: Facial erythema   Neurological:      Mental Status: He is alert. He is disoriented and confused.      Motor: Weakness and tremor present.      Comments: Patient is oriented x 3, disorientation of time.   Psychiatric:         Attention and Perception: He is inattentive.         Mood and Affect: Mood is anxious.         Speech: Speech is slurred.         Behavior: Behavior is cooperative.         Thought Content: Thought content is delusional.         Cognition and Memory: Cognition is impaired. He exhibits impaired recent memory.      Comments: Patient appears oriented at times however patient asked to speak to his wife Sarahi and according to caregiver he has never had a wife.          Results Reviewed:  Lab Results (last 24 hours)       Procedure Component Value Units Date/Time    Lactic Acid, Plasma [[id number]]  (Abnormal) Collected: 09/02/24 1022    Specimen: Blood from Arm, Right Updated: 09/02/24 1055     Lactate 2.1 mmol/L     Blood Culture - Blood, Arm, Left [[id number]] Collected: 09/02/24 1014    Specimen: Blood from Arm, Left Updated: 09/02/24 1029    Blood Culture - Blood, Arm, Right [[id number]] Collected: 09/02/24 1022    Specimen: Blood from Arm, Right Updated: 09/02/24 1029    Comprehensive Metabolic Panel [[id number]]  (Abnormal) Collected: 09/02/24 0858    Specimen: Blood Updated: 09/02/24 0927     Glucose 98 mg/dL      BUN 23 mg/dL      Creatinine 0.83 mg/dL      Sodium 146 mmol/L      Potassium 3.4 mmol/L      Comment: Slight hemolysis detected by analyzer. Result may be falsely elevated.        Chloride 99 mmol/L      CO2 32.0 mmol/L      Calcium 9.4 mg/dL      Total Protein 6.6 g/dL      Albumin 3.5 g/dL      ALT (SGPT) 63 U/L      AST (SGOT) 145 U/L      Comment: Slight hemolysis detected by analyzer. Result may be falsely elevated.        Alkaline Phosphatase 120 U/L      Total Bilirubin 1.5 mg/dL       Globulin 3.1 gm/dL      A/G Ratio 1.1 g/dL      BUN/Creatinine Ratio 27.7     Anion Gap 15.0 mmol/L      eGFR 91.3 mL/min/1.73     Narrative:      GFR Normal >60  Chronic Kidney Disease <60  Kidney Failure <15    The GFR formula is only valid for adults with stable renal function between ages 18 and 70.    Magnesium [[id number]]  (Normal) Collected: 09/02/24 0858    Specimen: Blood Updated: 09/02/24 0927     Magnesium 2.0 mg/dL     Single High Sensitivity Troponin T [[id number]]  (Abnormal) Collected: 09/02/24 0858    Specimen: Blood Updated: 09/02/24 0926     HS Troponin T 30 ng/L     Narrative:      High Sensitive Troponin T Reference Range:  <14.0 ng/L- Negative Female for AMI  <22.0 ng/L- Negative Male for AMI  >=14 - Abnormal Female indicating possible myocardial injury.  >=22 - Abnormal Male indicating possible myocardial injury.   Clinicians would have to utilize clinical acumen, EKG, Troponin, and serial changes to determine if it is an Acute Myocardial Infarction or myocardial injury due to an underlying chronic condition.         Protime-INR [[id number]]  (Normal) Collected: 09/02/24 0858    Specimen: Blood Updated: 09/02/24 0917     Protime 13.9 Seconds      INR 1.03    aPTT [[id number]]  (Normal) Collected: 09/02/24 0858    Specimen: Blood Updated: 09/02/24 0917     PTT 25.8 seconds     Urinalysis With Culture If Indicated - Indwelling Urethral Catheter [[id number]]  (Abnormal) Collected: 09/02/24 0848    Specimen: Urine from Indwelling Urethral Catheter Updated: 09/02/24 0914     Color, UA Dark Yellow     Appearance, UA Turbid     pH, UA 7.0     Specific Gravity, UA 1.022     Glucose, UA Negative     Ketones, UA 40 mg/dL (2+)     Bilirubin, UA Small (1+)     Blood, UA Large (3+)     Protein,  mg/dL (2+)     Leuk Esterase, UA Large (3+)     Nitrite, UA Positive     Urobilinogen, UA 1.0 E.U./dL    Narrative:      In absence of clinical symptoms, the presence of pyuria, bacteria, and/or nitrites on the  urinalysis result does not correlate with infection.    Urinalysis, Microscopic Only - Indwelling Urethral Catheter [[id number]]  (Abnormal) Collected: 09/02/24 0848    Specimen: Urine from Indwelling Urethral Catheter Updated: 09/02/24 0914     RBC, UA 0-2 /HPF      WBC, UA Too Numerous to Count /HPF      Bacteria, UA 1+ /HPF      Squamous Epithelial Cells, UA 0-2 /HPF      Hyaline Casts, UA None Seen /LPF      Triple Phosphate Crystals, UA Large/3+ /HPF      Amorphous Crystals, UA Small/1+ /HPF      Methodology Manual Light Microscopy    Urine Culture - Urine, Indwelling Urethral Catheter [[id number]] Collected: 09/02/24 0848    Specimen: Urine from Indwelling Urethral Catheter Updated: 09/02/24 0914    CBC & Differential [[id number]]  (Abnormal) Collected: 09/02/24 0858    Specimen: Blood Updated: 09/02/24 0911    Narrative:      The following orders were created for panel order CBC & Differential.  Procedure                               Abnormality         Status                     ---------                               -----------         ------                     CBC Auto Differential[[id number]]        Abnormal            Final result                 Please view results for these tests on the individual orders.    CBC Auto Differential [[id number]]  (Abnormal) Collected: 09/02/24 0858    Specimen: Blood Updated: 09/02/24 0911     WBC 26.01 10*3/mm3      RBC 5.66 10*6/mm3      Hemoglobin 17.4 g/dL      Hematocrit 53.3 %      MCV 94.2 fL      MCH 30.7 pg      MCHC 32.6 g/dL      RDW 12.8 %      RDW-SD 44.5 fl      MPV 9.3 fL      Platelets 287 10*3/mm3      Neutrophil % 89.8 %      Lymphocyte % 2.7 %      Monocyte % 6.1 %      Eosinophil % 0.0 %      Basophil % 0.3 %      Immature Grans % 1.1 %      Neutrophils, Absolute 23.37 10*3/mm3      Lymphocytes, Absolute 0.71 10*3/mm3      Monocytes, Absolute 1.58 10*3/mm3      Eosinophils, Absolute 0.00 10*3/mm3      Basophils, Absolute 0.07 10*3/mm3      Immature  Grans, Absolute 0.28 10*3/mm3      nRBC 0.0 /100 WBC           Imaging Results (Last 24 Hours)       Procedure Component Value Units Date/Time    XR Hip With or Without Pelvis 2 - 3 View Left [[id number]] Collected: 09/02/24 0859     Updated: 09/02/24 1149    Addenda:        ADDENDUM:     Follow-up CT was performed. There is no pubic rami fracture present on  the CT. The lucency extending through the left superior pubic ramus on  this radiograph is likely artifactual related to overlying bowel gas.     End addendum.     This report was signed and finalized on 9/2/2024 11:46 AM by Dr. Rey Shukla MD.     Signed: 09/02/24 1146 by Rey Shukla MD    Narrative:      EXAM/TECHNIQUE: XR HIP W OR WO PELVIS 2-3 VIEW LEFT-     INDICATION: Fall injury     COMPARISON: None available.     FINDINGS:     Pelvis:  Acute nondisplaced fracture of the left superior pubic ramus. No other  acute pelvic fracture. SI joints and pubic symphysis are maintained. No  sacral fracture.     Left hip: No evidence of acute left hip fracture or dislocation. Mild  left hip degenerative change characterized by mild joint space narrowing  and osteophyte development. No suspicious osseous lesion. No radiopaque  foreign body or soft tissue gas.       Impression:      1. Acute nondisplaced left superior pubic rami fracture.  2. No evidence of hip fracture or dislocation.     This report was signed and finalized on 9/2/2024 9:01 AM by Dr. Rey Shukla MD.       CT Pelvis Without Contrast [[id number]] Collected: 09/02/24 1137     Updated: 09/02/24 1149    Narrative:      EXAM/TECHNIQUE: CT pelvis without contrast     INDICATION: Fall, left hip pain     COMPARISON: Same-day radiographs     DLP: 260.96 mGy.cm. Automated exposure control was also utilized to  decrease patient radiation dose.     FINDINGS:     Pubic symphysis and SI joints are intact. No acute pelvic fracture. No  pubic symphysis fracture. No evidence of acute hip fracture  or  dislocation. Severe left hip osteoarthritis with bone-on-bone appearance  and osteophyte development.     Large volume stool in the rectum. No free pelvic fluid. Prostate is  enlarged. Diffuse urinary bladder wall thickening with Mario catheter in  place. Ventral abdominal hernia containing multiple loops of small  bowel.       Impression:         1.  No acute hip or pelvic fracture. The finding on same-day radiograph  is artifactual.     2.  Severe left hip osteoarthritis.     3.  Large volume stool in the rectum.     4.  Enlarged prostate. Diffuse urinary bladder wall thickening with  Mario catheter in place.        This report was signed and finalized on 9/2/2024 11:45 AM by Dr. Rey Shukla MD.       XR Ribs Left With PA Chest [[id number]] Collected: 09/02/24 0859     Updated: 09/02/24 0918    Narrative:      EXAM/TECHNIQUE: XR RIBS LEFT W PA CHEST-     INDICATION: Fall with left-sided rib pain     COMPARISON: None available.     FINDINGS:     Cardiac silhouette is enlarged. No pleural effusion or visible  pneumothorax. Hazy left lower lobe opacity, likely related to  atelectasis.     No definite acute displaced rib fracture identified. No acute osseous  finding.       Impression:      1.  No definite acute displaced rib fracture.  2.  Hazy left basilar opacity, likely atelectasis.     This report was signed and finalized on 9/2/2024 9:15 AM by Dr. Rey Shukla MD.       CT Head Without Contrast [[id number]] Collected: 09/02/24 0901     Updated: 09/02/24 0909    Narrative:      EXAM/TECHNIQUE: CT head without contrast     INDICATION: Syncope, fall     COMPARISON: None available.     DLP: 1461.9 mGy.cm. Automated exposure control was also utilized to  decrease patient radiation dose.     FINDINGS:     No evidence of intracranial hemorrhage. Gray-white differentiation is  maintained. Basilar cisterns are patent. No acute soft tissue finding  No midline shift or mass effect. Right parafalcine  calcification noted.  Left parietal scalp hematoma. Mastoid air cells are clear. Visualized  paranasal sinuses are clear. No acute osseous finding.       Impression:      1. No acute intracranial finding.  2. Left parietal scalp contusion/hematoma.     This report was signed and finalized on 9/2/2024 9:06 AM by Dr. Rey Shukla MD.       XR Shoulder 2+ View Left [[id number]] Collected: 09/02/24 0858     Updated: 09/02/24 0901    Narrative:      EXAM/TECHNIQUE: XR SHOULDER 2+ VW LEFT-     INDICATION: Fall with left shoulder pain     COMPARISON: None available.     FINDINGS:     No acute fracture or dislocation. No advanced joint space narrowing or  osteophyte development. No acute finding in the included portion of the  left chest. No radiopaque foreign body or soft tissue gas.       Impression:      No acute osseous findings.     This report was signed and finalized on 9/2/2024 8:58 AM by Dr. Rey Shukla MD.             I have personally reviewed and interpreted the radiology studies and ECG obtained at time of admission.     Assessment / Plan   Assessment:   Active Hospital Problems    Diagnosis     **Severe sepsis without septic shock     Acute on chronic urinary retention     Gait instability     Transaminitis     Chronic indwelling Mario catheter     Urinary tract infection associated with indwelling urethral catheter     Hypokalemia     Constipation     Traumatic rhabdomyolysis     Syncope     Right hip pain     Primary hypertension        Treatment Plan  The patient will be admitted to Dr. Pena's service [facility] at [facility].    1.  Severe sepsis without septic shock/urinary tract infection associated with indwelling catheter,chronic indwelling Mario catheter-patient presented after being found down at home.  Chronic Mario catheter probable source of infection.  Patient met severe sepsis criteria due to heart rate 110, BP 89/53, WBC 26.01, l and lactic acidosis.  Did however respond well to  fluid resuscitation and there is no evidence of greater than 2 organs failing.  Patient was given Rocephin IV 1 g per ED, due to patient's possible MDRO from Mario, patient placed on Merrem 500 mg every 8 will continue to follow culture.  Continue IV Merrem, Mario catheter changed per protocol, every 4 vital signs and normal saline at 75 mL an hour.  Review CBC and cultures in the a.m.    2.  Transaminitis/traumatic rhabdo-most likely due to patient's fall and subsequent sepsis.  Liver ultrasound pending, IV hydration, continue to monitor in the a.m. AST, 145, ALT, 63, total bilirubin 1.5.    3.  Hypokalemia--patient presented with potassium of 3.4 initiated electrolyte replacement protocol.    4.  Constipation-patient complains of mild abdominal discomfort, he stated he cannot remember the last time he had a bowel movement.  CT of the abdomen pelvis revealed a large volume stool in the rectum, milk molasses enema ordered with as needed bowel regimen post.    5.  Right hip pain/gait instability-due to patient's confusion unsure of how long right hip pain has been present however his caregiver states he has been complaining about gait instability and discomfort for several weeks.  Initially hip x-ray revealed an acute nondisplaced left superior pubic rami and fracture however CT was requested per Dr. Thayer who reviewed and stated there was no hip fracture.  Patient is chronically wheelchair-bound for the last 2 years, transfers himself from bed to chair and commode.  PT/OT eval for probable SNF placement.    6.  Hypertension-due to patient's hypotension presepsis bolus and currently stable.  Will hold all blood pressure medicines and reevaluate in the morning.    7.  Syncope-unwitnessed fall unsure of syncopal episode due to patient's confusion.  CT of the head revealed no acute intracranial finding.  Cardiac monitoring initiated, will continue to follow.    All patient's home medications were reviewed and restarted  as appropriate    VTE prophylaxis with Lovenox  Labs in a.m.  Medical Decision Making  Number and Complexity of problems: 12  Differential Diagnosis: Rhabdomyolysis   Conditions and Status        Condition is unchanged.     [facility] Data  External documents reviewed: Extensive care everywhere review of previous urology and primary care notes  Cardiac tracing (EKG, telemetry) interpretation: 9/2/2024 EKG per cardiology reviewed  Radiology interpretation: 9/2/2024 chest x-ray, CT of the head, CT of the right hip, x-ray of the shoulders, hip and ribs per radiology reviewed  Labs reviewed: 9/2/2024 urinalysis with culture, CBC, CMP, PT/INR, PTT, magnesium reviewed, repeat in a.m.  Any tests that were considered but not ordered: None     Decision rules/scores evaluated (example [id number], Wells, etc): None     Discussed with: Ever Skelton, caregiver, his brother Jitendra Cook and patient     Care Planning  Shared decision making: Ever Skelton, caregiver, his brother Jitendra Cook and patient  Code status and discussions: Full code per patient and brother    Disposition  Social Determinants of Health that impact treatment or disposition: Patient lives at home alone and according to caregiver has had significant decline over the last several weeks, patient will need placement at discharge  Estimated length of stay is 2-3.     I confirmed that the patient's advanced care plan is present, code status is documented, and a surrogate decision maker is listed in the patient's medical record.     The patient's surrogate decision maker is his brother Jitendra Cook.     The patient was seen and examined by me on 9/2/2024 at 1224.    Electronically signed by CONCHITA Dye, 09/02/24, 13:45 CDT.

## 2024-09-02 NOTE — ED NOTES
Nursing report ED to floor  Jagdish Cook  73 y.o.  male    HPI:   Chief Complaint   Patient presents with    Fall       Admitting doctor:   Cortney Pena DO    Consulting provider(s):  Consults       No orders found from 8/4/2024 to 9/3/2024.             Admitting diagnosis:   The primary encounter diagnosis was Other specified sepsis. Diagnoses of Fall on same level, initial encounter, Hematoma of left parietal scalp, initial encounter, and Sprain of left shoulder, unspecified shoulder sprain type, initial encounter were also pertinent to this visit.    Code status:   Current Code Status       Date Active Code Status Order ID Comments User Context       Not on file            Allergies:   Meloxicam, Tamsulosin, and Penicillins    Intake and Output    Intake/Output Summary (Last 24 hours) at 9/2/2024 1241  Last data filed at 9/2/2024 1115  Gross per 24 hour   Intake 100 ml   Output --   Net 100 ml       Weight:       09/02/24  0716   Weight: 66.7 kg (147 lb)       Most recent vitals:   Vitals:    09/02/24 1115 09/02/24 1116 09/02/24 1216 09/02/24 1219   BP:  107/53 109/59    BP Location:       Patient Position:       Pulse:  102 112    Resp:       Temp:       SpO2: 93%   93%   Weight:       Height:         Oxygen Therapy: .    Active LDAs/IV Access:   Lines, Drains & Airways       Active LDAs       Name Placement date Placement time Site Days    Peripheral IV 09/02/24 0859 Anterior;Left Forearm 09/02/24  0859  Forearm  less than 1                    Labs (abnormal labs have a star):   Labs Reviewed   COMPREHENSIVE METABOLIC PANEL - Abnormal; Notable for the following components:       Result Value    Sodium 146 (*)     Potassium 3.4 (*)     CO2 32.0 (*)     ALT (SGPT) 63 (*)     AST (SGOT) 145 (*)     Alkaline Phosphatase 120 (*)     Total Bilirubin 1.5 (*)     BUN/Creatinine Ratio 27.7 (*)     All other components within normal limits    Narrative:     GFR Normal >60  Chronic Kidney Disease <60  Kidney  Failure <15    The GFR formula is only valid for adults with stable renal function between ages 18 and 70.   URINALYSIS W/ CULTURE IF INDICATED - Abnormal; Notable for the following components:    Color, UA Dark Yellow (*)     Appearance, UA Turbid (*)     Ketones, UA 40 mg/dL (2+) (*)     Bilirubin, UA Small (1+) (*)     Blood, UA Large (3+) (*)     Protein,  mg/dL (2+) (*)     Leuk Esterase, UA Large (3+) (*)     Nitrite, UA Positive (*)     All other components within normal limits    Narrative:     In absence of clinical symptoms, the presence of pyuria, bacteria, and/or nitrites on the urinalysis result does not correlate with infection.   SINGLE HS TROPONIN T - Abnormal; Notable for the following components:    HS Troponin T 30 (*)     All other components within normal limits    Narrative:     High Sensitive Troponin T Reference Range:  <14.0 ng/L- Negative Female for AMI  <22.0 ng/L- Negative Male for AMI  >=14 - Abnormal Female indicating possible myocardial injury.  >=22 - Abnormal Male indicating possible myocardial injury.   Clinicians would have to utilize clinical acumen, EKG, Troponin, and serial changes to determine if it is an Acute Myocardial Infarction or myocardial injury due to an underlying chronic condition.        CBC WITH AUTO DIFFERENTIAL - Abnormal; Notable for the following components:    WBC 26.01 (*)     Hematocrit 53.3 (*)     Neutrophil % 89.8 (*)     Lymphocyte % 2.7 (*)     Eosinophil % 0.0 (*)     Immature Grans % 1.1 (*)     Neutrophils, Absolute 23.37 (*)     Monocytes, Absolute 1.58 (*)     Immature Grans, Absolute 0.28 (*)     All other components within normal limits   URINALYSIS, MICROSCOPIC ONLY - Abnormal; Notable for the following components:    WBC, UA Too Numerous to Count (*)     Bacteria, UA 1+ (*)     All other components within normal limits   LACTIC ACID, PLASMA - Abnormal; Notable for the following components:    Lactate 2.1 (*)     All other components  within normal limits   PROTIME-INR - Normal   APTT - Normal   MAGNESIUM - Normal   URINE CULTURE   BLOOD CULTURE   BLOOD CULTURE   LACTIC ACID, REFLEX   CBC AND DIFFERENTIAL    Narrative:     The following orders were created for panel order CBC & Differential.  Procedure                               Abnormality         Status                     ---------                               -----------         ------                     CBC Auto Differential[[id number]]        Abnormal            Final result                 Please view results for these tests on the individual orders.       Meds given in ED:   Medications   cefTRIAXone (ROCEPHIN) 2,000 mg in sodium chloride 0.9 % 100 mL MBP (0 mg Intravenous Stopped 9/2/24 1115)   sodium chloride 0.9 % bolus 2,001 mL (2,000 mL Intravenous New Bag 9/2/24 1039)     No current facility-administered medications for this encounter.       NIH Stroke Scale:       Isolation/Infection(s):  No active isolations   No active infections     COVID Testing  Collected .  Resulted .    Nursing report ED to floor:  Mental status: .a&ox3. Disoriented to place.  Ambulatory status: .bedrest  Precautions: .fall    ED nurse phone extentsion- ..

## 2024-09-02 NOTE — CASE MANAGEMENT/SOCIAL WORK
Continued Stay Note   [facility]     Patient Name: Jagdish Cook  MRN: [id number]  Today's Date: 9/2/2024    Admit Date: 9/2/2024        Discharge Plan       Row Name 09/02/24 1356       Plan    Plan Comments SS consult received for SNF placement.  Will need PT/OT evaluations.  PT/OT has been ordered.                   Discharge Codes    No documentation.                       ANNIE Del Cid

## 2024-09-03 ENCOUNTER — APPOINTMENT (OUTPATIENT)
Dept: ULTRASOUND IMAGING | Facility: HOSPITAL | Age: 74
End: 2024-09-03
Payer: MEDICARE

## 2024-09-03 VITALS
OXYGEN SATURATION: 95 % | DIASTOLIC BLOOD PRESSURE: 78 MMHG | RESPIRATION RATE: 16 BRPM | WEIGHT: 143.9 LBS | HEIGHT: 68 IN | BODY MASS INDEX: 21.81 KG/M2 | HEART RATE: 106 BPM | TEMPERATURE: 98.4 F | SYSTOLIC BLOOD PRESSURE: 113 MMHG

## 2024-09-03 LAB
ALBUMIN SERPL-MCNC: 2.7 G/DL (ref 3.5–5.2)
ALBUMIN/GLOB SERPL: 1.1 G/DL
ALP SERPL-CCNC: 95 U/L (ref 39–117)
ALT SERPL W P-5'-P-CCNC: 58 U/L (ref 1–41)
ANION GAP SERPL CALCULATED.3IONS-SCNC: 12 MMOL/L (ref 5–15)
AST SERPL-CCNC: 136 U/L (ref 1–40)
BASOPHILS # BLD AUTO: 0.02 10*3/MM3 (ref 0–0.2)
BASOPHILS NFR BLD AUTO: 0.1 % (ref 0–1.5)
BILIRUB SERPL-MCNC: 1.2 MG/DL (ref 0–1.2)
BUN SERPL-MCNC: 17 MG/DL (ref 8–23)
BUN/CREAT SERPL: 27 (ref 7–25)
CALCIUM SPEC-SCNC: 8.3 MG/DL (ref 8.6–10.5)
CHLORIDE SERPL-SCNC: 105 MMOL/L (ref 98–107)
CO2 SERPL-SCNC: 26 MMOL/L (ref 22–29)
CREAT SERPL-MCNC: 0.63 MG/DL (ref 0.76–1.27)
DEPRECATED RDW RBC AUTO: 46.4 FL (ref 37–54)
EGFRCR SERPLBLD CKD-EPI 2021: 99.2 ML/MIN/1.73
EOSINOPHIL # BLD AUTO: 0.01 10*3/MM3 (ref 0–0.4)
EOSINOPHIL NFR BLD AUTO: 0.1 % (ref 0.3–6.2)
ERYTHROCYTE [DISTWIDTH] IN BLOOD BY AUTOMATED COUNT: 13.1 % (ref 12.3–15.4)
GLOBULIN UR ELPH-MCNC: 2.5 GM/DL
GLUCOSE SERPL-MCNC: 76 MG/DL (ref 65–99)
HCT VFR BLD AUTO: 45.6 % (ref 37.5–51)
HGB BLD-MCNC: 14.8 G/DL (ref 13–17.7)
IMM GRANULOCYTES # BLD AUTO: 0.14 10*3/MM3 (ref 0–0.05)
IMM GRANULOCYTES NFR BLD AUTO: 0.9 % (ref 0–0.5)
LYMPHOCYTES # BLD AUTO: 0.9 10*3/MM3 (ref 0.7–3.1)
LYMPHOCYTES NFR BLD AUTO: 5.9 % (ref 19.6–45.3)
MCH RBC QN AUTO: 31.1 PG (ref 26.6–33)
MCHC RBC AUTO-ENTMCNC: 32.5 G/DL (ref 31.5–35.7)
MCV RBC AUTO: 95.8 FL (ref 79–97)
MONOCYTES # BLD AUTO: 1.23 10*3/MM3 (ref 0.1–0.9)
MONOCYTES NFR BLD AUTO: 8.1 % (ref 5–12)
NEUTROPHILS NFR BLD AUTO: 12.95 10*3/MM3 (ref 1.7–7)
NEUTROPHILS NFR BLD AUTO: 84.9 % (ref 42.7–76)
NRBC BLD AUTO-RTO: 0 /100 WBC (ref 0–0.2)
PLATELET # BLD AUTO: 225 10*3/MM3 (ref 140–450)
PMV BLD AUTO: 9.7 FL (ref 6–12)
POTASSIUM SERPL-SCNC: 3.7 MMOL/L (ref 3.5–5.2)
PROT SERPL-MCNC: 5.2 G/DL (ref 6–8.5)
QT INTERVAL: 432 MS
QTC INTERVAL: 565 MS
RBC # BLD AUTO: 4.76 10*6/MM3 (ref 4.14–5.8)
SODIUM SERPL-SCNC: 143 MMOL/L (ref 136–145)
WBC NRBC COR # BLD AUTO: 15.25 10*3/MM3 (ref 3.4–10.8)

## 2024-09-03 PROCEDURE — 25810000003 SODIUM CHLORIDE 0.9 % SOLUTION

## 2024-09-03 PROCEDURE — 85025 COMPLETE CBC W/AUTO DIFF WBC: CPT

## 2024-09-03 PROCEDURE — 97535 SELF CARE MNGMENT TRAINING: CPT

## 2024-09-03 PROCEDURE — 25010000002 MEROPENEM PER 100 MG

## 2024-09-03 PROCEDURE — 25010000002 ENOXAPARIN PER 10 MG

## 2024-09-03 PROCEDURE — 76705 ECHO EXAM OF ABDOMEN: CPT

## 2024-09-03 PROCEDURE — 80053 COMPREHEN METABOLIC PANEL: CPT

## 2024-09-03 PROCEDURE — 97162 PT EVAL MOD COMPLEX 30 MIN: CPT

## 2024-09-03 RX ORDER — TERAZOSIN 1 MG/1
1 CAPSULE ORAL NIGHTLY
Status: DISCONTINUED | OUTPATIENT
Start: 2024-09-03 | End: 2024-09-03 | Stop reason: HOSPADM

## 2024-09-03 RX ADMIN — ENOXAPARIN SODIUM 40 MG: 100 INJECTION SUBCUTANEOUS at 08:28

## 2024-09-03 RX ADMIN — SODIUM CHLORIDE 75 ML/HR: 9 INJECTION, SOLUTION INTRAVENOUS at 06:08

## 2024-09-03 RX ADMIN — GUAIFENESIN 600 MG: 600 TABLET, EXTENDED RELEASE ORAL at 08:28

## 2024-09-03 RX ADMIN — Medication 10 ML: at 08:29

## 2024-09-03 RX ADMIN — BUSPIRONE HYDROCHLORIDE 10 MG: 10 TABLET ORAL at 08:28

## 2024-09-03 RX ADMIN — LEVOTHYROXINE SODIUM 100 MCG: 100 TABLET ORAL at 06:07

## 2024-09-03 RX ADMIN — MEROPENEM 500 MG: 500 INJECTION, POWDER, FOR SOLUTION INTRAVENOUS at 06:07

## 2024-09-03 RX ADMIN — VENLAFAXINE HYDROCHLORIDE 150 MG: 75 CAPSULE, EXTENDED RELEASE ORAL at 08:28

## 2024-09-03 NOTE — THERAPY TREATMENT NOTE
Patient Name: Jagdish Cook  : 1950    MRN: [id number]                              Today's Date: 9/3/2024       Admit Date: 2024    Visit Dx:     ICD-10-CM ICD-9-CM   1. Other specified sepsis  A41.89 038.8     995.91   2. Fall on same level, initial encounter  W18.30XA E888.9   3. Hematoma of left parietal scalp, initial encounter  S00.03XA 920   4. Sprain of left shoulder, unspecified shoulder sprain type, initial encounter  S43.402A 840.9   5. Impaired mobility [Z74.09]  Z74.09 799.89     Patient Active Problem List   Diagnosis    Primary hypertension    Hypothyroidism    Right hip pain    Chronic bilateral low back pain with bilateral sciatica    Acute on chronic urinary retention    Severe sepsis without septic shock    Gait instability    Transaminitis    Chronic indwelling Mario catheter    Urinary tract infection associated with indwelling urethral catheter    Hypokalemia    Constipation    Traumatic rhabdomyolysis    Syncope     Past Medical History:   Diagnosis Date    Bacteremia     Chronic back pain     COPD (chronic obstructive pulmonary disease)     Mario catheter in place     GERD (gastroesophageal reflux disease)     Memory deficits     Neuropathy     Umbilical hernia     Urinary retention      Past Surgical History:   Procedure Laterality Date    BACK SURGERY      HERNIA REPAIR        General Information       Row Name 24 1034          OT Time and Intention    Document Type therapy note (daily note)  -LS     Mode of Treatment occupational therapy  -       Row Name 24 1034          General Information    Patient Profile Reviewed yes  -LS     Existing Precautions/Restrictions fall  -LS       Row Name 24 1034          Cognition    Orientation Status (Cognition) oriented x 4  -       Row Name 24 1034          Safety Issues, Functional Mobility    Safety Issues Affecting Function (Mobility) awareness of need for assistance;at risk behavior  observed;friction/shear risk;impulsivity;insight into deficits/self-awareness;judgment;safety precaution awareness;safety precautions follow-through/compliance  -     Impairments Affecting Function (Mobility) balance;endurance/activity tolerance;muscle tone abnormal;postural/trunk control;range of motion (ROM);strength  -               User Key  (r) = Recorded By, (t) = Taken By, (c) = Cosigned By      Initials Name Provider Type    Paz Santiago OTR/L Occupational Therapist                     Mobility/ADL's       Row Name 09/03/24 1034          Bed Mobility    Bed Mobility supine-sit  -     Supine-Sit [place] (Bed Mobility) moderate assist (50% patient effort);verbal cues  -     Bed Mobility, Safety Issues decreased use of legs for bridging/pushing;impaired trunk control for bed mobility  -     Assistive Device (Bed Mobility) head of bed elevated  -       Row Name 09/03/24 1034          Transfers    Transfers bed-chair transfer  -       Row Name 09/03/24 1034          Bed-Chair Transfer    Bed-Chair [place] (Transfers) maximum assist (25% patient effort);2 person assist  -     Comment, (Bed-Chair Transfer) bed>recliner due to Pt wanting to leave AMA and caregiver stated that he would only come get the Pt if he could transfer independently  -       Row Name 09/03/24 1034          Activities of Daily Living    BADL Assessment/Intervention upper body dressing  -       Row Name 09/03/24 1034          Upper Body Dressing Assessment/Training    [place] Level (Upper Body Dressing) upper body dressing skills;maximum assist (25% patient effort)  -     Position (Upper Body Dressing) edge of bed sitting  -               User Key  (r) = Recorded By, (t) = Taken By, (c) = Cosigned By      Initials Name Provider Type    Paz Santiago, OTR/L Occupational Therapist                   Obj/Interventions    No documentation.                  Goals/Plan    No documentation.                   Clinical Impression       Row Name 09/03/24 1034          Pain Assessment    Pretreatment Pain Rating 0/10 - no pain  -LS     Posttreatment Pain Rating 0/10 - no pain  -LS       Row Name 09/03/24 1034          Plan of Care Review    Plan of Care Reviewed With patient  -LS     Progress improving  -     Outcome Evaluation OT tx completed. Pt in fowlers upon therapist arrival; A&Ox4; No c/o pain. Per Pt's nurse, the Pt is attempting to leave AMA this date, however, Pt's caregiver reported that he would only come get the Pt if the Pt was able to transfer to/from his w/c I. Pt performed supine>sit utilizing bedrail with HOB elevated with Mod A and verbal cues for sequencing and body mechanics. Pt required Max A to doff soiled gown and haim new gown while seated EOB. Pt performed squat pivot transfer from bed>chair requiring Max A x2. OT/PT contacted Pt's caregiver to update him on the amount of assist the Pt required to transfer into the chair; Pt's caregiver verbalized understanding. Despite education, Pt has poor insight into deficits and still believes he can return home and perform transfers and toileting I. Cont OT POC. Recommend SNF at discharge.  -LS       Row Name 09/03/24 1034          Therapy Plan Review/Discharge Plan (OT)    Anticipated Discharge Disposition (OT) skilled nursing facility  -       Row Name 09/03/24 1034          Positioning and Restraints    Pre-Treatment Position in bed  -LS     Post Treatment Position chair  -LS     In Chair sitting;call light within reach;encouraged to call for assist;exit alarm on;notified ns  -               User Key  (r) = Recorded By, (t) = Taken By, (c) = Cosigned By      Initials Name Provider Type    LS Paz Aguilar, LUKASR/L Occupational Therapist                   Outcome Measures       Row Name 09/03/24 1034          How much help from another is currently needed...    Putting on and taking off regular lower body clothing? 1  -LS     Bathing (including  washing, rinsing, and drying) 2  -LS     Toileting (which includes using toilet bed pan or urinal) 1  -LS     Putting on and taking off regular upper body clothing 2  -LS     Taking care of personal grooming (such as brushing teeth) 3  -LS     Eating meals 3  -LS     AM-PAC 6 Clicks Score (OT) 12  -LS       Row Name 09/03/24 1023 09/03/24 0825       How much help from another person do you currently need...    Turning from your back to your side while in flat bed without using bedrails? 2  -AJ 1  -MS    Moving from lying on back to sitting on the side of a flat bed without bedrails? 2  -AJ 1  -MS    Moving to and from a bed to a chair (including a wheelchair)? 2  -AJ 1  -MS    Standing up from a chair using your arms (e.g., wheelchair, bedside chair)? 2  -AJ 1  -MS    Climbing 3-5 steps with a railing? 1  -AJ 1  -MS    To walk in hospital room? 1  -AJ 1  -MS    AM-PAC 6 Clicks Score (PT) 10  -AJ 6  -MS    Highest Level of Mobility Goal 4 --> Transfer to chair/commode  -AJ 2 --> Bed activities/dependent transfer  -MS      Row Name 09/03/24 1034 09/03/24 1023       Functional Assessment    Outcome Measure Options AM-PAC 6 Clicks Daily Activity (OT)  -LS AM-PAC 6 Clicks Basic Mobility (PT)  -AJ              User Key  (r) = Recorded By, (t) = Taken By, (c) = Cosigned By      Initials Name Provider Type    Catherine Martinez, RN Registered Nurse    Paz Santiago, OTR/L Occupational Therapist    Joss Kearney, PT DPT Physical Therapist                    Occupational Therapy Education       Title: PT OT SLP Therapies (In Progress)       Topic: Occupational Therapy (In Progress)       Point: ADL training (Done)       Description:   Instruct learner(s) on proper safety adaptation and remediation techniques during self care or transfers.   Instruct in proper use of assistive devices.                  Learning Progress Summary             Patient Acceptance, E, VU,NR by SIRENA at 9/3/2024 4054    Acceptance, E, VU,NR by EC  at 9/2/2024 1551                         Point: Home exercise program (Not Started)       Description:   Instruct learner(s) on appropriate technique for monitoring, assisting and/or progressing therapeutic exercises/activities.                  Learner Progress:  Not documented in this visit.              Point: Precautions (Done)       Description:   Instruct learner(s) on prescribed precautions during self-care and functional transfers.                  Learning Progress Summary             Patient Acceptance, E, VU,NR by  at 9/3/2024 1249    Acceptance, E, VU,NR by EC at 9/2/2024 1551                         Point: Body mechanics (Done)       Description:   Instruct learner(s) on proper positioning and spine alignment during self-care, functional mobility activities and/or exercises.                  Learning Progress Summary             Patient Acceptance, E, VU,NR by  at 9/3/2024 1249    Acceptance, E, VU,NR by EC at 9/2/2024 1551                                         User Key       Initials Effective Dates Name Provider Type Discipline     06/20/22 -  Paz Aguilar, OTR/L Occupational Therapist OT    EC 10/13/23 -  Jennifer Lopez OTR/L Occupational Therapist OT                  OT Recommendation and Plan     Plan of Care Review  Plan of Care Reviewed With: patient  Progress: improving  Outcome Evaluation: OT tx completed. Pt in fowlers upon therapist arrival; A&Ox4; No c/o pain. Per Pt's nurse, the Pt is attempting to leave AMA this date, however, Pt's caregiver reported that he would only come get the Pt if the Pt was able to transfer to/from his w/c I. Pt performed supine>sit utilizing bedrail with HOB elevated with Mod A and verbal cues for sequencing and body mechanics. Pt required Max A to doff soiled gown and haim new gown while seated EOB. Pt performed squat pivot transfer from bed>chair requiring Max A x2. OT/PT contacted Pt's caregiver to update him on the amount of assist the Pt required to  transfer into the chair; Pt's caregiver verbalized understanding. Despite education, Pt has poor insight into deficits and still believes he can return home and perform transfers and toileting I. Cont OT POC. Recommend SNF at discharge.     Time Calculation:         Time Calculation- OT       Row Name 09/03/24 1034             Time Calculation- OT    OT Start Time 1034  -LS      OT Stop Time 1059  -LS      OT Time Calculation (min) 25 min  -      Total Timed Code Minutes- OT 25 minute(s)  -      OT Received On 09/03/24  -                User Key  (r) = Recorded By, (t) = Taken By, (c) = Cosigned By      Initials Name Provider Type    LS Paz Aguilar OTR/L Occupational Therapist                  Therapy Charges for Today       Code Description Service Date Service Provider Modifiers Qty    [id number] HC OT SELF CARE/MGMT/TRAIN EA 15 MIN 9/3/2024 Paz Aguilar OTR/L GO 2                 Paz Aguilar OTR/MARTY  9/3/2024

## 2024-09-03 NOTE — THERAPY DISCHARGE NOTE
Acute Care - Occupational Therapy Discharge Summary  [facility]     Patient Name: Jagdish Cook  : 1950  MRN: [id number]    Today's Date: 9/3/2024       Date of Referral to OT: 24         Admit Date: 2024        OT Recommendation and Plan    Visit Dx:    ICD-10-CM ICD-9-CM   1. Other specified sepsis  A41.89 038.8     995.91   2. Fall on same level, initial encounter  W18.30XA E888.9   3. Hematoma of left parietal scalp, initial encounter  S00.03XA 920   4. Sprain of left shoulder, unspecified shoulder sprain type, initial encounter  S43.402A 840.9   5. Impaired mobility [Z74.09]  Z74.09 799.89          Time Calculation- OT       Row Name 24 1034             Time Calculation- OT    OT Start Time 1034  -LS      OT Stop Time 1059  -LS      OT Time Calculation (min) 25 min  -LS      Total Timed Code Minutes- OT 25 minute(s)  -      OT Received On 24  -                User Key  (r) = Recorded By, (t) = Taken By, (c) = Cosigned By      Initials Name Provider Type    Paz Santiago OTR/L Occupational Therapist                       OT Rehab Goals       Row Name 24 1500             Transfer Goal 1 (OT)    Activity/Assistive Device (Transfer Goal 1, OT) sit-to-stand/stand-to-sit;bed-to-chair/chair-to-bed;toilet;shower chair;wheelchair transfer  -JJ      [place] Level/Cues Needed (Transfer Goal 1, OT) minimum assist (75% or more patient effort)  -JJ      Time Frame (Transfer Goal 1, OT) long term goal (LTG)  -JJ      Progress/Outcome (Transfer Goal 1, OT) goal not met  left AMA  -JJ         Bathing Goal 1 (OT)    Activity/Device (Bathing Goal 1, OT) upper body bathing;lower body bathing  -JJ      [place] Level/Cues Needed (Bathing Goal 1, OT) moderate assist (50-74% patient effort)  -JJ      Time Frame (Bathing Goal 1, OT) long term goal (LTG)  -JJ      Progress/Outcomes (Bathing Goal 1, OT) goal not met  left AMA  -JJ         Toileting Goal 1 (OT)    Activity/Device  (Toileting Goal 1, OT) adjust/manage clothing;perform perineal hygiene;commode, bedside without drop arms  -JJ      [place] Level/Cues Needed (Toileting Goal 1, OT) moderate assist (50-74% patient effort)  -JJ      Time Frame (Toileting Goal 1, OT) long term goal (LTG)  -JJ      Progress/Outcome (Toileting Goal 1, OT) goal not met  left AMA  -JJ         Problem Specific Goal 1 (OT)    Problem Specific Goal 1 (OT) Pt will independently implement 1 pain management technique to reduce pain & increase functional performance.  -JJ      Time Frame (Problem Specific Goal 1, OT) long term goal (LTG)  -JJ      Progress/Outcome (Problem Specific Goal 1, OT) goal not met  left AMA  -JJ                User Key  (r) = Recorded By, (t) = Taken By, (c) = Cosigned By      Initials Name Provider Type Discipline    Tish Prieto, OTR/L, CSRS Occupational Therapist OT                     Outcome Measures       Row Name 09/02/24 1500             How much help from another is currently needed...    Putting on and taking off regular lower body clothing? 1  -EC      Bathing (including washing, rinsing, and drying) 2  -EC      Toileting (which includes using toilet bed pan or urinal) 2  -EC      Putting on and taking off regular upper body clothing 2  -EC      Taking care of personal grooming (such as brushing teeth) 2  -EC      Eating meals 3  -EC      AM-PAC 6 Clicks Score (OT) 12  -EC         Functional Assessment    Outcome Measure Options AM-PAC 6 Clicks Daily Activity (OT)  -EC                User Key  (r) = Recorded By, (t) = Taken By, (c) = Cosigned By      Initials Name Provider Type    EC Jennifer Lopez OTR/L Occupational Therapist                            OT Discharge Summary  Anticipated Discharge Disposition (OT): skilled nursing facility  Reason for Discharge: Non-compliant, other (comment) (left AMA)  Outcomes Achieved: Other (left AMA)  Discharge Destination: other (comment), Home (left AMA)      Tish  FRANCK Sanches/L, CSRS  9/3/2024

## 2024-09-03 NOTE — PLAN OF CARE
Goal Outcome Evaluation:  Plan of Care Reviewed With: patient        Progress: improving  Outcome Evaluation: OT tx completed. Pt in fowlers upon therapist arrival; A&Ox4; No c/o pain. Per Pt's nurse, the Pt is attempting to leave AMA this date, however, Pt's caregiver reported that he would only come get the Pt if the Pt was able to transfer to/from his w/c I. Pt performed supine>sit utilizing bedrail with HOB elevated with Mod A and verbal cues for sequencing and body mechanics. Pt required Max A to doff soiled gown and haim new gown while seated EOB. Pt performed squat pivot transfer from bed>chair requiring Max A x2. OT/PT contacted Pt's caregiver to update him on the amount of assist the Pt required to transfer into the chair; Pt's caregiver verbalized understanding. Despite education, Pt has poor insight into deficits and still believes he can return home and perform transfers and toileting I. Cont OT POC. Recommend SNF at discharge.      Anticipated Discharge Disposition (OT): skilled nursing facility

## 2024-09-03 NOTE — PLAN OF CARE
Goal Outcome Evaluation:      Pt is leaving AMA. Pt educated by nurse and MD that its not the best idea but pt wants to leave anyway. Ever pt caregiver is here to pick him up and take him home.

## 2024-09-03 NOTE — PLAN OF CARE
Goal Outcome Evaluation:              Outcome Evaluation: Oriented to self. Yells out frequently for staff. Room air. VSS. IVF infusing. IV abx. Pictures of skin in chart. Chronic ewing. Incontinent of stool; BM this shift. Turning as patient will allow. Cardiac diet; tolerating. Sepsis +. Bed-alarm. Lovenox. Call light within reach, safety maintained.                                114

## 2024-09-03 NOTE — PLAN OF CARE
"   Goal Outcome Evaluation:  Plan of Care Reviewed With: patient, caregiver           Outcome Evaluation: PT eval completed. Per pt RN, pt is attempting to leave AMA but must t/f to wheelchair independently which is his baseline. Pt was AOx4 today and stated he already had a ride on the way, required max verbal cues to be redirected to perform sesison today, and was eventually agreeable to perform t/clinton and OOB activity. In supine pt has Mod contracture to B LE knee flexion contractions, and Min hip flexion contractures. Pt required Mod A in order to reach EOB and allow feet to reach floor. Once sitting EOB pt hip flexion contractures improved to neutral roughly 90 degrees but pt was unable to perform further AROM or PROM knee extension and B hamstrings had palpable tightness. Once sitting pt also required intermittent CGA for LOB both posterior and L lateral direction. Prior to t/fers, pt performed very short leg extensions and stated he wanted to warm up due to being in bed for multiple days and so he could perform t/fers and \"go home.\" Pt required Max A x2 from PT/OT in order to clear bed and Max/Dep x2 in order to t/cl[place] to bedside chair in room. Once sitting in recliner pt reports this has been normal and that the caregiver provides this amount of assist for all transfers, but unable to verify assist level provided daily, as per caregiver pt performs all t/fers with independence at home and only requires assist for ADL, driving and other home management as needed. Pt left sitting in bedside chair, chair alarm pad armed, and PT/OT called caregiver to review POC and findings from session. Pt will benefit from skilled PT services to improve safe t/fers, improve/maintain B LE muscle lengths, decrease fall risk and decrease caregiver burden. recommend SNF when medicall ystable vs home with 24/7 assist with HH when medically stable.      Anticipated Discharge Disposition (PT): skilled nursing facility, home with 24/7 " care, home with home health

## 2024-09-03 NOTE — NURSING NOTE
[facility]  INPATIENT WOUND & OSTOMY CARE    Today's Date: 09/03/24    Patient Name: Jagdish Cook  MRN: [id number]  CSN: [id number]  PCP: Aidan Shields MD  Attending Provider: Cortney Pena DO  Length of Stay: 1    Pressure injury prevention measure ordered per protocol due to patient being at risk for skin breakdown.    Apply silicone foam border dressing per protocol to sacral spine/bilateral heels for protection.  Nursing to change dressing every 3 days and PRN if soiled. Nursing is to peel back dressing with every assessment to assess skin underneath dressing. No barrier cream under dressing.     Please reach out to wound care nurse if any skin issues arise.     This document has been electronically signed by Carolina Shell RN on 9/3/2024 08:26 CDT

## 2024-09-03 NOTE — THERAPY EVALUATION
Patient Name: Jagdish Cook  : 1950    MRN: [id number]                              Today's Date: 9/3/2024       Admit Date: 2024    Visit Dx:     ICD-10-CM ICD-9-CM   1. Other specified sepsis  A41.89 038.8     995.91   2. Fall on same level, initial encounter  W18.30XA E888.9   3. Hematoma of left parietal scalp, initial encounter  S00.03XA 920   4. Sprain of left shoulder, unspecified shoulder sprain type, initial encounter  S43.402A 840.9   5. Impaired mobility [Z74.09]  Z74.09 799.89     Patient Active Problem List   Diagnosis    Primary hypertension    Hypothyroidism    Right hip pain    Chronic bilateral low back pain with bilateral sciatica    Acute on chronic urinary retention    Severe sepsis without septic shock    Gait instability    Transaminitis    Chronic indwelling Mario catheter    Urinary tract infection associated with indwelling urethral catheter    Hypokalemia    Constipation    Traumatic rhabdomyolysis    Syncope     Past Medical History:   Diagnosis Date    Bacteremia     Chronic back pain     COPD (chronic obstructive pulmonary disease)     Mario catheter in place     GERD (gastroesophageal reflux disease)     Memory deficits     Neuropathy     Umbilical hernia     Urinary retention      Past Surgical History:   Procedure Laterality Date    BACK SURGERY      HERNIA REPAIR        General Information       Row Name 24 1023          Physical Therapy Time and Intention    Document Type evaluation  presents with syncope and fall at home Dx; sepsis, traumatic rhabdo. H/o fainting disorder, COPD, GERD, hypothyroidism, chronic catheter usage and unsteady gait  -AJ     Mode of Treatment physical therapy  -AJ       Row Name 24 1023          General Information    Patient Profile Reviewed yes  -AJ     Prior Level of Function independent:;transfer;w/c or scooter;all household mobility;community mobility  per caregiver  -AJ     Existing Precautions/Restrictions fall;other (see  comments)  noticeable contractures  -     Barriers to Rehab medically complex;physical barrier;previous functional deficit  B LE knee and flexion contractures  -       Row Name 09/03/24 1023          Living Environment    People in Home alone;other (see comments)  has caregiver 5 days/week  -       Row Name 09/03/24 1023          Cognition    Orientation Status (Cognition) oriented x 4  -       Row Name 09/03/24 1023          Safety Issues, Functional Mobility    Safety Issues Affecting Function (Mobility) at risk behavior observed;awareness of need for assistance;friction/shear risk;impulsivity;insight into deficits/self-awareness;safety precaution awareness;safety precautions follow-through/compliance;sequencing abilities;ability to follow commands  -     Impairments Affecting Function (Mobility) balance;endurance/activity tolerance;muscle tone abnormal;postural/trunk control;range of motion (ROM);strength  -               User Key  (r) = Recorded By, (t) = Taken By, (c) = Cosigned By      Initials Name Provider Type    Joss Kearney PT DPT Physical Therapist                   Mobility       Row Name 09/03/24 1023          Bed Mobility    Bed Mobility supine-sit  -     Rolling Right [place] (Bed Mobility) moderate assist (50% patient effort);1 person assist  -     Supine-Sit [place] (Bed Mobility) moderate assist (50% patient effort);1 person assist  -     Assistive Device (Bed Mobility) bed rails;draw sheet;head of bed elevated  -       Row Name 09/03/24 1023          Bed-Chair Transfer    Bed-Chair [place] (Transfers) maximum assist (25% patient effort);2 person assist   -     Comment, (Bed-Chair Transfer) bed to recliner due to pt wanting to leave AMA, caregiver stated only if pt could t/f independently   -               User Key  (r) = Recorded By, (t) = Taken By, (c) = Cosigned By      Initials Name Provider Type    Joss Kearney PT DPT Physical Therapist                    Obj/Interventions       Row Name 09/03/24 1023          Range of Motion Comprehensive    General Range of Motion lower extremity range of motion deficits identified  -     Comment, General Range of Motion Pt presents and demo mod hip and knee flexion contractures which is baseline.  -       Row Name 09/03/24 1023          Strength Comprehensive (MMT)    General Manual Muscle Testing (MMT) Assessment lower extremity strength deficits identified   -     Comment, General Manual Muscle Testing (MMT) Assessment decrease strength in B LE due to difficulty AROM and pt not receptive for testing due to his reports of his caregiver coming to pick him up.   -       Row Name 09/03/24 1023          Balance    Balance Assessment sitting static balance;sitting dynamic balance;sit to stand dynamic balance;standing static balance  -AJ     Static Sitting Balance contact guard;verbal cues  -AJ     Dynamic Sitting Balance contact guard;minimal assist;verbal cues  -AJ     Position, Sitting Balance supported;unsupported;sitting edge of bed  -AJ     Sit to Stand Dynamic Balance maximum assist;2-person assist  -AJ     Static Standing Balance maximum assist;2-person assist  -AJ     Balance Interventions sitting;standing;supported;static;dynamic;sit to stand  -AJ               User Key  (r) = Recorded By, (t) = Taken By, (c) = Cosigned By      Initials Name Provider Type    Joss Kearney, PT DPT Physical Therapist                   Goals/Plan       Row Name 09/03/24 1023          Bed Mobility Goal 1 (PT)    Activity/Assistive Device (Bed Mobility Goal 1, PT) rolling to left;rolling to right;sit to supine;supine to sit  -AJ     [place] Level/Cues Needed (Bed Mobility Goal 1, PT) supervision required  -VICKIE     Time Frame (Bed Mobility Goal 1, PT) long term goal (LTG);10 days  -VICKIE     Progress/Outcomes (Bed Mobility Goal 1, PT) new goal  -       Row Name 09/03/24 1023          Transfer Goal 1 (PT)     Activity/Assistive Device (Transfer Goal 1, PT) sit-to-stand/stand-to-sit;bed-to-chair/chair-to-bed;wheelchair transfer;toilet  -     [place] Level/Cues Needed (Transfer Goal 1, PT) standby assist;supervision required  -AJ     Time Frame (Transfer Goal 1, PT) long term goal (LTG);10 days  -AJ     Progress/Outcome (Transfer Goal 1, PT) new goal  -       Row Name 09/03/24 1023          ROM Goal 1 (PT)    ROM Goal 1 (PT) Pt will demo knee extension AROM and PROM to >50% as needed for increased muscle length and perform safe t/fers.  -AJ     Time Frame (ROM Goal 1, PT) long-term goal (LTG);10 days  -AJ     Strategies/Barriers (ROM Goal 1, PT) chronic B knee flexion contractures  -AJ     Progress/Outcome (ROM Goal 1, PT) new goal  -       Row Name 09/03/24 1023          Therapy Assessment/Plan (PT)    Planned Therapy Interventions (PT) balance training;bed mobility training;home exercise program;gait training;patient/family education;stretching;postural re-education;wheelchair management/propulsion training;transfer training  -               User Key  (r) = Recorded By, (t) = Taken By, (c) = Cosigned By      Initials Name Provider Type    Joss Kearney, PT DPT Physical Therapist                   Clinical Impression       Row Name 09/03/24 1023          Pain    Additional Documentation Pain Scale: FACES Pre/Post-Treatment (Group)  -[facility] Name 09/03/24 1023          Pain Scale: FACES Pre/Post-Treatment    Pain: FACES Scale, Pretreatment 4-->hurts little more  -     Posttreatment Pain Rating 4-->hurts little more  -     Pain Location generalized  -     Pain Location - abdomen;epigastrium;shoulder;back  -       Row Name 09/03/24 1023          Plan of Care Review    Plan of Care Reviewed With patient;caregiver  -     Outcome Evaluation PT eval completed. Per pt RN, pt is attempting to leave AMA but must t/f to wheelchair independently which is his baseline. Pt was AOx4 today and stated he  "already had a ride on the way, required max verbal cues to be redirected to perform sesison today, and was eventually agreeable to perform t/cl[place] and OOB activity. In supine pt has Mod contracture to B LE knee flexion contractions, and Min hip flexion contractures. Pt required Mod A in order to reach EOB and allow feet to reach floor. Once sitting EOB pt hip flexion contractures improved to neutral roughly 90 degrees but pt was unable to perform further AROM or PROM knee extension and B hamstrings had palpable tightness. Once sitting pt also required intermittent CGA for LOB both posterior and L lateral direction. Prior to t/fers, pt performed very short leg extensions and stated he wanted to warm up due to being in bed for multiple days and so he could perform t/fers and \"go home.\" Pt required Max A x2 from PT/OT in order to clear bed and Max/Dep x2 in order to t/[place] to bedside chair in room. Once sitting in recliner pt reports this has been normal and that the caregiver provides this amount of assist for all transfers, but unable to verify assist level provided daily, as per caregiver pt performs all t/fers with independence at home and only requires assist for ADL, driving and other home management as needed. Pt left sitting in bedside chair, chair alarm pad armed, and PT/OT called caregiver to review POC and findings from session. Pt will benefit from skilled PT services to improve safe t/fers, improve/maintain B LE muscle lengths, decrease fall risk and decrease caregiver burden. recommend SNF when medicall ystable vs home with 24/7 assist with HH when medically stable.  -       Row Name 09/03/24 1023          Therapy Assessment/Plan (PT)    Patient/Family Therapy Goals Statement (PT) go home  -VICKIE     Rehab Potential (PT) fair, will monitor progress closely  -VICKIE     Criteria for Skilled Interventions Met (PT) yes;meets criteria;skilled treatment is necessary  -VICKIE     Therapy Frequency (PT) 2 times/day  -VICKIE     " Predicted Duration of Therapy Intervention (PT) until d/c  -AJ       Row Name 09/03/24 1023          Vital Signs    Pre Patient Position Supine  -AJ     Intra Patient Position Standing  -AJ     Post Patient Position Sitting  -AJ       Row Name 09/03/24 1023          Positioning and Restraints    Pre-Treatment Position in bed  -AJ     Post Treatment Position chair  -AJ     In Chair notified nsg;sitting;call light within reach;encouraged to call for assist;exit alarm on;RUE elevated;LUE elevated  -AJ               User Key  (r) = Recorded By, (t) = Taken By, (c) = Cosigned By      Initials Name Provider Type    Joss Kearney PT DPT Physical Therapist                   Outcome Measures       Row Name 09/03/24 1023 09/03/24 0825       How much help from another person do you currently need...    Turning from your back to your side while in flat bed without using bedrails? 2  -AJ 1  -MS    Moving from lying on back to sitting on the side of a flat bed without bedrails? 2  -AJ 1  -MS    Moving to and from a bed to a chair (including a wheelchair)? 2  -AJ 1  -MS    Standing up from a chair using your arms (e.g., wheelchair, bedside chair)? 2  -AJ 1  -MS    Climbing 3-5 steps with a railing? 1  -AJ 1  -MS    To walk in hospital room? 1  -AJ 1  -MS    AM-PAC 6 Clicks Score (PT) 10  -AJ 6  -MS    Highest Level of Mobility Goal 4 --> Transfer to chair/commode  -AJ 2 --> Bed activities/dependent transfer  -MS      Row Name 09/03/24 1023          Functional Assessment    Outcome Measure Options AM-PAC 6 Clicks Basic Mobility (PT)  -AJ               User Key  (r) = Recorded By, (t) = Taken By, (c) = Cosigned By      Initials Name Provider Type    Catherine Martinez, RN Registered Nurse    Joss Kearney, PT DPT Physical Therapist                                 Physical Therapy Education       Title: PT OT SLP Therapies (In Progress)       Topic: Physical Therapy (In Progress)       Point: Mobility training (In Progress)        Learning Progress Summary             Patient Acceptance, E, NR,NL by AJ at 9/3/2024 1136    Comment: role of PT, safe t/fers, high fall risk, goals of care in order to prevent further decline   Caregiver Acceptance, E, NR,NL by AJ at 9/3/2024 1136    Comment: role of PT, safe t/fers, high fall risk, goals of care in order to prevent further decline                         Point: Home exercise program (In Progress)       Learning Progress Summary             Patient Acceptance, E, NR,NL by AJ at 9/3/2024 1136    Comment: role of PT, safe t/fers, high fall risk, goals of care in order to prevent further decline   Caregiver Acceptance, E, NR,NL by AJ at 9/3/2024 1136    Comment: role of PT, safe t/fers, high fall risk, goals of care in order to prevent further decline                         Point: Body mechanics (In Progress)       Learning Progress Summary             Patient Acceptance, E, NR,NL by AJ at 9/3/2024 1136    Comment: role of PT, safe t/fers, high fall risk, goals of care in order to prevent further decline   Caregiver Acceptance, E, NR,NL by AJ at 9/3/2024 1136    Comment: role of PT, safe t/fers, high fall risk, goals of care in order to prevent further decline                         Point: Precautions (In Progress)       Learning Progress Summary             Patient Acceptance, E, NR,NL by AJ at 9/3/2024 1136    Comment: role of PT, safe t/fers, high fall risk, goals of care in order to prevent further decline   Caregiver Acceptance, E, NR,NL by AJ at 9/3/2024 1136    Comment: role of PT, safe t/fers, high fall risk, goals of care in order to prevent further decline                                         User Key       Initials Effective Dates Name Provider Type Discipline     08/15/24 -  Joss Zabala, PT DPT Physical Therapist PT                  PT Recommendation and Plan  Planned Therapy Interventions (PT): balance training, bed mobility training, home exercise program, gait training,  "patient/family education, stretching, postural re-education, wheelchair management/propulsion training, transfer training  Plan of Care Reviewed With: patient, caregiver  Outcome Evaluation: PT eval completed. Per pt RN, pt is attempting to leave AMA but must t/f to wheelchair independently which is his baseline. Pt was AOx4 today and stated he already had a ride on the way, required max verbal cues to be redirected to perform sesison today, and was eventually agreeable to perform t/cl[place] and OOB activity. In supine pt has Mod contracture to B LE knee flexion contractions, and Min hip flexion contractures. Pt required Mod A in order to reach EOB and allow feet to reach floor. Once sitting EOB pt hip flexion contractures improved to neutral roughly 90 degrees but pt was unable to perform further AROM or PROM knee extension and B hamstrings had palpable tightness. Once sitting pt also required intermittent CGA for LOB both posterior and L lateral direction. Prior to t/fers, pt performed very short leg extensions and stated he wanted to warm up due to being in bed for multiple days and so he could perform t/fers and \"go home.\" Pt required Max A x2 from PT/OT in order to clear bed and Max/Dep x2 in order to t/[place] to bedside chair in room. Once sitting in recliner pt reports this has been normal and that the caregiver provides this amount of assist for all transfers, but unable to verify assist level provided daily, as per caregiver pt performs all t/fers with independence at home and only requires assist for ADL, driving and other home management as needed. Pt left sitting in bedside chair, chair alarm pad armed, and PT/OT called caregiver to review POC and findings from session. Pt will benefit from skilled PT services to improve safe t/fers, improve/maintain B LE muscle lengths, decrease fall risk and decrease caregiver burden. recommend SNF when medicall ystable vs home with 24/7 assist with HH when medically " stable.     Time Calculation:         PT Charges       Row Name 09/03/24 1023             Time Calculation    Start Time 1023  -AJ      Stop Time 1101  -AJ      Time Calculation (min) 38 min  -AJ      PT Received On 09/03/24  -AJ      PT Goal Re-Cert Due Date 09/13/24  -AJ         Untimed Charges    PT Eval/Re-eval Minutes 38  -AJ         Total Minutes    Untimed Charges Total Minutes 38  -AJ       Total Minutes 38  -AJ                User Key  (r) = Recorded By, (t) = Taken By, (c) = Cosigned By      Initials Name Provider Type    Joss Kearney, PT DPT Physical Therapist                  Therapy Charges for Today       Code Description Service Date Service Provider Modifiers Qty    [id number] HC PT EVAL MOD COMPLEXITY 3 9/3/2024 Joss Zabala PT DPT GP 1            PT G-Codes  Outcome Measure Options: AM-PAC 6 Clicks Basic Mobility (PT)  AM-PAC 6 Clicks Score (PT): 10  AM-PAC 6 Clicks Score (OT): 12  PT Discharge Summary  Anticipated Discharge Disposition (PT): skilled nursing facility, home with 24/7 care, home with home health    Joss Zabala PT DPT  9/3/2024

## 2024-09-04 LAB — BACTERIA SPEC AEROBE CULT: ABNORMAL

## 2024-09-04 NOTE — DISCHARGE SUMMARY
[facility] Medicine Services  DISCHARGE SUMMARY       Date of Admission: 9/2/2024  Date of Discharge:  9/4/2024  Primary Care Physician: Aidan Shields MD    Presenting Problem/History of Present Illness:  Fall at home    Final Discharge Diagnoses:     Severe sepsis without septic shock      Acute on chronic urinary retention      Gait instability      Transaminitis      Chronic indwelling Mario catheter      Urinary tract infection associated with indwelling urethral catheter      Hypokalemia      Constipation      Traumatic rhabdomyolysis      Syncope      Right hip pain      Primary hypertension      Consults:  none  Procedures Performed:   none  Pertinent Test Results:   Imaging Results (Last 7 Days)       Procedure Component Value Units Date/Time    US Liver [[id number]] Collected: 09/03/24 0826     Updated: 09/03/24 0831    Narrative:      US LIVER- 9/3/2024 6:24 AM     REASON FOR EXAM: Elevated liver enzymes and bilirubin; A41.89-Other  specified sepsis; W18.30XA-Fall on same level, unspecified, initial  encounter; S00.03XA-Contusion of scalp, initial encounter;  S43.402A-Unspecified sprain of left shoulder joint, initial encounter       COMPARISON: None available     TECHNIQUE: Multiple longitudinal and transverse real-time sonographic  images of the right upper quadrant of the abdomen are obtained. Report  and images stored per institutional and state regulations.     FINDINGS:    Pancreas: Pancreas is obscured due to overlying bowel gas..     Liver/Gallbladder/Bile ducts: Portal vein is patent with normal  direction of flow. Patient is status post cholecystectomy. There is  probable pneumobilia and may be secondary to sphincterotomy. A CT of the  abdomen and pelvis could confirm. Common bile duct is 0.8 cm likely  reservoir effect. No focal hepatic lesion is identified.     Vasculature:  The proximal abdominal aorta and IVC are unremarkable.     Right kidney: The  right kidney is 10.3 cm in length. Renal cortex is  unremarkable. There is no hydronephrosis.     Other: No ascites.       Impression:         1. Status post cholecystectomy and reservoir effect of the common bile  duct.  2. Linear hyperechoic areas seen in the liver may represent pneumobilia  and may be postoperative. CT abdomen and pelvis could confirm.           This report was signed and finalized on 9/3/2024 8:28 AM by Trenton Hameed.       XR Hip With or Without Pelvis 2 - 3 View Left [[id number]] Collected: 09/02/24 0859     Updated: 09/02/24 1149    Addenda:        ADDENDUM:     Follow-up CT was performed. There is no pubic rami fracture present on  the CT. The lucency extending through the left superior pubic ramus on  this radiograph is likely artifactual related to overlying bowel gas.     End addendum.     This report was signed and finalized on 9/2/2024 11:46 AM by Dr. Rey Shukla MD.     Signed: 09/02/24 1146 by Rey Shukla MD    Narrative:      EXAM/TECHNIQUE: XR HIP W OR WO PELVIS 2-3 VIEW LEFT-     INDICATION: Fall injury     COMPARISON: None available.     FINDINGS:     Pelvis:  Acute nondisplaced fracture of the left superior pubic ramus. No other  acute pelvic fracture. SI joints and pubic symphysis are maintained. No  sacral fracture.     Left hip: No evidence of acute left hip fracture or dislocation. Mild  left hip degenerative change characterized by mild joint space narrowing  and osteophyte development. No suspicious osseous lesion. No radiopaque  foreign body or soft tissue gas.       Impression:      1. Acute nondisplaced left superior pubic rami fracture.  2. No evidence of hip fracture or dislocation.     This report was signed and finalized on 9/2/2024 9:01 AM by Dr. Rey Shukla MD.       CT Pelvis Without Contrast [[id number]] Collected: 09/02/24 1137     Updated: 09/02/24 1149    Narrative:      EXAM/TECHNIQUE: CT pelvis without contrast     INDICATION: Fall,  left hip pain     COMPARISON: Same-day radiographs     DLP: 260.96 mGy.cm. Automated exposure control was also utilized to  decrease patient radiation dose.     FINDINGS:     Pubic symphysis and SI joints are intact. No acute pelvic fracture. No  pubic symphysis fracture. No evidence of acute hip fracture or  dislocation. Severe left hip osteoarthritis with bone-on-bone appearance  and osteophyte development.     Large volume stool in the rectum. No free pelvic fluid. Prostate is  enlarged. Diffuse urinary bladder wall thickening with Mario catheter in  place. Ventral abdominal hernia containing multiple loops of small  bowel.       Impression:         1.  No acute hip or pelvic fracture. The finding on same-day radiograph  is artifactual.     2.  Severe left hip osteoarthritis.     3.  Large volume stool in the rectum.     4.  Enlarged prostate. Diffuse urinary bladder wall thickening with  Mario catheter in place.        This report was signed and finalized on 9/2/2024 11:45 AM by Dr. Rey Shukla MD.       XR Ribs Left With PA Chest [[id number]] Collected: 09/02/24 0859     Updated: 09/02/24 0918    Narrative:      EXAM/TECHNIQUE: XR RIBS LEFT W PA CHEST-     INDICATION: Fall with left-sided rib pain     COMPARISON: None available.     FINDINGS:     Cardiac silhouette is enlarged. No pleural effusion or visible  pneumothorax. Hazy left lower lobe opacity, likely related to  atelectasis.     No definite acute displaced rib fracture identified. No acute osseous  finding.       Impression:      1.  No definite acute displaced rib fracture.  2.  Hazy left basilar opacity, likely atelectasis.     This report was signed and finalized on 9/2/2024 9:15 AM by Dr. Rey Shukla MD.       CT Head Without Contrast [[id number]] Collected: 09/02/24 0901     Updated: 09/02/24 0909    Narrative:      EXAM/TECHNIQUE: CT head without contrast     INDICATION: Syncope, fall     COMPARISON: None available.     DLP: 1461.9  mGy.cm. Automated exposure control was also utilized to  decrease patient radiation dose.     FINDINGS:     No evidence of intracranial hemorrhage. Gray-white differentiation is  maintained. Basilar cisterns are patent. No acute soft tissue finding  No midline shift or mass effect. Right parafalcine calcification noted.  Left parietal scalp hematoma. Mastoid air cells are clear. Visualized  paranasal sinuses are clear. No acute osseous finding.       Impression:      1. No acute intracranial finding.  2. Left parietal scalp contusion/hematoma.     This report was signed and finalized on 9/2/2024 9:06 AM by Dr. Rey Shukla MD.       XR Shoulder 2+ View Left [[id number]] Collected: 09/02/24 0858     Updated: 09/02/24 0901    Narrative:      EXAM/TECHNIQUE: XR SHOULDER 2+ VW LEFT-     INDICATION: Fall with left shoulder pain     COMPARISON: None available.     FINDINGS:     No acute fracture or dislocation. No advanced joint space narrowing or  osteophyte development. No acute finding in the included portion of the  left chest. No radiopaque foreign body or soft tissue gas.       Impression:      No acute osseous findings.     This report was signed and finalized on 9/2/2024 8:58 AM by Dr. Rey Shukla MD.             Lab Results (last 7 days)       Procedure Component Value Units Date/Time    Comprehensive Metabolic Panel [[id number]]  (Abnormal) Collected: 09/03/24 0555    Specimen: Blood Updated: 09/03/24 0636     Glucose 76 mg/dL      BUN 17 mg/dL      Creatinine 0.63 mg/dL      Sodium 143 mmol/L      Potassium 3.7 mmol/L      Chloride 105 mmol/L      CO2 26.0 mmol/L      Calcium 8.3 mg/dL      Total Protein 5.2 g/dL      Albumin 2.7 g/dL      ALT (SGPT) 58 U/L      AST (SGOT) 136 U/L      Alkaline Phosphatase 95 U/L      Total Bilirubin 1.2 mg/dL      Globulin 2.5 gm/dL      A/G Ratio 1.1 g/dL      BUN/Creatinine Ratio 27.0     Anion Gap 12.0 mmol/L      eGFR 99.2 mL/min/1.73     Narrative:      GFR  Normal >60  Chronic Kidney Disease <60  Kidney Failure <15    The GFR formula is only valid for adults with stable renal function between ages 18 and 70.    CBC & Differential [[id number]]  (Abnormal) Collected: 09/03/24 0555    Specimen: Blood Updated: 09/03/24 0613    Narrative:      The following orders were created for panel order CBC & Differential.  Procedure                               Abnormality         Status                     ---------                               -----------         ------                     CBC Auto Differential[[id number]]        Abnormal            Final result                 Please view results for these tests on the individual orders.    CBC Auto Differential [[id number]]  (Abnormal) Collected: 09/03/24 0555    Specimen: Blood Updated: 09/03/24 0613     WBC 15.25 10*3/mm3      RBC 4.76 10*6/mm3      Hemoglobin 14.8 g/dL      Hematocrit 45.6 %      MCV 95.8 fL      MCH 31.1 pg      MCHC 32.5 g/dL      RDW 13.1 %      RDW-SD 46.4 fl      MPV 9.7 fL      Platelets 225 10*3/mm3      Neutrophil % 84.9 %      Lymphocyte % 5.9 %      Monocyte % 8.1 %      Eosinophil % 0.1 %      Basophil % 0.1 %      Immature Grans % 0.9 %      Neutrophils, Absolute 12.95 10*3/mm3      Lymphocytes, Absolute 0.90 10*3/mm3      Monocytes, Absolute 1.23 10*3/mm3      Eosinophils, Absolute 0.01 10*3/mm3      Basophils, Absolute 0.02 10*3/mm3      Immature Grans, Absolute 0.14 10*3/mm3      nRBC 0.0 /100 WBC     CK [[id number]]  (Abnormal) Collected: 09/02/24 1327    Specimen: Blood Updated: 09/02/24 1433     Creatine Kinase 2,942 U/L     Comprehensive Metabolic Panel [[id number]]  (Abnormal) Collected: 09/02/24 1327    Specimen: Blood Updated: 09/02/24 1419     Glucose 86 mg/dL      BUN 20 mg/dL      Creatinine 0.68 mg/dL      Sodium 147 mmol/L      Potassium 3.2 mmol/L      Comment: Slight hemolysis detected by analyzer. Result may be falsely elevated.        Chloride 104 mmol/L      CO2 29.0 mmol/L       Calcium 8.4 mg/dL      Total Protein 5.6 g/dL      Albumin 2.9 g/dL      ALT (SGPT) 58 U/L      AST (SGOT) 141 U/L      Alkaline Phosphatase 104 U/L      Total Bilirubin 1.0 mg/dL      Globulin 2.7 gm/dL      A/G Ratio 1.1 g/dL      BUN/Creatinine Ratio 29.4     Anion Gap 14.0 mmol/L      eGFR 96.9 mL/min/1.73     Narrative:      GFR Normal >60  Chronic Kidney Disease <60  Kidney Failure <15    The GFR formula is only valid for adults with stable renal function between ages 18 and 70.    STAT Lactic Acid, Reflex [[id number]]  (Normal) Collected: 09/02/24 1327    Specimen: Blood Updated: 09/02/24 1413     Lactate 1.8 mmol/L     CBC & Differential [[id number]]  (Abnormal) Collected: 09/02/24 1327    Specimen: Blood Updated: 09/02/24 1400    Narrative:      The following orders were created for panel order CBC & Differential.  Procedure                               Abnormality         Status                     ---------                               -----------         ------                     CBC Auto Differential[[id number]]        Abnormal            Final result                 Please view results for these tests on the individual orders.    CBC Auto Differential [[id number]]  (Abnormal) Collected: 09/02/24 1327    Specimen: Blood Updated: 09/02/24 1400     WBC 22.37 10*3/mm3      RBC 5.23 10*6/mm3      Hemoglobin 15.9 g/dL      Hematocrit 49.9 %      MCV 95.4 fL      MCH 30.4 pg      MCHC 31.9 g/dL      RDW 12.8 %      RDW-SD 45.1 fl      MPV 9.5 fL      Platelets 283 10*3/mm3      Neutrophil % 88.5 %      Lymphocyte % 3.5 %      Monocyte % 6.7 %      Eosinophil % 0.0 %      Basophil % 0.3 %      Immature Grans % 1.0 %      Neutrophils, Absolute 19.80 10*3/mm3      Lymphocytes, Absolute 0.78 10*3/mm3      Monocytes, Absolute 1.50 10*3/mm3      Eosinophils, Absolute 0.01 10*3/mm3      Basophils, Absolute 0.06 10*3/mm3      Immature Grans, Absolute 0.22 10*3/mm3      nRBC 0.0 /100 WBC     Lactic Acid, Plasma  [[id number]]  (Abnormal) Collected: 09/02/24 1022    Specimen: Blood from Arm, Right Updated: 09/02/24 1055     Lactate 2.1 mmol/L     Comprehensive Metabolic Panel [[id number]]  (Abnormal) Collected: 09/02/24 0858    Specimen: Blood Updated: 09/02/24 0927     Glucose 98 mg/dL      BUN 23 mg/dL      Creatinine 0.83 mg/dL      Sodium 146 mmol/L      Potassium 3.4 mmol/L      Comment: Slight hemolysis detected by analyzer. Result may be falsely elevated.        Chloride 99 mmol/L      CO2 32.0 mmol/L      Calcium 9.4 mg/dL      Total Protein 6.6 g/dL      Albumin 3.5 g/dL      ALT (SGPT) 63 U/L      AST (SGOT) 145 U/L      Comment: Slight hemolysis detected by analyzer. Result may be falsely elevated.        Alkaline Phosphatase 120 U/L      Total Bilirubin 1.5 mg/dL      Globulin 3.1 gm/dL      A/G Ratio 1.1 g/dL      BUN/Creatinine Ratio 27.7     Anion Gap 15.0 mmol/L      eGFR 91.3 mL/min/1.73     Narrative:      GFR Normal >60  Chronic Kidney Disease <60  Kidney Failure <15    The GFR formula is only valid for adults with stable renal function between ages 18 and 70.    Magnesium [[id number]]  (Normal) Collected: 09/02/24 0858    Specimen: Blood Updated: 09/02/24 0927     Magnesium 2.0 mg/dL     Single High Sensitivity Troponin T [[id number]]  (Abnormal) Collected: 09/02/24 0858    Specimen: Blood Updated: 09/02/24 0926     HS Troponin T 30 ng/L     Narrative:      High Sensitive Troponin T Reference Range:  <14.0 ng/L- Negative Female for AMI  <22.0 ng/L- Negative Male for AMI  >=14 - Abnormal Female indicating possible myocardial injury.  >=22 - Abnormal Male indicating possible myocardial injury.   Clinicians would have to utilize clinical acumen, EKG, Troponin, and serial changes to determine if it is an Acute Myocardial Infarction or myocardial injury due to an underlying chronic condition.         Protime-INR [[id number]]  (Normal) Collected: 09/02/24 0858    Specimen: Blood Updated: 09/02/24 0917     Protime  13.9 Seconds      INR 1.03    aPTT [[id number]]  (Normal) Collected: 09/02/24 0858    Specimen: Blood Updated: 09/02/24 0917     PTT 25.8 seconds     Urinalysis With Culture If Indicated - Indwelling Urethral Catheter [[id number]]  (Abnormal) Collected: 09/02/24 0848    Specimen: Urine from Indwelling Urethral Catheter Updated: 09/02/24 0914     Color, UA Dark Yellow     Appearance, UA Turbid     pH, UA 7.0     Specific Gravity, UA 1.022     Glucose, UA Negative     Ketones, UA 40 mg/dL (2+)     Bilirubin, UA Small (1+)     Blood, UA Large (3+)     Protein,  mg/dL (2+)     Leuk Esterase, UA Large (3+)     Nitrite, UA Positive     Urobilinogen, UA 1.0 E.U./dL    Narrative:      In absence of clinical symptoms, the presence of pyuria, bacteria, and/or nitrites on the urinalysis result does not correlate with infection.    Urinalysis, Microscopic Only - Indwelling Urethral Catheter [[id number]]  (Abnormal) Collected: 09/02/24 0848    Specimen: Urine from Indwelling Urethral Catheter Updated: 09/02/24 0914     RBC, UA 0-2 /HPF      WBC, UA Too Numerous to Count /HPF      Bacteria, UA 1+ /HPF      Squamous Epithelial Cells, UA 0-2 /HPF      Hyaline Casts, UA None Seen /LPF      Triple Phosphate Crystals, UA Large/3+ /HPF      Amorphous Crystals, UA Small/1+ /HPF      Methodology Manual Light Microscopy    CBC & Differential [[id number]]  (Abnormal) Collected: 09/02/24 0858    Specimen: Blood Updated: 09/02/24 0911    Narrative:      The following orders were created for panel order CBC & Differential.  Procedure                               Abnormality         Status                     ---------                               -----------         ------                     CBC Auto Differential[[id number]]        Abnormal            Final result                 Please view results for these tests on the individual orders.    CBC Auto Differential [[id number]]  (Abnormal) Collected: 09/02/24 0858    Specimen: Blood  "Updated: 09/02/24 0911     WBC 26.01 10*3/mm3      RBC 5.66 10*6/mm3      Hemoglobin 17.4 g/dL      Hematocrit 53.3 %      MCV 94.2 fL      MCH 30.7 pg      MCHC 32.6 g/dL      RDW 12.8 %      RDW-SD 44.5 fl      MPV 9.3 fL      Platelets 287 10*3/mm3      Neutrophil % 89.8 %      Lymphocyte % 2.7 %      Monocyte % 6.1 %      Eosinophil % 0.0 %      Basophil % 0.3 %      Immature Grans % 1.1 %      Neutrophils, Absolute 23.37 10*3/mm3      Lymphocytes, Absolute 0.71 10*3/mm3      Monocytes, Absolute 1.58 10*3/mm3      Eosinophils, Absolute 0.00 10*3/mm3      Basophils, Absolute 0.07 10*3/mm3      Immature Grans, Absolute 0.28 10*3/mm3      nRBC 0.0 /100 WBC           Hospital Course:  The patient is a 73 y.o. male who presented to [facility] with fall at home.  He was found down by his caregiver.  He was brought to the emergency room.  He was evaluated.  He was admitted to the hospital.  He was hydrated.  The patient demanded to go home the day following admission.  I instructed the patient that he was not appropriate for discharge.  Patient left AMA.    Physical Exam on Discharge:  /78 (BP Location: Right arm, Patient Position: Lying)   Pulse 106   Temp 98.4 °F (36.9 °C) (Oral)   Resp 16   Ht 172.7 cm (68\")   Wt 65.3 kg (143 lb 14.4 oz)   SpO2 95%   BMI 21.88 kg/m²   Physical Exam  Vitals and nursing note reviewed.   HENT:      Head: Normocephalic.      Right Ear: External ear normal.      Left Ear: External ear normal.      Nose: Nose normal.      Mouth/Throat:      Mouth: Mucous membranes are moist.   Eyes:      Extraocular Movements: Extraocular movements intact.      Pupils: Pupils are equal, round, and reactive to light.   Cardiovascular:      Rate and Rhythm: Normal rate and regular rhythm.      Pulses: Normal pulses.      Heart sounds: Normal heart sounds.   Pulmonary:      Effort: Pulmonary effort is normal.      Breath sounds: Normal breath sounds.   Abdominal:      General: Bowel " sounds are normal.      Palpations: Abdomen is soft.   Musculoskeletal:      Cervical back: No rigidity.      Comments: Moves all extremities.   Skin:     General: Skin is warm and dry.      Capillary Refill: Capillary refill takes less than 2 seconds.   Neurological:      General: No focal deficit present.      Mental Status: He is alert.      Comments: Oriented to person and place.  He can tell me he wants Lavonne to be the president but he cannot tell me Jay Jay's name.  He is unable to tell me the year.   Psychiatric:         Mood and Affect: Mood normal.           Condition on Discharge:   Stable, improving  Discharge Disposition:  Left Against Medical Advice    Discharge Medications:     Discharge Medications        ASK your doctor about these medications        Instructions Start Date   alfuzosin 10 MG 24 hr tablet  Commonly known as: UROXATRAL   1 tablet, Oral, Every Night at Bedtime      busPIRone 10 MG tablet  Commonly known as: BUSPAR   10 mg, Oral, 2 Times Daily      ciprofloxacin 0.3 % ophthalmic solution  Commonly known as: Ciloxan   1 drop, Both Eyes, 4 Times Daily      finasteride 5 MG tablet  Commonly known as: PROSCAR   1 tablet, Oral, Daily      fluticasone 50 MCG/ACT nasal spray  Commonly known as: FLONASE   2 sprays, Nasal, Daily      guaiFENesin 600 MG 12 hr tablet  Commonly known as: MUCINEX   1 tablet, Oral, 2 Times Daily      HYDROcodone-acetaminophen 5-325 MG per tablet  Commonly known as: Norco   1 tablet, Oral, Every 12 Hours PRN      ipratropium-albuterol  MCG/ACT inhaler  Commonly known as: COMBIVENT RESPIMAT   1 puff, Inhalation      levothyroxine 100 MCG tablet  Commonly known as: SYNTHROID, LEVOTHROID   100 mcg, Oral, Daily      lisinopril-hydrochlorothiazide 10-12.5 MG per tablet  Commonly known as: PRINZIDE,ZESTORETIC   1 tablet, Oral, Daily      loratadine 10 MG tablet  Commonly known as: Claritin   10 mg, Oral, Daily      naloxone 4 MG/0.1ML nasal spray  Commonly known as:  NARCAN   1 spray, Nasal, As Needed      probiotic capsule capsule   1 capsule, Oral      tiZANidine 4 MG tablet  Commonly known as: ZANAFLEX   TAKE ONE TABLET THREE TIMES A DAY AS NEEDED      venlafaxine  MG 24 hr capsule  Commonly known as: EFFEXOR-XR   150 mg, Oral, Daily      Vitamin D (Ergocalciferol) 16920 units capsule   50,000 Units, Oral             Discharge Diet: Patient left AMA  Activity at Discharge: Patient left AMA  Follow-up Appointments:   Future Appointments   Date Time Provider Department Center   3/6/2025  7:00 AM [facility] CART 1  [facility]   3/6/2025  8:00 AM PAD US [facility] CART 1  [facility]   3/6/2025  9:15 AM Mara Albright APRN [facility]       Test Results Pending at Discharge: None    Cortney Pena DO  09/04/24  17:15 CDT    Time: 35 minutes.

## 2024-09-04 NOTE — THERAPY DISCHARGE NOTE
Acute Care - Physical Therapy Discharge Summary  [facility]       Patient Name: Jagdish Cook  : 1950  MRN: [id number]    Today's Date: 2024                 Admit Date: 2024      PT Recommendation and Plan    Visit Dx:    ICD-10-CM ICD-9-CM   1. Other specified sepsis  A41.89 038.8     995.91   2. Fall on same level, initial encounter  W18.30XA E888.9   3. Hematoma of left parietal scalp, initial encounter  S00.03XA 920   4. Sprain of left shoulder, unspecified shoulder sprain type, initial encounter  S43.402A 840.9   5. Impaired mobility [Z74.09]  Z74.09 799.89        Outcome Measures       Row Name 24 1500             How much help from another is currently needed...    Putting on and taking off regular lower body clothing? 1  -EC      Bathing (including washing, rinsing, and drying) 2  -EC      Toileting (which includes using toilet bed pan or urinal) 2  -EC      Putting on and taking off regular upper body clothing 2  -EC      Taking care of personal grooming (such as brushing teeth) 2  -EC      Eating meals 3  -EC      AM-PAC 6 Clicks Score (OT) 12  -EC         Functional Assessment    Outcome Measure Options AM-PAC 6 Clicks Daily Activity (OT)  -EC                User Key  (r) = Recorded By, (t) = Taken By, (c) = Cosigned By      Initials Name Provider Type    EC Jennifer Lopez, OTR/L Occupational Therapist                         PT Rehab Goals       Row Name 24 1538             Bed Mobility Goal 1 (PT)    Activity/Assistive Device (Bed Mobility Goal 1, PT) rolling to left;rolling to right;sit to supine;supine to sit  -      [place] Level/Cues Needed (Bed Mobility Goal 1, PT) supervision required  -      Time Frame (Bed Mobility Goal 1, PT) long term goal (LTG);10 days  -      Progress/Outcomes (Bed Mobility Goal 1, PT) goal not met  -         Transfer Goal 1 (PT)    Activity/Assistive Device (Transfer Goal 1, PT)  sit-to-stand/stand-to-sit;bed-to-chair/chair-to-bed;wheelchair transfer;toilet  -      [place] Level/Cues Needed (Transfer Goal 1, PT) standby assist;supervision required  -      Time Frame (Transfer Goal 1, PT) long term goal (LTG);10 days  -      Progress/Outcome (Transfer Goal 1, PT) goal not met  -         ROM Goal 1 (PT)    ROM Goal 1 (PT) Pt will demo knee extension AROM and PROM to >50% as needed for increased muscle length and perform safe t/fers.  -      Time Frame (ROM Goal 1, PT) long-term goal (LTG);10 days  -      Strategies/Barriers (ROM Goal 1, PT) chronic B knee flexion contractures  -      Progress/Outcome (ROM Goal 1, PT) goal not met  -                User Key  (r) = Recorded By, (t) = Taken By, (c) = Cosigned By      Initials Name Provider Type [facility] Jade Banda PTA Physical Therapist Assistant PT                        PT Discharge Summary  Reason for Discharge: other (comment) (left AMA)  Outcomes Achieved: Refer to plan of care for updates on goals achieved  Discharge Destination: other (comment) (left AMA)      Jade Banda PTA   9/4/2024

## 2024-09-07 LAB
BACTERIA SPEC AEROBE CULT: NORMAL
BACTERIA SPEC AEROBE CULT: NORMAL

## 2024-09-16 PROBLEM — N17.9 AKI (ACUTE KIDNEY INJURY): Status: ACTIVE | Noted: 2024-09-16

## 2024-09-16 PROBLEM — R57.9 SHOCK: Status: ACTIVE | Noted: 2024-01-01

## 2024-09-16 NOTE — ED NOTES
Removed patients chronic ewing catheter upon assessment head of the penis is necrotic and bleeding with yellow drainage coming from the penis. Patient was verbalizing pain upon d/c of catheter. Cleaned the penis and replaced with a 16Fr catheter urine returned. Patient also has wound on the left side of his hip. The left hip also had necrosis and bleeding.. Pictures of Penis and Left Hip Can be accessed in the chart. Provider made aware of wounds.

## 2024-09-16 NOTE — ED PROVIDER NOTES
Subjective   History of Present Illness  Cannot get a history of the patient is confused with altered mentation.  Per the EMS was shorter but tachycardic with altered mental status above from the nursing home.    History provided by:  EMS personnel  History limited by:  Mental status change and acuity of condition  Altered Mental Status  Presenting symptoms: confusion and lethargy    Severity:  Severe  Most recent episode:  Yesterday  Timing:  Constant  Progression:  Worsening  Chronicity:  New  Context: dementia, nursing home resident, recent change in medication, recent illness and recent infection    Context: not drug use and not homeless    Associated symptoms: agitation    Associated symptoms: no abdominal pain, no bladder incontinence, no decreased appetite, no headaches and no light-headedness        Review of Systems   Unable to perform ROS: Mental status change   Constitutional:  Negative for decreased appetite.   Gastrointestinal:  Negative for abdominal pain.   Genitourinary:  Negative for bladder incontinence.   Neurological:  Negative for light-headedness and headaches.   Psychiatric/Behavioral:  Positive for agitation and confusion.        Past Medical History:   Diagnosis Date    Bacteremia     Chronic back pain     COPD (chronic obstructive pulmonary disease)     Mario catheter in place     GERD (gastroesophageal reflux disease)     Memory deficits     Neuropathy     Umbilical hernia     Urinary retention        Allergies   Allergen Reactions    Meloxicam Hives     Dizzy, skin crawls    Tamsulosin Dizziness     excessive sweating    Penicillins        Past Surgical History:   Procedure Laterality Date    BACK SURGERY      HERNIA REPAIR         Family History   Problem Relation Age of Onset    No Known Problems Father     No Known Problems Mother        Social History     Socioeconomic History    Marital status: Single   Tobacco Use    Smoking status: Every Day     Types: Cigars     Passive exposure:  Current    Smokeless tobacco: Never   Vaping Use    Vaping status: Never Used   Substance and Sexual Activity    Alcohol use: No    Drug use: Defer    Sexual activity: Defer           Objective   Physical Exam  Vitals and nursing note reviewed. Exam conducted with a chaperone present.   Constitutional:       General: He is not in acute distress.     Appearance: He is underweight. He is ill-appearing and toxic-appearing.      Comments: Toxic appearing   HENT:      Head: Normocephalic and atraumatic.      Nose: Nose normal.      Mouth/Throat:      Mouth: Mucous membranes are moist.      Pharynx: Uvula midline.   Eyes:      General: Lids are normal. Lids are everted, no foreign bodies appreciated.      Conjunctiva/sclera: Conjunctivae normal.      Pupils: Pupils are equal, round, and reactive to light.   Neck:      Vascular: Normal carotid pulses. No carotid bruit or JVD.      Trachea: Trachea and phonation normal. No tracheal deviation.      Meningeal: Brudzinski's sign and Kernig's sign absent.   Cardiovascular:      Rate and Rhythm: Normal rate. Tachycardia present.      Chest Wall: PMI is not displaced.      Pulses: Normal pulses.      Heart sounds: Normal heart sounds.      No gallop.   Pulmonary:      Effort: Pulmonary effort is normal. Tachypnea and respiratory distress present. No accessory muscle usage.      Breath sounds: Normal breath sounds. No stridor. Examination of the right-lower field reveals decreased breath sounds and wheezing. Examination of the left-lower field reveals decreased breath sounds and wheezing. No decreased breath sounds, wheezing, rhonchi or rales.   Abdominal:      General: Bowel sounds are normal. There is no distension.      Palpations: Abdomen is soft.      Tenderness: There is generalized no abdominal tenderness. There is no right CVA tenderness, left CVA tenderness, guarding or rebound.      Comments: Mario catheter   Musculoskeletal:         General: No swelling. Normal range of  motion.      Cervical back: Full passive range of motion without pain, normal range of motion and neck supple. No rigidity.      Right lower leg: No edema.      Left lower leg: No edema.      Comments: Lower extremity exam bilaterally is unremarkable.  There is no right or left calf tenderness .  There is no palpable venous cord.  No obvious difference in the size of the legs.  No pitting edema.  The dorsalis pedis and posterior tibial femoral and popliteal pulses are palpable and +2 bilaterally.  Homans sign is negative   Skin:     General: Skin is warm and dry.      Capillary Refill: Capillary refill takes less than 2 seconds.      Coloration: Skin is not jaundiced or pale.      Nails: There is no clubbing.   Neurological:      General: No focal deficit present.      Mental Status: He is lethargic, disoriented and confused.      GCS: GCS eye subscore is 3. GCS verbal subscore is 4. GCS motor subscore is 5.      Cranial Nerves: Cranial nerves 2-12 are intact. No cranial nerve deficit or facial asymmetry.      Motor: Motor function is intact. No weakness.      Deep Tendon Reflexes: Reflexes are normal and symmetric. Reflexes normal.   Psychiatric:         Speech: Speech normal.         Behavior: Behavior normal. Behavior is uncooperative.         Central Line At Bedside    Date/Time: 9/16/2024 2:07 PM    Performed by: Ronald Alcaraz MD  Authorized by: Ronald Alcaraz MD    Consent:     Consent obtained:  Emergent situation    Consent given by:  Patient    Risks discussed:  Arterial puncture, bleeding, infection, incorrect placement, nerve damage and pneumothorax    Alternatives discussed:  Delayed treatment  Universal protocol:     Patient identity confirmed:  Hospital-assigned identification number  Pre-procedure details:     Indication(s): central venous access and hemodynamic monitoring      Hand hygiene: Hand hygiene performed prior to insertion      Sterile barrier technique: All elements of maximal sterile  technique followed      Skin preparation:  Chlorhexidine  Anesthesia:     Anesthesia method:  Local infiltration    Local anesthetic:  Lidocaine 1% w/o epi  Procedure details:     Location:  R internal jugular    Patient position:  Supine    Procedural supplies:  Triple lumen    Catheter size:  9 Fr    Landmarks identified: yes      Ultrasound guidance: yes      Ultrasound guidance timing: prior to insertion and real time      Sterile ultrasound techniques: Sterile gel and sterile probe covers were used      Number of attempts:  1    Successful placement: yes    Post-procedure details:     Post-procedure:  Dressing applied    Assessment:  Blood return through all ports, free fluid flow, no pneumothorax on x-ray and placement verified by x-ray    Procedure completion:  Tolerated  Critical Care    Performed by: Ronald Alcaraz MD  Authorized by: Ronald Alcaraz MD    Critical care provider statement:     Critical care time (minutes):  55    Critical care time was exclusive of:  Separately billable procedures and treating other patients    Critical care was necessary to treat or prevent imminent or life-threatening deterioration of the following conditions:  Circulatory failure, shock and sepsis    Critical care was time spent personally by me on the following activities:  Blood draw for specimens, discussions with consultants, evaluation of patient's response to treatment, examination of patient, re-evaluation of patient's condition, pulse oximetry, ordering and review of radiographic studies and review of old charts             ED Course  ED Course as of 09/16/24 1601   Mon Sep 16, 2024   1337 Large decubitus ulcer on the left hip [TS]   1421 Elevated troponin to tachycardia and hypotension and sepsis [TS]   1428 Clinically the patient appears dry with poor skin turgor poor cap refill no.  Edema and no rales or JVD. [TS]   1429 While accessing the central line in the right IJ vein Collapsing he did not appear to be  distended [TS]   1556 Patient obviously sick came to the ED with tachycardia hypotension hypoxia on a nonrebreather.  Will start titrating his oxygen if he can otherwise placed on Vapotherm.  He was given vancomycin and cefepime in the ED dexamethasone was given for sepsis.  And a sepsis fluid bolus was given and the patient was placed on Levophed.  He is done remarkably well with that his heart rates come down to 94 and his blood pressure is up 210 systolic.  He is mentating normally at this time at his baseline.  Cap refill is still diminished.  Will be admitted to the medicine service intensivist for further evaluation assessment. [TS]      ED Course User Index  [TS] Ronald Alcaraz MD                                             Medical Decision Making  Differential Diagnosis:  I considered toxic-metabolic etiology, hypoglycemia, hyperglycemia, diabetic ketoacidosis, drug overdose, ethanol intoxication, thiamine deficiency, hypothermia, hyponatremia, hypernatremia, organ failure, liver failure, kidney failure, thyroid failure, adrenal failure, hypoxia, hypercarbia, ischemic stroke, intracranial bleed, subarachnoid hemorrhage, closed head injury, subdural hematoma, seizure activity, syncopal episode, infectious etiology, hypertensive encephalopathy, vasculitis, thrombotic thrombocytopenic purpura and disseminated intravascular coagulation as a possible cause of altered mental status in this patient. This is a partial list of diagnoses considered.             Problems Addressed:  Acute kidney injury: acute illness or injury  Metabolic acidosis: acute illness or injury  Pneumonia of left lung due to infectious organism, unspecified part of lung: acute illness or injury  Shock: complicated acute illness or injury  Skin infection: complicated acute illness or injury that poses a threat to life or bodily functions    Amount and/or Complexity of Data Reviewed  Labs: ordered.     Details: Labs reviewed  Radiology:  ordered.     Details: X-rays reviewed  ECG/medicine tests: ordered.     Details: Tachycardia underlying right bundle branch block  Discussion of management or test interpretation with external provider(s): Discussed with intensivist    Risk  Prescription drug management.  Decision regarding hospitalization.  Risk Details: Patient will be admitted to intensive care unit.  qSOFA 3 high risk  Curb 65  score 4 points high risk for          Final diagnoses:   Shock   Acute kidney injury   Pneumonia of left lung due to infectious organism, unspecified part of lung   Metabolic acidosis   Skin infection       ED Disposition  ED Disposition       ED Disposition   Decision to Admit    Condition   --    Comment   Level of Care: Critical Care [6]   Diagnosis: Shock [[id number]]   Admitting Physician: MIKE BASILIO [[id number]]   Attending Physician: MIKE BASILIO [[id number]]   Certification: I Certify That Inpatient Hospital Services Are Medically Necessary For Greater Than 2 Midnights                 No follow-up provider specified.       Medication List      No changes were made to your prescriptions during this visit.            Ronald Alcaraz MD  09/16/24 1328       Ronald Alcaraz MD  09/16/24 1459       Ronald Alcaraz MD  09/16/24 1601

## 2024-09-17 NOTE — PLAN OF CARE
Goal Outcome Evaluation:  Plan of Care Reviewed With: caregiver        Progress: improving  Outcome Evaluation: RDN assessment completed. Pt admitted from SNF for altered mental status. Multiple wounds noted. Large wound to left hip. NPO diet. Recommend oral supplement and MVI w/ minerals,zinc and vitamin C when po diet advanced. Cont to follow for plan of care.

## 2024-09-17 NOTE — PROGRESS NOTES
"Pharmacy Dosing Service  Antimicrobial Dosing  Cefepime    Assessment/Action/Plan:  Based on indication and renal function, continue Cefepime 2000 mg IV every 24 hours. Pharmacy will continue to monitor daily and make further adjustment(s) accordingly.     Subjective:  Jagdish Cook is a 73 y.o. male with a  \"Pharmacy to Dose Cefepime\" consult for the treatment of sepsis , day 1 of 5 of treatment.    Objective:  Ht: 175.3 cm (69\"); Wt: 63.5 kg (140 lb)  Estimated Creatinine Clearance: 18.6 mL/min (A) (by C-G formula based on SCr of 3.08 mg/dL (H)).   Creatinine   Date Value Ref Range Status   09/16/2024 3.08 (H) 0.76 - 1.27 mg/dL Final   09/03/2024 0.63 (L) 0.76 - 1.27 mg/dL Final   09/02/2024 0.68 (L) 0.76 - 1.27 mg/dL Final   07/31/2024 0.9 0.5 - 1.2 mg/dL Final   08/25/2019 0.6 0.5 - 1.2 mg/dL Final   05/16/2019 0.6 0.5 - 1.2 mg/dL Final      Lab Results   Component Value Date    WBC 11.22 (H) 09/16/2024    WBC 15.25 (H) 09/03/2024    WBC 22.37 (H) 09/02/2024      Baseline culture results:  Microbiology Results (last 10 days)       Procedure Component Value - Date/Time    Respiratory Panel PCR w/COVID-19(SARS-CoV-2) ZEESHAN/DANIELLE/KYLAH/PAD/COR/SHYANN In-House, NP Swab in [facility] 2 HR TAT - Swab, Nasopharynx [[id number]]  (Normal) Collected: 09/16/24 1343    Lab Status: Final result Specimen: Swab from Nasopharynx Updated: 09/16/24 1446     ADENOVIRUS, PCR Not Detected     Coronavirus 229E Not Detected     Coronavirus HKU1 Not Detected     Coronavirus NL63 Not Detected     Coronavirus OC43 Not Detected     COVID19 Not Detected     Human Metapneumovirus Not Detected     Human Rhinovirus/Enterovirus Not Detected     Influenza A PCR Not Detected     Influenza B PCR Not Detected     Parainfluenza Virus 1 Not Detected     Parainfluenza Virus 2 Not Detected     Parainfluenza Virus 3 Not Detected     Parainfluenza Virus 4 Not Detected     RSV, PCR Not Detected     Bordetella pertussis pcr Not Detected     Bordetella " parapertussis PCR Not Detected     Chlamydophila pneumoniae PCR Not Detected     Mycoplasma pneumo by PCR Not Detected    Narrative:      In the setting of a positive respiratory panel with a viral infection PLUS a negative procalcitonin without other underlying concern for bacterial infection, consider observing off antibiotics or discontinuation of antibiotics and continue supportive care. If the respiratory panel is positive for atypical bacterial infection (Bordetella pertussis, Chlamydophila pneumoniae, or Mycoplasma pneumoniae), consider antibiotic de-escalation to target atypical bacterial infection.            Rey Obregon, PharmD  09/16/24 20:34 CDT

## 2024-09-17 NOTE — CASE MANAGEMENT/SOCIAL WORK
Discharge Planning Assessment  [facility]     Patient Name: Jagdish Cook  MRN: [id number]  Today's Date: 9/17/2024    Admit Date: 9/16/2024        Discharge Needs Assessment       Row Name 09/17/24 0956       Living Environment    People in Home facility resident    Name(s) of People in Home [facility]    Current Living Arrangements extended care facility    Potentially Unsafe Housing Conditions none    In the past 12 months has the electric, gas, oil, or water company threatened to shut off services in your home? No    Family Caregiver if Needed none    Quality of Family Relationships other (see comments)  Only blood relative is patient's brother that resides in [place].    Able to Return to Prior Arrangements yes       Resource/Environmental Concerns    Resource/Environmental Concerns none    Transportation Concerns none       Transition Planning    Patient/Family Anticipates Transition to long-term care facility    Patient/Family Anticipated Services at Transition skilled nursing    Transportation Anticipated health plan transportation       Discharge Needs Assessment    Readmission Within the Last 30 Days other (see comments)    Current Outpatient/Agency/Support Group skilled nursing facility    Concerns to be Addressed care coordination/care conferences;discharge planning    Outpatient/Agency/Support Group Needs skilled nursing facility    Discharge Facility/Level of Care Needs nursing facility, skilled    Current Discharge Risk chronically ill;substance use/abuse    Discharge Coordination/Progress Patient is currently residing at [facility] [phone].  Patient has a bed hold and plans to return to this facility upon discharge.  FANTA spoke with patient's brother, Jitendra Cook [phone], who advised he is patient's only blood relative.  SW following and will assist with disposition.                   Discharge Plan    No documentation.                 Continued Care and  Services - Admitted Since 9/16/2024    No active coordination exists for this encounter.          Demographic Summary    No documentation.                  Functional Status    No documentation.                  Psychosocial    No documentation.                  Abuse/Neglect    No documentation.                  Legal    No documentation.                  Substance Abuse    No documentation.                  Patient Forms    No documentation.                     [facility]

## 2024-09-17 NOTE — CONSULTS
[facility]  INPATIENT WOUND & OSTOMY CONSULTATION    Today's Date: 09/17/24    Patient Name: Jagdish Cook  MRN: [id number]  CSN: [id number]  PCP: Aidan Shields MD  Referring Provider:   Consulting Provider (From admission, onward)      Start Ordered     Status Ordering Provider    09/16/24 2021 09/16/24 2020  Inpatient Wound Care MD Consult  Once        Specialty:  Wound Care  Provider:  Yudelka Gonzalez APRN    Acknowledged MIKE BASILIO    09/16/24 2021 09/16/24 2020  Inpatient Wound Care MD Consult  Once        Specialty:  Wound Care  Provider:  Yudelka Gonzalez APRN    Acknowledged MIKE BASILIO    09/16/24 2020 09/16/24 2020  Inpatient Wound Care MD Consult  Once        Specialty:  Wound Care  Provider:  Yudelka Gonzalez APRN    Acknowledged MIKE BASILIO    09/16/24 2016 09/16/24 2020  Inpatient Wound Care MD Consult  Once        Specialty:  Wound Care  Provider:  Yudelka Gonzalez APRN    Acknowledged MIKE BASILIO           Attending Provider: Mike Basilio MD  Length of Stay: 1    SUBJECTIVE   Chief Complaint: ***    HPI: Jagdish Cook, a 73 y.o.male, presents with a past medical history of ***.  A full past medical history is listed below.  Inpatient wound care consulted due to ***      Visit Dx:    ICD-10-CM ICD-9-CM   1. Shock  R57.9 785.50   2. Acute kidney injury  N17.9 584.9   3. Pneumonia of left lung due to infectious organism, unspecified part of lung  J18.9 486   4. Metabolic acidosis  E87.20 276.2   5. Skin infection  L08.9 686.9       Hospital Problem List:     Shock    Chronic indwelling Mario catheter    Urinary tract infection associated with indwelling urethral catheter    RYNE (acute kidney injury)      History:   Past Medical History:   Diagnosis Date    Bacteremia     Chronic back pain     COPD (chronic obstructive pulmonary disease)     Mario catheter in place     GERD (gastroesophageal reflux disease)     Memory deficits      Neuropathy     Umbilical hernia     Urinary retention      Past Surgical History:   Procedure Laterality Date    BACK SURGERY      HERNIA REPAIR       Social History     Socioeconomic History    Marital status: Single   Tobacco Use    Smoking status: Every Day     Types: Cigars     Passive exposure: Current    Smokeless tobacco: Never   Vaping Use    Vaping status: Never Used   Substance and Sexual Activity    Alcohol use: No    Drug use: Defer    Sexual activity: Defer     Family History   Problem Relation Age of Onset    No Known Problems Father     No Known Problems Mother        Allergies:  Allergies   Allergen Reactions    Meloxicam Hives     Dizzy, skin crawls    Tamsulosin Dizziness     excessive sweating    Penicillins        Medications:    Current Facility-Administered Medications:     acetaminophen (TYLENOL) tablet 650 mg, 650 mg, Oral, Q4H PRN **OR** acetaminophen (TYLENOL) suppository 650 mg, 650 mg, Rectal, Q4H PRN, Kristen Sánchez MD    sennosides-docusate (PERICOLACE) 8.6-50 MG per tablet 2 tablet, 2 tablet, Oral, BID, 2 tablet at 09/17/24 0828 **AND** polyethylene glycol (MIRALAX) packet 17 g, 17 g, Oral, Daily PRN **AND** bisacodyl (DULCOLAX) EC tablet 5 mg, 5 mg, Oral, Daily PRN **AND** bisacodyl (DULCOLAX) suppository 10 mg, 10 mg, Rectal, Daily PRN, Kristen Sánchez MD    Calcium Replacement - Follow Nurse / BPA Driven Protocol, , Does not apply, PRN, Kristen Sánchez MD    cefepime 2000 mg IVPB in 100 mL NS (MBP), 2,000 mg, Intravenous, Q24H, Otilio Velázquez APRN    Chlorhexidine Gluconate Cloth 2 % pads 1 Application, 1 Application, Topical, Q24H, Kristen Sánchez MD, 1 Application at 09/17/24 0410    collagenase ointment 1 Application, 1 Application, Topical, Q12H, Yudelka Gonzalez APRN    heparin (porcine) 5000 UNIT/ML injection 5,000 Units, 5,000 Units, Subcutaneous, Q8H, Kristen Sánchez MD, 5,000 Units at 09/17/24 0607    lactated ringers infusion, 125 mL/hr,  Intravenous, Continuous, Kristen Sánchez MD, Last Rate: 125 mL/hr at 09/17/24 0559, 125 mL/hr at 09/17/24 0559    lidocaine (LMX) 4 % cream 1 Application, 1 Application, Topical, Once, Yudelka Gonzalez APRN    Magnesium Standard Dose Replacement - Follow Nurse / BPA Driven Protocol, , Does not apply, PRN, Kristen Sánchez MD    mupirocin (BACTROBAN) 2 % nasal ointment 1 Application, 1 Application, Each Nare, BID, Kristen Sánchez MD, 1 Application at 09/17/24 0828    nitroglycerin (NITROSTAT) SL tablet 0.4 mg, 0.4 mg, Sublingual, Q5 Min PRN, Kristen Sánchez MD    norepinephrine (LEVOPHED) 8 mg in 250 mL NS infusion (premix), 0.02-0.3 mcg/kg/min, Intravenous, Titrated, Ronald Alcaraz MD, Stopped at 09/17/24 0829    ondansetron (ZOFRAN) injection 4 mg, 4 mg, Intravenous, Q6H PRN, Kristen Sánchez MD    Pharmacy to Dose Cefepime, , Does not apply, Continuous PRN, Otilio Velázquez APRN    Phosphorus Replacement - Follow Nurse / BPA Driven Protocol, , Does not apply, PRN, Kristen Sánchez MD    Potassium Replacement - Follow Nurse / BPA Driven Protocol, , Does not apply, PRN, Kristen Sánchez MD    sodium chloride 0.9 % flush 10 mL, 10 mL, Intravenous, Q12H, Kristen Sánchez MD, 10 mL at 09/16/24 2049    sodium chloride 0.9 % flush 10 mL, 10 mL, Intravenous, PRN, Kristen Sánchez MD    sodium chloride 0.9 % infusion 40 mL, 40 mL, Intravenous, PRN, Kristen Sánchez MD    sodium hypochlorite (DAKIN'S 1/4 STRENGTH) 0.125 % topical solution 0.125% solution, , Topical, Q12H, Yudelka Gonzalez APRN    vancomycin (VANCOCIN) 500 mg IVPB in 100 mL NS (MBP), 500 mg, Intravenous, Q24H, Otilio Velázquez, APRN, Last Rate: 200 mL/hr at 09/16/24 2143, 500 mg at 09/16/24 2143    OBJECTIVE     Vitals:    09/17/24 1000   BP:    Pulse: 93   Resp: 18   Temp:    SpO2: 96%       PHYSICAL EXAM: ***  Physical Exam                                                                      Post debridement pic:         Results Review:  Lab Results (last 48 hours)       Procedure Component Value Units Date/Time    Urine Culture - Urine, Indwelling Urethral Catheter [[id number]]  (Normal) Collected: 09/16/24 1437    Specimen: Urine from Indwelling Urethral Catheter Updated: 09/17/24 0944     Urine Culture Growth present, too young to evaluate    Magnesium [[id number]]  (Normal) Collected: 09/17/24 0405    Specimen: Blood Updated: 09/17/24 0503     Magnesium 2.0 mg/dL     Comprehensive Metabolic Panel [[id number]]  (Abnormal) Collected: 09/17/24 0405    Specimen: Blood Updated: 09/17/24 0503     Glucose 117 mg/dL      BUN 56 mg/dL      Creatinine 1.89 mg/dL      Sodium 148 mmol/L      Potassium 3.7 mmol/L      Comment: Specimen hemolyzed.  Result may be falsely elevated.        Chloride 111 mmol/L      CO2 24.0 mmol/L      Calcium 8.6 mg/dL      Total Protein 5.1 g/dL      Albumin 2.1 g/dL      ALT (SGPT) 23 U/L      Comment: Specimen hemolyzed.  Result may  be falsely elevated.        AST (SGOT) 44 U/L      Comment: Specimen hemolyzed.  Result may be falsely elevated.        Alkaline Phosphatase 103 U/L      Total Bilirubin 0.5 mg/dL      Globulin 3.0 gm/dL      A/G Ratio 0.7 g/dL      BUN/Creatinine Ratio 29.6     Anion Gap 13.0 mmol/L      eGFR 36.6 mL/min/1.73     Narrative:      GFR Normal >60  Chronic Kidney Disease <60  Kidney Failure <15    The GFR formula is only valid for adults with stable renal function between ages 18 and 70.    Phosphorus [[id number]]  (Normal) Collected: 09/17/24 0405    Specimen: Blood Updated: 09/17/24 0441     Phosphorus 4.2 mg/dL     CBC & Differential [[id number]]  (Abnormal) Collected: 09/17/24 0405    Specimen: Blood Updated: 09/17/24 0428    Narrative:      The following orders were created for panel order CBC & Differential.  Procedure                               Abnormality         Status                     ---------                               -----------         ------                      CBC Auto Differential[[id number]]        Abnormal            Final result                 Please view results for these tests on the individual orders.    CBC Auto Differential [[id number]]  (Abnormal) Collected: 09/17/24 0405    Specimen: Blood Updated: 09/17/24 0428     WBC 13.15 10*3/mm3      RBC 3.87 10*6/mm3      Hemoglobin 12.0 g/dL      Hematocrit 37.3 %      MCV 96.4 fL      MCH 31.0 pg      MCHC 32.2 g/dL      RDW 13.9 %      RDW-SD 49.7 fl      MPV 10.0 fL      Platelets 241 10*3/mm3      Neutrophil % 88.6 %      Lymphocyte % 3.0 %      Monocyte % 4.9 %      Eosinophil % 0.0 %      Basophil % 0.3 %      Immature Grans % 3.2 %      Neutrophils, Absolute 11.65 10*3/mm3      Lymphocytes, Absolute 0.39 10*3/mm3      Monocytes, Absolute 0.65 10*3/mm3      Eosinophils, Absolute 0.00 10*3/mm3      Basophils, Absolute 0.04 10*3/mm3      Immature Grans, Absolute 0.42 10*3/mm3     MRSA Screen, PCR (Inpatient) - Swab, Nares [[id number]]  (Normal) Collected: 09/16/24 2157    Specimen: Swab from Nares Updated: 09/16/24 2317     MRSA PCR No MRSA Detected    Narrative:      The negative predictive value of this diagnostic test is high and should only be used to consider de-escalating anti-MRSA therapy. A positive result may indicate colonization with MRSA and must be correlated clinically.    CK [[id number]]  (Abnormal) Collected: 09/16/24 2056    Specimen: Blood Updated: 09/16/24 2239     Creatine Kinase 386 U/L     Magnesium [[id number]]  (Normal) Collected: 09/16/24 2056    Specimen: Blood Updated: 09/16/24 2156     Magnesium 2.1 mg/dL     Phosphorus [[id number]]  (Normal) Collected: 09/16/24 2056    Specimen: Blood Updated: 09/16/24 2156     Phosphorus 4.5 mg/dL     CBC & Differential [[id number]]  (Abnormal) Collected: 09/16/24 2056    Specimen: Blood Updated: 09/16/24 2143    Narrative:      The following orders were created for panel order CBC & Differential.  Procedure                               Abnormality          Status                     ---------                               -----------         ------                     CBC Auto Differential[[id number]]        Abnormal            Final result                 Please view results for these tests on the individual orders.    CBC Auto Differential [[id number]]  (Abnormal) Collected: 09/16/24 2056    Specimen: Blood Updated: 09/16/24 2143     WBC 15.16 10*3/mm3      RBC 4.04 10*6/mm3      Hemoglobin 12.6 g/dL      Hematocrit 38.2 %      MCV 94.6 fL      MCH 31.2 pg      MCHC 33.0 g/dL      RDW 14.0 %      RDW-SD 48.8 fl      MPV 9.8 fL      Platelets 254 10*3/mm3      Neutrophil % 91.0 %      Lymphocyte % 1.8 %      Monocyte % 3.7 %      Eosinophil % 0.0 %      Basophil % 0.3 %      Immature Grans % 3.2 %      Neutrophils, Absolute 13.80 10*3/mm3      Lymphocytes, Absolute 0.27 10*3/mm3      Monocytes, Absolute 0.56 10*3/mm3      Eosinophils, Absolute 0.00 10*3/mm3      Basophils, Absolute 0.04 10*3/mm3      Immature Grans, Absolute 0.49 10*3/mm3     High Sensitivity Troponin T 2Hr [[id number]]  (Abnormal) Collected: 09/16/24 1617    Specimen: Blood Updated: 09/16/24 1655     HS Troponin T 219 ng/L      Troponin T Delta -89 ng/L     Narrative:      High Sensitive Troponin T Reference Range:  <14.0 ng/L- Negative Female for AMI  <22.0 ng/L- Negative Male for AMI  >=14 - Abnormal Female indicating possible myocardial injury.  >=22 - Abnormal Male indicating possible myocardial injury.   Clinicians would have to utilize clinical acumen, EKG, Troponin, and serial changes to determine if it is an Acute Myocardial Infarction or myocardial injury due to an underlying chronic condition.         STAT Lactic Acid, Reflex [[id number]]  (Normal) Collected: 09/16/24 1617    Specimen: Blood Updated: 09/16/24 1647     Lactate 1.4 mmol/L     Urinalysis With Culture If Indicated - Indwelling Urethral Catheter [[id number]]  (Abnormal) Collected: 09/16/24 1437    Specimen: Urine from Indwelling  Urethral Catheter Updated: 09/16/24 1502     Color, UA Yellow     Appearance, UA Cloudy     pH, UA <=5.0     Specific Gravity, UA 1.020     Glucose, UA Negative     Ketones, UA 15 mg/dL (1+)     Bilirubin, UA Large (3+)     Blood, UA Large (3+)     Protein,  mg/dL (2+)     Leuk Esterase, UA Small (1+)     Nitrite, UA Negative     Urobilinogen, UA 2.0 E.U./dL    Narrative:      In absence of clinical symptoms, the presence of pyuria, bacteria, and/or nitrites on the urinalysis result does not correlate with infection.    Urinalysis, Microscopic Only - Indwelling Urethral Catheter [[id number]]  (Abnormal) Collected: 09/16/24 1437    Specimen: Urine from Indwelling Urethral Catheter Updated: 09/16/24 1502     RBC, UA 6-10 /HPF      WBC, UA 6-10 /HPF      Bacteria, UA 1+ /HPF      Squamous Epithelial Cells, UA None Seen /HPF      Transitional Epithelial Cells, UA 3-6 /HPF      Hyaline Casts, UA 7-12 /LPF      Amorphous Crystals, UA Small/1+ /HPF      Methodology Manual Light Microscopy    Blood Gas, Arterial With Co-Ox [[id number]]  (Abnormal) Collected: 09/16/24 1502    Specimen: Arterial Blood Updated: 09/16/24 1502     Site Right Radial     Darryl's Test Positive     pH, Arterial 7.309 pH units      Comment: 84 Value below reference range        pCO2, Arterial 47.1 mm Hg      Comment: 83 Value above reference range        pO2, Arterial 66.4 mm Hg      Comment: 84 Value below reference range        HCO3, Arterial 23.6 mmol/L      Base Excess, Arterial -2.9 mmol/L      Comment: 84 Value below reference range        O2 Saturation, Arterial 90.8 %      Comment: 84 Value below reference range        Hemoglobin, Blood Gas 12.3 g/dL      Comment: 84 Value below reference range        Hematocrit, Blood Gas 37.8 %      Comment: 84 Value below reference range        Oxyhemoglobin 89.4 %      Comment: 84 Value below reference range        Methemoglobin 0.50 %      Carboxyhemoglobin 1.0 %      Temperature 37.0     Sodium,  Arterial 145 mmol/L      Potassium, Arterial 3.5 mmol/L      Barometric Pressure for Blood Gas 754 mmHg      Modality NRB     Flow Rate 15.0 lpm      Ventilator Mode NA     Collected by [id number]     Comment: Meter: [id number]     :  [id number]        pH, Temp Corrected 7.309 pH Units      pCO2, Temperature Corrected 47.1 mm Hg      pO2, Temperature Corrected 66.4 mm Hg     Respiratory Panel PCR w/COVID-19(SARS-CoV-2) ZEESHAN/DANIELLE/KYLAH/PAD/COR/SHYANN In-House, NP Swab in UTM/VTM, 2 HR TAT - Swab, Nasopharynx [[id number]]  (Normal) Collected: 09/16/24 1343    Specimen: Swab from Nasopharynx Updated: 09/16/24 1446     ADENOVIRUS, PCR Not Detected     Coronavirus 229E Not Detected     Coronavirus HKU1 Not Detected     Coronavirus NL63 Not Detected     Coronavirus OC43 Not Detected     COVID19 Not Detected     Human Metapneumovirus Not Detected     Human Rhinovirus/Enterovirus Not Detected     Influenza A PCR Not Detected     Influenza B PCR Not Detected     Parainfluenza Virus 1 Not Detected     Parainfluenza Virus 2 Not Detected     Parainfluenza Virus 3 Not Detected     Parainfluenza Virus 4 Not Detected     RSV, PCR Not Detected     Bordetella pertussis pcr Not Detected     Bordetella parapertussis PCR Not Detected     Chlamydophila pneumoniae PCR Not Detected     Mycoplasma pneumo by PCR Not Detected    Narrative:      In the setting of a positive respiratory panel with a viral infection PLUS a negative procalcitonin without other underlying concern for bacterial infection, consider observing off antibiotics or discontinuation of antibiotics and continue supportive care. If the respiratory panel is positive for atypical bacterial infection (Bordetella pertussis, Chlamydophila pneumoniae, or Mycoplasma pneumoniae), consider antibiotic de-escalation to target atypical bacterial infection.    Single High Sensitivity Troponin T [[id number]]  (Abnormal) Collected: 09/16/24 1339    Specimen: Blood Updated: 09/16/24 1419     " HS Troponin T 308 ng/L     Narrative:      High Sensitive Troponin T Reference Range:  <14.0 ng/L- Negative Female for AMI  <22.0 ng/L- Negative Male for AMI  >=14 - Abnormal Female indicating possible myocardial injury.  >=22 - Abnormal Male indicating possible myocardial injury.   Clinicians would have to utilize clinical acumen, EKG, Troponin, and serial changes to determine if it is an Acute Myocardial Infarction or myocardial injury due to an underlying chronic condition.         Procalcitonin [[id number]]  (Abnormal) Collected: 09/16/24 1339    Specimen: Blood Updated: 09/16/24 1418     Procalcitonin 4.57 ng/mL     Narrative:      As a Marker for Sepsis (Non-Neonates):    1. <0.5 ng/mL represents a low risk of severe sepsis and/or septic shock.  2. >2 ng/mL represents a high risk of severe sepsis and/or septic shock.    As a Marker for Lower Respiratory Tract Infections that require antibiotic therapy:    PCT on Admission    Antibiotic Therapy       6-12 Hrs later    >0.5                Strongly Recommended  >0.25 - <0.5        Recommended   0.1 - 0.25          Discouraged              Remeasure/reassess PCT  <0.1                Strongly Discouraged     Remeasure/reassess PCT    As 28 day mortality risk marker: \"Change in Procalcitonin Result\" (>80% or <=80%) if Day 0 (or Day 1) and Day 4 values are available. Refer to http://www.[employer]-pct-calculator.com    Change in PCT <=80%  A decrease of PCT levels below or equal to 80% defines a positive change in PCT test result representing a higher risk for 28-day all-cause mortality of patients diagnosed with severe sepsis for septic shock.    Change in PCT >80%  A decrease of PCT levels of more than 80% defines a negative change in PCT result representing a lower risk for 28-day all-cause mortality of patients diagnosed with severe sepsis or septic shock.       CBC & Differential [[id number]]  (Abnormal) Collected: 09/16/24 1339    Specimen: Blood Updated: 09/16/24 " 1417    Narrative:      The following orders were created for panel order CBC & Differential.  Procedure                               Abnormality         Status                     ---------                               -----------         ------                     CBC Auto Differential[[id number]]        Abnormal            Final result                 Please view results for these tests on the individual orders.    CBC Auto Differential [[id number]]  (Abnormal) Collected: 09/16/24 1339    Specimen: Blood Updated: 09/16/24 1417     WBC 11.22 10*3/mm3      RBC 3.90 10*6/mm3      Hemoglobin 12.1 g/dL      Hematocrit 36.8 %      MCV 94.4 fL      MCH 31.0 pg      MCHC 32.9 g/dL      RDW 13.8 %      RDW-SD 47.6 fl      MPV 9.8 fL      Platelets 232 10*3/mm3     Narrative:      The previously reported component NRBC is no longer being reported. Previous result was 0.0 /100 WBC (Reference Range: 0.0-0.2 /100 WBC) on 9/16/2024 at 1357 CDT.    Manual Differential [[id number]]  (Abnormal) Collected: 09/16/24 1339    Specimen: Blood Updated: 09/16/24 1417     Neutrophil % 89.0 %      Lymphocyte % 2.0 %      Monocyte % 5.0 %      Eosinophil % 0.0 %      Basophil % 0.0 %      Bands %  3.0 %      Metamyelocyte % 1.0 %      Neutrophils Absolute 10.32 10*3/mm3      Lymphocytes Absolute 0.22 10*3/mm3      Monocytes Absolute 0.56 10*3/mm3      Eosinophils Absolute 0.00 10*3/mm3      Basophils Absolute 0.00 10*3/mm3      Anisocytosis Slight/1+     Crenated RBC's Large/3+     Hypochromia Mod/2+     Poikilocytes Slight/1+     Polychromasia Slight/1+     WBC Morphology Normal     Platelet Morphology Normal    C-reactive Protein [[id number]]  (Abnormal) Collected: 09/16/24 1339    Specimen: Blood Updated: 09/16/24 1414     C-Reactive Protein 30.76 mg/dL     Comprehensive Metabolic Panel [[id number]]  (Abnormal) Collected: 09/16/24 1339    Specimen: Blood Updated: 09/16/24 1414     Glucose 85 mg/dL      BUN 69 mg/dL      Creatinine 3.08  mg/dL      Sodium 144 mmol/L      Potassium 3.9 mmol/L      Chloride 106 mmol/L      CO2 22.0 mmol/L      Calcium 9.0 mg/dL      Total Protein 5.2 g/dL      Albumin 2.0 g/dL      ALT (SGPT) 22 U/L      AST (SGOT) 42 U/L      Alkaline Phosphatase 101 U/L      Total Bilirubin 0.6 mg/dL      Globulin 3.2 gm/dL      A/G Ratio 0.6 g/dL      BUN/Creatinine Ratio 22.4     Anion Gap 16.0 mmol/L      eGFR 20.3 mL/min/1.73     Narrative:      GFR Normal >60  Chronic Kidney Disease <60  Kidney Failure <15    The GFR formula is only valid for adults with stable renal function between ages 18 and 70.    Protime-INR [[id number]]  (Abnormal) Collected: 09/16/24 1339    Specimen: Blood Updated: 09/16/24 1413     Protime 14.9 Seconds      INR 1.12    Magnesium [[id number]]  (Normal) Collected: 09/16/24 1339    Specimen: Blood Updated: 09/16/24 1412     Magnesium 2.3 mg/dL     BNP [[id number]]  (Abnormal) Collected: 09/16/24 1339    Specimen: Blood Updated: 09/16/24 1412     proBNP 24,732.0 pg/mL     Narrative:      This assay is used as an aid in the diagnosis of individuals suspected of having heart failure. It can be used as an aid in the diagnosis of acute decompensated heart failure (ADHF) in patients presenting with signs and symptoms of ADHF to the emergency department (ED). In addition, NT-proBNP of <300 pg/mL indicates ADHF is not likely.    Age Range Result Interpretation  NT-proBNP Concentration (pg/mL:      <50             Positive            >450                   Gray                 300-450                    Negative             <300    50-75           Positive            >900                  Gray                300-900                  Negative            <300      >75             Positive            >1800                  Gray                300-1800                  Negative            <300    Lactic Acid, Plasma [[id number]]  (Abnormal) Collected: 09/16/24 1339    Specimen: Blood Updated: 09/16/24 1410     Lactate  2.3 mmol/L     Blood Culture - Blood, Hand, Left [[id number]] Collected: 09/16/24 1339    Specimen: Blood from Hand, Left Updated: 09/16/24 1351    Blood Culture - Blood, Hand, Right [[id number]] Collected: 09/16/24 1339    Specimen: Blood from Hand, Right Updated: 09/16/24 1351          Imaging Results (Last 72 Hours)       Procedure Component Value Units Date/Time    XR Chest 1 View [[id number]] Collected: 09/17/24 0744     Updated: 09/17/24 0748    Narrative:      XR CHEST 1 VW-     HISTORY: Septic shock, Acute hypoxic respiratory failure; R57.9-Shock,  unspecified; N17.9-Acute kidney failure, unspecified; J18.9-Pneumonia,  unspecified organism; E87.20-Acidosis, unspecified; L08.9-Local  infection of the skin and subcutaneous tissue, unspecified     COMPARISON: 9/16/2024     FINDINGS: Frontal view the chest obtained.     Right IJ central line tip similar in position projecting over the SVC.     There are similar bilateral patchy appearing basilar pulmonary opacities  concerning for multifocal pneumonia. Questional trace pleural effusions.  No pneumothorax. Stable mediastinal contours. No acute regional bony  pathology.       Impression:      1. Similar patchy bibasilar opacities concerning for multifocal  pneumonia. Similar positioning right IJ central line.        This report was signed and finalized on 9/17/2024 7:45 AM by Dr. Tish Guevara MD.       XR Chest 1 View [[id number]] Collected: 09/16/24 1512     Updated: 09/16/24 1517    Narrative:      EXAMINATION: XR CHEST 1 VW-  9/16/2024 3:12 PM 1 view     HISTORY: Pneumonia     COMPARISON: 9/2/2024     TECHNIQUE: A single frontal view of the chest was obtained.     FINDINGS:  There is cardiomegaly and mild pulmonary vascular prominence. No  pneumothorax. No large effusion. Mild retrocardiac atelectasis  suspected. Some hazy opacity at the lateral LEFT lung base is  nonspecific and could represent pneumonia or atelectasis. Central venous  catheter via RIGHT IJ  approach is seen with tip projecting over the  supra azygos SVC. No definite acute osseous abnormality.       Impression:         1.  Shallow inspiration with persistent LEFT basilar opacity which may  represent atelectasis or pneumonia.     2.  There is cardiomegaly and mild pulmonary vascular prominence.     3.  RIGHT IJ approach central venous catheter with tip projecting over  the super azygous portion of the SVC.           This report was signed and finalized on 9/16/2024 3:14 PM by Dr. Ever Jaquez MD.                  ASSESSMENT/PLAN       Examination and evaluation of wound(s) was performed.    An excisional tissue debridement was completed of the wound located on the *** with a total area of ***cm². The following instruments were used: ***. Pain control was achieved using lidocaine 4% topical solution. Material removed includes ***.  No specimens were taken. A minimal amount of bleeding was controlled with ***. The procedure was tolerated well with a pain level of *** prior to procedure and a pain level of *** following the procedure. Post debridement measurements: ***. Tissue exposed post debridement is ***.     DIAGNOSIS:   ***    PLAN:   Orders placed for wound care and pressure/moisture management as listed below.   PIP measures placed on admission with dolphin mattress ordered. Continue this plan.   Suggest urology consult due to extent of skin breakdown of penis.       Start     Ordered    09/17/24 2019  Silicone Border Dressing to Bony Prominences  Every Shift      Comments: Apply silicone border foam dressing per protocol to sacral spine/bilateral heels for protection.  Nursing to change dressing every 3 days and PRN if soiled. Nursing is to peel back dressing with every assessment to assess skin underneath dressing. No barrier cream under dressing.    09/17/24 1458    09/17/24 2000  Wound Care  Every 12 Hours,   Status:  Canceled        Question Answer Comment   Wound Locations left hip     Wound Care Instructions Clean with NS.  Apply a nickel-thick layer of Santyl to wound base.  Cover with Vaseline gauze, cover with Dakins moistened gauze. Cover with abd pad, pink tape on borders    Cleanse Normal Saline    Intervention Santyl - Thick Layer    Intervention Vaseline Gauze    Moistened? Yes    Moisten With Dakins Solution 1/4 Strength    Dressing: Abdominal Pad    Securement Rico Tape        09/17/24 1440    09/17/24 2000  Wound Care  Every 12 Hours        Question Answer Comment   Wound Locations left hip    Wound Care Instructions Clean with NS.  Apply a nickel-thick layer of Santyl to wound base.  Cover with Dakins moistened gauze. Cover with abd pad, pink tape on borders    Cleanse Normal Saline    Intervention Santyl - Thick Layer    Intervention Vaseline Gauze    Moistened? Yes    Moisten With Dakins Solution 1/4 Strength    Dressing: Abdominal Pad    Securement Rico Tape        09/17/24 1458    09/17/24 2000  Wound Care  Every 12 Hours        Question Answer Comment   Wound Locations All deep tissue pressure injuries    Wound Care Instructions Clean with NS.  Apply skin barrier wipe/spray. Apply silicone border foam dressing. Assess each wound every shift and change dressing daily.    Cleanse Normal Saline    Periwound Barrier Wipe    Periwound Barrier Spray    Dressing: Silicone Border Dressing        09/17/24 1458    Unscheduled  Wound Care  As Needed      Question:  Wound Care Instructions  Answer:  Apply Moisture Barrier After Any Incontinence    09/16/24 2020                     Discussed findings and treatment plan including risks, benefits, and treatment options with *** in detail. Patient agreed with treatment plan.      This document has been electronically signed by CONCHITA Aquino on 9/17/2024 10:19 CDT     obtained.     FINDINGS:  There is cardiomegaly and mild pulmonary vascular prominence. No  pneumothorax. No large effusion. Mild retrocardiac atelectasis  suspected. Some hazy opacity at the lateral LEFT lung base is  nonspecific and could represent pneumonia or atelectasis. Central venous  catheter via RIGHT IJ approach is seen with tip projecting over the  supra azygos SVC. No definite acute osseous abnormality.       Impression:         1.  Shallow inspiration with persistent LEFT basilar opacity which may  represent atelectasis or pneumonia.     2.  There is cardiomegaly and mild pulmonary vascular prominence.     3.  RIGHT IJ approach central venous catheter with tip projecting over  the super azygous portion of the SVC.           This report was signed and finalized on 9/16/2024 3:14 PM by Dr. Ever Jaquez MD.                  ASSESSMENT/PLAN       Examination and evaluation of wound(s) was performed.    An excisional tissue debridement was completed of the wound located on the left hip with a total area of 52.5 cm². The following instruments were used: Curette, scalpel, scissors. Pain control was achieved using lidocaine 4% topical solution. Material removed includes eschar, slough, subcutaneous tissue.  No specimens were taken. A minimal amount of bleeding was controlled with pressure. The procedure was tolerated well with a pain level of 0 prior to procedure and a pain level of 0 following the procedure.  Predebridement measurements: 7.5 cm x 7 cm.  Post debridement measurements: 7.5 cm x 7 cm x 1.1 cm. Tissue exposed post debridement is muscle.     DIAGNOSIS:   Pressure injury of left hip, stage 4  Pressure injury of deep tissue of right heel  Pressure injury of deep tissue of left buttock  Pressure injury of deep tissue of right ankle  Pressure injury of deep tissue of left heel  Pressure injury of left ankle, stage 1  Pressure injury of deep tissue of right medial distal foot  Pressure injury of deep  tissue of left scapula  Pressure injury of deep tissue of left lateral lower back  Pressure injury of sacral region, stage 3  Mucosal injury of penis-pressure and moisture etiology  Bedbound    Shock    Primary hypertension    Chronic indwelling Mario catheter    Urinary tract infection associated with indwelling urethral catheter    Hypokalemia    RYNE (acute kidney injury)    Hypomagnesemia          PLAN:   Orders placed for wound care and pressure/moisture management as listed below.   PIP measures placed on admission with dolphin mattress ordered. Continue this plan.   Suggest urology consult due to extent of skin breakdown of penis.       Start     Ordered    09/17/24 2019  Silicone Border Dressing to Bony Prominences  Every Shift      Comments: Apply silicone border foam dressing per protocol to sacral spine/bilateral heels for protection.  Nursing to change dressing every 3 days and PRN if soiled. Nursing is to peel back dressing with every assessment to assess skin underneath dressing. No barrier cream under dressing.    09/17/24 1458 09/17/24 2000  Wound Care  Every 12 Hours        Question Answer Comment   Wound Locations left hip    Wound Care Instructions Clean with NS.  Apply a nickel-thick layer of Santyl to wound base.  Cover with Dakins moistened gauze. Cover with abd pad, pink tape on borders    Cleanse Normal Saline    Intervention Santyl - Thick Layer    Intervention Vaseline Gauze    Moistened? Yes    Moisten With Dakins Solution 1/4 Strength    Dressing: Abdominal Pad    Securement [place] Tape        09/17/24 1458 09/17/24 2000  Wound Care  Every 12 Hours        Question Answer Comment   Wound Locations All deep tissue pressure injuries    Wound Care Instructions Clean with NS.  Apply skin barrier wipe/spray. Apply silicone border foam dressing. Assess each wound every shift and change dressing daily.    Cleanse Normal Saline    Periwound Barrier Wipe    Periwound Barrier Spray    Dressing:  Silicone Border Dressing        09/17/24 5383    Unscheduled  Wound Care  As Needed      Question:  Wound Care Instructions  Answer:  Apply Moisture Barrier After Any Incontinence    09/16/24 2020             Discussed findings and treatment plan including risks, benefits, and treatment options with patient in detail. Patient agreed with treatment plan.      This document has been electronically signed by CONCHITA Aquino on 9/17/2024 10:19 CDT      Time spent in face-to-face evaluation, chart review, planning and education totaled 90 minutes with greater than 50% of time spent with patient and/or family and in coordination of care.  Counseling of patient and/or family includes discussing wound diagnosis and etiology , discussing risks and benefits, counseling on risk factor reduction, Importance of compliance with chosen management options, and patient/family education. Total time does not include procedures completed.

## 2024-09-17 NOTE — ED NOTES
Nursing report ED to floor  Jagdish Cook  73 y.o.  male    HPI:   Chief Complaint   Patient presents with    Altered Mental Status    Respiratory Distress       Admitting doctor:   Kristen Sánchez MD    Consulting provider(s):  Consults       No orders found from 8/18/2024 to 9/17/2024.             Admitting diagnosis:   The primary encounter diagnosis was Shock. Diagnoses of Acute kidney injury, Pneumonia of left lung due to infectious organism, unspecified part of lung, Metabolic acidosis, and Skin infection were also pertinent to this visit.    Code status:   Current Code Status       Date Active Code Status Order ID Comments User Context       9/16/2024 1849 CPR (Attempt to Resuscitate) [id number]  Kristen Sánchez MD ED        Question Answer    Code Status (Patient has no pulse and is not breathing) CPR (Attempt to Resuscitate)    Medical Interventions (Patient has pulse or is breathing) Full Support                    Allergies:   Meloxicam, Tamsulosin, and Penicillins    Intake and Output  No intake or output data in the 24 hours ending 09/16/24 1928    Weight:       09/16/24  1325   Weight: 63.5 kg (140 lb)       Most recent vitals:   Vitals:    09/16/24 1818 09/16/24 1819 09/16/24 1921 09/16/24 1926   BP:   126/58 129/55   BP Location:       Patient Position:       Pulse:   105 92   Resp:       Temp:       TempSrc:       SpO2: 91% 92% 91% 92%   Weight:       Height:         Oxygen Therapy: .    Active LDAs/IV Access:   Lines, Drains & Airways       Active LDAs       Name Placement date Placement time Site Days    CVC Triple Lumen 09/16/24 Right Internal jugular 09/16/24  1407  Internal jugular  less than 1    Peripheral IV 09/16/24 1320 Left;Posterior Forearm 09/16/24  1320  Forearm  less than 1    Urethral Catheter Non-latex 16 Fr. 09/16/24  1438  -- less than 1                    Labs (abnormal labs have a star):   Labs Reviewed   COMPREHENSIVE METABOLIC PANEL - Abnormal; Notable for the  following components:       Result Value    BUN 69 (*)     Creatinine 3.08 (*)     Total Protein 5.2 (*)     Albumin 2.0 (*)     AST (SGOT) 42 (*)     Anion Gap 16.0 (*)     eGFR 20.3 (*)     All other components within normal limits    Narrative:     GFR Normal >60  Chronic Kidney Disease <60  Kidney Failure <15    The GFR formula is only valid for adults with stable renal function between ages 18 and 70.   PROTIME-INR - Abnormal; Notable for the following components:    Protime 14.9 (*)     INR 1.12 (*)     All other components within normal limits   URINALYSIS W/ CULTURE IF INDICATED - Abnormal; Notable for the following components:    Appearance, UA Cloudy (*)     Ketones, UA 15 mg/dL (1+) (*)     Bilirubin, UA Large (3+) (*)     Blood, UA Large (3+) (*)     Protein,  mg/dL (2+) (*)     Leuk Esterase, UA Small (1+) (*)     Urobilinogen, UA 2.0 E.U./dL (*)     All other components within normal limits    Narrative:     In absence of clinical symptoms, the presence of pyuria, bacteria, and/or nitrites on the urinalysis result does not correlate with infection.   BNP (IN-HOUSE) - Abnormal; Notable for the following components:    proBNP 24,732.0 (*)     All other components within normal limits    Narrative:     This assay is used as an aid in the diagnosis of individuals suspected of having heart failure. It can be used as an aid in the diagnosis of acute decompensated heart failure (ADHF) in patients presenting with signs and symptoms of ADHF to the emergency department (ED). In addition, NT-proBNP of <300 pg/mL indicates ADHF is not likely.    Age Range Result Interpretation  NT-proBNP Concentration (pg/mL:      <50             Positive            >450                   Gray                 300-450                    Negative             <300    50-75           Positive            >900                  Gray                300-900                  Negative            <300      >75             Positive        "     >1800                  Gray                300-1800                  Negative            <300   SINGLE HS TROPONIN T - Abnormal; Notable for the following components:    HS Troponin T 308 (*)     All other components within normal limits    Narrative:     High Sensitive Troponin T Reference Range:  <14.0 ng/L- Negative Female for AMI  <22.0 ng/L- Negative Male for AMI  >=14 - Abnormal Female indicating possible myocardial injury.  >=22 - Abnormal Male indicating possible myocardial injury.   Clinicians would have to utilize clinical acumen, EKG, Troponin, and serial changes to determine if it is an Acute Myocardial Infarction or myocardial injury due to an underlying chronic condition.        LACTIC ACID, PLASMA - Abnormal; Notable for the following components:    Lactate 2.3 (*)     All other components within normal limits   PROCALCITONIN - Abnormal; Notable for the following components:    Procalcitonin 4.57 (*)     All other components within normal limits    Narrative:     As a Marker for Sepsis (Non-Neonates):    1. <0.5 ng/mL represents a low risk of severe sepsis and/or septic shock.  2. >2 ng/mL represents a high risk of severe sepsis and/or septic shock.    As a Marker for Lower Respiratory Tract Infections that require antibiotic therapy:    PCT on Admission    Antibiotic Therapy       6-12 Hrs later    >0.5                Strongly Recommended  >0.25 - <0.5        Recommended   0.1 - 0.25          Discouraged              Remeasure/reassess PCT  <0.1                Strongly Discouraged     Remeasure/reassess PCT    As 28 day mortality risk marker: \"Change in Procalcitonin Result\" (>80% or <=80%) if Day 0 (or Day 1) and Day 4 values are available. Refer to http://www.[facility]-pct-calculator.com    Change in PCT <=80%  A decrease of PCT levels below or equal to 80% defines a positive change in PCT test result representing a higher risk for 28-day all-cause mortality of patients diagnosed with severe " sepsis for septic shock.    Change in PCT >80%  A decrease of PCT levels of more than 80% defines a negative change in PCT result representing a lower risk for 28-day all-cause mortality of patients diagnosed with severe sepsis or septic shock.      C-REACTIVE PROTEIN - Abnormal; Notable for the following components:    C-Reactive Protein 30.76 (*)     All other components within normal limits   CBC WITH AUTO DIFFERENTIAL - Abnormal; Notable for the following components:    WBC 11.22 (*)     RBC 3.90 (*)     Hemoglobin 12.1 (*)     Hematocrit 36.8 (*)     All other components within normal limits    Narrative:     The previously reported component NRBC is no longer being reported. Previous result was 0.0 /100 WBC (Reference Range: 0.0-0.2 /100 WBC) on 9/16/2024 at 1357 CDT.   MANUAL DIFFERENTIAL - Abnormal; Notable for the following components:    Neutrophil % 89.0 (*)     Lymphocyte % 2.0 (*)     Eosinophil % 0.0 (*)     Metamyelocyte % 1.0 (*)     Neutrophils Absolute 10.32 (*)     Lymphocytes Absolute 0.22 (*)     All other components within normal limits   HIGH SENSITIVITIY TROPONIN T 2HR - Abnormal; Notable for the following components:    HS Troponin T 219 (*)     Troponin T Delta -89 (*)     All other components within normal limits    Narrative:     High Sensitive Troponin T Reference Range:  <14.0 ng/L- Negative Female for AMI  <22.0 ng/L- Negative Male for AMI  >=14 - Abnormal Female indicating possible myocardial injury.  >=22 - Abnormal Male indicating possible myocardial injury.   Clinicians would have to utilize clinical acumen, EKG, Troponin, and serial changes to determine if it is an Acute Myocardial Infarction or myocardial injury due to an underlying chronic condition.        URINALYSIS, MICROSCOPIC ONLY - Abnormal; Notable for the following components:    RBC, UA 6-10 (*)     WBC, UA 6-10 (*)     Bacteria, UA 1+ (*)     Transitional Epithelial Cells, UA 3-6 (*)     All other components within  normal limits   BLOOD GAS, ARTERIAL W/CO-OXIMETRY - Abnormal; Notable for the following components:    pH, Arterial 7.309 (*)     pCO2, Arterial 47.1 (*)     pO2, Arterial 66.4 (*)     Base Excess, Arterial -2.9 (*)     O2 Saturation, Arterial 90.8 (*)     Hemoglobin, Blood Gas 12.3 (*)     Hematocrit, Blood Gas 37.8 (*)     Oxyhemoglobin 89.4 (*)     pH, Temp Corrected 7.309 (*)     pCO2, Temperature Corrected 47.1 (*)     pO2, Temperature Corrected 66.4 (*)     All other components within normal limits   RESPIRATORY PANEL PCR W/ COVID-19 (SARS-COV-2), NP SWAB IN UTM/VTP, 2 HR TAT - Normal    Narrative:     In the setting of a positive respiratory panel with a viral infection PLUS a negative procalcitonin without other underlying concern for bacterial infection, consider observing off antibiotics or discontinuation of antibiotics and continue supportive care. If the respiratory panel is positive for atypical bacterial infection (Bordetella pertussis, Chlamydophila pneumoniae, or Mycoplasma pneumoniae), consider antibiotic de-escalation to target atypical bacterial infection.   MAGNESIUM - Normal   LACTIC ACID, REFLEX - Normal   BLOOD CULTURE   BLOOD CULTURE   URINE CULTURE   BLOOD GAS, ARTERIAL W/CO-OXIMETRY   MAGNESIUM   PHOSPHORUS   CBC WITH AUTO DIFFERENTIAL   CBC AND DIFFERENTIAL    Narrative:     The following orders were created for panel order CBC & Differential.  Procedure                               Abnormality         Status                     ---------                               -----------         ------                     CBC Auto Differential[[id number]]        Abnormal            Final result                 Please view results for these tests on the individual orders.   CBC AND DIFFERENTIAL    Narrative:     The following orders were created for panel order CBC & Differential.  Procedure                               Abnormality         Status                     ---------                                -----------         ------                     CBC Auto Differential[[id number]]                                                         Please view results for these tests on the individual orders.       Meds given in ED:   Medications   norepinephrine (LEVOPHED) 8 mg in 250 mL NS infusion (premix) (0.14 mcg/kg/min × 63.5 kg Intravenous Rate/Dose Change 9/16/24 3523)   nitroglycerin (NITROSTAT) SL tablet 0.4 mg (has no administration in time range)   sodium chloride 0.9 % flush 10 mL (has no administration in time range)   sodium chloride 0.9 % flush 10 mL (has no administration in time range)   sodium chloride 0.9 % infusion 40 mL (has no administration in time range)   mupirocin (BACTROBAN) 2 % nasal ointment 1 Application (has no administration in time range)   Chlorhexidine Gluconate Cloth 2 % pads 1 Application (has no administration in time range)   Chlorhexidine Gluconate Cloth 2 % pads 1 Application (has no administration in time range)   sennosides-docusate (PERICOLACE) 8.6-50 MG per tablet 2 tablet (has no administration in time range)     And   polyethylene glycol (MIRALAX) packet 17 g (has no administration in time range)     And   bisacodyl (DULCOLAX) EC tablet 5 mg (has no administration in time range)     And   bisacodyl (DULCOLAX) suppository 10 mg (has no administration in time range)   heparin (porcine) 5000 UNIT/ML injection 5,000 Units (has no administration in time range)   lactated ringers infusion (has no administration in time range)   Potassium Replacement - Follow Nurse / BPA Driven Protocol (has no administration in time range)   Magnesium Standard Dose Replacement - Follow Nurse / BPA Driven Protocol (has no administration in time range)   Phosphorus Replacement - Follow Nurse / BPA Driven Protocol (has no administration in time range)   Calcium Replacement - Follow Nurse / BPA Driven Protocol (has no administration in time range)   acetaminophen (TYLENOL) tablet 650 mg (has no  administration in time range)     Or   acetaminophen (TYLENOL) suppository 650 mg (has no administration in time range)   ondansetron (ZOFRAN) injection 4 mg (has no administration in time range)   sepsis fluid LR bolus 1,905 mL (1,905 mL Intravenous New Bag 9/16/24 1341)   cefepime 2000 mg IVPB in 100 mL NS (MBP) (0 mg Intravenous Stopped 9/16/24 1520)   vancomycin (VANCOCIN) 1,000 mg in sodium chloride 0.9 % 250 mL IVPB-VTB (0 mg Intravenous Stopped 9/16/24 1521)   dexAMETHasone (DECADRON) injection 10 mg (10 mg Intravenous Given 9/16/24 1347)     lactated ringers, 125 mL/hr  norepinephrine, 0.02-0.3 mcg/kg/min, Last Rate: 0.14 mcg/kg/min (09/16/24 1423)         NIH Stroke Scale:       Isolation/Infection(s):  No active isolations   No active infections     COVID Testing  Collected .  Resulted .    Nursing report ED to floor:  Mental status: .  Ambulatory status: .  Precautions: .    ED nurse phone extentsion- .. [phone]

## 2024-09-17 NOTE — H&P
[facility]   HISTORY AND PHYSICAL    Patient Name: Jagdish Cook  : 1950  MRN: [id number]  Primary Care Physician:  Aidan Shields MD  Date of admission: 2024    Subjective   Subjective     Chief Complaint: Altered mental status    History of Present Illness    Mr. Cook is confused, information is obtained from the medical record and the RN.  He is a 73 year old with PMH of arthritis, chronic back pain, COPD, GERD, hypothyroidism, neuropathy, chronic urinary retention with chronic indwelling Mario catheter follows with Dr. Mittal and Dr. North.  He presented to ED today due to altered mental status.  He had a recent hospitalization for UTI and Sepsis and left AMA per discharge summary.  Review of microbiology from that visit shows that urine culture grew Proteus mirabilis. I spoke with his brother Bull ([phone]) who states Mr. Cook was placed in the nursing home about a week ago.  He received a call today from the nursing home and was told that Mr. Cook was confused and being transferred to the ER for evaluation.  According to him, Mr. Cook is usually oriented.  Evaluation in ED significant for RYNE BUN 69, creatinine 3.08 (normal on 9/3/24), troponin 308-> 219, procalcitonin 4.5, WBC 11.2, lactic acid 2.3 -> 1.4, BNP 44484, mild respiratory acidosis and hypoxia, UA significant for UTI.  He has several areas of skin breakdown and necrosis to his meatus.  He was given vancomycin, cefepime, 30 mL/kg crystalloid sepsis fluids, and started on norepinephrine in ED.  I discussed Mr. Cook's diagnostic findings and condition with his brother Bull and discussed initial treatment plan.  I discussed code status and Bull stated that Jagdish's wishes are to be DNR/DNI.  Mr. Cook will be admitted to the ICU for further care.      Review of Systems     Mr. Cook is confused and unable to participate in ROS.    Personal History     Past Medical History:   Diagnosis Date     Bacteremia     Chronic back pain     COPD (chronic obstructive pulmonary disease)     Mario catheter in place     GERD (gastroesophageal reflux disease)     Memory deficits     Neuropathy     Umbilical hernia     Urinary retention        Past Surgical History:   Procedure Laterality Date    BACK SURGERY      HERNIA REPAIR         Family History: family history includes No Known Problems in his father and mother. Otherwise pertinent FHx was reviewed and not pertinent to current issue.    Social History:  reports that he has been smoking cigars. He has been exposed to tobacco smoke. He has never used smokeless tobacco. Drug use questions deferred to the physician. He reports that he does not drink alcohol.    Home Medications:  HYDROcodone-acetaminophen, Vitamin D (Ergocalciferol), alfuzosin, busPIRone, ciprofloxacin, finasteride, fluticasone, guaiFENesin, ipratropium-albuterol, levothyroxine, lisinopril-hydrochlorothiazide, loratadine, naloxone, probiotic, tiZANidine, and venlafaxine XR    Allergies:  Allergies   Allergen Reactions    Meloxicam Hives     Dizzy, skin crawls    Tamsulosin Dizziness     excessive sweating    Penicillins        Objective    Objective     Vitals:   Temp:  [97.9 °F (36.6 °C)] 97.9 °F (36.6 °C)  Heart Rate:  [] 92  Resp:  [25] 25  BP: ()/(41-76) 129/55  Flow (L/min):  [4-15] 4    Physical Exam  Vitals and nursing note reviewed.   Constitutional:       Appearance: He is ill-appearing.   HENT:      Head: Normocephalic and atraumatic.      Nose: Nose normal.      Mouth/Throat:      Mouth: Mucous membranes are dry.      Pharynx: Oropharynx is clear.   Eyes:      Extraocular Movements: Extraocular movements intact.      Conjunctiva/sclera: Conjunctivae normal.      Pupils: Pupils are equal, round, and reactive to light.   Cardiovascular:      Rate and Rhythm: Normal rate and regular rhythm.      Pulses: Normal pulses.      Heart sounds: Normal heart sounds.   Pulmonary:       Effort: Pulmonary effort is normal.      Breath sounds: Rhonchi present.   Abdominal:      General: Bowel sounds are normal.      Palpations: Abdomen is soft.   Genitourinary:     Comments: Necrosis around head of penis  Musculoskeletal:         General: Deformity present.      Cervical back: Normal range of motion and neck supple.      Comments: BLE contracture   Skin:     General: Skin is warm and dry.      Capillary Refill: Capillary refill takes less than 2 seconds.      Comments: Multiple areas of skin breakdown.  See nursing documentation.   Neurological:      Mental Status: He is disoriented.   Psychiatric:      Comments: Confused, lethargic         Result Review    Result Review:  I have personally reviewed the results from the time of this admission to 9/16/2024 20:22 CDT and agree with these findings:  [x]  Laboratory list / accordion  [x]  Microbiology  [x]  Radiology  [x]  EKG/Telemetry   []  Cardiology/Vascular   []  Pathology  [x]  Old records  []  Other:  Most notable findings include: RYNE, UTI.      Assessment & Plan   Assessment / Plan     Brief Patient Summary:  Jagdish Cook is a 73 y.o. male who presented to ED from NH due to altered mental status.  Hypotensive on arrival, received 30 mL/kg crystalloid bolus and started on norepinephrine.  UTI,   RYNE noted.  Started on broad spectrum antibiotics.  Admitted to ICU for further care.    Active Hospital Problems:  Active Hospital Problems    Diagnosis     **Shock     RYNE (acute kidney injury)     Urinary tract infection associated with indwelling urethral catheter     Chronic indwelling Mario catheter      Results for orders placed or performed during the hospital encounter of 09/16/24   Respiratory Panel PCR w/COVID-19(SARS-CoV-2) ZEESHAN/DANIELLE/KYLAH/PAD/COR/SHYANN In-House, NP Swab in UTM/VTM, 2 HR TAT - Swab, Nasopharynx    Specimen: Nasopharynx; Swab   Result Value Ref Range    ADENOVIRUS, PCR Not Detected Not Detected    Coronavirus 229E Not Detected Not  Detected    Coronavirus HKU1 Not Detected Not Detected    Coronavirus NL63 Not Detected Not Detected    Coronavirus OC43 Not Detected Not Detected    COVID19 Not Detected Not Detected - Ref. Range    Human Metapneumovirus Not Detected Not Detected    Human Rhinovirus/Enterovirus Not Detected Not Detected    Influenza A PCR Not Detected Not Detected    Influenza B PCR Not Detected Not Detected    Parainfluenza Virus 1 Not Detected Not Detected    Parainfluenza Virus 2 Not Detected Not Detected    Parainfluenza Virus 3 Not Detected Not Detected    Parainfluenza Virus 4 Not Detected Not Detected    RSV, PCR Not Detected Not Detected    Bordetella pertussis pcr Not Detected Not Detected    Bordetella parapertussis PCR Not Detected Not Detected    Chlamydophila pneumoniae PCR Not Detected Not Detected    Mycoplasma pneumo by PCR Not Detected Not Detected   Comprehensive Metabolic Panel    Specimen: Blood   Result Value Ref Range    Glucose 85 65 - 99 mg/dL    BUN 69 (H) 8 - 23 mg/dL    Creatinine 3.08 (H) 0.76 - 1.27 mg/dL    Sodium 144 136 - 145 mmol/L    Potassium 3.9 3.5 - 5.2 mmol/L    Chloride 106 98 - 107 mmol/L    CO2 22.0 22.0 - 29.0 mmol/L    Calcium 9.0 8.6 - 10.5 mg/dL    Total Protein 5.2 (L) 6.0 - 8.5 g/dL    Albumin 2.0 (L) 3.5 - 5.2 g/dL    ALT (SGPT) 22 1 - 41 U/L    AST (SGOT) 42 (H) 1 - 40 U/L    Alkaline Phosphatase 101 39 - 117 U/L    Total Bilirubin 0.6 0.0 - 1.2 mg/dL    Globulin 3.2 gm/dL    A/G Ratio 0.6 g/dL    BUN/Creatinine Ratio 22.4 7.0 - 25.0    Anion Gap 16.0 (H) 5.0 - 15.0 mmol/L    eGFR 20.3 (L) >60.0 mL/min/1.73   Protime-INR    Specimen: Blood   Result Value Ref Range    Protime 14.9 (H) 11.8 - 14.8 Seconds    INR 1.12 (H) 0.91 - 1.09   Urinalysis With Culture If Indicated - Indwelling Urethral Catheter    Specimen: Indwelling Urethral Catheter; Urine   Result Value Ref Range    Color, UA Yellow Yellow, Straw    Appearance, UA Cloudy (A) Clear    pH, UA <=5.0 5.0 - 8.0    Specific  Gravity, UA 1.020 1.005 - 1.030    Glucose, UA Negative Negative    Ketones, UA 15 mg/dL (1+) (A) Negative    Bilirubin, UA Large (3+) (A) Negative    Blood, UA Large (3+) (A) Negative    Protein,  mg/dL (2+) (A) Negative    Leuk Esterase, UA Small (1+) (A) Negative    Nitrite, UA Negative Negative    Urobilinogen, UA 2.0 E.U./dL (A) 0.2 - 1.0 E.U./dL   BNP    Specimen: Blood   Result Value Ref Range    proBNP 24,732.0 (H) 0.0 - 1,800.0 pg/mL   Single High Sensitivity Troponin T    Specimen: Blood   Result Value Ref Range    HS Troponin T 308 (C) <22 ng/L   Lactic Acid, Plasma    Specimen: Blood   Result Value Ref Range    Lactate 2.3 (C) 0.5 - 2.0 mmol/L   Procalcitonin    Specimen: Blood   Result Value Ref Range    Procalcitonin 4.57 (H) 0.00 - 0.25 ng/mL   C-reactive Protein    Specimen: Blood   Result Value Ref Range    C-Reactive Protein 30.76 (H) 0.00 - 0.50 mg/dL   Magnesium    Specimen: Blood   Result Value Ref Range    Magnesium 2.3 1.6 - 2.4 mg/dL   CBC Auto Differential    Specimen: Blood   Result Value Ref Range    WBC 11.22 (H) 3.40 - 10.80 10*3/mm3    RBC 3.90 (L) 4.14 - 5.80 10*6/mm3    Hemoglobin 12.1 (L) 13.0 - 17.7 g/dL    Hematocrit 36.8 (L) 37.5 - 51.0 %    MCV 94.4 79.0 - 97.0 fL    MCH 31.0 26.6 - 33.0 pg    MCHC 32.9 31.5 - 35.7 g/dL    RDW 13.8 12.3 - 15.4 %    RDW-SD 47.6 37.0 - 54.0 fl    MPV 9.8 6.0 - 12.0 fL    Platelets 232 140 - 450 10*3/mm3   Manual Differential    Specimen: Blood   Result Value Ref Range    Neutrophil % 89.0 (H) 42.7 - 76.0 %    Lymphocyte % 2.0 (L) 19.6 - 45.3 %    Monocyte % 5.0 5.0 - 12.0 %    Eosinophil % 0.0 (L) 0.3 - 6.2 %    Basophil % 0.0 0.0 - 1.5 %    Bands %  3.0 0.0 - 5.0 %    Metamyelocyte % 1.0 (H) 0.0 - 0.0 %    Neutrophils Absolute 10.32 (H) 1.70 - 7.00 10*3/mm3    Lymphocytes Absolute 0.22 (L) 0.70 - 3.10 10*3/mm3    Monocytes Absolute 0.56 0.10 - 0.90 10*3/mm3    Eosinophils Absolute 0.00 0.00 - 0.40 10*3/mm3    Basophils Absolute 0.00 0.00 -  0.20 10*3/mm3    Anisocytosis Slight/1+ None Seen    Crenated RBC's Large/3+ None Seen    Hypochromia Mod/2+ None Seen    Poikilocytes Slight/1+ None Seen    Polychromasia Slight/1+ None Seen    WBC Morphology Normal Normal    Platelet Morphology Normal Normal   STAT Lactic Acid, Reflex    Specimen: Blood   Result Value Ref Range    Lactate 1.4 0.5 - 2.0 mmol/L   High Sensitivity Troponin T 2Hr    Specimen: Blood   Result Value Ref Range    HS Troponin T 219 (C) <22 ng/L    Troponin T Delta -89 (L) >=-4 - <+4 ng/L   Urinalysis, Microscopic Only - Indwelling Urethral Catheter    Specimen: Indwelling Urethral Catheter; Urine   Result Value Ref Range    RBC, UA 6-10 (A) None Seen, 0-2 /HPF    WBC, UA 6-10 (A) None Seen, 0-2 /HPF    Bacteria, UA 1+ (A) None Seen /HPF    Squamous Epithelial Cells, UA None Seen None Seen, 0-2 /HPF    Transitional Epithelial Cells, UA 3-6 (A) 0 - 2 /HPF    Hyaline Casts, UA 7-12 None Seen /LPF    Amorphous Crystals, UA Small/1+ None Seen /HPF    Methodology Manual Light Microscopy    Blood Gas, Arterial With Co-Ox    Specimen: Arterial Blood   Result Value Ref Range    Site Right Radial     Darryl's Test Positive     pH, Arterial 7.309 (L) 7.350 - 7.450 pH units    pCO2, Arterial 47.1 (H) 35.0 - 45.0 mm Hg    pO2, Arterial 66.4 (L) 83.0 - 108.0 mm Hg    HCO3, Arterial 23.6 20.0 - 26.0 mmol/L    Base Excess, Arterial -2.9 (L) 0.0 - 2.0 mmol/L    O2 Saturation, Arterial 90.8 (L) 94.0 - 99.0 %    Hemoglobin, Blood Gas 12.3 (L) 14 - 18 g/dL    Hematocrit, Blood Gas 37.8 (L) 38.0 - 51.0 %    Oxyhemoglobin 89.4 (L) 94 - 99 %    Methemoglobin 0.50 0.00 - 3.00 %    Carboxyhemoglobin 1.0 0 - 5 %    Temperature 37.0     Sodium, Arterial 145 136 - 145 mmol/L    Potassium, Arterial 3.5 3.5 - 5.2 mmol/L    Barometric Pressure for Blood Gas 754 mmHg    Modality NRB     Flow Rate 15.0 lpm    Ventilator Mode NA     Collected by [id number]     pH, Temp Corrected 7.309 (L) 7.350 - 7.450 pH Units    pCO2,  Temperature Corrected 47.1 (H) 35 - 45 mm Hg    pO2, Temperature Corrected 66.4 (L) 83 - 108 mm Hg   ECG 12 Lead Altered Mental Status   Result Value Ref Range    QT Interval 294 ms    QTC Interval 456 ms   ECG 12 Lead Rhythm Change   Result Value Ref Range    QT Interval 374 ms    QTC Interval 494 ms       Current Facility-Administered Medications:     acetaminophen (TYLENOL) tablet 650 mg, 650 mg, Oral, Q4H PRN **OR** acetaminophen (TYLENOL) suppository 650 mg, 650 mg, Rectal, Q4H PRN, Kristen Sánchez MD    sennosides-docusate (PERICOLACE) 8.6-50 MG per tablet 2 tablet, 2 tablet, Oral, BID **AND** polyethylene glycol (MIRALAX) packet 17 g, 17 g, Oral, Daily PRN **AND** bisacodyl (DULCOLAX) EC tablet 5 mg, 5 mg, Oral, Daily PRN **AND** bisacodyl (DULCOLAX) suppository 10 mg, 10 mg, Rectal, Daily PRN, Kristen Sánchez MD    Calcium Replacement - Follow Nurse / BPA Driven Protocol, , Does not apply, PRN, Kristen Sánchez MD    [START ON 9/17/2024] cefepime 2000 mg IVPB in 100 mL NS (MBP), 2,000 mg, Intravenous, Q24H, Otilio Velázquez APRN    [START ON 9/17/2024] Chlorhexidine Gluconate Cloth 2 % pads 1 Application, 1 Application, Topical, Q24H, Kristen Sánchez MD    heparin (porcine) 5000 UNIT/ML injection 5,000 Units, 5,000 Units, Subcutaneous, Q8H, Kristen Sánchez MD    lactated ringers bolus 1,000 mL, 1,000 mL, Intravenous, Once, Otilio Velázquez APRN, Last Rate: 1,000 mL/hr at 09/16/24 2048, 1,000 mL at 09/16/24 2048    lactated ringers infusion, 125 mL/hr, Intravenous, Continuous, Kristen Sánchez MD, Last Rate: 125 mL/hr at 09/16/24 2047, 125 mL/hr at 09/16/24 2047    Magnesium Standard Dose Replacement - Follow Nurse / BPA Driven Protocol, , Does not apply, PRN, Kristen Sánchez MD    mupirocin (BACTROBAN) 2 % nasal ointment 1 Application, 1 Application, Each Nare, BID, Kristen Sánchez MD    nitroglycerin (NITROSTAT) SL tablet 0.4 mg, 0.4 mg, Sublingual, Q5 Min PRN, Kristen Sánchez  MD KASSIDY    norepinephrine (LEVOPHED) 8 mg in 250 mL NS infusion (premix), 0.02-0.3 mcg/kg/min, Intravenous, Titrated, Ronald Alcaraz MD, Last Rate: 11.91 mL/hr at 09/16/24 2053, 0.1 mcg/kg/min at 09/16/24 2053    ondansetron (ZOFRAN) injection 4 mg, 4 mg, Intravenous, Q6H PRN, Kristen Sánchez MD    Pharmacy to Dose Cefepime, , Does not apply, Continuous PRN, Otilio Velázquez APRN    Phosphorus Replacement - Follow Nurse / BPA Driven Protocol, , Does not apply, PRN, Kristen Sánchez MD    Potassium Replacement - Follow Nurse / BPA Driven Protocol, , Does not apply, Gonzalo HERNANDEZ Padma C, MD    sodium chloride 0.9 % flush 10 mL, 10 mL, Intravenous, Q12H, Kristen Sánchez MD, 10 mL at 09/16/24 2049    sodium chloride 0.9 % flush 10 mL, 10 mL, Intravenous, PRN, Kristen Sánchez MD    sodium chloride 0.9 % infusion 40 mL, 40 mL, Intravenous, PRN, Kristen Sánchez MD    vancomycin (VANCOCIN) 500 mg IVPB in 100 mL NS (MBP), 500 mg, Intravenous, Q24H, Otilio Velázquez APRN    Plan:   Diagnoses:  -Septic Shock  -UTI  -Acute Kidney Injury  -NSTEMI type II  -Deep Tissue Injury to Left Hip  -Chronic Urinary Retention with Chronic Indwelling Ewing Catheter    Plan:  -Vancomycin and Cefepime, blood and urine cx, 30 mL/kg crystalloid bolus complete, lactic acid trend  -urine cx pending, antibiotics as above, ewing catheter change in ED  -likely prerenal, fluid resuscitated in ED, monitor renal function, avoid nephrotoxic agents  -likely demand ischemia in the setting of septic shock, hyoxia, trend troponin, EKG no ischemic findings  -wound nurse consult  -Ewing changed in ED        VTE Prophylaxis:  Pharmacologic & mechanical VTE prophylaxis orders are present.        CODE STATUS:    Code Status (Patient has no pulse and is not breathing): CPR (Attempt to Resuscitate)  Medical Interventions (Patient has pulse or is breathing): Full Support    Admission Status:  I believe this patient meets inpatient critical care  status.    46 minutes of critical care provided. This time excludes other billable procedures. Time does include preparation of documents, medical consultations, review of old records, and direct bedside care. Patient is at high risk for life-threatening deterioration due to septic shock.       Otilio Velázquez, DNP, APRN, AGACNP-BC

## 2024-09-17 NOTE — CONSULTS
[facility] Palliative Care Services  Initial Consult    Attending Physician: Kristen Sánchez MD  Referring Provider: Kristen Sánchez MD    Patient Name: Jagdish Cook  Date of Admission: 9/16/2024  Today's Date: 09/17/24     Reason for Referral: Goals of Care/Advance Care Planning    Code Status and Medical Interventions: No CPR (Do Not Attempt to Resuscitate); Limited Support; No intubation (DNI)   Ordered at: 09/17/24 0107     Medical Intervention Limits:    No intubation (DNI)     Level Of Support Discussed With:    Next of Kin (If No Surrogate)     Code Status (Patient has no pulse and is not breathing):    No CPR (Do Not Attempt to Resuscitate)     Medical Interventions (Patient has pulse or is breathing):    Limited Support        Subjective     HPI: 73 y.o. male with past medical history including bacteremia, chronic back pain, COPD, GERD, memory deficits, neuropathy, umbilical hernia, and urinary retention.  Additional past medical history listed below.  Urology notes reviewed from office visit on 8/1/2024 which notes chronic urinary retention, BPH with obstruction, and phimosis.  Noted he has been noncompliant with appointments with cardiology and pulmonology to obtain surgery clearance therefore urology recommended continuing chronic indwelling catheter.  According to chart review he was recently admitted from 9/2/2024-9/3/2024 after being found down at home by his caregiver. Noted to have multiple problems listed as discharge diagnosis including sepsis, traumatic rhabdomyolysis, and gait instability however he left AGAINST MEDICAL ADVICE.  Patient presented to [facility] on 9/16/2024 related to altered mental status and tachycardia.  According to chart review he was placed in nursing facility approximately 1 week ago.  Workup in ED revealed multiple abnormalities in labs including troponin T 308, BNP 24,732, creatinine 3.08, BUN 69, GFR 20.3, albumin 2.0, AST 42,  lactate 2.3, CRP 30.76, procalcitonin 4.57, WBC 11.22, hemoglobin 12.1 and hematocrit 36.8.  ABGs collected while on nonrebreather at 15 L revealed pH 7.309, pCO2 47.1 and pO2 66.4.  UA revealed 1+ bacteria, 3+ bilirubin and blood.  Urine culture pending.  Chest x-ray revealed shallow inspiration with persistent left basilar opacity, cardiomegaly and mild pulmonary vascular prominence.  Respiratory panel negative.  Noted to be hypotensive with blood pressure pressure as low as 55/41 in ED.  He was placed on Levophed.  Chart review notes he has a chronic Mario catheter and was found to have necrosis with bleeding and yellow drainage coming from penis.  Mario catheter replaced.  Also noted to have wound of left hip.  Wound care provider has been consulted.  He was admitted to the critical care unit for further workup and treatment.  Labs collected this morning reveals sodium slightly elevated at 148.  Renal function has improved with creatinine 1.89, BUN 56, and GFR 36.6.  Hemoglobin and hematocrit appear stable.  WBC trending downward.  Palliative care has been consulted to discuss goals of care/advance care planning.  He is lying in bed, alert and in no apparent distress.  Oriented to self and place.  Reports the year is 2043.  He is requesting water.  Denies any pain.  Speech is somewhat garbled and difficult to understand.  No visitors at bedside.  Discussed with nursing.     Advance Care Planning   Advanced Directives: No advance directive on file.     Advance Care Planning Discussion: Attempted to discuss goals of care with Mr. Cook however he is unable to demonstrate ability to make complex medical decisions at time of exam.   Call placed to patient's brother, Jitendra, to discuss goals of care.  He provided additional history regarding his brother's baseline.  Reports he is essentially wheelchair-bound at baseline due to he is essentially wheelchair-bound at baseline due to increased weakness in one of his  legs.  Also shared he tends to have some speech garbled at baseline.  We discussed events leading to hospitalization.  Shared he was able to speak with someone yesterday to receive update.  Explored medical priorities and whether he had discussed wishes with his brother in the past.  Confirmed Mr. Cook did not wish to undergo CPR and/or be intubated.  We discussed additional medical priorities including restarting vasopressors if indicated, NIPPV, etc.  Jitendra expressed wishes to continue with current measures at this time.  He inquired if/when he would return to nursing facility.  Explained he would likely remain hospitalized for a day or 2 to ensure he was stable prior to returning.  He denied any questions and or concerns.  Difficult to determine his overall prognostic awareness.  Encouraged questions if arise.    The patient receives support from his sibling and friends. Patient's sibling, Jitendra, reports he is Mr. Cook's only relative.     Due to the palliative care topics discussed including goals of care and medical priorities we will establish an advance care plan.      Review of Systems   Unable to perform ROS: Other     Pain Assessment  CPOT Facial Expression: 0-->relaxed, neutral  CPOT Body Movements: 0-->absence of movements  CPOT Muscle Tension: 0-->relaxed  Ventilator Compliance/Vocalization: 0-->talking in normal tone or no sound  CPOT Score: 0  PAINAD Breathin-->occasional labored breathing, short period of hyperventilation  PAINAD Negative Vocalization: 1-->occasional moan/groan, low speech, negative/disapproving quality  PAINAD Facial Expression: 1-->sad, frightened, frown  PAINAD Body Language: 0-->relaxed  PAINAD Consolability: 0-->no need to console  PAINAD Score: 3  Past Medical History:   Diagnosis Date    Bacteremia     Chronic back pain     COPD (chronic obstructive pulmonary disease)     Mario catheter in place     GERD (gastroesophageal reflux disease)     Memory deficits      Neuropathy     Umbilical hernia     Urinary retention       Past Surgical History:   Procedure Laterality Date    BACK SURGERY      HERNIA REPAIR        Social History     Socioeconomic History    Marital status: Single   Tobacco Use    Smoking status: Every Day     Types: Cigars     Passive exposure: Current    Smokeless tobacco: Never   Vaping Use    Vaping status: Never Used   Substance and Sexual Activity    Alcohol use: No    Drug use: Defer    Sexual activity: Defer     Family History   Problem Relation Age of Onset    No Known Problems Father     No Known Problems Mother       Allergies   Allergen Reactions    Meloxicam Hives     Dizzy, skin crawls    Tamsulosin Dizziness     excessive sweating    Penicillins        Objective   Diagnostics: Reviewed    Intake/Output Summary (Last 24 hours) at 9/17/2024 0814  Last data filed at 9/17/2024 0559  Gross per 24 hour   Intake 2095.28 ml   Output 850 ml   Net 1245.28 ml       Current medications patient is presently taking including all prescriptions, over-the-counter, herbals and vitamin/mineral/dietary (nutritional) supplements with reviewed including route, type, dose and frequency and are current per MAR at time of dictation.  Current Facility-Administered Medications   Medication Dose Route Frequency Provider Last Rate Last Admin    acetaminophen (TYLENOL) tablet 650 mg  650 mg Oral Q4H PRN Kristen Sánchez MD        Or    acetaminophen (TYLENOL) suppository 650 mg  650 mg Rectal Q4H PRN Kristen Sánchez MD        sennosides-docusate (PERICOLACE) 8.6-50 MG per tablet 2 tablet  2 tablet Oral BID Kristen Sánchez MD   2 tablet at 09/16/24 2143    And    polyethylene glycol (MIRALAX) packet 17 g  17 g Oral Daily PRN Kristen Sánchez MD        And    bisacodyl (DULCOLAX) EC tablet 5 mg  5 mg Oral Daily PRN Kristen Sánchez MD        And    bisacodyl (DULCOLAX) suppository 10 mg  10 mg Rectal Daily PRN Kristen Sánchez MD        Calcium Replacement  - Follow Nurse / BPA Driven Protocol   Does not apply PRN Kristen Sánchez MD        cefepime 2000 mg IVPB in 100 mL NS (MBP)  2,000 mg Intravenous Q24H Otilio Velázquez APRN        Chlorhexidine Gluconate Cloth 2 % pads 1 Application  1 Application Topical Q24H Kristen Sánchez MD   1 Application at 09/17/24 0410    heparin (porcine) 5000 UNIT/ML injection 5,000 Units  5,000 Units Subcutaneous Q8H Kristen Sánchez MD   5,000 Units at 09/17/24 0607    lactated ringers infusion  125 mL/hr Intravenous Continuous Kristen Sánchez  mL/hr at 09/17/24 0559 125 mL/hr at 09/17/24 0559    Magnesium Standard Dose Replacement - Follow Nurse / BPA Driven Protocol   Does not apply PRN Kristen Sánchez MD        mupirocin (BACTROBAN) 2 % nasal ointment 1 Application  1 Application Each Nare BID Kristen Sánchez MD   1 Application at 09/16/24 2143    nitroglycerin (NITROSTAT) SL tablet 0.4 mg  0.4 mg Sublingual Q5 Min PRN Kristen Sánchez MD        norepinephrine (LEVOPHED) 8 mg in 250 mL NS infusion (premix)  0.02-0.3 mcg/kg/min Intravenous Titrated Ronald Alcaraz MD 2.38 mL/hr at 09/17/24 0638 0.02 mcg/kg/min at 09/17/24 0638    ondansetron (ZOFRAN) injection 4 mg  4 mg Intravenous Q6H PRN Kristen Sánchez MD        Pharmacy to Dose Cefepime   Does not apply Continuous PRN Otilio Velázquez APRN        Phosphorus Replacement - Follow Nurse / BPA Driven Protocol   Does not apply PRN Kristen Sánchez MD        Potassium Replacement - Follow Nurse / BPA Driven Protocol   Does not apply PRN Kristen Sánchez MD        sodium chloride 0.9 % flush 10 mL  10 mL Intravenous Q12H Kristen Sánchez MD   10 mL at 09/16/24 2049    sodium chloride 0.9 % flush 10 mL  10 mL Intravenous PRN Kristen Sánchez MD        sodium chloride 0.9 % infusion 40 mL  40 mL Intravenous PRN Kristen Sánchez MD        vancomycin (VANCOCIN) 500 mg IVPB in 100 mL NS (MBP)  500 mg Intravenous Q24H Otilio Velázquez, APRN  "200 mL/hr at 09/16/24 2143 500 mg at 09/16/24 2143    lactated ringers, 125 mL/hr, Last Rate: 125 mL/hr (09/17/24 0559)  norepinephrine, 0.02-0.3 mcg/kg/min, Last Rate: 0.02 mcg/kg/min (09/17/24 0638)  Pharmacy Consult - Pharmacy to dose,        acetaminophen **OR** acetaminophen    senna-docusate sodium **AND** polyethylene glycol **AND** bisacodyl **AND** bisacodyl    Calcium Replacement - Follow Nurse / BPA Driven Protocol    Magnesium Standard Dose Replacement - Follow Nurse / BPA Driven Protocol    nitroglycerin    ondansetron    Pharmacy Consult - Pharmacy to dose    Phosphorus Replacement - Follow Nurse / BPA Driven Protocol    Potassium Replacement - Follow Nurse / BPA Driven Protocol    sodium chloride    sodium chloride  Assessment   /46   Pulse 93   Temp 97.8 °F (36.6 °C) (Oral)   Resp 25   Ht 175.3 cm (69.02\")   Wt 64.1 kg (141 lb 5 oz)   SpO2 96%   BMI 20.86 kg/m²     Physical Exam  Vitals and nursing note reviewed.   Constitutional:       General: He is not in acute distress.     Appearance: He is ill-appearing.      Interventions: Nasal cannula in place.   HENT:      Head: Normocephalic and atraumatic.   Eyes:      General: Lids are normal.      Extraocular Movements: Extraocular movements intact.   Neck:      Vascular: No JVD.      Trachea: Trachea normal.   Cardiovascular:      Rate and Rhythm: Tachycardia present.   Pulmonary:      Effort: Pulmonary effort is normal.   Genitourinary:     Comments: Indwelling urinary catheter present.  Musculoskeletal:      Cervical back: Neck supple.   Skin:     General: Skin is warm and dry.      Findings: Wound present.   Neurological:      Mental Status: He is alert. He is disoriented.     Functional status: Palliative Performance Scale Score: Performance 50% based on the following measures: Ambulation: Mainly sit or lie down, Activity and Evidence of Disease: Unable to do any work, extensive evidence of disease, Self-Care: Considerable assistance " required,  Intake: Normal or reduced, LOC: Full or confusion.  Nutritional status: Albumin 2.1. Body mass index is 20.86 kg/m².  Patient status: Disease state: Controlled with current treatments.    Active Hospital Problems    Diagnosis     **Shock     RYNE (acute kidney injury)     Urinary tract infection associated with indwelling urethral catheter     Chronic indwelling Mario catheter        Impression/Problem List:  Shock  Acute kidney injury  Altered mental status   Urinary tract infection associated with indwelling urethral catheter  COPD  Pressure injury of left hip     Pressure injury of penis     Chronic urinary retention with chronic indwelling Mario catheter  Chronic back pain   Hypoalbuminemia   Hypernatremia     Plan / Recommendations     Palliative Care Encounter   Goals of care include CODE STATUS NO CPR with limited support interventions.    Prognosis is guarded long-term secondary to altered mental status, UTI, pressure injuries, chronic urinary retention, COPD and other comorbidities listed above.     Attempted to discuss goals of care with Mr. Cook however he is unable to demonstrate ability to make complex medical decisions at time of exam.       Spoke with his brother/next of kin, Jitendra, via telephone.  Details of discussion above.      Expressed wishes to continue with current measures at this time.     Difficult to determine his overall prognostic awareness.  Denied questions and/or concerns.  Encouraged questions if arise.     Anticipate return to SNF once medically stable per attending.     Thank you for allowing us to participate in patient's plan of care. Palliative Care Team will continue to follow patient as needed.     Time spent:60 minutes spent reviewing providers documentation, medical records, assessing and examining patient, discussing with family, answering questions, providing guidance about a plan of care, and coordinating care with other healthcare members.  More than 50% of  time spent face-to-face discussing disease education, current clinical status, and medication management.    Electronically signed by, CONCHITA Nunn, 09/17/24.

## 2024-09-17 NOTE — PLAN OF CARE
Goal Outcome Evaluation:  Plan of Care Reviewed With: patient        Progress: improving  Outcome Evaluation: Alert to self only. LOC increased overnight. Norepinephrine gtt titrated off at this time. Remains on maintenance IV fluids. Adequate urine output. Turned Q2h with wedges. Pictures of wounds present on admission added to patients chart. Nasal cannula oxygen titrated down.

## 2024-09-17 NOTE — PROGRESS NOTES
"Pharmacy Dosing Service  Pharmacokinetics  Vancomycin Initial Evaluation  Assessment/Action/Plan:  Loading dose: 1000 mg at 1415  Current Order: Vancomycin 500 mg IVPB every 24 hours  Current end date:9/20/2024  Levels: no levels ordered  Additional antimicrobial agent(s): Cefepime  MRSA Nasal PCR ordered: Yes (Vancomycin indication is pneumonia, bone/joint, SSTI or IAI)    Vancomycin dosage initiated based on population pharmacokinetic parameters. Pharmacy will continue to follow daily and adjust dose accordingly.     Subjective:  Jagdish Cook is a 73 y.o. male with a Vancomycin \"Pharmacy to Dose\" consult for the treatment of sepsis , day 1 of 5 of treatment.    AUC Model Data:  Regimen: 500 mg IV every 24 hours.  Start time: 22:00 on 09/16/2024  Exposure target: AUC24 (range)400-600 mg/L.hr   AUC24,ss: 475 mg/L.hr  Probability of AUC24 > 400: 70 %  Ctrough,ss: 16.9 mg/L  Probability of Ctrough,ss > 20: 32 %  Probability of nephrotoxicity (Lodise BARRY 2009): 13 %      Objective:  Ht: 175.3 cm (69\"); Wt: 63.5 kg (140 lb)  Estimated Creatinine Clearance: 18.6 mL/min (A) (by C-G formula based on SCr of 3.08 mg/dL (H)).   Creatinine   Date Value Ref Range Status   09/16/2024 3.08 (H) 0.76 - 1.27 mg/dL Final   09/03/2024 0.63 (L) 0.76 - 1.27 mg/dL Final   09/02/2024 0.68 (L) 0.76 - 1.27 mg/dL Final   07/31/2024 0.9 0.5 - 1.2 mg/dL Final   08/25/2019 0.6 0.5 - 1.2 mg/dL Final   05/16/2019 0.6 0.5 - 1.2 mg/dL Final      Lab Results   Component Value Date    WBC 11.22 (H) 09/16/2024    WBC 15.25 (H) 09/03/2024    WBC 22.37 (H) 09/02/2024      Baseline culture results:  Microbiology Results (last 10 days)       Procedure Component Value - Date/Time    Respiratory Panel PCR w/COVID-19(SARS-CoV-2) ZEESHAN/DANIELLE/KYLAH/PAD/COR/SHYANN In-House, NP Swab in UTM/VTM, 2 HR TAT - Swab, Nasopharynx [[id number]]  (Normal) Collected: 09/16/24 1343    Lab Status: Final result Specimen: Swab from Nasopharynx Updated: 09/16/24 1446     ADENOVIRUS, " PCR Not Detected     Coronavirus 229E Not Detected     Coronavirus HKU1 Not Detected     Coronavirus NL63 Not Detected     Coronavirus OC43 Not Detected     COVID19 Not Detected     Human Metapneumovirus Not Detected     Human Rhinovirus/Enterovirus Not Detected     Influenza A PCR Not Detected     Influenza B PCR Not Detected     Parainfluenza Virus 1 Not Detected     Parainfluenza Virus 2 Not Detected     Parainfluenza Virus 3 Not Detected     Parainfluenza Virus 4 Not Detected     RSV, PCR Not Detected     Bordetella pertussis pcr Not Detected     Bordetella parapertussis PCR Not Detected     Chlamydophila pneumoniae PCR Not Detected     Mycoplasma pneumo by PCR Not Detected    Narrative:      In the setting of a positive respiratory panel with a viral infection PLUS a negative procalcitonin without other underlying concern for bacterial infection, consider observing off antibiotics or discontinuation of antibiotics and continue supportive care. If the respiratory panel is positive for atypical bacterial infection (Bordetella pertussis, Chlamydophila pneumoniae, or Mycoplasma pneumoniae), consider antibiotic de-escalation to target atypical bacterial infection.            Rey Obregon, PharmD  09/16/24 20:35 CDT

## 2024-09-17 NOTE — NURSING NOTE
[facility]  INPATIENT WOUND & OSTOMY CARE    Today's Date: 09/17/24    Patient Name: Jagdish Cook  MRN: [id number]  CSN: [id number]  PCP: Aidan Shields MD  Attending Provider: Kristen Sánchez MD  Length of Stay: 1    Rounded with CONCHITA Duran for initial consult. See her consult note for details. Patient is currently resting in bed. No family at bedside. Patient was admitted with multiple wounds present.     Updated pictures taken by Yudelka. Bedside debridement of left hip wound,. Post procedures measurements taken. I took updated picture post debridement.     Wound care orders placed for wounds. All pressure ulcer prevention measures to be utilized. Dolphin bed has been ordered.     Patient educated on importance of turning and repositioning at frequent intervals to prevent skin breakdown.     Patient is currently on a comfort glide with an absorbant pad. Wedges are at bedside for nursing to utilizing for turning.    All pressure ulcer prevention measures have been ordered per protocol.     Inpatient wound care will continue to follow during hospital stay.  Please contact if any issues or concerns arise.     This document has been electronically signed by Carolina Shell RN on 9/17/2024 11:49 CDT

## 2024-09-17 NOTE — PROGRESS NOTES
"Pharmacy Dosing Service  Antimicrobial Dosing  Cefepime     Assessment/Action/Plan:  Based on indication and renal function, Cefepime adjusted to 2000 mg IV every 12 hours. Pharmacy will continue to monitor daily and make further adjustment(s) accordingly.      Subjective:  Jagdish Cook is a 73 y.o. male with a  \"Pharmacy to Dose Cefepime\" consult for the treatment of sepsis, day 2 of 5 of treatment.    Objective:  Ht: 175.3 cm (69.02\"); Wt: 64.1 kg (141 lb 5 oz)  Estimated Creatinine Clearance: 30.6 mL/min (A) (by C-G formula based on SCr of 1.89 mg/dL (H)).   Creatinine   Date Value Ref Range Status   09/17/2024 1.89 (H) 0.76 - 1.27 mg/dL Final   09/16/2024 3.08 (H) 0.76 - 1.27 mg/dL Final   09/03/2024 0.63 (L) 0.76 - 1.27 mg/dL Final   07/31/2024 0.9 0.5 - 1.2 mg/dL Final   08/25/2019 0.6 0.5 - 1.2 mg/dL Final   05/16/2019 0.6 0.5 - 1.2 mg/dL Final      Lab Results   Component Value Date    WBC 13.15 (H) 09/17/2024    WBC 15.16 (H) 09/16/2024    WBC 11.22 (H) 09/16/2024      Baseline culture results:  Microbiology Results (last 10 days)       Procedure Component Value - Date/Time    MRSA Screen, PCR (Inpatient) - Swab, Nares [[id number]]  (Normal) Collected: 09/16/24 2157    Lab Status: Final result Specimen: Swab from Nares Updated: 09/16/24 2317     MRSA PCR No MRSA Detected    Narrative:      The negative predictive value of this diagnostic test is high and should only be used to consider de-escalating anti-MRSA therapy. A positive result may indicate colonization with MRSA and must be correlated clinically.    Urine Culture - Urine, Indwelling Urethral Catheter [[id number]]  (Normal) Collected: 09/16/24 1437    Lab Status: Preliminary result Specimen: Urine from Indwelling Urethral Catheter Updated: 09/17/24 0944     Urine Culture Growth present, too young to evaluate    Respiratory Panel PCR w/COVID-19(SARS-CoV-2) ZEESHAN/DANIELLE/KYLAH/PAD/COR/SHYANN In-House, NP Swab in UTM/VTM, 2 HR TAT - Swab, Nasopharynx " [[id number]]  (Normal) Collected: 09/16/24 1343    Lab Status: Final result Specimen: Swab from Nasopharynx Updated: 09/16/24 1446     ADENOVIRUS, PCR Not Detected     Coronavirus 229E Not Detected     Coronavirus HKU1 Not Detected     Coronavirus NL63 Not Detected     Coronavirus OC43 Not Detected     COVID19 Not Detected     Human Metapneumovirus Not Detected     Human Rhinovirus/Enterovirus Not Detected     Influenza A PCR Not Detected     Influenza B PCR Not Detected     Parainfluenza Virus 1 Not Detected     Parainfluenza Virus 2 Not Detected     Parainfluenza Virus 3 Not Detected     Parainfluenza Virus 4 Not Detected     RSV, PCR Not Detected     Bordetella pertussis pcr Not Detected     Bordetella parapertussis PCR Not Detected     Chlamydophila pneumoniae PCR Not Detected     Mycoplasma pneumo by PCR Not Detected    Narrative:      In the setting of a positive respiratory panel with a viral infection PLUS a negative procalcitonin without other underlying concern for bacterial infection, consider observing off antibiotics or discontinuation of antibiotics and continue supportive care. If the respiratory panel is positive for atypical bacterial infection (Bordetella pertussis, Chlamydophila pneumoniae, or Mycoplasma pneumoniae), consider antibiotic de-escalation to target atypical bacterial infection.            Rey Obregon, PharmD  09/17/24 13:35 CDT

## 2024-09-18 NOTE — PLAN OF CARE
Problem: Adult Inpatient Plan of Care  Goal: Plan of Care Review  Recent Flowsheet Documentation  Taken 9/18/2024 1059 by Dean Norris, PT DPT     Goal Outcome Evaluation:  Plan of Care Reviewed With: patient           Outcome Evaluation: PT linda complete. He is alert and confused, only oriented to self. Therapy called SNF and they report he is able to stand with assist x1, requires Mod A for adls. He demos significant B hip/knee flexion contractures that limit mobility. He needs max assist x 2 for supine <> sit bed mobility. Stands once with max assist x 2 and needs max x 2 to maintain static standing balance. Was unable to take steps or transfer due to weakness and contractures in B LE. High fall risk. PT will cont with mobility efforts and strengthening wihtin available ROM. Recommend d.c back to SNF.      Anticipated Discharge Disposition (PT): skilled nursing facility

## 2024-09-18 NOTE — PROGRESS NOTES
"Pharmacy Dosing Service  Antimicrobial Dosing  Cefepime    Assessment/Action/Plan:  Based on indication and renal function, cefepime adjusted to 2000 mg IV every 8 hours. Pharmacy will continue to monitor daily and make further adjustment(s) accordingly.     Subjective:  Jagdish Cook is a 73 y.o. male with a  \"Pharmacy to Dose Cefepime\" consult for the treatment of sepsis , day 3 of 5 of treatment.    Objective:  Ht: 175.3 cm (69.02\"); Wt: 65 kg (143 lb 4.8 oz)  Estimated Creatinine Clearance: 71.6 mL/min (by C-G formula based on SCr of 0.82 mg/dL).   Creatinine   Date Value Ref Range Status   09/18/2024 0.82 0.76 - 1.27 mg/dL Final   09/18/2024 1.00 0.76 - 1.27 mg/dL Final   09/17/2024 1.89 (H) 0.76 - 1.27 mg/dL Final   07/31/2024 0.9 0.5 - 1.2 mg/dL Final   08/25/2019 0.6 0.5 - 1.2 mg/dL Final   05/16/2019 0.6 0.5 - 1.2 mg/dL Final      Lab Results   Component Value Date    WBC 16.25 (H) 09/18/2024    WBC 13.15 (H) 09/17/2024    WBC 15.16 (H) 09/16/2024      Baseline culture results:  Microbiology Results (last 10 days)       Procedure Component Value - Date/Time    MRSA Screen, PCR (Inpatient) - Swab, Nares [[id number]]  (Normal) Collected: 09/16/24 2157    Lab Status: Final result Specimen: Swab from Nares Updated: 09/16/24 2317     MRSA PCR No MRSA Detected    Narrative:      The negative predictive value of this diagnostic test is high and should only be used to consider de-escalating anti-MRSA therapy. A positive result may indicate colonization with MRSA and must be correlated clinically.    Urine Culture - Urine, Indwelling Urethral Catheter [[id number]]  (Abnormal) Collected: 09/16/24 1437    Lab Status: Preliminary result Specimen: Urine from Indwelling Urethral Catheter Updated: 09/18/24 1141     Urine Culture >100,000 CFU/mL Gram Positive Cocci      >100,000 CFU/mL Corynebacterium species     Comment:   Based on laboratory diagnosis criteria, these organisms are common urogenital commensal rolando and " have not been associated with urinary tract infections; clinical correlation is recommended.       Narrative:      Colonization of the urinary tract without infection is common. Treatment is discouraged unless the patient is symptomatic, pregnant, or undergoing an invasive urologic procedure.    Respiratory Panel PCR w/COVID-19(SARS-CoV-2) ZEESHAN/DANIELLE/KYLAH/PAD/COR/SHYANN In-House, NP Swab in UTM/VTM, 2 HR TAT - Swab, Nasopharynx [[id number]]  (Normal) Collected: 09/16/24 1343    Lab Status: Final result Specimen: Swab from Nasopharynx Updated: 09/16/24 1446     ADENOVIRUS, PCR Not Detected     Coronavirus 229E Not Detected     Coronavirus HKU1 Not Detected     Coronavirus NL63 Not Detected     Coronavirus OC43 Not Detected     COVID19 Not Detected     Human Metapneumovirus Not Detected     Human Rhinovirus/Enterovirus Not Detected     Influenza A PCR Not Detected     Influenza B PCR Not Detected     Parainfluenza Virus 1 Not Detected     Parainfluenza Virus 2 Not Detected     Parainfluenza Virus 3 Not Detected     Parainfluenza Virus 4 Not Detected     RSV, PCR Not Detected     Bordetella pertussis pcr Not Detected     Bordetella parapertussis PCR Not Detected     Chlamydophila pneumoniae PCR Not Detected     Mycoplasma pneumo by PCR Not Detected    Narrative:      In the setting of a positive respiratory panel with a viral infection PLUS a negative procalcitonin without other underlying concern for bacterial infection, consider observing off antibiotics or discontinuation of antibiotics and continue supportive care. If the respiratory panel is positive for atypical bacterial infection (Bordetella pertussis, Chlamydophila pneumoniae, or Mycoplasma pneumoniae), consider antibiotic de-escalation to target atypical bacterial infection.    Blood Culture - Blood, Hand, Right [[id number]]  (Normal) Collected: 09/16/24 1339    Lab Status: Preliminary result Specimen: Blood from Hand, Right Updated: 09/18/24 1400     Blood Culture No  growth at 2 days    Blood Culture - Blood, Hand, Left [[id number]]  (Normal) Collected: 09/16/24 1339    Lab Status: Preliminary result Specimen: Blood from Hand, Left Updated: 09/18/24 1400     Blood Culture No growth at 2 days            Rey Obregon, PharmD  09/18/24 15:56 CDT

## 2024-09-18 NOTE — THERAPY EVALUATION
Patient Name: Jagdish Cook  : 1950    MRN: [id number]                              Today's Date: 2024       Admit Date: 2024    Visit Dx:     ICD-10-CM ICD-9-CM   1. Shock  R57.9 785.50   2. Acute kidney injury  N17.9 584.9   3. Pneumonia of left lung due to infectious organism, unspecified part of lung  J18.9 486   4. Metabolic acidosis  E87.20 276.2   5. Skin infection  L08.9 686.9     Patient Active Problem List   Diagnosis    Primary hypertension    Hypothyroidism    Right hip pain    Chronic bilateral low back pain with bilateral sciatica    Acute on chronic urinary retention    Severe sepsis without septic shock    Gait instability    Transaminitis    Chronic indwelling Mario catheter    Urinary tract infection associated with indwelling urethral catheter    Hypokalemia    Constipation    Traumatic rhabdomyolysis    Syncope    Shock    RYNE (acute kidney injury)     Past Medical History:   Diagnosis Date    Bacteremia     Chronic back pain     COPD (chronic obstructive pulmonary disease)     Mario catheter in place     GERD (gastroesophageal reflux disease)     Memory deficits     Neuropathy     Umbilical hernia     Urinary retention      Past Surgical History:   Procedure Laterality Date    BACK SURGERY      HERNIA REPAIR        General Information       Row Name 24 1100          OT Time and Intention    Mode of Treatment occupational therapy  -       Row Name 24 1100          General Information    Patient Profile Reviewed yes  -JJ     Existing Precautions/Restrictions fall;oxygen therapy device and L/min  -JJ     Barriers to Rehab medically complex;physical barrier;previous functional deficit;contractures;cognitive status  -       Row Name 24 1100          Living Environment    People in Home facility resident  -       Row Name 24 1100          Cognition    Orientation Status (Cognition) oriented to;person  -       Row Name 24 1100          Safety  Issues, Functional Mobility    Impairments Affecting Function (Mobility) cognition;balance;endurance/activity tolerance;strength;range of motion (ROM)  -     Cognitive Impairments, Mobility Safety/Performance attention;awareness, need for assistance;insight into deficits/self-awareness;judgment;problem-solving/reasoning;safety precaution awareness;safety precaution follow-through  -               User Key  (r) = Recorded By, (t) = Taken By, (c) = Cosigned By      Initials Name Provider Type    Tish Lazaro, LUKASR/L, CSRS Occupational Therapist                     Mobility/ADL's       Row Name 09/18/24 1100          Bed Mobility    Bed Mobility supine-sit;sit-supine;scooting/bridging  -     Scooting/Bridging [place] (Bed Mobility) dependent (less than 25% patient effort)  -     Supine-Sit [place] (Bed Mobility) maximum assist (25% patient effort)  -     Sit-Supine [place] (Bed Mobility) maximum assist (25% patient effort)  -     Bed Mobility, Safety Issues decreased use of arms for pushing/pulling;decreased use of legs for bridging/pushing;cognitive deficits limit understanding  -     Assistive Device (Bed Mobility) head of bed elevated;bed rails;draw sheet  -       Row Name 09/18/24 1100          Transfers    Transfers sit-stand transfer;stand-sit transfer  -     Comment, (Transfers) attempted sit <> stand t/f from EOB. Due to flexion contracture - B knees are bent at near 90 degrees. He required Max Ax2 to achieve stance and to maintain it.  -       Row Name 09/18/24 1100          Sit-Stand Transfer    Sit-Stand [place] (Transfers) maximum assist (25% patient effort);2 person assist  -       Row Name 09/18/24 1100          Stand-Sit Transfer    Stand-Sit [place] (Transfers) maximum assist (25% patient effort);2 person assist  -       Row Name 09/18/24 1100          Functional Mobility    Functional Mobility- Ind. Level not appropriate to assess  -       Pedro Luis  Name 09/18/24 1100          Activities of Daily Living    BADL Assessment/Intervention lower body dressing  -       Row Name 09/18/24 1100          Lower Body Dressing Assessment/Training    [place] Level (Lower Body Dressing) don;socks;dependent (less than 25% patient effort)  -     Position (Lower Body Dressing) edge of bed sitting  -J               User Key  (r) = Recorded By, (t) = Taken By, (c) = Cosigned By      Initials Name Provider Type    Tish Lazaro OTR/L, MADELINE Occupational Therapist                   Obj/Interventions       Row Name 09/18/24 1100          Sensory Assessment (Somatosensory)    Sensory Assessment (Somatosensory) UE sensation intact  -       Row Name 09/18/24 1100          Vision Assessment/Intervention    Visual Impairment/Limitations WFL  -[facility] Name 09/18/24 1100          Range of Motion Comprehensive    General Range of Motion bilateral upper extremity ROM WFL  -     Comment, General Range of Motion B shoulders limited approx 25% - chronic deficit.  -       Row Name 09/18/24 1100          Strength Comprehensive (MMT)    Comment, General Manual Muscle Testing (MMT) Assessment B UE strength grossly 3/5  -J       Row Name 09/18/24 1100          Balance    Balance Assessment sitting static balance;sitting dynamic balance;standing static balance  -JJ     Static Sitting Balance contact guard;minimal assist  -JJ     Dynamic Sitting Balance moderate assist  -JJ     Position, Sitting Balance supported;sitting edge of bed  -J     Static Standing Balance maximum assist;2-person assist  -JJ     Position/Device Used, Standing Balance supported  -               User Key  (r) = Recorded By, (t) = Taken By, (c) = Cosigned By      Initials Name Provider Type    Tish Prieto OTR/L, MADELINE Occupational Therapist                   Goals/Plan       Row Name 09/18/24 1100          Dressing Goal 1 (OT)    Activity/Device (Dressing Goal 1, OT) upper body dressing   -JJ     [place]/Cues Needed (Dressing Goal 1, OT) minimum assist (75% or more patient effort)  -JJ     Time Frame (Dressing Goal 1, OT) long term goal (LTG);by discharge  -JJ     Progress/Outcome (Dressing Goal 1, OT) new [facility] [place]  -       Row Name 09/18/24 1100          Toileting Goal 1 (OT)    Activity/Device (Toileting Goal 1, OT) toileting skills, all  -JJ     [place] Level/Cues Needed (Toileting Goal 1, OT) moderate assist (50-74% patient effort)  -JJ     Time Frame (Toileting Goal 1, OT) long term goal (LTG);by discharge  -JJ     Progress/Outcome (Toileting Goal 1, OT) new goal  -       Row Name 09/18/24 1100          Grooming Goal 1 (OT)    Activity/Device (Grooming Goal 1, OT) grooming skills, all  -JJ     [place] (Grooming Goal 1, OT) minimum assist (75% or more patient effort)  -JJ     Time Frame (Grooming Goal 1, OT) long term goal (LTG);by discharge  -JJ     Progress/Outcome (Grooming Goal 1, OT) new [facility] [place]  -       Row Name 09/18/24 1100          Self-Feeding Goal 1 (OT)    Activity/Device (Self-Feeding Goal 1, OT) self-feeding skills, all  -JJ     [place] Level/Cues Needed (Self-Feeding Goal 1, OT) set-up required  -JJ     Time Frame (Self-Feeding Goal 1, OT) long term goal (LTG);by discharge  -JJ     Progress/Outcomes (Self-Feeding Goal 1, OT) new [facility]       Row Name 09/18/24 1100          Therapy Assessment/Plan (OT)    Planned Therapy Interventions (OT) activity tolerance training;adaptive equipment training;BADL retraining;functional balance retraining;transfer/mobility retraining;strengthening exercise;occupation/activity based interventions;patient/caregiver education/training;ROM/therapeutic exercise  -JJ               User Key  (r) = Recorded By, (t) = Taken By, (c) = Cosigned By      Initials Name Provider Type    Tish Prieto, OTR/L, CSRS Occupational Therapist                   Clinical Impression       Row Name 09/18/24 1100          Pain Assessment     "Pretreatment Pain Rating 0/10 - no pain  -     Posttreatment Pain Rating 0/10 - no pain  -JJ       Row Name 09/18/24 1100          Plan of Care Review    Plan of Care Reviewed With patient  -J     Outcome Evaluation OT eval completed. Pt presents alert and oriented to self only. He believes he is at \"[employer] [facility]\". Even with re-orientation, he demonstrated significant conversational confusion. This therapist called NH to obtain PLOF. Per their report, he is able to stand with assist x1, self feeds with set-up only but required Mod A for most other adls. Today he has significant B knee contractures, impaired safety awareness, balance activity tolerance, strength and ROM. He required Max A to come to sitting at EOB. Dep to don socks due to knee contractures, which are contracted into a near 90 degress flexed position. Pt adamently wants to get to chair, therapist assisted pt with sit <> stand t/f. He required Max Ax2 to achieve and upright position with the same level of assistance to maintain and upright position. He was seated back at EOB and returned back to [facility] in bed with max A. He would benefit from skilled OT services to address these deficits listed above. Recommend d/c to SNF.  -       Row Name 09/18/24 1100          Therapy Assessment/Plan (OT)    Rehab Potential (OT) good, to achieve stated therapy goals  -     Criteria for Skilled Therapeutic Interventions Met (OT) yes;skilled treatment is necessary  -     Therapy Frequency (OT) 5 times/wk  -     Predicted Duration of Therapy Intervention (OT) 10 days  -       Row Name 09/18/24 1100          Therapy Plan Review/Discharge Plan (OT)    Anticipated Discharge Disposition (OT) skilled nursing facility  -       Row Name 09/18/24 1100          Positioning and Restraints    Pre-Treatment Position in bed  -JJ     Post Treatment Position bed  -JJ     In Bed notified nsg;side lying left;fowlers;call light within reach;encouraged to call for " assist;exit alarm on;side rails up x3;patient within staff view  -               User Key  (r) = Recorded By, (t) = Taken By, (c) = Cosigned By      Initials Name Provider Type    Tish Prieto OTR/L, MADELINE Occupational Therapist                   Outcome Measures       Row Name 09/18/24 1100          How much help from another is currently needed...    Putting on and taking off regular lower body clothing? 1  -JJ     Bathing (including washing, rinsing, and drying) 1  -JJ     Toileting (which includes using toilet bed pan or urinal) 1  -JJ     Putting on and taking off regular upper body clothing 1  -JJ     Taking care of personal grooming (such as brushing teeth) 2  -JJ     Eating meals 2  -JJ     AM-PAC 6 Clicks Score (OT) 8  -       Row Name 09/18/24 0800          How much help from another person do you currently need...    Turning from your back to your side while in flat bed without using bedrails? 2  -HT     Moving from lying on back to sitting on the side of a flat bed without bedrails? 1  -HT     Moving to and from a bed to a chair (including a wheelchair)? 1  -HT     Standing up from a chair using your arms (e.g., wheelchair, bedside chair)? 1  -HT     Climbing 3-5 steps with a railing? 1  -HT     To walk in hospital room? 1  -HT     AM-PAC 6 Clicks Score (PT) 7  -HT     Highest Level of Mobility Goal 2 --> Bed activities/dependent transfer  -HT       Row Name 09/18/24 1100          Functional Assessment    Outcome Measure Options AM-PAC 6 Clicks Daily Activity (OT)  -               User Key  (r) = Recorded By, (t) = Taken By, (c) = Cosigned By      Initials Name Provider Type     Yudelka Chavarria RN Registered Nurse    Tish Prieto OTR/L, MADELINE Occupational Therapist                    Occupational Therapy Education       Title: PT OT SLP Therapies (In Progress)       Topic: Occupational Therapy (In Progress)       Point: ADL training (In Progress)       Description:   Instruct  "learner(s) on proper safety adaptation and remediation techniques during self care or transfers.   Instruct in proper use of assistive devices.                  Learning Progress Summary             Patient Acceptance, E, NR,NL by  at 9/18/2024 1151                         Point: Home exercise program (Not Started)       Description:   Instruct learner(s) on appropriate technique for monitoring, assisting and/or progressing therapeutic exercises/activities.                  Learner Progress:  Not documented in this visit.              Point: Precautions (In Progress)       Description:   Instruct learner(s) on prescribed precautions during self-care and functional transfers.                  Learning Progress Summary             Patient Acceptance, E, NR,NL by DALIA at 9/18/2024 1151                         Point: Body mechanics (In Progress)       Description:   Instruct learner(s) on proper positioning and spine alignment during self-care, functional mobility activities and/or exercises.                  Learning Progress Summary             Patient Acceptance, E, NR,NL by  at 9/18/2024 1151                                         User Key       Initials Effective Dates Name Provider Type Discipline     07/11/23 -  Tish Sanches, OTDELGADO/L, CSRS Occupational Therapist OT                  OT Recommendation and Plan  Planned Therapy Interventions (OT): activity tolerance training, adaptive equipment training, BADL retraining, functional balance retraining, transfer/mobility retraining, strengthening exercise, occupation/activity based interventions, patient/caregiver education/training, ROM/therapeutic exercise  Therapy Frequency (OT): 5 times/wk  Plan of Care Review  Plan of Care Reviewed With: patient  Outcome Evaluation: OT eval completed. Pt presents alert and oriented to self only. He believes he is at \"[employer]\". Even with re-orientation, he demonstrated significant conversational confusion. This " therapist called NH to obtain PLOF. Per their report, he is able to stand with assist x1, self feeds with set-up only but required Mod A for most other adls. Today he has significant B knee contractures, impaired safety awareness, balance activity tolerance, strength and ROM. He required Max A to come to sitting at EOB. Dep to don socks due to knee contractures, which are contracted into a near 90 degress flexed position. Pt adamently wants to get to chair, therapist assisted pt with sit <> stand t/f. He required Max Ax2 to achieve and upright position with the same level of assistance to maintain and upright position. He was seated back at EOB and returned back to [facility] in bed with max A. He would benefit from skilled OT services to address these deficits listed above. Recommend d/c to SNF.     Time Calculation:         Time Calculation- OT       Row Name 09/18/24 1100             Time Calculation- OT    OT Start Time 1100  -      OT Stop Time 1142  -      OT Time Calculation (min) 42 min  -      OT Received On 09/18/24  -      OT Goal Re-Cert Due Date 09/28/24  -                User Key  (r) = Recorded By, (t) = Taken By, (c) = Cosigned By      Initials Name Provider Type    Tish Prieto OTR/L, MADELINE Occupational Therapist                  Therapy Charges for Today       Code Description Service Date Service Provider Modifiers Qty    [id number] HC OT EVAL MOD COMPLEXITY 3 9/18/2024 Tish Sanches OTR/L, MADELINE GO 1                 Tish Sanches, OTR/L, CSRS  9/18/2024

## 2024-09-18 NOTE — PLAN OF CARE
Problem: Skin Injury Risk Increased  Goal: Skin Health and Integrity  Intervention: Optimize Skin Protection  Recent Flowsheet Documentation  Taken 9/18/2024 0405 by Clara Valle RN  Pressure Reduction Techniques:   weight shift assistance provided   heels elevated off bed  Head of Bed ([facility]) Positioning: [facility] at 30-45 degrees  Pressure Reduction Devices: pressure-redistributing mattress utilized  Skin Protection:   adhesive use limited   tubing/devices free from skin contact   skin-to-device areas padded   skin-to-skin areas padded   incontinence pads utilized  Taken 9/18/2024 0202 by Clara Valle RN  Head of Bed ([facility]) Positioning: HOB at 30 degrees  Taken 9/18/2024 0005 by Clara Valle RN  Pressure Reduction Techniques:   weight shift assistance provided   heels elevated off bed  Head of Bed ([facility]) Positioning: [facility] at 30-45 degrees  Pressure Reduction Devices: pressure-redistributing mattress utilized  Skin Protection:   adhesive use limited   tubing/devices free from skin contact   skin-to-device areas padded   skin-to-skin areas padded   incontinence pads utilized  Taken 9/17/2024 2202 by Clara Valle RN  Head of Bed ([facility]) Positioning: HOB at 30 degrees  Taken 9/17/2024 2005 by Clara Valle RN  Pressure Reduction Techniques:   weight shift assistance provided   heels elevated off bed  Head of Bed ([facility]) Positioning: HOB at 60-90 degrees  Pressure Reduction Devices: pressure-redistributing mattress utilized  Skin Protection:   adhesive use limited   tubing/devices free from skin contact   skin-to-skin areas padded   skin-to-device areas padded   incontinence pads utilized     Problem: Adult Inpatient Plan of Care  Goal: Absence of Hospital-Acquired Illness or Injury  Intervention: Identify and Manage Fall Risk  Recent Flowsheet Documentation  Taken 9/18/2024 0502 by Clara Valle RN  Safety Promotion/Fall Prevention: safety round/check completed  Taken 9/18/2024 0405 by Leonard  OSMANI Elizabeth  Safety Promotion/Fall Prevention: safety round/check completed  Taken 9/18/2024 0302 by Clara Valle RN  Safety Promotion/Fall Prevention: safety round/check completed  Taken 9/18/2024 0202 by Clara Valle RN  Safety Promotion/Fall Prevention: safety round/check completed  Taken 9/18/2024 0102 by Clara Valle RN  Safety Promotion/Fall Prevention: safety round/check completed  Taken 9/18/2024 0005 by Clara Valle RN  Safety Promotion/Fall Prevention: safety round/check completed  Taken 9/17/2024 2302 by Clara Valle RN  Safety Promotion/Fall Prevention: safety round/check completed  Taken 9/17/2024 2202 by Clara Valle RN  Safety Promotion/Fall Prevention: safety round/check completed  Taken 9/17/2024 2102 by Clara Valle RN  Safety Promotion/Fall Prevention: safety round/check completed  Taken 9/17/2024 2005 by Clara Valle RN  Safety Promotion/Fall Prevention: safety round/check completed  Intervention: Prevent Skin Injury  Recent Flowsheet Documentation  Taken 9/18/2024 0405 by Clara Valle RN  Body Position:   turned   left  Skin Protection:   adhesive use limited   tubing/devices free from skin contact   skin-to-device areas padded   skin-to-skin areas padded   incontinence pads utilized  Taken 9/18/2024 0202 by Clara Valle RN  Body Position:   turned   right  Taken 9/18/2024 0005 by Claar Valle RN  Body Position:   turned   left  Skin Protection:   adhesive use limited   tubing/devices free from skin contact   skin-to-device areas padded   skin-to-skin areas padded   incontinence pads utilized  Taken 9/17/2024 2202 by Clara Valle RN  Body Position:   turned   right  Taken 9/17/2024 2005 by Clara Valle RN  Body Position: supine  Skin Protection:   adhesive use limited   tubing/devices free from skin contact   skin-to-skin areas padded   skin-to-device areas padded   incontinence pads utilized  Intervention: Prevent and  Manage VTE (Venous Thromboembolism) Risk  Recent Flowsheet Documentation  Taken 9/18/2024 0405 by Clara Valle RN  Activity Management: bedrest  VTE Prevention/Management: other (see comments)  Range of Motion: ROM (range of motion) performed  Taken 9/18/2024 0202 by Clara Valle RN  Activity Management: bedrest  Taken 9/18/2024 0005 by Clara Valle RN  Activity Management: bedrest  VTE Prevention/Management: other (see comments)  Range of Motion: ROM (range of motion) performed  Taken 9/17/2024 2202 by Clara Valle RN  Activity Management: bedrest  Taken 9/17/2024 2005 by Clara Valle RN  Activity Management: bedrest  VTE Prevention/Management: (see  mar) other (see comments)  Range of Motion: ROM (range of motion) performed  Goal: Optimal Comfort and Wellbeing  Intervention: Provide Person-Centered Care  Recent Flowsheet Documentation  Taken 9/18/2024 0405 by Clara Valle RN  Trust Relationship/Rapport:   care explained   choices provided  Taken 9/18/2024 0005 by Clara Valle RN  Trust Relationship/Rapport:   care explained   choices provided  Taken 9/17/2024 2005 by Clara Valle RN  Trust Relationship/Rapport:   care explained   choices provided     Problem: Infection Progression (Sepsis/Septic Shock)  Goal: Absence of Infection Signs and Symptoms  Intervention: Promote Recovery  Recent Flowsheet Documentation  Taken 9/18/2024 0405 by Clara Valle RN  Activity Management: bedrest  Taken 9/18/2024 0202 by Clara Valle RN  Activity Management: bedrest  Taken 9/18/2024 0005 by Clara Valle RN  Activity Management: bedrest  Taken 9/17/2024 2202 by Clara Valle RN  Activity Management: bedrest  Taken 9/17/2024 2005 by Clara Valle RN  Activity Management: bedrest     Problem: Fall Injury Risk  Goal: Absence of Fall and Fall-Related Injury  Intervention: Promote Injury-Free Environment  Recent Flowsheet Documentation  Taken 9/18/2024 0502 by  Valle, Charida, RN  Safety Promotion/Fall Prevention: safety round/check completed  Taken 9/18/2024 0405 by Clara Valle RN  Safety Promotion/Fall Prevention: safety round/check completed  Taken 9/18/2024 0302 by Clara Valle RN  Safety Promotion/Fall Prevention: safety round/check completed  Taken 9/18/2024 0202 by Clara Valle RN  Safety Promotion/Fall Prevention: safety round/check completed  Taken 9/18/2024 0102 by Clara Valle RN  Safety Promotion/Fall Prevention: safety round/check completed  Taken 9/18/2024 0005 by Clara Valle RN  Safety Promotion/Fall Prevention: safety round/check completed  Taken 9/17/2024 2302 by Clara Valle RN  Safety Promotion/Fall Prevention: safety round/check completed  Taken 9/17/2024 2202 by Clara Valle RN  Safety Promotion/Fall Prevention: safety round/check completed  Taken 9/17/2024 2102 by Clara Valle RN  Safety Promotion/Fall Prevention: safety round/check completed  Taken 9/17/2024 2005 by Clara Valle RN  Safety Promotion/Fall Prevention: safety round/check completed   Goal Outcome Evaluation:

## 2024-09-18 NOTE — PLAN OF CARE
Goal Outcome Evaluation:  Plan of Care Reviewed With: patient        Progress: improving  Outcome Evaluation: VSS. Not requiring Levophed at this time and has been off most of this shift. Wound care here to see pt. today. New orders received. Is more alert but remains oriented to person only.

## 2024-09-18 NOTE — THERAPY EVALUATION
Patient Name: Jagdish Cook  : 1950    MRN: [id number]                              Today's Date: 2024       Admit Date: 2024    Visit Dx:     ICD-10-CM ICD-9-CM   1. Shock  R57.9 785.50   2. Acute kidney injury  N17.9 584.9   3. Pneumonia of left lung due to infectious organism, unspecified part of lung  J18.9 486   4. Metabolic acidosis  E87.20 276.2   5. Skin infection  L08.9 686.9   6. Impaired transfers [Z74.09]  Z74.09 781.99     Patient Active Problem List   Diagnosis    Primary hypertension    Hypothyroidism    Right hip pain    Chronic bilateral low back pain with bilateral sciatica    Acute on chronic urinary retention    Severe sepsis without septic shock    Gait instability    Transaminitis    Chronic indwelling Mario catheter    Urinary tract infection associated with indwelling urethral catheter    Hypokalemia    Constipation    Traumatic rhabdomyolysis    Syncope    Shock    RYNE (acute kidney injury)     Past Medical History:   Diagnosis Date    Bacteremia     Chronic back pain     COPD (chronic obstructive pulmonary disease)     Mario catheter in place     GERD (gastroesophageal reflux disease)     Memory deficits     Neuropathy     Umbilical hernia     Urinary retention      Past Surgical History:   Procedure Laterality Date    BACK SURGERY      HERNIA REPAIR        General Information       Row Name 24 1053          Physical Therapy Time and Intention    Document Type evaluation  AMS. Dx: Septic shock, UTI, RYNE  -TOI     Mode of Treatment physical therapy  -TOI       Row Name 24 1054          General Information    Patient Profile Reviewed yes  -TOI     Prior Level of Function --  Called NH- per their report he is able to stand with assist x1, requires Mod A for adls.  -TOI     Existing Precautions/Restrictions fall;oxygen therapy device and L/min  -TOI     Barriers to Rehab medically complex;previous functional deficit;cognitive status;contractures;physical barrier  -TOI        Row Name 09/18/24 1059          Living Environment    People in Home facility resident  -TOI     Name(s) of People in Home [facility]  -TOI       Row Name 09/18/24 1059          Cognition    Orientation Status (Cognition) oriented to;person  -TOI       Row Name 09/18/24 1059          Safety Issues, Functional Mobility    Safety Issues Affecting Function (Mobility) ability to follow commands;at risk behavior observed;awareness of need for assistance;friction/shear risk;insight into deficits/self-awareness;judgment;problem-solving;safety precaution awareness;safety precautions follow-through/compliance  -TOI     Impairments Affecting Function (Mobility) cognition;balance;endurance/activity tolerance;strength;muscle tone abnormal;range of motion (ROM)  -TOI               User Key  (r) = Recorded By, (t) = Taken By, (c) = Cosigned By      Initials Name Provider Type    Dean Pineda, PT DPT Physical Therapist                   Mobility       Row Name 09/18/24 1059          Bed Mobility    Bed Mobility supine-sit;sit-supine;scooting/bridging  -TOI     Scooting/Bridging [place] (Bed Mobility) dependent (less than 25% patient effort)  -TOI     Supine-Sit [place] (Bed Mobility) maximum assist (25% patient effort);2 person assist  -TOI     Sit-Supine [place] (Bed Mobility) maximum assist (25% patient effort);2 person assist  -TOI     Assistive Device (Bed Mobility) head of bed elevated;bed rails;draw sheet  -TOI       Row Name 09/18/24 1059          Transfers    Comment, (Transfers) attempted sit <> stand t/f from EOB. Due to flexion contracture - B knees are bent at near 90 degrees. He required Max Ax2 to achieve stance and to maintain it.   -TOI       Row Name 09/18/24 1059          Sit-Stand Transfer    Sit-Stand [place] (Transfers) maximum assist (25% patient effort);2 person assist  -TOI               User Key  (r) = Recorded By, (t) = Taken By, (c) = Cosigned By      Initials Name  Provider Type    Daen Pineda PT DPT Physical Therapist                   Obj/Interventions       Row Name 09/18/24 1059          Range of Motion Comprehensive    Comment, General Range of Motion B ankle AROM WFL, B knee into ~ 90 degrees flexion contracture, B hip flexion contracture ~ 45 degrees.  -TOI       Row Name 09/18/24 1059          Strength Comprehensive (MMT)    Comment, General Manual Muscle Testing (MMT) Assessment Unable to MMT B LE due to hip/knee flexion contractures, B ankle 3-/5  -TOI       Row Name 09/18/24 1059          Balance    Balance Assessment sitting dynamic balance;standing static balance  -TOI     Dynamic Sitting Balance minimal assist  -TOI     Position, Sitting Balance supported;sitting edge of bed  -TOI     Static Standing Balance maximum assist;2-person assist  -TOI     Position/Device Used, Standing Balance supported  -TOI               User Key  (r) = Recorded By, (t) = Taken By, (c) = Cosigned By      Initials Name Provider Type    Dean Pineda PT DPT Physical Therapist                   Goals/Plan       Row Name 09/18/24 1059          Bed Mobility Goal 1 (PT)    Activity/Assistive Device (Bed Mobility Goal 1, PT) sit to supine/supine to sit  -TOI     [place] Level/Cues Needed (Bed Mobility Goal 1, PT) moderate assist (50-74% patient effort)  -TOI     Time Frame (Bed Mobility Goal 1, PT) long term goal (LTG);10 days  -TOI     Progress/Outcomes (Bed Mobility Goal 1, PT) new goal  -TOI       Row Name 09/18/24 1059          Transfer Goal 1 (PT)    Activity/Assistive Device (Transfer Goal 1, PT) sit-to-stand/stand-to-sit;bed-to-chair/chair-to-bed  -TOI     [place] Level/Cues Needed (Transfer Goal 1, PT) moderate assist (50-74% patient effort)  -TOI     Time Frame (Transfer Goal 1, PT) long term goal (LTG);10 days  -TOI     Progress/Outcome (Transfer Goal 1, PT) new goal  -TOI       Row Name 09/18/24 1059          Therapy Assessment/Plan (PT)    Planned Therapy  "Interventions (PT) bed mobility training;transfer training;balance training;home exercise program;patient/family education;postural re-education;ROM (range of motion);strengthening;stretching  -TOI               User Key  (r) = Recorded By, (t) = Taken By, (c) = Cosigned By      Initials Name Provider Type    Dean Pineda, PT DPT Physical Therapist                   Clinical Impression       Row Name 09/18/24 1053          Pain    Pretreatment Pain Rating 0/10 - no pain  -TOI       Row Name 09/18/24 1056          Plan of Care Review    Plan of Care Reviewed With patient  -TOI     Outcome Evaluation PT eval complete. He is alert and confused, only oriented to self. Therapy called SNF and they report he is able to stand with assist x1, requires Mod A for adls. He demos significant B hip/knee flexion contractures that limit mobility. He needs max assist x 2 for supine <> sit bed mobility. Stands once with max assist x 2 and needs max x 2 to maintain static standing balance. Was unable to take steps or transfer due to weakness and contractures in B LE. High fall risk. PT will cont with mobility efforts and strengthening wihtin available ROM. Recommend d.c back to SNF.  -TOI       Row Name 09/18/24 1054          Therapy Assessment/Plan (PT)    Patient/Family Therapy Goals Statement (PT) [facility]\"  -TOI     Rehab Potential (PT) fair, will monitor progress closely  -TOI     Criteria for Skilled Interventions Met (PT) yes;meets criteria;skilled treatment is necessary  -TOI     Therapy Frequency (PT) 2 times/day  -TOI     Predicted Duration of Therapy Intervention (PT) until d.c  -TOI       Row Name 09/18/24 1056          Positioning and Restraints    Pre-Treatment Position in bed  -TOI     Post Treatment Position bed  -TOI     In Bed fowlers;side lying right;call light within reach;encouraged to call for assist;exit alarm on;patient within staff view;pillow between legs  -TOI               User Key  (r) = Recorded " By, (t) = Taken By, (c) = Cosigned By      Initials Name Provider Type    Dean Pineda, PT DPT Physical Therapist                   Outcome Measures       Row Name 09/18/24 1059 09/18/24 0800       How much help from another person do you currently need...    Turning from your back to your side while in flat bed without using bedrails? 2  -TOI 2  -HT    Moving from lying on back to sitting on the side of a flat bed without bedrails? 2  -TOI 1  -HT    Moving to and from a bed to a chair (including a wheelchair)? 1  -TOI 1  -HT    Standing up from a chair using your arms (e.g., wheelchair, bedside chair)? 2  -TOI 1  -HT    Climbing 3-5 steps with a railing? 1  -TOI 1  -HT    To walk in hospital room? 1  -TOI 1  -HT    AM-PAC 6 Clicks Score (PT) 9  -TOI 7  -HT    Highest Level of Mobility Goal 3 --> Sit at edge of bed  -TOI 2 --> Bed activities/dependent transfer  -HT      Row Name 09/18/24 1100 09/18/24 1059       Functional Assessment    Outcome Measure Options AM-PAC 6 Clicks Daily Activity (OT)  -DALIA AM-PAC 6 Clicks Basic Mobility (PT)  -TOI              User Key  (r) = Recorded By, (t) = Taken By, (c) = Cosigned By      Initials Name Provider Type    Dean Pineda, PT DPT Physical Therapist    HT Yudelka Chavarria, RN Registered Nurse    Tish Prieto, OTR/L, CSRS Occupational Therapist                                 Physical Therapy Education       Title: PT OT SLP Therapies (In Progress)       Topic: Physical Therapy (In Progress)       Point: Mobility training (In Progress)       Learning Progress Summary             Patient Acceptance, E, NR by TOI at 9/18/2024 1059    Comment: benefits of activity, progression of PT                         Point: Home exercise program (Not Started)       Learner Progress:  Not documented in this visit.              Point: Body mechanics (Not Started)       Learner Progress:  Not documented in this visit.              Point: Precautions (Not Started)       Learner  Progress:  Not documented in this visit.                              User Key       Initials Effective Dates Name Provider Type Discipline    TOI 02/03/23 -  Dean Norris, PT DPT Physical Therapist PT                  PT Recommendation and Plan  Planned Therapy Interventions (PT): bed mobility training, transfer training, balance training, home exercise program, patient/family education, postural re-education, ROM (range of motion), strengthening, stretching  Plan of Care Reviewed With: patient  Outcome Evaluation: PT eval complete. He is alert and confused, only oriented to self. Therapy called SNF and they report he is able to stand with assist x1, requires Mod A for adls. He demos significant B hip/knee flexion contractures that limit mobility. He needs max assist x 2 for supine <> sit bed mobility. Stands once with max assist x 2 and needs max x 2 to maintain static standing balance. Was unable to take steps or transfer due to weakness and contractures in B LE. High fall risk. PT will cont with mobility efforts and strengthening wihtin available ROM. Recommend d.c back to SNF.     Time Calculation:         PT Charges       Row Name 09/18/24 1230             Time Calculation    Start Time 1059  -TOI      Stop Time 1130  + 8 min w chart review, PT with 39 total minutes  -TOI      Time Calculation (min) 31 min  -TOI      PT Received On 09/18/24  -TOI      PT Goal Re-Cert Due Date 09/28/24  -TOI         Untimed Charges    PT Eval/Re-eval Minutes 39  -TOI         Total Minutes    Untimed Charges Total Minutes 39  -TOI       Total Minutes 39  -TOI                User Key  (r) = Recorded By, (t) = Taken By, (c) = Cosigned By      Initials Name Provider Type    Dean Pineda, PT DPT Physical Therapist                  Therapy Charges for Today       Code Description Service Date Service Provider Modifiers Qty    [id number] HC PT EVAL MOD COMPLEXITY 3 9/18/2024 Dean Norris, PT DPT GP 1            PT  G-Codes  Outcome Measure Options: AM-PAC 6 Clicks Daily Activity (OT)  AM-PAC 6 Clicks Score (PT): 9  AM-PAC 6 Clicks Score (OT): 8  PT Discharge Summary  Anticipated Discharge Disposition (PT): skilled nursing facility    Dean Norris, PT DPT  9/18/2024

## 2024-09-18 NOTE — PLAN OF CARE
Problem: Skin Injury Risk Increased  Goal: Skin Health and Integrity  Outcome: Ongoing, Progressing  Intervention: Optimize Skin Protection  Description: Perform a full pressure injury risk assessment, as indicated by screening, upon admission to care unit.  Reassess skin (injury risk, full inspection) frequently (e.g., scheduled interval, with change in condition) to provide optimal early detection and prevention.  Maintain adequate tissue perfusion (e.g., encourage fluid balance; avoid crossing legs, constrictive clothing or devices) to promote tissue oxygenation.  Maintain head of bed at lowest degree of elevation tolerated, considering medical condition and other restrictions.  Avoid positioning onto an area that remains reddened.  Minimize incontinence and moisture (e.g., toileting schedule; moisture-wicking pad, diaper or incontinence collection device; skin moisture barrier).  Cleanse skin promptly and gently when soiled utilizing a pH-balanced cleanser.  Relieve and redistribute pressure (e.g., scheduled position changes, weight shifts, use of support surface, medical device repositioning, protective dressing application, use of positioning device, microclimate control, use of pressure-injury-monitor  Encourage increased activity, such as sitting in a chair at the bedside or early mobilization, when able to tolerate.  Recent Flowsheet Documentation  Taken 9/18/2024 1800 by Yudelka Chavarria RN  Head of Bed (HOB) Positioning: HOB at 30 degrees  Taken 9/18/2024 1700 by Yudelka Chavarria RN  Head of Bed (HOB) Positioning: HOB at 30 degrees  Taken 9/18/2024 1600 by Yudelka Chavarria RN  Head of Bed (HOB) Positioning: HOB at 30 degrees  Taken 9/18/2024 1500 by Yudelka Chavarria RN  Head of Bed (HOB) Positioning: HOB at 30 degrees  Taken 9/18/2024 1400 by Yudelka Chavarria RN  Head of Bed (HOB) Positioning: HOB at 30 degrees  Taken 9/18/2024 1300 by Yudelka Chavarria RN  Head of Bed (HOB) Positioning: HOB  at 30 degrees  Taken 9/18/2024 1200 by Yudelka Chavarria RN  Head of Bed (HOB) Positioning: HOB at 30 degrees  Taken 9/18/2024 1100 by Yudelka Chavarria RN  Head of Bed (HOB) Positioning: HOB at 30 degrees  Taken 9/18/2024 0800 by Yudelka Chavarria, RN  Pressure Reduction Techniques:   weight shift assistance provided   frequent weight shift encouraged   positioned off wounds   heels elevated off bed   pressure points protected  Head of Bed (HOB) Positioning: HOB at 30 degrees  Pressure Reduction Devices: pressure-redistributing mattress utilized  Skin Protection:   adhesive use limited   incontinence pads utilized  Taken 9/18/2024 0700 by Yudelka Chavarria, RN  Head of Bed ([facility]) Positioning: HOB at 30 degrees     Problem: Adult Inpatient Plan of Care  Goal: Plan of Care Review  Outcome: Ongoing, Progressing  Flowsheets (Taken 9/18/2024 1850)  Plan of Care Reviewed With: patient  Outcome Evaluation: VSS. No pressors this shift. Potassium replaced today and is currently infusing. Adequate UOP per F/C. Miralax given for lack of BM per MD request. Tylenol given for c/o LLE pain. Dressing changes as ordered. Care ongoing.  Goal: Patient-Specific Goal (Individualized)  Outcome: Ongoing, Progressing  Goal: Absence of Hospital-Acquired Illness or Injury  Outcome: Ongoing, Progressing  Intervention: Identify and Manage Fall Risk  Description: Perform standard risk assessment on admission using a validated tool or comprehensive approach appropriate to the patient; reassess fall risk frequently, with change in status or transfer to another level of care.  Communicate fall injury risk to interprofessional healthcare team.  Determine need for increased observation, equipment and environmental modification, such as low bed, signage and supportive, nonskid footwear.  Adjust safety measures to individual developmental age, stage and identified risk factors.  Reinforce the importance of safety and physical activity with patient  and family.  Perform regular intentional rounding to assess need for position change, pain assessment and personal needs, including assistance with toileting.  Recent Flowsheet Documentation  Taken 9/18/2024 1800 by Yudelka Chavarria RN  Safety Promotion/Fall Prevention: safety round/check completed  Taken 9/18/2024 1700 by Yudelka Chavarria RN  Safety Promotion/Fall Prevention: safety round/check completed  Taken 9/18/2024 1600 by Yudelka Chavarria RN  Safety Promotion/Fall Prevention: safety round/check completed  Taken 9/18/2024 1500 by Yudelka Chavarria RN  Safety Promotion/Fall Prevention: safety round/check completed  Taken 9/18/2024 1400 by Yudelka Chavarria RN  Safety Promotion/Fall Prevention: safety round/check completed  Taken 9/18/2024 1300 by Yudelka Chavarria RN  Safety Promotion/Fall Prevention: safety round/check completed  Taken 9/18/2024 1200 by Yudelka Chavarria RN  Safety Promotion/Fall Prevention: safety round/check completed  Taken 9/18/2024 1100 by Yudelka Chavarria RN  Safety Promotion/Fall Prevention: safety round/check completed  Taken 9/18/2024 1000 by Yudelka Chavarria RN  Safety Promotion/Fall Prevention: safety round/check completed  Taken 9/18/2024 0900 by Yudelka Chavarria RN  Safety Promotion/Fall Prevention: safety round/check completed  Taken 9/18/2024 0800 by Yudelka Chavarria RN  Safety Promotion/Fall Prevention: safety round/check completed  Taken 9/18/2024 0700 by Yudelka Chavarria RN  Safety Promotion/Fall Prevention: safety round/check completed  Intervention: Prevent Skin Injury  Description: Perform a screening for skin injury risk, such as pressure or moisture associated skin damage on admission and at regular intervals throughout hospital stay.  Keep all areas of skin (especially folds) clean and dry.  Maintain adequate skin hydration.  Relieve and redistribute pressure and protect bony prominences; implement measures based on patient-specific risk  factors.  Match turning and repositioning schedule to clinical condition.  Encourage weight shift frequently; assist with reposition if unable to complete independently.  Float heels off bed; avoid pressure on the Achilles tendon.  Keep skin free from extended contact with medical devices.  Encourage functional activity and mobility, as early as tolerated.  Use aids (e.g., slide boards, mechanical lift) during transfer.  Recent Flowsheet Documentation  Taken 9/18/2024 1800 by Yudelka Chavarria RN  Body Position:   turned   supine, legs elevated  Taken 9/18/2024 1700 by Yudelka Chavarria RN  Body Position: (position maintained)   tilted   right   other (see comments)  Taken 9/18/2024 1600 by Yudelka Chavarria RN  Body Position:   turned   tilted   right  Taken 9/18/2024 1500 by Yudelka Chavarria RN  Body Position: (position maintained)   supine, legs elevated   other (see comments)  Taken 9/18/2024 1400 by Yudelka Chavarria RN  Body Position:   turned   supine, legs elevated  Taken 9/18/2024 1300 by Yudelak Chavarria RN  Body Position: (position maintained)   other (see comments)   tilted   right  Taken 9/18/2024 1200 by Yudelka Chavarria RN  Body Position:   turned   tilted   right   upper extremity elevated   legs elevated  Taken 9/18/2024 1100 by Yudelka Chavarria RN  Body Position: (patient maintained) other (see comments)  Taken 9/18/2024 1000 by Yudelka Chavarria RN  Body Position:   turned   tilted   left  Taken 9/18/2024 0800 by Yudelka Chavarria RN  Body Position:   turned   sitting up in bed  Skin Protection:   adhesive use limited   incontinence pads utilized  Taken 9/18/2024 0700 by Yudelka Chavarria RN  Body Position: (position maintained)   supine   other (see comments)  Intervention: Prevent and Manage VTE (Venous Thromboembolism) Risk  Description: Assess for VTE (venous thromboembolism) risk.  Encourage and assist with early ambulation.  Initiate and maintain compression or other therapy,  as indicated, based on identified risk in accordance with organizational protocol and provider order.  Encourage both active and passive leg exercises while in bed, if unable to ambulate.  Recent Flowsheet Documentation  Taken 9/18/2024 1800 by Yudelka Chavarria RN  Activity Management: bedrest  Taken 9/18/2024 1700 by Yudelka Chavarria RN  Activity Management: bedrest  Taken 9/18/2024 1600 by Yudelka Chavarria RN  Activity Management: bedrest  Taken 9/18/2024 1500 by Yudelka Chavarria RN  Activity Management: bedrest  Taken 9/18/2024 1400 by Yudelka Chavarria RN  Activity Management: bedrest  Taken 9/18/2024 1300 by Yudelka Chavarria RN  Activity Management: bedrest  Taken 9/18/2024 1200 by Yudelka Chavarria RN  Activity Management: bedrest  Taken 9/18/2024 1100 by Yudelka Chavarria RN  Activity Management: bedrest  Taken 9/18/2024 1000 by Yudelka Chavarria RN  Activity Management: bedrest  Taken 9/18/2024 0900 by Yudelka Chavarria RN  Activity Management: bedrest  Taken 9/18/2024 0800 by Yudelka Chavarria RN  Activity Management: bedrest  Taken 9/18/2024 0700 by Yudelka Chavarria RN  Activity Management: bedrest  Intervention: Prevent Infection  Description: Maintain skin and mucous membrane integrity; promote hand, oral and pulmonary hygiene.  Optimize fluid balance, nutrition, sleep and glycemic control to maximize infection resistance.  Identify potential sources of infection early to prevent or mitigate progression of infection (e.g., wound, lines, devices).  Evaluate ongoing need for invasive devices; remove promptly when no longer indicated.  Recent Flowsheet Documentation  Taken 9/18/2024 0800 by Yudelka Chavarria, RN  Infection Prevention: single patient room provided  Goal: Optimal Comfort and Wellbeing  Outcome: Ongoing, Progressing  Intervention: Monitor Pain and Promote Comfort  Description: Assess pain level, treatment efficacy and patient response at regular intervals using a consistent  pain scale.  Consider the presence and impact of preexisting chronic pain.  Encourage patient and caregiver involvement in pain assessment, interventions and safety measures.  Recent Flowsheet Documentation  Taken 9/18/2024 0800 by Yudelka Chavarria RN  Pain Management Interventions:   position adjusted   pillow support provided  Intervention: Provide Person-Centered Care  Description: Use a family-focused approach to care.  Develop trust and rapport by proactively providing information, encouraging questions, addressing concerns and offering reassurance.  Acknowledge emotional response to hospitalization.  Recognize and utilize personal coping strategies.  Honor spiritual and cultural preferences.  Recent Flowsheet Documentation  Taken 9/18/2024 0800 by Yudelka Chavarria, RN  Trust Relationship/Rapport: care explained  Goal: Readiness for Transition of Care  Outcome: Ongoing, Progressing     Problem: Adjustment to Illness (Sepsis/Septic Shock)  Goal: Optimal Coping  Outcome: Ongoing, Progressing     Problem: Bleeding (Sepsis/Septic Shock)  Goal: Absence of Bleeding  Outcome: Ongoing, Progressing     Problem: Glycemic Control Impaired (Sepsis/Septic Shock)  Goal: Blood Glucose Level Within Desired Range  Outcome: Ongoing, Progressing     Problem: Infection Progression (Sepsis/Septic Shock)  Goal: Absence of Infection Signs and Symptoms  Outcome: Ongoing, Progressing  Intervention: Initiate Sepsis Management  Description: Provide fluid therapy, such as crystalloid or albumin, to increase intravascular volume, organ perfusion and oxygen delivery.  Provide respiratory support, such as oxygen therapy, noninvasive or invasive positive pressure ventilation, to achieve oxygenation and ventilation goal; avoid hyperoxemia.  Obtain cultures prior to initiating antimicrobial therapy when possible. Do not delay for laboratory results in the presence of high suspicion or clinical indicators.  Administer intravenous  broad-spectrum antimicrobial therapy promptly.  Implement hemodynamic monitoring to guide intravascular support based on individual targeted parameters.  Determine and address underlying source of infection aggressively; implement transmission-based precautions and isolation, as indicated.  Recent Flowsheet Documentation  Taken 9/18/2024 0800 by Yudelka Chavarria RN  Infection Management: aseptic technique maintained  Infection Prevention: single patient room provided  Intervention: Promote Recovery  Description: Encourage pulmonary hygiene, such as cough-enhancement and airway-clearance techniques, that may include use of incentive spirometry, deep breathing and cough.  Encourage early rehabilitation and physical activity to optimize functional ability and activity tolerance, as well as minimize delirium.  Promote energy conservation; minimize oxygen demand and consumption by adjusting environment, decreasing stimulation, maintaining normothermia and treating pain.  Optimize fluid balance, nutrition intake, sleep and glycemic control to maintain tissue perfusion and enhance immune response.  Recent Flowsheet Documentation  Taken 9/18/2024 1800 by Yudelka Chavarria RN  Activity Management: bedrest  Taken 9/18/2024 1700 by Yudelka Chavarria, RN  Activity Management: bedrest  Taken 9/18/2024 1600 by Yudelka Chavarria RN  Activity Management: bedrest  Taken 9/18/2024 1500 by Yudelka Chavarria RN  Activity Management: bedrest  Taken 9/18/2024 1400 by Yudelka Chavarria RN  Activity Management: bedrest  Taken 9/18/2024 1300 by Yudelka Chavarria RN  Activity Management: bedrest  Taken 9/18/2024 1200 by Yudelka Chavarria RN  Activity Management: bedrest  Taken 9/18/2024 1100 by Yudelka Chavarria RN  Activity Management: bedrest  Taken 9/18/2024 1000 by Yudelka Chavarria RN  Activity Management: bedrest  Taken 9/18/2024 0900 by Yudelka Chavarria RN  Activity Management: bedrest  Taken 9/18/2024 0800 by Deon  Yudelka BELLO RN  Activity Management: bedrest  Taken 9/18/2024 0700 by Yudelka Chavarria RN  Activity Management: bedrest  Intervention: Promote Stabilization  Description: Monitor for signs of fluid responsiveness and overload; consider fluid adjustment and diuretic therapy.  Anticipate use of vasoactive agent to support microperfusion and oxygen delivery; titrate to response.  Monitor laboratory value, diagnostic test and clinical status trends for signs of infection progression and multiple organ failure.  Assess effectiveness of and potential for de-escalation of the antimicrobial regimen daily.  Provide fever-reduction and comfort measures.  Monitor and manage electrolyte imbalance, such as hypocalcemia.  Use lung protective ventilation measures, such as low volume, inspiratory pressure, optimal positive end-expiratory pressure, to minimize the risk of ventilator-induced lung injury; ensure minute volume demands.  Prepare for supportive therapy, such as corticosteroid therapy, coagulopathy management, CRRT (continuous renal replacement therapy), hemofiltration and cardiac-assist device.  Recent Flowsheet Documentation  Taken 9/18/2024 0800 by Yudelka Chavarria, RN  Fluid/Electrolyte Management:   fluids provided   electrolyte supplement initiated     Problem: Nutrition Impaired (Sepsis/Septic Shock)  Goal: Optimal Nutrition Intake  Outcome: Ongoing, Progressing     Problem: Fall Injury Risk  Goal: Absence of Fall and Fall-Related Injury  Outcome: Ongoing, Progressing  Intervention: Promote Injury-Free Environment  Description: Provide a safe, barrier-free environment that encourages independent activity.  Keep care area uncluttered and well-lighted.  Determine need for increased observation or monitoring.  Avoid use of devices that minimize mobility, such as restraints or indwelling urinary catheter.  Recent Flowsheet Documentation  Taken 9/18/2024 1800 by Yudelka Chavarria, RN  Safety Promotion/Fall Prevention:  safety round/check completed  Taken 9/18/2024 1700 by Yudelka Chavarria RN  Safety Promotion/Fall Prevention: safety round/check completed  Taken 9/18/2024 1600 by Yudelka Chavarria RN  Safety Promotion/Fall Prevention: safety round/check completed  Taken 9/18/2024 1500 by Yudelka Chavarria RN  Safety Promotion/Fall Prevention: safety round/check completed  Taken 9/18/2024 1400 by Yudelka Chavarria RN  Safety Promotion/Fall Prevention: safety round/check completed  Taken 9/18/2024 1300 by Yudelka Chavarria RN  Safety Promotion/Fall Prevention: safety round/check completed  Taken 9/18/2024 1200 by Yudelka Chavarria RN  Safety Promotion/Fall Prevention: safety round/check completed  Taken 9/18/2024 1100 by Yudelka Chavarria RN  Safety Promotion/Fall Prevention: safety round/check completed  Taken 9/18/2024 1000 by Yudelka Chavarria RN  Safety Promotion/Fall Prevention: safety round/check completed  Taken 9/18/2024 0900 by Yudelka Chavarria RN  Safety Promotion/Fall Prevention: safety round/check completed  Taken 9/18/2024 0800 by Yudelka Chavarria RN  Safety Promotion/Fall Prevention: safety round/check completed  Taken 9/18/2024 0700 by Yudelka Chavarria RN  Safety Promotion/Fall Prevention: safety round/check completed   Goal Outcome Evaluation:  Plan of Care Reviewed With: patient        Progress: improving  Outcome Evaluation: VSS. No pressors this shift. Potassium replaced today and is currently infusing. Adequate UOP per F/C. Miralax given for lack of BM per MD request. Tylenol given for c/o LLE pain. Dressing changes as ordered. Care ongoing.

## 2024-09-18 NOTE — PLAN OF CARE
"Goal Outcome Evaluation:  Plan of Care Reviewed With: patient           Outcome Evaluation: OT eval completed. Pt presents alert and oriented to self only. He believes he is at \"[facility]\". Even with re-orientation, he demonstrated significant conversational confusion. This therapist called NH to obtain PLOF. Per their report, he is able to stand with assist x1, self feeds with set-up only but required Mod A for most other adls. Today he has significant B knee contractures, impaired safety awareness, balance activity tolerance, strength and ROM. He required Max A to come to sitting at EOB. Dep to don socks due to knee contractures, which are contracted into a near 90 degress flexed position. Pt adamently wants to get to chair, therapist assisted pt with sit <> stand t/f. He required Max Ax2 to achieve and upright position with the same level of assistance to maintain and upright position. He was seated back at EOB and returned back to [facility] in bed with max A. He would benefit from skilled OT services to address these deficits listed above. Recommend d/c to SNF.      Anticipated Discharge Disposition (OT): skilled nursing facility                        "

## 2024-09-19 NOTE — PLAN OF CARE
Goal Outcome Evaluation:  Plan of Care Reviewed With: patient, caregiver        Progress: no change  Outcome Evaluation: Nutrition follow up. Pt reports his appetite is good. However RN reports pt did not eat breakfast this morning, insufficient po intake to reveiw for 9/18. Pt agreeable to oral supplements. Cardiac diet. Boost Plus BID and Boost Original with lunch daily. Encouraged oral intake. Cont to follow for plan of care.

## 2024-09-19 NOTE — PROGRESS NOTES
[facility] Intensivist Services  INPATIENT PROGRESS NOTE    Patient Name: Jagdish Cook  Date of Admission: 9/16/2024  Today's Date: 09/19/24  Length of Stay: 3  Primary Care Physician: Aidan Shields MD    Subjective   Chief Complaint: Altered mental status  Altered Mental Status       Mr. Cook is confused, information is obtained from the medical record and the RN.  He is a 73 year old with PMH of arthritis, chronic back pain, COPD, GERD, hypothyroidism, neuropathy, chronic urinary retention with chronic indwelling Mario catheter follows with Dr. Mittal and Dr. North.  He presented to ED today due to altered mental status.  He had a recent hospitalization for UTI and Sepsis and left AMA per discharge summary.  Review of microbiology from that visit shows that urine culture grew Proteus mirabilis. I spoke with his brother Bull ([phone]) who states Mr. Cook was placed in the nursing home about a week ago.  He received a call today from the nursing home and was told that Mr. Cook was confused and being transferred to the ER for evaluation.  According to him, Mr. Cook is usually oriented.  Evaluation in ED significant for RYNE BUN 69, creatinine 3.08 (normal on 9/3/24), troponin 308-> 219, procalcitonin 4.5, WBC 11.2, lactic acid 2.3 -> 1.4, BNP 34644, mild respiratory acidosis and hypoxia, UA significant for UTI.  He has several areas of skin breakdown and necrosis to his meatus.  He was given vancomycin, cefepime, 30 mL/kg crystalloid sepsis fluids, and started on norepinephrine in ED.  I discussed Mr. Cook's diagnostic findings and condition with his brother Bull and discussed initial treatment plan.  I discussed code status and Bull stated that Jagdish's wishes are to be DNR/DNI.  Mr. Cook will be admitted to the ICU for further care.     9/17  Patient weaned off Levophed and normotensive.  Continued IV fluids for hydration until p.o.  intake improves.    9/18  Patient's sodium increased to 150.  Free water deficit 2.8 L.  Started on D5 overnight and sodium fell to 147.      9/19  No acute events overnight.  Sodium 145.  Patient's oral intake improved.  Objective    Temp:  [97.5 °F (36.4 °C)-98.3 °F (36.8 °C)] 98.2 °F (36.8 °C)  Heart Rate:  [69-93] 71  Resp:  [15-19] 19  BP: ()/(51-87) 102/51  Physical Exam  Physical Exam  Vitals and nursing note reviewed.   Constitutional:       Appearance: He is ill-appearing.   HENT:      Head: Normocephalic and atraumatic.      Nose: Nose normal.      Mouth/Throat:      Mouth: Mucous membranes are dry.      Pharynx: Oropharynx is clear.   Eyes:      Extraocular Movements: Extraocular movements intact.      Conjunctiva/sclera: Conjunctivae normal.      Pupils: Pupils are equal, round, and reactive to light.   Cardiovascular:      Rate and Rhythm: Normal rate and regular rhythm.      Pulses: Normal pulses.      Heart sounds: Normal heart sounds.   Pulmonary:      Effort: Pulmonary effort is normal.      Breath sounds: Rhonchi present.   Abdominal:      General: Bowel sounds are normal.      Palpations: Abdomen is soft.   Genitourinary:     Comments: Necrosis around head of penis  Musculoskeletal:         General: Deformity present.      Cervical back: Normal range of motion and neck supple.      Comments: BLE contracture   Skin:     General: Skin is warm and dry.      Capillary Refill: Capillary refill takes less than 2 seconds.      Comments: Multiple areas of skin breakdown.  See nursing documentation.   Neurological:      No focal deficits, AOx3        Results Review:    No radiology results for the last day    Result Review:  I have personally reviewed the results from the time of this admission to 9/19/2024 16:52 CDT and agree with these findings:  [x]  Laboratory list / accordion  [x]  Microbiology  [x]  Radiology  [x]  EKG/Telemetry   []  Cardiology/Vascular   []  Pathology  []  Old records  []   Other:      Culture Data:   Blood Culture   Date Value Ref Range Status   09/16/2024 No growth at 24 hours  Preliminary   09/16/2024 No growth at 24 hours  Preliminary     Urine Culture   Date Value Ref Range Status   09/16/2024 Growth present, too young to evaluate  Preliminary       I have reviewed the patient's current medications.     Assessment/Plan   Assessment  Active Hospital Problems    Diagnosis     **Shock     RYNE (acute kidney injury)     Urinary tract infection associated with indwelling urethral catheter     Chronic indwelling Mario catheter    Plan:   Diagnoses:  -Septic Shock  -UTI  -Acute Kidney Injury, likely prerenal  -NSTEMI type II  -Deep Tissue Injury to Left Hip  -Chronic Urinary Retention with Chronic Indwelling Mario Catheter  - Hypernatremia     Plan:  -Discontinued vancomycin as MRSA swab is negative.  Antibiotics discontinued today as culture reveals corynebacterium which is considered normal rolando.  Mario catheter change in ED  - Was on NS but switched to D5 overnight due to sodium of 150 and dropped to 147 after a few hours.  Free water deficit is 2.8 L.  Changed to D5 1/2 NS yesterday after which sodium normalized today.  BMP every 6 hours can be discontinued.    -RYNE improved with fluid resuscitation, continue to monitor, avoid nephrotoxic agents  -likely demand ischemia in the setting of septic shock, hyoxia, trend troponin, EKG no ischemic findings and troponin down trended  -wound nurse consult  - Palliative care consulted, after discussion with patient's brother and patient CODE STATUS was changed to DNR/DNI     VTE Prophylaxis:  Pharmacologic & mechanical VTE prophylaxis orders are present.      Conditions and Status        Condition is improving.        Decision rules/scores evaluated (example [id number], Wells, etc):/A     Care Planning  Code status and discussions: DNR/DNI after discussion with palliative care      Disposition  Patient stable to transition out of the ICU to  the floor.    I provided 35 minutes of total critical care time. Due to the high probability of clinically significant, life-threatening deterioration, the patient required my direct and personal management. The critical care time does not include time spent on separately billable procedures.     Electronically signed by Kristen Sánchez MD on 9/19/2024 at 16:52 CDT

## 2024-09-19 NOTE — CASE MANAGEMENT/SOCIAL WORK
Continued Stay Note   [facility]     Patient Name: Jagdish Cook  MRN: [id number]  Today's Date: 9/19/2024    Admit Date: 9/16/2024    Plan: SNF   Discharge Plan       Row Name 09/19/24 1102       Plan    Plan SNF    Plan Comments Patient is currently residing at [facility] [phone].  Patient has a bed hold and plans to return to this facility upon discharge.  FANTA spoke with patient's brother, Jitendra Cook [phone], who advised he is patient's only blood relative.  SW following and will assist with disposition.                   Discharge Codes    No documentation.                       SINAI Del CidW

## 2024-09-19 NOTE — PLAN OF CARE
Patient remains confused. VSS. UOP great and no BM. Transfer orders, but pending bed. Afebrile and patient safety maintained.     Problem: Skin Injury Risk Increased  Goal: Skin Health and Integrity  Outcome: Ongoing, Progressing  Intervention: Optimize Skin Protection  Recent Flowsheet Documentation  Taken 9/19/2024 0402 by Clara Valle RN  Head of Bed ([facility]) Positioning: HOB at 30-45 degrees  Taken 9/19/2024 0202 by Clara Valle RN  Head of Bed ([facility]) Positioning: HOB at 30-45 degrees  Taken 9/19/2024 0005 by Clara Valle RN  Head of Bed ([facility]) Positioning: HOB at 30-45 degrees  Taken 9/18/2024 2202 by Clara Valle RN  Head of Bed ([facility]) Positioning: HOB at 30-45 degrees  Taken 9/18/2024 2005 by Clara Valle RN  Pressure Reduction Techniques:   weight shift assistance provided   heels elevated off bed  Head of Bed (HOB) Positioning: HOB at 30 degrees  Pressure Reduction Devices: pressure-redistributing mattress utilized  Skin Protection:   adhesive use limited   tubing/devices free from skin contact   skin-to-skin areas padded   skin-to-device areas padded   incontinence pads utilized     Problem: Adult Inpatient Plan of Care  Goal: Plan of Care Review  Outcome: Ongoing, Progressing  Goal: Patient-Specific Goal (Individualized)  Outcome: Ongoing, Progressing  Goal: Absence of Hospital-Acquired Illness or Injury  Outcome: Ongoing, Progressing  Intervention: Identify and Manage Fall Risk  Recent Flowsheet Documentation  Taken 9/19/2024 0502 by Clara Valle RN  Safety Promotion/Fall Prevention: safety round/check completed  Taken 9/19/2024 0402 by Clara Valle RN  Safety Promotion/Fall Prevention: safety round/check completed  Taken 9/19/2024 0302 by Clara Valle RN  Safety Promotion/Fall Prevention: safety round/check completed  Taken 9/19/2024 0202 by Clara Valle RN  Safety Promotion/Fall Prevention: safety round/check completed  Taken 9/19/2024 0102 by Leonard  OSMANI Elizabeth  Safety Promotion/Fall Prevention: safety round/check completed  Taken 9/19/2024 0005 by Clara Valle RN  Safety Promotion/Fall Prevention: safety round/check completed  Taken 9/18/2024 2302 by Clara Valle RN  Safety Promotion/Fall Prevention: safety round/check completed  Taken 9/18/2024 2202 by Clara Valle RN  Safety Promotion/Fall Prevention: safety round/check completed  Taken 9/18/2024 2102 by Clara Valle RN  Safety Promotion/Fall Prevention: safety round/check completed  Taken 9/18/2024 2005 by Clara Valle RN  Safety Promotion/Fall Prevention: safety round/check completed  Intervention: Prevent Skin Injury  Recent Flowsheet Documentation  Taken 9/19/2024 0402 by Clara Valle RN  Body Position:   turned   right  Taken 9/19/2024 0202 by Clara Valle RN  Body Position: supine  Taken 9/19/2024 0005 by Clara Valle RN  Body Position:   turned   left  Taken 9/18/2024 2202 by Clara Valle RN  Body Position:   turned   right  Taken 9/18/2024 2005 by Clara Valle RN  Body Position:   turned   left  Skin Protection:   adhesive use limited   tubing/devices free from skin contact   skin-to-skin areas padded   skin-to-device areas padded   incontinence pads utilized  Intervention: Prevent and Manage VTE (Venous Thromboembolism) Risk  Recent Flowsheet Documentation  Taken 9/19/2024 0402 by Clara Valle RN  Activity Management: bedrest  Taken 9/19/2024 0202 by Clara Valle RN  Activity Management: bedrest  Taken 9/19/2024 0005 by Clara Valle RN  Activity Management: bedrest  Taken 9/18/2024 2202 by Clara Valle RN  Activity Management: bedrest  Taken 9/18/2024 2005 by Clara Valle RN  Activity Management: bedrest  VTE Prevention/Management: other (see comments)  Range of Motion: ROM (range of motion) performed  Goal: Optimal Comfort and Wellbeing  Outcome: Ongoing, Progressing  Intervention: Provide Person-Centered  Care  Recent Flowsheet Documentation  Taken 9/18/2024 2005 by Clara Valle RN  Trust Relationship/Rapport:   care explained   choices provided  Goal: Readiness for Transition of Care  Outcome: Ongoing, Progressing     Problem: Adjustment to Illness (Sepsis/Septic Shock)  Goal: Optimal Coping  Outcome: Ongoing, Progressing     Problem: Bleeding (Sepsis/Septic Shock)  Goal: Absence of Bleeding  Outcome: Ongoing, Progressing     Problem: Glycemic Control Impaired (Sepsis/Septic Shock)  Goal: Blood Glucose Level Within Desired Range  Outcome: Ongoing, Progressing     Problem: Infection Progression (Sepsis/Septic Shock)  Goal: Absence of Infection Signs and Symptoms  Outcome: Ongoing, Progressing  Intervention: Promote Recovery  Recent Flowsheet Documentation  Taken 9/19/2024 0402 by Clara Valle RN  Activity Management: bedrest  Taken 9/19/2024 0202 by Clara Valle RN  Activity Management: bedrest  Taken 9/19/2024 0005 by Clara Valle RN  Activity Management: bedrest  Taken 9/18/2024 2202 by Clara Valle RN  Activity Management: bedrest  Taken 9/18/2024 2005 by Clara Valle RN  Activity Management: bedrest     Problem: Nutrition Impaired (Sepsis/Septic Shock)  Goal: Optimal Nutrition Intake  Outcome: Ongoing, Progressing     Problem: Fall Injury Risk  Goal: Absence of Fall and Fall-Related Injury  Outcome: Ongoing, Progressing  Intervention: Promote Injury-Free Environment  Recent Flowsheet Documentation  Taken 9/19/2024 0502 by Clara Valle RN  Safety Promotion/Fall Prevention: safety round/check completed  Taken 9/19/2024 0402 by Clara Valle RN  Safety Promotion/Fall Prevention: safety round/check completed  Taken 9/19/2024 0302 by Clara Valle RN  Safety Promotion/Fall Prevention: safety round/check completed  Taken 9/19/2024 0202 by Clara Valle RN  Safety Promotion/Fall Prevention: safety round/check completed  Taken 9/19/2024 0102 by Clara Valle  RN  Safety Promotion/Fall Prevention: safety round/check completed  Taken 9/19/2024 0005 by Clara Valle RN  Safety Promotion/Fall Prevention: safety round/check completed  Taken 9/18/2024 2302 by Clara Valle RN  Safety Promotion/Fall Prevention: safety round/check completed  Taken 9/18/2024 2202 by Clara Valle RN  Safety Promotion/Fall Prevention: safety round/check completed  Taken 9/18/2024 2102 by Clara Valle RN  Safety Promotion/Fall Prevention: safety round/check completed  Taken 9/18/2024 2005 by Clara Valle RN  Safety Promotion/Fall Prevention: safety round/check completed   Goal Outcome Evaluation:

## 2024-09-19 NOTE — PROGRESS NOTES
[facility] Intensivist Services  INPATIENT PROGRESS NOTE    Patient Name: Jagdish Cook  Date of Admission: 9/16/2024  Today's Date: 09/18/24  Length of Stay: 2  Primary Care Physician: Aidan Shields MD    Subjective   Chief Complaint: Altered mental status  Altered Mental Status       Mr. Cook is confused, information is obtained from the medical record and the RN.  He is a 73 year old with PMH of arthritis, chronic back pain, COPD, GERD, hypothyroidism, neuropathy, chronic urinary retention with chronic indwelling Mario catheter follows with Dr. Mittal and Dr. North.  He presented to ED today due to altered mental status.  He had a recent hospitalization for UTI and Sepsis and left AMA per discharge summary.  Review of microbiology from that visit shows that urine culture grew Proteus mirabilis. I spoke with his brother Bull ([phone]) who states Mr. Cook was placed in the nursing home about a week ago.  He received a call today from the nursing home and was told that Mr. Cook was confused and being transferred to the ER for evaluation.  According to him, Mr. Cook is usually oriented.  Evaluation in ED significant for RYNE BUN 69, creatinine 3.08 (normal on 9/3/24), troponin 308-> 219, procalcitonin 4.5, WBC 11.2, lactic acid 2.3 -> 1.4, BNP 47290, mild respiratory acidosis and hypoxia, UA significant for UTI.  He has several areas of skin breakdown and necrosis to his meatus.  He was given vancomycin, cefepime, 30 mL/kg crystalloid sepsis fluids, and started on norepinephrine in ED.  I discussed Mr. Cook's diagnostic findings and condition with his brother Bull and discussed initial treatment plan.  I discussed code status and Bull stated that Jagdish's wishes are to be DNR/DNI.  Mr. Cook will be admitted to the ICU for further care.     9/17  Patient weaned off Levophed and normotensive.  Continued IV fluids for hydration until p.o.  intake improves.    9/18  Patient's sodium increased to 150.  Free water deficit 2.8 L.  Started on D5 overnight and sodium fell to 147.    Objective    Temp:  [97.5 °F (36.4 °C)-98.2 °F (36.8 °C)] 97.5 °F (36.4 °C)  Heart Rate:  [68-93] 90  Resp:  [10-17] 15  BP: ()/(45-99) 109/74  Physical Exam  Physical Exam  Vitals and nursing note reviewed.   Constitutional:       Appearance: He is ill-appearing.   HENT:      Head: Normocephalic and atraumatic.      Nose: Nose normal.      Mouth/Throat:      Mouth: Mucous membranes are dry.      Pharynx: Oropharynx is clear.   Eyes:      Extraocular Movements: Extraocular movements intact.      Conjunctiva/sclera: Conjunctivae normal.      Pupils: Pupils are equal, round, and reactive to light.   Cardiovascular:      Rate and Rhythm: Normal rate and regular rhythm.      Pulses: Normal pulses.      Heart sounds: Normal heart sounds.   Pulmonary:      Effort: Pulmonary effort is normal.      Breath sounds: Rhonchi present.   Abdominal:      General: Bowel sounds are normal.      Palpations: Abdomen is soft.   Genitourinary:     Comments: Necrosis around head of penis  Musculoskeletal:         General: Deformity present.      Cervical back: Normal range of motion and neck supple.      Comments: BLE contracture   Skin:     General: Skin is warm and dry.      Capillary Refill: Capillary refill takes less than 2 seconds.      Comments: Multiple areas of skin breakdown.  See nursing documentation.   Neurological:      No focal deficits, AOx3        Results Review:    XR Chest 1 View    Result Date: 9/17/2024  1. Similar patchy bibasilar opacities concerning for multifocal pneumonia. Similar positioning right IJ central line.   This report was signed and finalized on 9/17/2024 7:45 AM by Dr. Tish Guevara MD.       Result Review:  I have personally reviewed the results from the time of this admission to 9/18/2024 19:29 CDT and agree with these findings:  [x]  Laboratory list /  accordion  [x]  Microbiology  [x]  Radiology  [x]  EKG/Telemetry   []  Cardiology/Vascular   []  Pathology  []  Old records  []  Other:      Culture Data:   Blood Culture   Date Value Ref Range Status   09/16/2024 No growth at 24 hours  Preliminary   09/16/2024 No growth at 24 hours  Preliminary     Urine Culture   Date Value Ref Range Status   09/16/2024 Growth present, too young to evaluate  Preliminary       I have reviewed the patient's current medications.     Assessment/Plan   Assessment  Active Hospital Problems    Diagnosis     **Shock     RYNE (acute kidney injury)     Urinary tract infection associated with indwelling urethral catheter     Chronic indwelling Mario catheter    Plan:   Diagnoses:  -Septic Shock  -UTI  -Acute Kidney Injury, likely prerenal  -NSTEMI type II  -Deep Tissue Injury to Left Hip  -Chronic Urinary Retention with Chronic Indwelling Mario Catheter  - Hypernatremia     Plan:  -Discontinued vancomycin as MRSA swab is negative.  Continued cefepime for UTI based on sensitivities. Mario catheter change in ED  - Lactate down trended and Levophed discontinued overnight.  - Was on NS but switched to D5 overnight due to sodium of 150 and dropped to 147 after a few hours.  Free water deficit is 2.8 L.  Will change to D5 1/2 NS and monitor sodiums every 6 hour so as not to lower sodium by more than 8 mill equivalents over 24 hours.  -RYNE improved with fluid resuscitation, continue to monitor, avoid nephrotoxic agents  -likely demand ischemia in the setting of septic shock, hyoxia, trend troponin, EKG no ischemic findings and troponin down trended  -wound nurse consult  - Palliative care consulted, after discussion with patient's brother and patient CODE STATUS changed to DNR/DNI     VTE Prophylaxis:  Pharmacologic & mechanical VTE prophylaxis orders are present.      Conditions and Status        Condition is improving.        Decision rules/scores evaluated (example [id number], Wells, etc):/A      Care Planning  Code status and discussions: DNR/DNI after discussion with palliative care      Disposition  Patient stable to transition out of the ICU to the floor.    I provided 35 minutes of total critical care time. Due to the high probability of clinically significant, life-threatening deterioration, the patient required my direct and personal management. The critical care time does not include time spent on separately billable procedures.     Electronically signed by Kristen Sánchez MD on 9/18/2024 at 19:29 CDT

## 2024-09-19 NOTE — PLAN OF CARE
Problem: Skin Injury Risk Increased  Goal: Skin Health and Integrity  Outcome: Ongoing, Progressing  Intervention: Optimize Skin Protection  Recent Flowsheet Documentation  Taken 9/19/2024 1615 by Kath Still RN  Head of Bed (HOB) Positioning: HOB at 30-45 degrees  Taken 9/19/2024 1510 by Kath Still RN  Head of Bed (HOB) Positioning: HOB at 20-30 degrees  Taken 9/19/2024 1210 by Kath Still RN  Head of Bed (HOB) Positioning: HOB at 30 degrees  Taken 9/19/2024 1010 by Kath Still RN  Head of Bed (HOB) Positioning: HOB at 30 degrees  Taken 9/19/2024 0813 by Kath Still RN  Pressure Reduction Techniques:   heels elevated off bed   positioned off wounds   weight shift assistance provided  Head of Bed (HOB) Positioning: HOB at 30 degrees  Pressure Reduction Devices:   foam padding utilized   positioning supports utilized   specialty bed utilized  Skin Protection:   adhesive use limited   tubing/devices free from skin contact     Problem: Adult Inpatient Plan of Care  Goal: Plan of Care Review  Outcome: Ongoing, Progressing  Goal: Patient-Specific Goal (Individualized)  Outcome: Ongoing, Progressing  Goal: Absence of Hospital-Acquired Illness or Injury  Outcome: Ongoing, Progressing  Intervention: Identify and Manage Fall Risk  Recent Flowsheet Documentation  Taken 9/19/2024 1713 by Kath Still RN  Safety Promotion/Fall Prevention: safety round/check completed  Taken 9/19/2024 1615 by Kath Still RN  Safety Promotion/Fall Prevention: safety round/check completed  Taken 9/19/2024 1510 by Kath Still RN  Safety Promotion/Fall Prevention: safety round/check completed  Taken 9/19/2024 1410 by Kath Still RN  Safety Promotion/Fall Prevention: safety round/check completed  Taken 9/19/2024 1310 by Kath Still RN  Safety Promotion/Fall Prevention: safety round/check completed  Taken 9/19/2024 1210 by Kath Still RN  Safety Promotion/Fall  Prevention: safety round/check completed  Taken 9/19/2024 1110 by Kath Still RN  Safety Promotion/Fall Prevention: safety round/check completed  Taken 9/19/2024 1010 by Kath Still RN  Safety Promotion/Fall Prevention: safety round/check completed  Taken 9/19/2024 0910 by Kath Still RN  Safety Promotion/Fall Prevention: safety round/check completed  Taken 9/19/2024 0813 by Kath Still RN  Safety Promotion/Fall Prevention:   safety round/check completed   nonskid shoes/slippers when out of bed   fall prevention program maintained   clutter free environment maintained   assistive device/personal items within reach  Taken 9/19/2024 0710 by Kath Still RN  Safety Promotion/Fall Prevention: safety round/check completed  Intervention: Prevent Skin Injury  Recent Flowsheet Documentation  Taken 9/19/2024 1615 by Kath Still RN  Body Position:   turned   supine, legs elevated  Taken 9/19/2024 1510 by Kath Still RN  Body Position:   turned   right  Taken 9/19/2024 1410 by Kath Still RN  Body Position: position changed independently  Taken 9/19/2024 1210 by Kath Still RN  Body Position:   turned   supine, legs elevated  Taken 9/19/2024 1010 by Kath Still RN  Body Position:   turned   left  Taken 9/19/2024 0813 by Kath Still RN  Body Position:   turned   right  Skin Protection:   adhesive use limited   tubing/devices free from skin contact  Intervention: Prevent and Manage VTE (Venous Thromboembolism) Risk  Recent Flowsheet Documentation  Taken 9/19/2024 0813 by Kath Still RN  VTE Prevention/Management:   bilateral   sequential compression devices off   patient refused intervention  Goal: Optimal Comfort and Wellbeing  Outcome: Ongoing, Progressing  Intervention: Provide Person-Centered Care  Recent Flowsheet Documentation  Taken 9/19/2024 0813 by Kath Still RN  Trust Relationship/Rapport:   care explained   choices  provided   emotional support provided   empathic listening provided   questions answered   questions encouraged   reassurance provided   thoughts/feelings acknowledged  Goal: Readiness for Transition of Care  Outcome: Ongoing, Progressing     Problem: Adjustment to Illness (Sepsis/Septic Shock)  Goal: Optimal Coping  Outcome: Ongoing, Progressing     Problem: Bleeding (Sepsis/Septic Shock)  Goal: Absence of Bleeding  Outcome: Ongoing, Progressing     Problem: Glycemic Control Impaired (Sepsis/Septic Shock)  Goal: Blood Glucose Level Within Desired Range  Outcome: Ongoing, Progressing     Problem: Infection Progression (Sepsis/Septic Shock)  Goal: Absence of Infection Signs and Symptoms  Outcome: Ongoing, Progressing     Problem: Nutrition Impaired (Sepsis/Septic Shock)  Goal: Optimal Nutrition Intake  Outcome: Ongoing, Progressing     Problem: Fall Injury Risk  Goal: Absence of Fall and Fall-Related Injury  Outcome: Ongoing, Progressing  Intervention: Identify and Manage Contributors  Recent Flowsheet Documentation  Taken 9/19/2024 0813 by Kath Still RN  Medication Review/Management: medications reviewed  Intervention: Promote Injury-Free Environment  Recent Flowsheet Documentation  Taken 9/19/2024 1713 by Kath Still RN  Safety Promotion/Fall Prevention: safety round/check completed  Taken 9/19/2024 1615 by Kath Still RN  Safety Promotion/Fall Prevention: safety round/check completed  Taken 9/19/2024 1510 by Kath Still RN  Safety Promotion/Fall Prevention: safety round/check completed  Taken 9/19/2024 1410 by Kath Still RN  Safety Promotion/Fall Prevention: safety round/check completed  Taken 9/19/2024 1310 by Kath Still RN  Safety Promotion/Fall Prevention: safety round/check completed  Taken 9/19/2024 1210 by Kath Still RN  Safety Promotion/Fall Prevention: safety round/check completed  Taken 9/19/2024 1110 by Kath Still RN  Safety  Promotion/Fall Prevention: safety round/check completed  Taken 9/19/2024 1010 by Kath Still RN  Safety Promotion/Fall Prevention: safety round/check completed  Taken 9/19/2024 0910 by Kath Still RN  Safety Promotion/Fall Prevention: safety round/check completed  Taken 9/19/2024 0813 by Kath Still RN  Safety Promotion/Fall Prevention:   safety round/check completed   nonskid shoes/slippers when out of bed   fall prevention program maintained   clutter free environment maintained   assistive device/personal items within reach  Taken 9/19/2024 0710 by Kath Still RN  Safety Promotion/Fall Prevention: safety round/check completed   Goal Outcome Evaluation:

## 2024-09-20 PROBLEM — E83.42 HYPOMAGNESEMIA: Status: ACTIVE | Noted: 2024-01-01

## 2024-09-20 NOTE — PROGRESS NOTES
[facility]  INPATIENT WOUND & OSTOMY CARE    PROGRESS NOTE    Today's Date: 24    Patient Name: Jagdish Cook  MRN: [id number]  CSN: [id number]  PCP: Aidan Shields MD  Referring Provider: ***  Attending Provider: Chato Boo DO  Length of Stay: 4    SUBJECTIVE   Chief Complaint: ***    HPI: Jagdish Cook ***      Visit Dx:    ICD-10-CM ICD-9-CM   1. Shock  R57.9 785.50   2. Acute kidney injury  N17.9 584.9   3. Pneumonia of left lung due to infectious organism, unspecified part of lung  J18.9 486   4. Metabolic acidosis  E87.20 276.2   5. Skin infection  L08.9 686.9   6. Impaired transfers [Z74.09]  Z74.09 781.99       Hospital Problem List:     Shock    Primary hypertension    Chronic indwelling Mario catheter    Urinary tract infection associated with indwelling urethral catheter    Hypokalemia    RYNE (acute kidney injury)    Hypomagnesemia      History:   Past Medical History:   Diagnosis Date    Bacteremia     Chronic back pain     COPD (chronic obstructive pulmonary disease)     Mario catheter in place     GERD (gastroesophageal reflux disease)     Memory deficits     Neuropathy     Umbilical hernia     Urinary retention      Past Surgical History:   Procedure Laterality Date    BACK SURGERY      HERNIA REPAIR       Social History     Socioeconomic History    Marital status: Single   Tobacco Use    Smoking status: Former     Current packs/day: 0.00     Types: Cigars, Cigarettes     Start date:      Quit date:      Years since quittin.7     Passive exposure: Past    Smokeless tobacco: Never   Vaping Use    Vaping status: Never Used   Substance and Sexual Activity    Alcohol use: No    Drug use: Never    Sexual activity: Defer       Allergies:  Allergies   Allergen Reactions    Meloxicam Hives     Dizzy, skin crawls    Tamsulosin Dizziness     excessive sweating    Penicillins        Medications:    Current Facility-Administered Medications:     acetaminophen  (TYLENOL) tablet 650 mg, 650 mg, Oral, Q4H PRN, 650 mg at 09/19/24 1514 **OR** acetaminophen (TYLENOL) suppository 650 mg, 650 mg, Rectal, Q4H PRN, Kristen Sánchez MD    ascorbic acid (VITAMIN C) tablet 1,000 mg, 1,000 mg, Oral, Daily, Jagdish Courtney PA-C, 1,000 mg at 09/20/24 0902    sennosides-docusate (PERICOLACE) 8.6-50 MG per tablet 2 tablet, 2 tablet, Oral, BID, 2 tablet at 09/20/24 0902 **AND** polyethylene glycol (MIRALAX) packet 17 g, 17 g, Oral, Daily PRN, 17 g at 09/18/24 1015 **AND** bisacodyl (DULCOLAX) EC tablet 5 mg, 5 mg, Oral, Daily PRN **AND** bisacodyl (DULCOLAX) suppository 10 mg, 10 mg, Rectal, Daily PRN, Kristen Sánchez MD    busPIRone (BUSPAR) tablet 10 mg, 10 mg, Oral, BID, Jagdish Courtney PA-C, 10 mg at 09/20/24 0902    Calcium Replacement - Follow Nurse / BPA Driven Protocol, , Does not apply, PRN, Kristen Sánchez MD    cetirizine (zyrTEC) tablet 10 mg, 10 mg, Oral, Daily, Jagdish Courtney PA-C, 10 mg at 09/20/24 0902    collagenase ointment 1 Application, 1 Application, Topical, Q12H, Yudelka Gonzalez R, APRN, 1 Application at 09/20/24 0907    dextrose 5 % and sodium chloride 0.45 % infusion, 50 mL/hr, Intravenous, Continuous, Chato Boo, DO, Last Rate: 50 mL/hr at 09/20/24 0959, 50 mL/hr at 09/20/24 0959    finasteride (PROSCAR) tablet 5 mg, 5 mg, Oral, Daily, Jagdish Courtney PA-C, 5 mg at 09/20/24 0902    fluticasone (FLONASE) 50 MCG/ACT nasal spray 2 spray, 2 spray, Nasal, Daily, Jagdish Courtney PA-C, 2 spray at 09/20/24 0907    guaiFENesin (MUCINEX) 12 hr tablet 600 mg, 600 mg, Oral, BID, Jagdish Courtney PA-C, 600 mg at 09/20/24 0902    heparin (porcine) 5000 UNIT/ML injection 5,000 Units, 5,000 Units, Subcutaneous, Q8H, Kristen Sánchez MD, 5,000 Units at 09/20/24 0608    lisinopril (PRINIVIL,ZESTRIL) tablet 10 mg, 10 mg, Oral, Q24H, 10 mg at 09/20/24 0956 **AND** hydroCHLOROthiazide tablet 12.5 mg, 12.5 mg, Oral, Q24H, Jagdish Courtney PA-C, 12.5 mg at  09/20/24 0902    lactobacillus acidophilus (RISAQUAD) capsule 1 capsule, 1 capsule, Oral, Daily, Jagdish Courtney PA-C, 1 capsule at 09/20/24 0957    levothyroxine (SYNTHROID, LEVOTHROID) tablet 125 mcg, 125 mcg, Oral, Q AM, Jagdish Courtney PA-C, 125 mcg at 09/20/24 0608    Magnesium Standard Dose Replacement - Follow Nurse / BPA Driven Protocol, , Does not apply, PRN, Kristen Sánchez MD    multivitamin with minerals 1 tablet, 1 tablet, Oral, Daily, Kristen Sánchez MD, 1 tablet at 09/20/24 0902    mupirocin (BACTROBAN) 2 % ointment 1 Application, 1 Application, Topical, Q12H, Yudelka Gonzalez APRN, 1 Application at 09/20/24 0957    nitrofurantoin (macrocrystal-monohydrate) (MACROBID) capsule 100 mg, 100 mg, Oral, Q12H, Chato Boo DO, 100 mg at 09/20/24 0957    nitroglycerin (NITROSTAT) SL tablet 0.4 mg, 0.4 mg, Sublingual, Q5 Min PRN, Kristen Sánchez MD    Non-Formulary / Patient Supplied Medication, 10 mg, Oral, Nightly, Jagdish Courtney PA-C    ondansetron (ZOFRAN) injection 4 mg, 4 mg, Intravenous, Q6H PRN, Kristen Sánchez MD, 4 mg at 09/19/24 2048    Phosphorus Replacement - Follow Nurse / BPA Driven Protocol, , Does not apply, PRN, Kristen Sánchez MD    Potassium Replacement - Follow Nurse / BPA Driven Protocol, , Does not apply, PRN, Kristen Sánchez MD    sodium chloride 0.9 % flush 10 mL, 10 mL, Intravenous, Q12H, Kristen Sánchez MD, 10 mL at 09/20/24 0907    sodium chloride 0.9 % flush 10 mL, 10 mL, Intravenous, PRN, Kristen Sánchez MD    sodium chloride 0.9 % infusion 40 mL, 40 mL, Intravenous, PRN, Kristen Sánchez MD    sodium hypochlorite (DAKIN'S 1/4 STRENGTH) 0.125 % topical solution 0.125% solution, , Topical, Q12H, Yudelka Gonzalez, APRN, Given at 09/20/24 0907    tiZANidine (ZANAFLEX) tablet 4 mg, 4 mg, Oral, Q8H PRN, Jagdish Courtney PA-C    venlafaxine XR (EFFEXOR-XR) 24 hr capsule 150 mg, 150 mg, Oral, Daily, Jagdish Courtney PA-C, 150 mg at 09/20/24  0903    zinc sulfate (ZINCATE) capsule 220 mg, 220 mg, Oral, Daily, Kristen Sánchez MD, 220 mg at 09/20/24 0903      OBJECTIVE     Vitals:    09/20/24 1241   BP: 134/63   Pulse: 77   Resp: 18   Temp: 98.3 °F (36.8 °C)   SpO2: 96%       PHYSICAL EXAM ***  Physical Exam         Results Review:  Lab Results (last 48 hours)       Procedure Component Value Units Date/Time    Blood Culture - Blood, Hand, Right [[id number]]  (Normal) Collected: 09/16/24 1339    Specimen: Blood from Hand, Right Updated: 09/20/24 1400     Blood Culture No growth at 4 days    Blood Culture - Blood, Hand, Left [[id number]]  (Normal) Collected: 09/16/24 1339    Specimen: Blood from Hand, Left Updated: 09/20/24 1400     Blood Culture No growth at 4 days    Magnesium [[id number]]  (Abnormal) Collected: 09/20/24 0515    Specimen: Blood Updated: 09/20/24 0605     Magnesium 1.4 mg/dL     Hepatic Function Panel [[id number]]  (Abnormal) Collected: 09/20/24 0515    Specimen: Blood Updated: 09/20/24 0604     Total Protein 4.5 g/dL      Albumin 1.9 g/dL      ALT (SGPT) 19 U/L      AST (SGOT) 27 U/L      Comment: Specimen hemolyzed.  Result may be falsely elevated.        Alkaline Phosphatase 85 U/L      Total Bilirubin 0.4 mg/dL      Bilirubin, Direct <0.2 mg/dL      Comment: Specimen hemolyzed. Results may be affected.        Bilirubin, Indirect --     Comment: Unable to calculate       Basic Metabolic Panel [[id number]]  (Abnormal) Collected: 09/20/24 0515    Specimen: Blood Updated: 09/20/24 0604     Glucose 113 mg/dL      BUN 20 mg/dL      Creatinine 0.62 mg/dL      Sodium 141 mmol/L      Potassium 4.8 mmol/L      Comment: Slight hemolysis detected by analyzer. Result may be falsely elevated.        Chloride 107 mmol/L      CO2 28.0 mmol/L      Calcium 8.3 mg/dL      BUN/Creatinine Ratio 32.3     Anion Gap 6.0 mmol/L      eGFR 99.7 mL/min/1.73     Narrative:      GFR Normal >60  Chronic Kidney Disease <60  Kidney Failure <15    The GFR formula is only  valid for adults with stable renal function between ages 18 and 70.    Manual Differential [[id number]]  (Abnormal) Collected: 09/20/24 0515    Specimen: Blood Updated: 09/20/24 0602     Neutrophil % 86.0 %      Lymphocyte % 3.0 %      Monocyte % 6.0 %      Eosinophil % 0.0 %      Basophil % 0.0 %      Bands %  2.0 %      Metamyelocyte % 1.0 %      Myelocyte % 1.0 %      Atypical Lymphocyte % 1.0 %      Neutrophils Absolute 10.01 10*3/mm3      Lymphocytes Absolute 0.46 10*3/mm3      Monocytes Absolute 0.68 10*3/mm3      Eosinophils Absolute 0.00 10*3/mm3      Basophils Absolute 0.00 10*3/mm3      Anisocytosis Slight/1+     Polychromasia Slight/1+     WBC Morphology Normal     Clumped Platelets Present    Phosphorus [[id number]]  (Abnormal) Collected: 09/20/24 0515    Specimen: Blood Updated: 09/20/24 0554     Phosphorus 2.4 mg/dL     CBC & Differential [[id number]]  (Abnormal) Collected: 09/20/24 0515    Specimen: Blood Updated: 09/20/24 0537    Narrative:      The following orders were created for panel order CBC & Differential.  Procedure                               Abnormality         Status                     ---------                               -----------         ------                     CBC Auto Differential[[id number]]        Abnormal            Final result                 Please view results for these tests on the individual orders.    CBC Auto Differential [[id number]]  (Abnormal) Collected: 09/20/24 0515    Specimen: Blood Updated: 09/20/24 0537     WBC 11.38 10*3/mm3      RBC 4.19 10*6/mm3      Hemoglobin 12.6 g/dL      Hematocrit 40.7 %      MCV 97.1 fL      MCH 30.1 pg      MCHC 31.0 g/dL      RDW 14.0 %      RDW-SD 50.1 fl      MPV 10.0 fL      Platelets 206 10*3/mm3      nRBC 0.0 /100 WBC     Basic Metabolic Panel [[id number]]  (Abnormal) Collected: 09/19/24 2221    Specimen: Blood Updated: 09/19/24 2250     Glucose 103 mg/dL      BUN 22 mg/dL      Creatinine 0.58 mg/dL      Sodium 145 mmol/L       Potassium 3.4 mmol/L      Chloride 109 mmol/L      CO2 29.0 mmol/L      Calcium 8.2 mg/dL      BUN/Creatinine Ratio 37.9     Anion Gap 7.0 mmol/L      eGFR 101.7 mL/min/1.73     Narrative:      GFR Normal >60  Chronic Kidney Disease <60  Kidney Failure <15    The GFR formula is only valid for adults with stable renal function between ages 18 and 70.    Basic Metabolic Panel [[id number]]  (Abnormal) Collected: 09/19/24 1643    Specimen: Blood Updated: 09/19/24 1711     Glucose 127 mg/dL      BUN 23 mg/dL      Creatinine 0.68 mg/dL      Sodium 145 mmol/L      Potassium 3.5 mmol/L      Chloride 110 mmol/L      CO2 30.0 mmol/L      Calcium 8.0 mg/dL      BUN/Creatinine Ratio 33.8     Anion Gap 5.0 mmol/L      eGFR 96.9 mL/min/1.73     Narrative:      GFR Normal >60  Chronic Kidney Disease <60  Kidney Failure <15    The GFR formula is only valid for adults with stable renal function between ages 18 and 70.    Phosphorus [[id number]]  (Abnormal) Collected: 09/19/24 1458    Specimen: Blood Updated: 09/19/24 1522     Phosphorus 2.4 mg/dL     Urine Culture - Urine, Indwelling Urethral Catheter [[id number]]  (Abnormal)  (Susceptibility) Collected: 09/16/24 1437    Specimen: Urine from Indwelling Urethral Catheter Updated: 09/19/24 1153     Urine Culture >100,000 CFU/mL Enterococcus faecalis      >100,000 CFU/mL Corynebacterium species     Comment:   Based on laboratory diagnosis criteria, these organisms are common urogenital commensal rolando and have not been associated with urinary tract infections; clinical correlation is recommended.       Narrative:      Colonization of the urinary tract without infection is common. Treatment is discouraged unless the patient is symptomatic, pregnant, or undergoing an invasive urologic procedure.    Susceptibility        Enterococcus faecalis      JOSE      Ampicillin Susceptible      Levofloxacin Susceptible      Nitrofurantoin Susceptible      Vancomycin Susceptible                            Basic Metabolic Panel [[id number]]  (Abnormal) Collected: 09/19/24 1039    Specimen: Blood Updated: 09/19/24 1128     Glucose 139 mg/dL      BUN 27 mg/dL      Creatinine 0.70 mg/dL      Sodium 146 mmol/L      Potassium 4.2 mmol/L      Comment: Slight hemolysis detected by analyzer. Result may be falsely elevated.        Chloride 110 mmol/L      CO2 30.0 mmol/L      Calcium 8.7 mg/dL      BUN/Creatinine Ratio 38.6     Anion Gap 6.0 mmol/L      eGFR 96.1 mL/min/1.73     Narrative:      GFR Normal >60  Chronic Kidney Disease <60  Kidney Failure <15    The GFR formula is only valid for adults with stable renal function between ages 18 and 70.    CBC & Differential [[id number]]  (Abnormal) Collected: 09/19/24 0339    Specimen: Blood, Central Line Updated: 09/19/24 0404    Narrative:      The following orders were created for panel order CBC & Differential.  Procedure                               Abnormality         Status                     ---------                               -----------         ------                     CBC Auto Differential[[id number]]        Abnormal            Final result                 Please view results for these tests on the individual orders.    CBC Auto Differential [[id number]]  (Abnormal) Collected: 09/19/24 0339    Specimen: Blood, Central Line Updated: 09/19/24 0404     WBC 12.41 10*3/mm3      RBC 3.81 10*6/mm3      Hemoglobin 11.9 g/dL      Hematocrit 36.7 %      MCV 96.3 fL      MCH 31.2 pg      MCHC 32.4 g/dL      RDW 14.1 %      RDW-SD 49.8 fl      MPV 9.7 fL      Platelets 217 10*3/mm3     Narrative:      The previously reported component NRBC is no longer being reported. Previous result was 0.0 /100 WBC (Reference Range: 0.0-0.2 /100 WBC) on 9/19/2024 at 0349 CDT.    Manual Differential [[id number]]  (Abnormal) Collected: 09/19/24 0339    Specimen: Blood, Central Line Updated: 09/19/24 0404     Neutrophil % 92.1 %      Lymphocyte % 4.0 %      Monocyte % 2.0 %      Eosinophil %  0.0 %      Basophil % 0.0 %      Metamyelocyte % 2.0 %      Neutrophils Absolute 11.43 10*3/mm3      Lymphocytes Absolute 0.50 10*3/mm3      Monocytes Absolute 0.25 10*3/mm3      Eosinophils Absolute 0.00 10*3/mm3      Basophils Absolute 0.00 10*3/mm3      RBC Morphology Normal     WBC Morphology Normal     Platelet Morphology Normal    Comprehensive Metabolic Panel [[id number]]  (Abnormal) Collected: 09/19/24 0339    Specimen: Blood, Central Line Updated: 09/19/24 0403     Glucose 138 mg/dL      BUN 31 mg/dL      Creatinine 0.74 mg/dL      Sodium 148 mmol/L      Potassium 4.0 mmol/L      Chloride 112 mmol/L      CO2 31.0 mmol/L      Calcium 8.7 mg/dL      Total Protein 4.5 g/dL      Albumin 2.1 g/dL      ALT (SGPT) 21 U/L      AST (SGOT) 25 U/L      Alkaline Phosphatase 83 U/L      Total Bilirubin 0.4 mg/dL      Globulin 2.4 gm/dL      A/G Ratio 0.9 g/dL      BUN/Creatinine Ratio 41.9     Anion Gap 5.0 mmol/L      eGFR 94.5 mL/min/1.73     Narrative:      GFR Normal >60  Chronic Kidney Disease <60  Kidney Failure <15    The GFR formula is only valid for adults with stable renal function between ages 18 and 70.    Magnesium [[id number]]  (Normal) Collected: 09/19/24 0339    Specimen: Blood, Central Line Updated: 09/19/24 0403     Magnesium 1.6 mg/dL     Bilirubin, Direct [[id number]]  (Normal) Collected: 09/19/24 0339    Specimen: Blood, Central Line Updated: 09/19/24 0403     Bilirubin, Direct 0.2 mg/dL     Phosphorus [[id number]]  (Abnormal) Collected: 09/19/24 0339    Specimen: Blood, Central Line Updated: 09/19/24 0403     Phosphorus 2.0 mg/dL     Potassium [[id number]]  (Normal) Collected: 09/19/24 0120    Specimen: Blood, Central Line Updated: 09/19/24 0137     Potassium 4.0 mmol/L     Basic Metabolic Panel [[id number]]  (Abnormal) Collected: 09/18/24 2140    Specimen: Blood Updated: 09/18/24 2216     Glucose 120 mg/dL      BUN 35 mg/dL      Creatinine 0.81 mg/dL      Sodium 148 mmol/L      Potassium 4.3 mmol/L       Comment: Slight hemolysis detected by analyzer. Result may be falsely elevated.        Chloride 113 mmol/L      CO2 29.0 mmol/L      Calcium 8.8 mg/dL      BUN/Creatinine Ratio 43.2     Anion Gap 6.0 mmol/L      eGFR 91.9 mL/min/1.73     Narrative:      GFR Normal >60  Chronic Kidney Disease <60  Kidney Failure <15    The GFR formula is only valid for adults with stable renal function between ages 18 and 70.    Basic Metabolic Panel [[id number]]  (Abnormal) Collected: 09/18/24 1601    Specimen: Blood Updated: 09/18/24 1636     Glucose 139 mg/dL      BUN 39 mg/dL      Creatinine 0.74 mg/dL      Sodium 148 mmol/L      Potassium 3.5 mmol/L      Chloride 112 mmol/L      CO2 31.0 mmol/L      Calcium 9.1 mg/dL      BUN/Creatinine Ratio 52.7     Anion Gap 5.0 mmol/L      eGFR 94.5 mL/min/1.73     Narrative:      GFR Normal >60  Chronic Kidney Disease <60  Kidney Failure <15    The GFR formula is only valid for adults with stable renal function between ages 18 and 70.          Imaging Results (Last 72 Hours)       Procedure Component Value Units Date/Time    XR Abdomen KUB [[id number]] Resulted: 09/20/24 1012     Updated: 09/20/24 1200               ASSESSMENT/PLAN       Examination and evaluation of wound(s) was performed.    DIAGNOSIS:   ***    PLAN:   Picture of left hip wound updated and listed above.     Continue current wound care plan.       Discussed findings and treatment plan including risks, benefits, and treatment options with *** in detail. Patient agreed with treatment plan.      This document has been electronically signed by CONCHITA Aquino on 9/20/2024 14:27 CDT     Time spent in face-to-face evaluation, chart review, planning and education totaled *** minutes with greater than 50% of time spent with patient and/or family and in coordination of care.  Counseling of patient and/or family includes {Counseling Options:89198}. {Time/Procedures:35267}               Total Protein 4.5 g/dL      Albumin 2.1 g/dL      ALT (SGPT) 21 U/L      AST (SGOT) 25 U/L      Alkaline Phosphatase 83 U/L      Total Bilirubin 0.4 mg/dL      Globulin 2.4 gm/dL      A/G Ratio 0.9 g/dL      BUN/Creatinine Ratio 41.9     Anion Gap 5.0 mmol/L      eGFR 94.5 mL/min/1.73     Narrative:      GFR Normal >60  Chronic Kidney Disease <60  Kidney Failure <15    The GFR formula is only valid for adults with stable renal function between ages 18 and 70.    Magnesium [[id number]]  (Normal) Collected: 09/19/24 0339    Specimen: Blood, Central Line Updated: 09/19/24 0403     Magnesium 1.6 mg/dL     Bilirubin, Direct [[id number]]  (Normal) Collected: 09/19/24 0339    Specimen: Blood, Central Line Updated: 09/19/24 0403     Bilirubin, Direct 0.2 mg/dL     Phosphorus [[id number]]  (Abnormal) Collected: 09/19/24 0339    Specimen: Blood, Central Line Updated: 09/19/24 0403     Phosphorus 2.0 mg/dL     Potassium [[id number]]  (Normal) Collected: 09/19/24 0120    Specimen: Blood, Central Line Updated: 09/19/24 0137     Potassium 4.0 mmol/L     Basic Metabolic Panel [[id number]]  (Abnormal) Collected: 09/18/24 2140    Specimen: Blood Updated: 09/18/24 2216     Glucose 120 mg/dL      BUN 35 mg/dL      Creatinine 0.81 mg/dL      Sodium 148 mmol/L      Potassium 4.3 mmol/L      Comment: Slight hemolysis detected by analyzer. Result may be falsely elevated.        Chloride 113 mmol/L      CO2 29.0 mmol/L      Calcium 8.8 mg/dL      BUN/Creatinine Ratio 43.2     Anion Gap 6.0 mmol/L      eGFR 91.9 mL/min/1.73     Narrative:      GFR Normal >60  Chronic Kidney Disease <60  Kidney Failure <15    The GFR formula is only valid for adults with stable renal function between ages 18 and 70.    Basic Metabolic Panel [[id number]]  (Abnormal) Collected: 09/18/24 1601    Specimen: Blood Updated: 09/18/24 1636     Glucose 139 mg/dL      BUN 39 mg/dL      Creatinine 0.74 mg/dL      Sodium 148 mmol/L      Potassium 3.5 mmol/L      Chloride  112 mmol/L      CO2 31.0 mmol/L      Calcium 9.1 mg/dL      BUN/Creatinine Ratio 52.7     Anion Gap 5.0 mmol/L      eGFR 94.5 mL/min/1.73     Narrative:      GFR Normal >60  Chronic Kidney Disease <60  Kidney Failure <15    The GFR formula is only valid for adults with stable renal function between ages 18 and 70.          Imaging Results (Last 72 Hours)       Procedure Component Value Units Date/Time    XR Abdomen KUB [[id number]] Resulted: 09/20/24 1012     Updated: 09/20/24 1200               ASSESSMENT/PLAN       Examination and evaluation of wound(s) was performed.    DIAGNOSIS:   Pressure injury of left hip, stage 4  Pressure injury of deep tissue of right heel  Pressure injury of deep tissue of left buttock  Pressure injury of deep tissue of right ankle  Pressure injury of deep tissue of left heel  Pressure injury of left ankle, stage 1  Pressure injury of deep tissue of right medial distal foot  Pressure injury of deep tissue of left scapula  Pressure injury of deep tissue of left lateral lower back  Pressure injury of sacral region, stage 3  Mucosal injury of penis-pressure and moisture etiology  Bedbound    Shock    Primary hypertension    Chronic indwelling Mario catheter    Urinary tract infection associated with indwelling urethral catheter    Hypokalemia    RYNE (acute kidney injury)    Hypomagnesemia    PLAN:   Picture of left hip wound updated and listed above.     Continue current wound care plan.       Discussed findings and treatment plan including risks, benefits, and treatment options with patient in detail. Patient agreed with treatment plan.      This document has been electronically signed by CONCHITA Aquino on 9/20/2024 14:27 CDT     Time spent in face-to-face evaluation, chart review, planning and education totaled 40 minutes with greater than 50% of time spent with patient and/or family and in coordination of care.  Counseling of patient and/or family includes discussing wound  diagnosis and etiology , discussing risks and benefits, counseling on risk factor reduction, giving instructions including follow-up management, Importance of compliance with chosen management options, and patient/family education. No procedures were completed during this visit.

## 2024-09-20 NOTE — PLAN OF CARE
Goal Outcome Evaluation:              Outcome Evaluation: Follow per protocol. Pt in room with physician. Pt was noted to have not eaten his breakfast and has a poor appetite. Pt seemed confused and remarked to dietetic graduate that he had problems breathing and wanted help. Attempted to perform an MSA, but pt seemed agitated. Pt is receiving Boost Plus 2x. If pt drinks liquids, encouraged ONS for added calories and protein. Unkown when last BM was, waiting for results of abdomen XR. Will attempt MSA in follow-up.

## 2024-09-20 NOTE — PROGRESS NOTES
[facility] Palliative Care Services  Progress Note  Patient Name: Jagdish Cook  Date of Admission: 9/16/2024  Today's Date: 09/20/24     Code Status and Medical Interventions: No CPR (Do Not Attempt to Resuscitate); Limited Support; No intubation (DNI)   Ordered at: 09/17/24 0107     Medical Intervention Limits:    No intubation (DNI)     Level Of Support Discussed With:    Next of Kin (If No Surrogate)     Code Status (Patient has no pulse and is not breathing):    No CPR (Do Not Attempt to Resuscitate)     Medical Interventions (Patient has pulse or is breathing):    Limited Support     Subjective   Chief complaint/Reason for Referral/Visit: Follow up on Goals of Care/Advance Care Planning.    Medical record reviewed. Events noted.  Has been off Levophed since 9/17/2024 per MAR.  Urine culture revealed growth of Enterococcus faecalis and Corynebacterium species.  He was transferred to the medical floor 9/19/2024.  Therapy has recommended return to SNF at discharge.  Labs collected this morning reviewed.  Magnesium and phosphorus slightly low.  Albumin remains low.  WBC trending downward.  He is sitting up in bed, asleep, and in no apparent distress.   Arouses to voice.  Oriented to person and place.  Requesting water.  Denied any pain.  No visitors at bedside.  Discussed with nursing.     Advance Care Planning   Advanced Directives: No advance directive on file.     Advance Care Planning Discussion: Followed up with Mr. Cook to discuss goals of care.  He continues to have difficulty demonstrating ability to make complex medical decisions at time of exam.  He wanted to speak with his brother.  Call placed to his brother, Jitendra, while in room with him and he was able to speak with him some however fell asleep while talking.  Jitendra shared he is hopeful he will have improvement and be able to return to nursing facility when he is ready for discharge.     Call placed to patient's brother/next of  kinJitendra, later this morning to see if he had any questions and/or concerns and to determine if he would be interested in completing MOST form prior to discharge.  Unable to reach.  Voicemail left with request for return phone call.     The patient receives support from his sibling and friends. Patient's sibling is his next of kin.    Due to the palliative care topics discussed including goals of care and treatment options we will establish an advance care plan.    Goals of care: Ongoing.    Review of Systems   Unable to perform ROS: Other   Musculoskeletal:  Negative for back pain.   Neurological:  Positive for weakness.       Pain Assessment  Preferred Pain Scale: rFLACC (Revised Face, Legs, Activity, Crying, and Consolability Scale)  CPOT Facial Expression: 0-->relaxed, neutral  CPOT Body Movements: 0-->absence of movements  CPOT Muscle Tension: 0-->relaxed  Ventilator Compliance/Vocalization: 0-->talking in normal tone or no sound  CPOT Score: 0  PAINAD Breathin-->normal  PAINAD Negative Vocalization: 0-->none  PAINAD Facial Expression: 0-->smiling or inexpressive  PAINAD Body Language: 1-->tense, distressed pacing, fidgeting  PAINAD Consolability: 0-->no need to console  PAINAD Score: 1  Pain Location: extremity  Objective   Diagnostics: Reviewed      Intake/Output Summary (Last 24 hours) at 2024  Last data filed at 2024 0646  Gross per 24 hour   Intake 1350.4 ml   Output 1550 ml   Net -199.6 ml     Current Facility-Administered Medications   Medication Dose Route Frequency Provider Last Rate Last Admin    acetaminophen (TYLENOL) tablet 650 mg  650 mg Oral Q4H PRN Kristen Sánchez MD   650 mg at 24 1514    Or    acetaminophen (TYLENOL) suppository 650 mg  650 mg Rectal Q4H PRN Kristen Sánchez MD        ascorbic acid (VITAMIN C) tablet 1,000 mg  1,000 mg Oral Daily Jagdish Courtney PA-C   1,000 mg at 24 0902    sennosides-docusate (PERICOLACE) 8.6-50 MG per tablet 2 tablet   2 tablet Oral BID Kristen Sánchez MD   2 tablet at 09/20/24 0902    And    polyethylene glycol (MIRALAX) packet 17 g  17 g Oral Daily PRN Kristen Sánchez MD   17 g at 09/18/24 1015    And    bisacodyl (DULCOLAX) EC tablet 5 mg  5 mg Oral Daily PRN Kristen Sánchez MD        And    bisacodyl (DULCOLAX) suppository 10 mg  10 mg Rectal Daily PRN Kristen Sánchez MD        busPIRone (BUSPAR) tablet 10 mg  10 mg Oral BID Jagdish Courtney PA-C   10 mg at 09/20/24 0902    Calcium Replacement - Follow Nurse / BPA Driven Protocol   Does not apply PRN Kristen Sánchez MD        cetirizine (zyrTEC) tablet 10 mg  10 mg Oral Daily Jagdish Courtney PA-C   10 mg at 09/20/24 0902    collagenase ointment 1 Application  1 Application Topical Q12H Yudelka Gonzalez, APRN   1 Application at 09/20/24 0907    dextrose 5 % and sodium chloride 0.45 % infusion  50 mL/hr Intravenous Continuous Chato Boo  mL/hr at 09/20/24 0635 150 mL/hr at 09/20/24 0635    finasteride (PROSCAR) tablet 5 mg  5 mg Oral Daily Jagdish Courtney PA-C   5 mg at 09/20/24 0902    fluticasone (FLONASE) 50 MCG/ACT nasal spray 2 spray  2 spray Nasal Daily Jagdish Courtney PA-C   2 spray at 09/20/24 0907    guaiFENesin (MUCINEX) 12 hr tablet 600 mg  600 mg Oral BID Jagdish Courtney PA-C   600 mg at 09/20/24 0902    heparin (porcine) 5000 UNIT/ML injection 5,000 Units  5,000 Units Subcutaneous Q8H Kristen Sánchez MD   5,000 Units at 09/20/24 0608    lisinopril (PRINIVIL,ZESTRIL) tablet 10 mg  10 mg Oral Q24H Jagdish Courtney PA-C        And    hydroCHLOROthiazide tablet 12.5 mg  12.5 mg Oral Q24H Jagdish Courtney PA-C   12.5 mg at 09/20/24 0902    lactobacillus acidophilus (RISAQUAD) capsule 1 capsule  1 capsule Oral Daily Jagdish Courtney PA-C        levothyroxine (SYNTHROID, LEVOTHROID) tablet 125 mcg  125 mcg Oral Q AM Jagdish Courtney PA-C   125 mcg at 09/20/24 0608    Magnesium Standard Dose Replacement - Follow Nurse / BPA Driven  Protocol   Does not apply PRN Kristen Sánchez MD        magnesium sulfate 2g/50 mL (PREMIX) infusion  2 g Intravenous Once Chato Boo, DO        multivitamin with minerals 1 tablet  1 tablet Oral Daily Kristen Sánchez MD   1 tablet at 09/20/24 0902    mupirocin (BACTROBAN) 2 % ointment 1 Application  1 Application Topical Q12H Yudelka Gonzalez, APRN   1 Application at 09/19/24 2100    nitrofurantoin (macrocrystal-monohydrate) (MACROBID) capsule 100 mg  100 mg Oral Q12H Chato Boo, DO        nitroglycerin (NITROSTAT) SL tablet 0.4 mg  0.4 mg Sublingual Q5 Min PRN Kristen Sánchez MD        Non-Formulary / Patient Supplied Medication  10 mg Oral Nightly Jagdish Courtney PA-C        ondansetron (ZOFRAN) injection 4 mg  4 mg Intravenous Q6H PRN Kristen Sánchez MD   4 mg at 09/19/24 2048    Phosphorus Replacement - Follow Nurse / BPA Driven Protocol   Does not apply PRN Kristen Sánchze MD        Potassium Replacement - Follow Nurse / BPA Driven Protocol   Does not apply PRN Kristen Sánchez MD        sodium chloride 0.9 % flush 10 mL  10 mL Intravenous Q12H Kristen Sánchez MD   10 mL at 09/20/24 0907    sodium chloride 0.9 % flush 10 mL  10 mL Intravenous PRN Kristen Sánchez MD        sodium chloride 0.9 % infusion 40 mL  40 mL Intravenous PRN Kristen Sánchez MD        sodium hypochlorite (DAKIN'S 1/4 STRENGTH) 0.125 % topical solution 0.125% solution   Topical Q12H Yudelka Gonzalez R, APRN   Given at 09/20/24 0907    tiZANidine (ZANAFLEX) tablet 4 mg  4 mg Oral Q8H PRN Jagdish Courtney PA-C        venlafaxine XR (EFFEXOR-XR) 24 hr capsule 150 mg  150 mg Oral Daily Jagdish Courtney PA-C   150 mg at 09/20/24 0903    zinc sulfate (ZINCATE) capsule 220 mg  220 mg Oral Daily Kristen Sánchez MD   220 mg at 09/20/24 0903     dextrose 5 % and sodium chloride 0.45 %, 50 mL/hr, Last Rate: 150 mL/hr (09/20/24 0635)        acetaminophen **OR** acetaminophen    senna-docusate sodium  "**AND** polyethylene glycol **AND** bisacodyl **AND** bisacodyl    Calcium Replacement - Follow Nurse / BPA Driven Protocol    Magnesium Standard Dose Replacement - Follow Nurse / BPA Driven Protocol    nitroglycerin    ondansetron    Phosphorus Replacement - Follow Nurse / BPA Driven Protocol    Potassium Replacement - Follow Nurse / BPA Driven Protocol    sodium chloride    sodium chloride    tiZANidine  Current medications patient is presently taking including all prescriptions, over-the-counter, herbals and vitamin/mineral/dietary (nutritional) supplements with reviewed including route, type, dose and frequency and are current per MAR at time of dictation.    Assessment:  Vital Signs: /66 (BP Location: Right arm, Patient Position: Lying)   Pulse 74   Temp 98 °F (36.7 °C) (Oral)   Resp 18   Ht 175.3 cm (69.02\")   Wt 63.9 kg (140 lb 14.4 oz)   SpO2 98%   BMI 20.80 kg/m²     Physical Exam  Vitals and nursing note reviewed.   Constitutional:       General: He is sleeping. He is not in acute distress.     Appearance: He is ill-appearing.      Interventions: Nasal cannula in place.   HENT:      Head: Normocephalic and atraumatic.   Eyes:      General: Lids are normal.      Extraocular Movements: Extraocular movements intact.   Neck:      Vascular: No JVD.      Trachea: Trachea normal.   Cardiovascular:      Rate and Rhythm: Normal rate.   Pulmonary:      Effort: Pulmonary effort is normal.   Musculoskeletal:      Cervical back: Neck supple.   Skin:     General: Skin is warm and dry.   Neurological:      Mental Status: He is alert and easily aroused. He is disoriented.     Functional status: Palliative Performance Scale Score: Performance 50% based on the following measures: Ambulation: Mainly sit or lie down, Activity and Evidence of Disease: Unable to do any work, extensive evidence of disease, Self-Care: Considerable assistance required,  Intake: Normal or reduced, LOC: Full or confusion.  Nutritional " status: Albumin 1.9. Body mass index is 20.8 kg/m².   Patient status: Disease state: Controlled with current treatments.    Active Hospital Problems    Diagnosis     **Shock     Hypomagnesemia     RYNE (acute kidney injury)     Urinary tract infection associated with indwelling urethral catheter     Chronic indwelling Mario catheter     Hypokalemia     Primary hypertension        Impression/Problem List:  Shock     Altered mental status    Urinary tract infection associated with indwelling urethral catheter  COPD  Pressure injury of left hip     Pressure injury of penis     Chronic urinary retention with chronic indwelling Mario catheter  Hypoalbuminemia   Impaired mobility         Plan / Recommendations     Palliative Care Encounter   Goals of care include CODE STATUS NO CPR with limited support interventions.    Prognosis is guarded long-term secondary to altered mental status, UTI, chronic urinary retention with chronic indwelling Mario catheter, pressure injury of left hip and penis, hypoalbuminemia and other comorbidities listed above.     Followed up with Mr. Cook to discuss goals of care.  He continues to have difficulty demonstrating ability to make complex medical decisions at time of exam.  Call placed to his brother, Jitendra, while in room with him per request.      Jitendra remains hopeful he will have improvement and be able to return to nursing facility when he is ready for discharge.      Call placed to patient's brother/next of kin, Jitendra, later this morning to see if he had any questions and/or concerns and to determine if he would be interested in completing MOST form prior to discharge.  Unable to reach.  Voicemail left with request for return phone call.     ADDENDUM:  Spoke with Jitendra later this afternoon.  Discussed MOST form.  He was interested in completing.  Initiated electronic form and awaiting Jitendra's signature.  Once form is signed and returned will ask attending physician for signature.      Thank you for allowing us to participate in patient's plan of care. Palliative Care Team will continue to follow patient.     Time spent:35 minutes spent reviewing providers documentation, medical records, assessing and examining patient, discussing with family, answering questions, providing guidance about a plan of care, and coordinating care with other healthcare members.  More than 50% of time spent face-to-face discussing disease education, current clinical status, and medication management.    Electronically signed by, CONCHITA Nunn, 09/20/24.

## 2024-09-20 NOTE — THERAPY TREATMENT NOTE
Acute Care - Physical Therapy Treatment Note  [facility]     Patient Name: Jagdish Cook  : 1950  MRN: [id number]  Today's Date: 2024      Visit Dx:     ICD-10-CM ICD-9-CM   1. Shock  R57.9 785.50   2. Acute kidney injury  N17.9 584.9   3. Pneumonia of left lung due to infectious organism, unspecified part of lung  J18.9 486   4. Metabolic acidosis  E87.20 276.2   5. Skin infection  L08.9 686.9   6. Impaired transfers [Z74.09]  Z74.09 781.99     Patient Active Problem List   Diagnosis    Primary hypertension    Hypothyroidism    Right hip pain    Chronic bilateral low back pain with bilateral sciatica    Acute on chronic urinary retention    Severe sepsis without septic shock    Gait instability    Transaminitis    Chronic indwelling Mario catheter    Urinary tract infection associated with indwelling urethral catheter    Hypokalemia    Constipation    Traumatic rhabdomyolysis    Syncope    Shock    RYNE (acute kidney injury)    Hypomagnesemia     Past Medical History:   Diagnosis Date    Bacteremia     Chronic back pain     COPD (chronic obstructive pulmonary disease)     Mario catheter in place     GERD (gastroesophageal reflux disease)     Memory deficits     Neuropathy     Umbilical hernia     Urinary retention      Past Surgical History:   Procedure Laterality Date    BACK SURGERY      HERNIA REPAIR       PT Assessment (Last 12 Hours)       PT Evaluation and Treatment       Row Name 24 1524          Physical Therapy Time and Intention    Subjective Information complains of;pain  -     Document Type therapy note (daily note)  -     Mode of Treatment physical therapy  -     Comment pt more calm this afternoon. Agreeable to therapy.  -       Row Name 24 1524          General Information    Existing Precautions/Restrictions fall  knees at 90 degree contracture  -       Row Name 24 1524          Pain    Pain Intervention(s) Repositioned  -       Row Name 24 1524           Pain Scale: Word Pre/Post-Treatment    Pain: Word Scale, Pretreatment 4 - moderate pain  -     Posttreatment Pain Rating 0 - no pain  -     Pain Location - neck  -       Row Name 09/20/24 1524          Bed Mobility    Scooting/Bridging [place] (Bed Mobility) dependent (less than 25% patient effort)  -     Supine-Sit [place] (Bed Mobility) verbal cues;dependent (less than 25% patient effort)  -     Sit-Supine [place] (Bed Mobility) verbal cues;maximum assist (25% patient effort);dependent (less than 25% patient effort)  -     Assistive Device (Bed Mobility) draw sheet  -     Comment, (Bed Mobility) pt was dependent for mobility. Agreeable to move, but did not assist what so ever.  -       Row Name 09/20/24 1524          Balance    Comment, Balance SBA-CGA. MIN assist at times to correct right lateral lean. Worked on trunk rotation and movement, but pt only able to tolerate a little bit.  -       Row Name 09/20/24 1524          Motor Skills    Comments, Therapeutic Exercise AAROM B UEs-elbow and shoulder flexion.  -       Row Name 09/20/24 1524          Hip (Therapeutic Exercise)    Hip (Therapeutic Exercise) AROM (active range of motion)  -     Hip AROM (Therapeutic Exercise) bilateral;flexion;sitting;5 repetitions  -       Row Name 09/20/24 1524          Knee (Therapeutic Exercise)    Knee (Therapeutic Exercise) --  unable to extend-contractures  -       Row Name             Wound 09/02/24 1320 gluteal    Wound - Properties Group Placement Date: 09/02/24  -MS Placement Time: 1320  -MS Present on Original Admission: Y  -MS Location: gluteal  -MS    Retired Wound - Properties Group Placement Date: 09/02/24  -MS Placement Time: 1320  -MS Present on Original Admission: Y  -MS Location: gluteal  -MS    Retired Wound - Properties Group Date first assessed: 09/02/24  -MS Time first assessed: 1320  -MS Present on Original Admission: Y  -MS Location: gluteal  -MS      Row Name              Wound 09/02/24 1315 Left posterior heel    Wound - Properties Group Placement Date: 09/02/24  -MS Placement Time: 1315  -MS Present on Original Admission: Y  -MS Side: Left  -MS Orientation: posterior  -MS Location: heel  -MS    Retired Wound - Properties Group Placement Date: 09/02/24  -MS Placement Time: 1315  -MS Present on Original Admission: Y  -MS Side: Left  -MS Orientation: posterior  -MS Location: heel  -MS    Retired Wound - Properties Group Date first assessed: 09/02/24  -MS Time first assessed: 1315  -MS Present on Original Admission: Y  -MS Side: Left  -MS Location: heel  -MS      Row Name             Wound 09/02/24 1320 Right posterior heel    Wound - Properties Group Placement Date: 09/02/24  -MS Placement Time: 1320  -MS Present on Original Admission: Y  -MS Side: Right  -MS Orientation: posterior  -MS Location: heel  -MS    Retired Wound - Properties Group Placement Date: 09/02/24  -MS Placement Time: 1320  -MS Present on Original Admission: Y  -MS Side: Right  -MS Orientation: posterior  -MS Location: heel  -MS    Retired Wound - Properties Group Date first assessed: 09/02/24  -MS Time first assessed: 1320  -MS Present on Original Admission: Y  -MS Side: Right  -MS Location: heel  -MS      Row Name             Wound 09/16/24 1318 Left anterior greater trochanter    Wound - Properties Group Placement Date: 09/16/24  -SM Placement Time: 1318  -SM Present on Original Admission: Y  -SM Side: Left  -SM Orientation: anterior  -SM Location: greater trochanter  -SM    Retired Wound - Properties Group Placement Date: 09/16/24  -SM Placement Time: 1318  -SM Present on Original Admission: Y  -SM Side: Left  -SM Orientation: anterior  -SM Location: greater trochanter  -SM    Retired Wound - Properties Group Date first assessed: 09/16/24  -SM Time first assessed: 1318  -SM Present on Original Admission: Y  -SM Side: Left  -SM Location: greater trochanter  -SM      Row Name             Wound 09/16/24  1318 penis    Wound - Properties Group Placement Date: 09/16/24  - Placement Time: 1318  -SM Present on Original Admission: Y  -SM Location: penis  -SM    Retired Wound - Properties Group Placement Date: 09/16/24  -SM Placement Time: 1318  -SM Present on Original Admission: Y  -SM Location: penis  -SM    Retired Wound - Properties Group Date first assessed: 09/16/24  - Time first assessed: 1318  -SM Present on Original Admission: Y  -SM Location: penis  -SM      Row Name 09/20/24 1524          Plan of Care Review    Plan of Care Reviewed With patient  -     Progress no change  -     Outcome Evaluation PT tx completed. Pt. more calm this afternoon per nsg. Pt. agreeable to therapy. He was Dependent to get to EOB. He was agreeable to move, but did not put forth any effort to get there. Pt. is stiff all over and has 90 degree contractures of bilateral knees. He was able to maintain sitting balance with CGA-SBA. Occasionally needed assist to correct right lateral lean. Worked on trunk mobility and UE ROM. Pt. will needed continued SNF following discharge.  -       Row Name 09/20/24 1524          Vital Signs    Post SpO2 (%) 90  -     O2 Delivery Post Treatment room air  pt c/o SOA. No O2 in room. Notified nsg and asked if I should place O2 on pt, but nsg stated not at this time.  -       Row Name 09/20/24 1524          Positioning and Restraints    Pre-Treatment Position in bed  -     Post Treatment Position bed  -     In Bed notified nsg;fowlers;side lying right;call light within reach;encouraged to call for assist;exit alarm on;side rails up x2;pillow between legs;LUE elevated  -               User Key  (r) = Recorded By, (t) = Taken By, (c) = Cosigned By      Initials Name Provider Type    Hilda Rios PTA Physical Therapist Assistant    Catherine Martinez, RN Registered Nurse    Macie Cast RN Registered Nurse                    Physical Therapy Education       Title: PT OT  SLP Therapies (Done)       Topic: Physical Therapy (Done)       Point: Mobility training (Done)       Learning Progress Summary             Patient Acceptance, E,TB, VU by SM at 9/20/2024 0337    Acceptance, E,TB, VU by CR at 9/19/2024 0038    Acceptance, E, NR by TOI at 9/18/2024 1059    Comment: benefits of activity, progression of PT   Family Acceptance, E,TB, VU by CR at 9/19/2024 0038                         Point: Home exercise program (Done)       Learning Progress Summary             Patient Acceptance, E,TB, VU by SM at 9/20/2024 0337    Acceptance, E,TB, VU by CR at 9/19/2024 0038   Family Acceptance, E,TB, VU by CR at 9/19/2024 0038                         Point: Body mechanics (Done)       Learning Progress Summary             Patient Acceptance, E,TB, VU by SM at 9/20/2024 0337    Acceptance, E,TB, VU by CR at 9/19/2024 0038   Family Acceptance, E,TB, VU by CR at 9/19/2024 0038                         Point: Precautions (Done)       Learning Progress Summary             Patient Acceptance, E,TB, VU by SM at 9/20/2024 0337    Acceptance, E,TB, VU by CR at 9/19/2024 0038   Family Acceptance, E,TB, VU by CR at 9/19/2024 0038                                         User Key       Initials Effective Dates Name Provider Type Discipline    TOI 02/03/23 -  Dean Norris, PT DPT Physical Therapist PT    CR 03/03/23 -  Clara Valle, RN Registered Nurse Nurse     06/19/24 -  Macie Ulrich, RN Registered Nurse Nurse                  PT Recommendation and Plan     Plan of Care Reviewed With: patient  Progress: no change  Outcome Evaluation: PT tx completed. Pt. more calm this afternoon per nsg. Pt. agreeable to therapy. He was Dependent to get to EOB. He was agreeable to move, but did not put forth any effort to get there. Pt. is stiff all over and has 90 degree contractures of bilateral knees. He was able to maintain sitting balance with CGA-SBA. Occasionally needed assist to correct right lateral  lean. Worked on trunk mobility and UE ROM. Pt. will needed continued SNF following discharge.   Outcome Measures       Row Name 09/20/24 1524             How much help from another person do you currently need...    Turning from your back to your side while in flat bed without using bedrails? 2  -MF      Moving from lying on back to sitting on the side of a flat bed without bedrails? 1  -MF      Moving to and from a bed to a chair (including a wheelchair)? 1  -MF      Standing up from a chair using your arms (e.g., wheelchair, bedside chair)? 1  -MF      Climbing 3-5 steps with a railing? 1  -MF      To walk in hospital room? 1  -MF      AM-PAC 6 Clicks Score (PT) 7  -MF      Highest Level of Mobility Goal 2 --> Bed activities/dependent transfer  -MF         Functional Assessment    Outcome Measure Options AM-PAC 6 Clicks Basic Mobility (PT)  -MF                User Key  (r) = Recorded By, (t) = Taken By, (c) = Cosigned By      Initials Name Provider Type    Hilda Rios PTA Physical Therapist Assistant                     Time Calculation:    PT Charges       Row Name 09/20/24 1639             Time Calculation    Start Time 1524  -MF      Stop Time 1550  -MF      Time Calculation (min) 26 min  -MF      PT Received On 09/20/24  -MF         Time Calculation- PT    Total Timed Code Minutes- PT 26 minute(s)  -MF         Timed Charges    24091 - PT Therapeutic Activity Minutes 26  -MF         Total Minutes    Timed Charges Total Minutes 26  -MF       Total Minutes 26  -MF                User Key  (r) = Recorded By, (t) = Taken By, (c) = Cosigned By      Initials Name Provider Type    Hilda Rios PTA Physical Therapist Assistant                  Therapy Charges for Today       Code Description Service Date Service Provider Modifiers Qty    [id number]  PT THERAPEUTIC ACT EA 15 MIN 9/20/2024 Hilda Zamarripa PTA GP 2            PT G-Codes  Outcome Measure Options: AM-PAC 6 Clicks Basic  Mobility (PT)  AM-PAC 6 Clicks Score (PT): 7  AM-PAC 6 Clicks Score (OT): 8    Hilda Zamarripa, PTA  9/20/2024

## 2024-09-20 NOTE — PROGRESS NOTES
[facility]  INPATIENT PROGRESS NOTE    Patient Name: Jagdish Cook  Date of Admission: 9/16/2024  Today's Date: 09/20/24  Length of Stay: 4  Primary Care Physician: Aidan Shields MD    Subjective   Chief Complaint: f/u sepsis, uti    HPI   Patient seen resting in bed.  Somewhat difficult to understand.  Patient speaks softly and mumbles a lot.  Patient did not eat breakfast.  He tells me he just wants some water.  He says he is not hungry and does not want to eat.  He is complaining of some abdominal discomfort and nausea.  No reported BM for several days since arrival.  He is afebrile with normal blood pressure.        Review of Systems   All pertinent negatives and positives are as above. All other systems have been reviewed and are negative unless otherwise stated.     Objective    Temp:  [97.6 °F (36.4 °C)-98.3 °F (36.8 °C)] 98 °F (36.7 °C)  Heart Rate:  [68-88] 74  Resp:  [16-19] 18  BP: (102-138)/(51-87) 131/66  Physical Exam  GEN: Awake, alert, interactive, in NAD.  Seems mildly confused.  HEENT: PERRLA, EOMI, Anicteric, Trachea midline  Lungs:  no wheezing/rales/rhonchi  Heart: RRR, +S1/s2, no rub  ABD: ventral hernia, reducible, soft, nd, +BS, no guarding/rebound  Extremities: atraumatic, no cyanosis, no pitting edema  Skin: L hip wound, some eschar around penile meatus   Neuro: AAOx2, no obvious focal deficits          Results Review:  I have reviewed the labs, radiology results, and diagnostic studies.    Laboratory Data:   Results from last 7 days   Lab Units 09/20/24  0515 09/19/24  0339 09/18/24  0451   WBC 10*3/mm3 11.38* 12.41* 16.25*   HEMOGLOBIN g/dL 12.6* 11.9* 11.9*   HEMATOCRIT % 40.7 36.7* 37.5   PLATELETS 10*3/mm3 206 217 233        Results from last 7 days   Lab Units 09/20/24  0515 09/19/24  2221 09/19/24  1643 09/19/24  1039 09/19/24  0339 09/18/24  1221 09/18/24  0451   SODIUM mmol/L 141 145 145   < > 148*   < > 150*    POTASSIUM mmol/L 4.8 3.4* 3.5   < > 4.0   < > 3.1*   CHLORIDE mmol/L 107 109* 110*   < > 112*   < > 111*   CO2 mmol/L 28.0 29.0 30.0*   < > 31.0*   < > 30.0*   BUN mg/dL 20 22 23   < > 31*   < > 43*   CREATININE mg/dL 0.62* 0.58* 0.68*   < > 0.74*   < > 1.00   CALCIUM mg/dL 8.3* 8.2* 8.0*   < > 8.7   < > 9.0   BILIRUBIN mg/dL 0.4  --   --   --  0.4  --  0.4   ALK PHOS U/L 85  --   --   --  83  --  89   ALT (SGPT) U/L 19  --   --   --  21  --  21   AST (SGOT) U/L 27  --   --   --  25  --  32   GLUCOSE mg/dL 113* 103* 127*   < > 138*   < > 110*    < > = values in this interval not displayed.       Culture Data:   Blood Culture   Date Value Ref Range Status   09/16/2024 No growth at 3 days  Preliminary   09/16/2024 No growth at 3 days  Preliminary     Urine Culture   Date Value Ref Range Status   09/16/2024 >100,000 CFU/mL Enterococcus faecalis (A)  Final   09/16/2024 >100,000 CFU/mL Corynebacterium species (A)  Final     Comment:       Based on laboratory diagnosis criteria, these organisms are common urogenital commensal rolando and have not been associated with urinary tract infections; clinical correlation is recommended.       Radiology Data:   Imaging Results (Last 24 Hours)       ** No results found for the last 24 hours. **            I have reviewed the patient's current medications.     Assessment/Plan   Assessment  Active Hospital Problems    Diagnosis     **Shock     Hypomagnesemia     RYNE (acute kidney injury)     Urinary tract infection associated with indwelling urethral catheter     Chronic indwelling Mario catheter     Hypokalemia     Primary hypertension        Treatment Plan  #1 sepsis -documented as septic shock on arrival per ICU staff and team.  Suspected urinary tract infection in the setting of chronic Mario catheter per ICU notation.  Mario catheter was exchanged in the ER.  He was on pressors but now off doing better.  Tolerating blood pressure meds.  Blood cultures negative.  Urine culture  grew Enterococcus and Corynebacterium.  Patient now on amoxicillin.  Note as well however he does seem to have some other potential issues.  He had some reported necrosis around penile meatus on arrival.  Also had hip wound.  These do seem to be improving based on pictures and personal exam today.  Also of note on arrival there was mention of potential bilateral pneumonia on imaging that I do not see documented much on ICU notes.  Patient currently however is not complaining of any shortness of breath or phlegm.  He is on room air.    #2 UTI -Enterococcus on urinalysis.  Patient was started on amoxicillin but has noted allergy and has some concern.  Will use Macrobid.    #3 RYNE -in the setting of 1.  Resolved.  Renal function normalized.    #4 penile/hip wound -continue wound care    #5 abdominal pain/nausea -patient with poor appetite.  States he just wants tomorrow does not want to eat.  He does have a ventral hernia that was easily reducible and nontender on exam.  Do not see any bowel movements documented since arrival.  Possible constipation or ileus.  Will get a KUB.    #6 hypertension -tolerating home HCTZ and lisinopril now that his sepsis has resolved.    #7 hypokalemia -replaced and improved.    #8 hypomagnesemia -replace    Medical Decision Making  Number and Complexity of problems: 5+ acute, multiple chronic.  Medically complex patient.  Differential Diagnosis: As above    Conditions and Status        New to me.  Hard to get a clear story from.  Somewhat confused.  Does not seem toxic.     MDM Data  External documents reviewed: None  Cardiac tracing (EKG, telemetry) interpretation: None  Radiology interpretation: Reports reviewed  Labs reviewed: As above  Any tests that were considered but not ordered: None     Decision rules/scores evaluated (example [id number], Wells, etc): None     Discussed with: Patient, dietitian, nursing staff     Care Planning  Shared decision making: Patient apprised of  current labs, vitals, imaging and treatment plan.  They are agreeable with proceeding with plans as discussed.    Code status and discussions: dnr/dni    Disposition  Social Determinants of Health that impact treatment or disposition: from facility  I expect the patient to be discharged to back to nursing facility when appropriate. Hopefully 1-2 days pending ongoing needs.      Electronically signed by Chato Boo DO, 09/20/24, 09:42 CDT.    Addendum:   KUB came back as nonobstructive bowel gas pattern with possible early changes of ileus.  Suspect patient has constipation.  Of note however there is also mention of possible pneumobilia versus portal venous gas.  Will get a stat CT abdomen and pelvis with IV contrast to assess.  If anything on imaging come back concerning for infection will start Levaquin and Flagyl.    Electronically signed by Chato Boo DO, 09/20/24, 4:04 PM CDT.

## 2024-09-20 NOTE — CASE MANAGEMENT/SOCIAL WORK
Continued Stay Note   [facility]     Patient Name: Jagdish Cook  MRN: [id number]  Today's Date: 9/20/2024    Admit Date: 9/16/2024    Plan: SNF   Discharge Plan       Row Name 09/20/24 0910       Plan    Plan SNF    Plan Comments Patient is currently residing at [facility] [phone].  Patient has a bed hold and plans to return to this facility upon discharge.  SW spoke with patient's brother, Jitendra Cook [phone], who advised he is patient's only blood relative.  SW following and will assist with disposition.                   Discharge Codes    No documentation.                       ANNIE Del Cid

## 2024-09-20 NOTE — PLAN OF CARE
Goal Outcome Evaluation:  Plan of Care Reviewed With: patient        Progress: no change   Pt rested on and off during shift. Pt is A&Ox1 to self only. Pt needed encouragement to take medication. Pt turned every 2 hours when he didn't refuse intervention. Penis cleaned with normal saline and Mupirocin applied. Wound care done to wounds per orders. Safety maintained during shift. Bed alarm is set and audible. Call light within reach, pt educated on how to use. No complaints at this time, will continue to monitor.

## 2024-09-20 NOTE — PLAN OF CARE
Problem: Skin Injury Risk Increased  Goal: Skin Health and Integrity  Outcome: Ongoing, Progressing  Intervention: Optimize Skin Protection  Recent Flowsheet Documentation  Taken 9/20/2024 1716 by Loyd Le RN  Head of Bed (HOB) Positioning: HOB at 30-45 degrees  Taken 9/20/2024 1610 by Loyd Le RN  Head of Bed (HOB) Positioning: HOB at 30 degrees  Taken 9/20/2024 1529 by Loyd Le RN  Head of Bed (HOB) Positioning: HOB at 20 degrees  Taken 9/20/2024 1202 by Loyd Le RN  Head of Bed (HOB) Positioning: HOB at 30 degrees  Taken 9/20/2024 1107 by Loyd Le RN  Head of Bed (HOB) Positioning: HOB at 30 degrees  Taken 9/20/2024 1020 by Loyd Le RN  Head of Bed (HOB) Positioning: HOB at 30 degrees  Taken 9/20/2024 0912 by Loyd Le RN  Head of Bed (HOB) Positioning: HOB at 30 degrees  Taken 9/20/2024 0747 by Loyd Le RN  Pressure Reduction Techniques: weight shift assistance provided  Head of Bed (HOB) Positioning: HOB at 30 degrees  Pressure Reduction Devices:   foam padding utilized   pressure-redistributing mattress utilized  Skin Protection:   adhesive use limited   incontinence pads utilized   skin-to-skin areas padded     Problem: Adult Inpatient Plan of Care  Goal: Plan of Care Review  Outcome: Ongoing, Progressing  Goal: Patient-Specific Goal (Individualized)  Outcome: Ongoing, Progressing  Goal: Absence of Hospital-Acquired Illness or Injury  Outcome: Ongoing, Progressing  Intervention: Identify and Manage Fall Risk  Recent Flowsheet Documentation  Taken 9/20/2024 1716 by Loyd Le RN  Safety Promotion/Fall Prevention: safety round/check completed  Taken 9/20/2024 1610 by Loyd Le RN  Safety Promotion/Fall Prevention: safety round/check completed  Taken 9/20/2024 1529 by Loyd Le RN  Safety Promotion/Fall Prevention: safety round/check completed  Taken 9/20/2024 1404 by Loyd Le RN  Safety Promotion/Fall Prevention: safety round/check completed  Taken  9/20/2024 1304 by Loyd Le RN  Safety Promotion/Fall Prevention: safety round/check completed  Taken 9/20/2024 1202 by Loyd Le RN  Safety Promotion/Fall Prevention: safety round/check completed  Taken 9/20/2024 1107 by Loyd Le RN  Safety Promotion/Fall Prevention: safety round/check completed  Taken 9/20/2024 1020 by Loyd Le RN  Safety Promotion/Fall Prevention: safety round/check completed  Taken 9/20/2024 0912 by Loyd Le RN  Safety Promotion/Fall Prevention: safety round/check completed  Taken 9/20/2024 0747 by Loyd Le RN  Safety Promotion/Fall Prevention: safety round/check completed  Intervention: Prevent Skin Injury  Recent Flowsheet Documentation  Taken 9/20/2024 1716 by Loyd Le RN  Body Position:   turned   right  Taken 9/20/2024 1610 by Loyd Le RN  Body Position:   turned   left  Taken 9/20/2024 1529 by Loyd Le RN  Body Position:   right   turned  Taken 9/20/2024 1404 by Loyd Le RN  Body Position: patient/family refused  Taken 9/20/2024 1304 by Loyd Le RN  Body Position: patient/family refused  Taken 9/20/2024 1202 by Loyd Le RN  Body Position:   turned   left  Taken 9/20/2024 1107 by Loyd Le RN  Body Position:   turned   right  Taken 9/20/2024 1020 by Loyd Le RN  Body Position:   turned   left  Taken 9/20/2024 0912 by Loyd Le RN  Body Position:   turned   right  Taken 9/20/2024 0747 by Loyd Le RN  Body Position:   turned   left  Skin Protection:   adhesive use limited   incontinence pads utilized   skin-to-skin areas padded  Intervention: Prevent and Manage VTE (Venous Thromboembolism) Risk  Recent Flowsheet Documentation  Taken 9/20/2024 0747 by Loyd Le RN  Activity Management: bedrest  VTE Prevention/Management: (Heparin) other (see comments)  Goal: Optimal Comfort and Wellbeing  Outcome: Ongoing, Progressing  Intervention: Provide Person-Centered Care  Recent Flowsheet Documentation  Taken 9/20/2024 0747  by Loyd Le RN  Trust Relationship/Rapport: care explained  Goal: Readiness for Transition of Care  Outcome: Ongoing, Progressing     Problem: Adjustment to Illness (Sepsis/Septic Shock)  Goal: Optimal Coping  Outcome: Ongoing, Progressing  Intervention: Optimize Psychosocial Adjustment to Illness  Recent Flowsheet Documentation  Taken 9/20/2024 0747 by Loyd Le RN  Family/Support System Care: support provided     Problem: Bleeding (Sepsis/Septic Shock)  Goal: Absence of Bleeding  Outcome: Ongoing, Progressing     Problem: Glycemic Control Impaired (Sepsis/Septic Shock)  Goal: Blood Glucose Level Within Desired Range  Outcome: Ongoing, Progressing     Problem: Infection Progression (Sepsis/Septic Shock)  Goal: Absence of Infection Signs and Symptoms  Outcome: Ongoing, Progressing  Intervention: Promote Recovery  Recent Flowsheet Documentation  Taken 9/20/2024 0747 by Loyd Le RN  Activity Management: bedrest  Intervention: Promote Stabilization  Recent Flowsheet Documentation  Taken 9/20/2024 0747 by Loyd Le RN  Fluid/Electrolyte Management: fluids provided     Problem: Nutrition Impaired (Sepsis/Septic Shock)  Goal: Optimal Nutrition Intake  Outcome: Ongoing, Progressing     Problem: Fall Injury Risk  Goal: Absence of Fall and Fall-Related Injury  Outcome: Ongoing, Progressing  Intervention: Identify and Manage Contributors  Recent Flowsheet Documentation  Taken 9/20/2024 0747 by Loyd Le RN  Medication Review/Management: medications reviewed  Intervention: Promote Injury-Free Environment  Recent Flowsheet Documentation  Taken 9/20/2024 1716 by Loyd Le RN  Safety Promotion/Fall Prevention: safety round/check completed  Taken 9/20/2024 1610 by Loyd Le RN  Safety Promotion/Fall Prevention: safety round/check completed  Taken 9/20/2024 1529 by Loyd Le RN  Safety Promotion/Fall Prevention: safety round/check completed  Taken 9/20/2024 1404 by Loyd Le RN  Safety  Promotion/Fall Prevention: safety round/check completed  Taken 9/20/2024 1304 by Loyd Le RN  Safety Promotion/Fall Prevention: safety round/check completed  Taken 9/20/2024 1202 by Loyd Le RN  Safety Promotion/Fall Prevention: safety round/check completed  Taken 9/20/2024 1107 by Loyd Le RN  Safety Promotion/Fall Prevention: safety round/check completed  Taken 9/20/2024 1020 by Loyd Le RN  Safety Promotion/Fall Prevention: safety round/check completed  Taken 9/20/2024 0912 by Loyd Le RN  Safety Promotion/Fall Prevention: safety round/check completed  Taken 9/20/2024 0747 by Loyd Le RN  Safety Promotion/Fall Prevention: safety round/check completed   Goal Outcome Evaluation:

## 2024-09-20 NOTE — PLAN OF CARE
Goal Outcome Evaluation:  Plan of Care Reviewed With: patient        Progress: no change  Outcome Evaluation: PT tx completed. Pt. more calm this afternoon per nsg. Pt. agreeable to therapy. He was Dependent to get to EOB. He was agreeable to move, but did not put forth any effort to get there. Pt. is stiff all over and has 90 degree contractures of bilateral knees. He was able to maintain sitting balance with CGA-SBA. Occasionally needed assist to correct right lateral lean. Worked on trunk mobility and UE ROM. Pt. will needed continued SNF following discharge.

## 2024-09-21 NOTE — PLAN OF CARE
Goal Outcome Evaluation:  Plan of Care Reviewed With: patient        Progress: no change  Outcome Evaluation: VSS. Denies pain this shift. Pt oriented to self. Pt did have a moderate BM this shift. Lazaro protocol in place. Wound care provided per order. Urology and General surgery consulted this shift. Pt still does not want to eat. Safety maintained. Plan of care ongoing.

## 2024-09-21 NOTE — PLAN OF CARE
Problem: Skin Injury Risk Increased  Goal: Skin Health and Integrity  Outcome: Ongoing, Progressing  Intervention: Optimize Skin Protection  Recent Flowsheet Documentation  Taken 9/21/2024 0015 by Una Hameed RN  Head of Bed (HOB) Positioning: HOB elevated     Problem: Adult Inpatient Plan of Care  Goal: Plan of Care Review  Outcome: Ongoing, Progressing  Goal: Patient-Specific Goal (Individualized)  Outcome: Ongoing, Progressing  Goal: Absence of Hospital-Acquired Illness or Injury  Outcome: Ongoing, Progressing  Intervention: Identify and Manage Fall Risk  Recent Flowsheet Documentation  Taken 9/21/2024 0027 by Una Hameed, RN  Safety Promotion/Fall Prevention: safety round/check completed  Intervention: Prevent Skin Injury  Recent Flowsheet Documentation  Taken 9/21/2024 0015 by Una Hameed RN  Body Position:   left   side-lying  Goal: Optimal Comfort and Wellbeing  Outcome: Ongoing, Progressing  Intervention: Provide Person-Centered Care  Recent Flowsheet Documentation  Taken 9/20/2024 2049 by Una Hameed RN  Trust Relationship/Rapport: care explained  Goal: Readiness for Transition of Care  Outcome: Ongoing, Progressing     Problem: Adjustment to Illness (Sepsis/Septic Shock)  Goal: Optimal Coping  Outcome: Ongoing, Progressing     Problem: Bleeding (Sepsis/Septic Shock)  Goal: Absence of Bleeding  Outcome: Ongoing, Progressing     Problem: Glycemic Control Impaired (Sepsis/Septic Shock)  Goal: Blood Glucose Level Within Desired Range  Outcome: Ongoing, Progressing     Problem: Infection Progression (Sepsis/Septic Shock)  Goal: Absence of Infection Signs and Symptoms  Outcome: Ongoing, Progressing     Problem: Nutrition Impaired (Sepsis/Septic Shock)  Goal: Optimal Nutrition Intake  Outcome: Ongoing, Progressing     Problem: Fall Injury Risk  Goal: Absence of Fall and Fall-Related Injury  Outcome: Ongoing, Progressing  Intervention: Promote Injury-Free  Environment  Recent Flowsheet Documentation  Taken 9/21/2024 0027 by Una Hameed, RN  Safety Promotion/Fall Prevention: safety round/check completed   Goal Outcome Evaluation:

## 2024-09-21 NOTE — CONSULTS
Rin Hameed MD Consult Note - General Surgery     Referring Provider: No ref. provider found    Patient Care Team:  Aidan Shields MD as PCP - General (Family Medicine)    Chief complaint CT findings of pneumobilia     Subjective .     History of present illness:  Mr. Cook is a 73 year old male recently admitted with urosepsis to the ICU. He has since improved, is now stable, and is on the floor. He had a KUB which showed pneumobilia and CT confirmed this finding so I was consulted. He is s/p cholecystectomy. He is not cooperative in my questioning but does state this abdominal pain and low appetite are chronic. He is a former smoker and not on blood thinners at baseline. He cannot give me his whole surgical history.     Review of Systems  Unable to obtain as patient is not cooperative.       History  Past Medical History:   Diagnosis Date    Bacteremia     Chronic back pain     COPD (chronic obstructive pulmonary disease)     Mario catheter in place     GERD (gastroesophageal reflux disease)     Memory deficits     Neuropathy     Umbilical hernia     Urinary retention    ,   Past Surgical History:   Procedure Laterality Date    BACK SURGERY      HERNIA REPAIR     ,   Family History   Problem Relation Age of Onset    No Known Problems Father     No Known Problems Mother    ,   Social History     Tobacco Use    Smoking status: Former     Current packs/day: 0.00     Types: Cigars, Cigarettes     Start date:      Quit date:      Years since quittin.7     Passive exposure: Past    Smokeless tobacco: Never   Vaping Use    Vaping status: Never Used   Substance Use Topics    Alcohol use: No    Drug use: Never   ,   Medications Prior to Admission   Medication Sig Dispense Refill Last Dose    alfuzosin (UROXATRAL) 10 MG 24 hr tablet Take 1 tablet by mouth every night at bedtime.       busPIRone (BUSPAR) 10 MG tablet Take 1 tablet by mouth 2 (Two) Times a Day. 60 tablet 5     ciprofloxacin  (Ciloxan) 0.3 % ophthalmic solution Administer 1 drop to both eyes 4 (Four) Times a Day. (Patient taking differently: Administer 1 drop to both eyes 4 (Four) Times a Day. X10 days (started on (09/06/2024)) 2.5 mL 0     finasteride (PROSCAR) 5 MG tablet Take 1 tablet by mouth Daily.       fluticasone (FLONASE) 50 MCG/ACT nasal spray Administer 2 sprays into the nostril(s) as directed by provider Daily. Alternate nostrils       guaiFENesin (MUCINEX) 600 MG 12 hr tablet Take 1 tablet by mouth 2 (Two) Times a Day. X10 days (started on 09/06/2024)       ipratropium-albuterol (COMBIVENT RESPIMAT)  MCG/ACT inhaler Inhale 1 puff Daily.       levothyroxine (SYNTHROID, LEVOTHROID) 125 MCG tablet Take 1 tablet by mouth Daily.       lisinopril-hydrochlorothiazide (PRINZIDE,ZESTORETIC) 10-12.5 MG per tablet Take 1 tablet by mouth Daily. 30 tablet 5     loratadine (Claritin) 10 MG tablet Take 1 tablet by mouth Daily. 30 tablet 0     multivitamin with minerals tablet tablet Take 1 tablet by mouth Daily.       potassium chloride (KLOR-CON M20) 20 MEQ CR tablet Take 1 tablet by mouth Daily.       venlafaxine XR (EFFEXOR-XR) 150 MG 24 hr capsule Take 1 capsule by mouth Daily.       Vitamin D, Ergocalciferol, 50 MCG (2000 UT) capsule Take 1 capsule by mouth Daily.       acetaminophen (TYLENOL) 325 MG tablet Take 2 tablets by mouth Every 4 (Four) Hours As Needed for Fever or Mild Pain.       ascorbic acid (VITAMIN C) 500 MG tablet Take 2 tablets by mouth Daily.       bisacodyl (DULCOLAX) 10 MG suppository Insert 1 suppository into the rectum Daily As Needed for Constipation.       HYDROcodone-acetaminophen (Norco) 5-325 MG per tablet Take 1 tablet by mouth Every 12 (Twelve) Hours As Needed for Severe Pain. 46 tablet 0     magnesium hydroxide (MILK OF MAGNESIA) 400 MG/5ML suspension Take 30 mL by mouth Daily As Needed for Constipation (if no BM in 3 days).       Naloxone HCl 2 MG/2ML Prefilled Syringe Kit Inject 1 mL into the  appropriate muscle as directed by prescriber As Needed (for opioid overdose give 2 nd dose for failure to respond to first dose).       ondansetron (ZOFRAN) 4 MG tablet Take 1 tablet by mouth Every 6 (Six) Hours As Needed for Nausea.       probiotic (CULTURELLE) capsule capsule Take 1 capsule by mouth Daily.       tiZANidine (ZANAFLEX) 4 MG tablet Take 1 tablet by mouth Every 8 (Eight) Hours As Needed for Muscle Spasms.       Zinc 50 MG tablet Take 1 tablet by mouth Daily.       and Allergies:  Meloxicam, Tamsulosin, and Penicillins    Current Facility-Administered Medications:     acetaminophen (TYLENOL) tablet 650 mg, 650 mg, Oral, Q4H PRN, 650 mg at 09/19/24 1514 **OR** acetaminophen (TYLENOL) suppository 650 mg, 650 mg, Rectal, Q4H PRN, Kristen Sánchez MD    ascorbic acid (VITAMIN C) tablet 1,000 mg, 1,000 mg, Oral, Daily, Jagdish Courtney PA-C, 1,000 mg at 09/21/24 0921    sennosides-docusate (PERICOLACE) 8.6-50 MG per tablet 2 tablet, 2 tablet, Oral, BID, 2 tablet at 09/21/24 0921 **AND** polyethylene glycol (MIRALAX) packet 17 g, 17 g, Oral, Daily PRN, 17 g at 09/18/24 1015 **AND** bisacodyl (DULCOLAX) EC tablet 5 mg, 5 mg, Oral, Daily PRN **AND** bisacodyl (DULCOLAX) suppository 10 mg, 10 mg, Rectal, Daily PRN, Kristen Sánchez MD    busPIRone (BUSPAR) tablet 10 mg, 10 mg, Oral, BID, Jagdish Courtney PA-C, 10 mg at 09/21/24 0921    Calcium Replacement - Follow Nurse / BPA Driven Protocol, , Does not apply, PRN, Kristen Sánchez MD    cetirizine (zyrTEC) tablet 10 mg, 10 mg, Oral, Daily, Jagdish Courtney PA-C, 10 mg at 09/21/24 0921    collagenase ointment 1 Application, 1 Application, Topical, Q12H, Yudelka Gonzalez, CON[place]ITA, 1 Application at 09/21/24 0922    finasteride (PROSCAR) tablet 5 mg, 5 mg, Oral, Daily, Jagdish Courtney PA-C, 5 mg at 09/21/24 0921    fluticasone (FLONASE) 50 MCG/ACT nasal spray 2 spray, 2 spray, Nasal, Daily, Jagdish Courtney PA-C, 2 spray at 09/21/24 0922    guaiFENesin  (MUCINEX) 12 hr tablet 600 mg, 600 mg, Oral, BID, Jagdish Courtney PA-C, 600 mg at 09/21/24 0921    heparin (porcine) 5000 UNIT/ML injection 5,000 Units, 5,000 Units, Subcutaneous, Q8H, Kristen Sánchez MD, 5,000 Units at 09/21/24 1325    lisinopril (PRINIVIL,ZESTRIL) tablet 10 mg, 10 mg, Oral, Q24H, 10 mg at 09/21/24 0921 **AND** hydroCHLOROthiazide tablet 12.5 mg, 12.5 mg, Oral, Q24H, Jagdish Courtney PA-C, 12.5 mg at 09/21/24 0921    lactobacillus acidophilus (RISAQUAD) capsule 1 capsule, 1 capsule, Oral, Daily, Jagdish Courtney PA-C, 1 capsule at 09/21/24 0936    levoFLOXacin (LEVAQUIN) tablet 750 mg, 750 mg, Oral, Q24H, Chato Boo DO, 750 mg at 09/21/24 0937    levothyroxine (SYNTHROID, LEVOTHROID) tablet 125 mcg, 125 mcg, Oral, Q AM, Jagdish Courtney PA-C, 125 mcg at 09/21/24 0630    Magnesium Standard Dose Replacement - Follow Nurse / BPA Driven Protocol, , Does not apply, PRN, Kristen Sánchez MD    metroNIDAZOLE (FLAGYL) tablet 500 mg, 500 mg, Oral, Q8H, Chato Boo, , 500 mg at 09/21/24 0937    multivitamin with minerals 1 tablet, 1 tablet, Oral, Daily, Kristen Sánchez MD, 1 tablet at 09/21/24 0922    mupirocin (BACTROBAN) 2 % ointment 1 Application, 1 Application, Topical, Q12H, Yudelka Gonzalez APRN, 1 Application at 09/21/24 0922    nitroglycerin (NITROSTAT) SL tablet 0.4 mg, 0.4 mg, Sublingual, Q5 Min PRN, Kristen Sánchez MD    ondansetron (ZOFRAN) injection 4 mg, 4 mg, Intravenous, Q6H PRN, Kristen Sánchez MD, 4 mg at 09/19/24 2048    Phosphorus Replacement - Follow Nurse / BPA Driven Protocol, , Does not apply, Gonzalo HERNANDEZ Padma C, MD    Potassium Replacement - Follow Nurse / BPA Driven Protocol, , Does not apply, Gonzalo HERNANDEZ Padma C, MD    sodium chloride 0.9 % flush 10 mL, 10 mL, Intravenous, Q12H, Kristen Sánchez MD, 10 mL at 09/20/24 0907    sodium chloride 0.9 % flush 10 mL, 10 mL, Intravenous, PRN, Kristen Sánchez MD    sodium chloride 0.9 %  infusion 40 mL, 40 mL, Intravenous, PRN, Kristen Sánchez MD    sodium hypochlorite (DAKIN'S 1/4 STRENGTH) 0.125 % topical solution 0.125% solution, , Topical, Q12H, Yudelka Gonzalez APRN, Given at 09/21/24 0922    terazosin (HYTRIN) capsule 1 mg, 1 mg, Oral, Nightly, Chato Boo DO    tiZANidine (ZANAFLEX) tablet 4 mg, 4 mg, Oral, Q8H PRN, Jagdish Courtney PA-C    venlafaxine XR (EFFEXOR-XR) 24 hr capsule 150 mg, 150 mg, Oral, Daily, Jagdish Courtney PA-C, 150 mg at 09/21/24 0921    zinc sulfate (ZINCATE) capsule 220 mg, 220 mg, Oral, Daily, Kristen Sánchez MD, 220 mg at 09/21/24 0921    Objective     Vital Signs   Temp:  [97.6 °F (36.4 °C)-98.3 °F (36.8 °C)] 97.6 °F (36.4 °C)  Heart Rate:  [76-94] 80  Resp:  [18] 18  BP: (111-144)/(60-73) 144/72    Physical Exam:  General appearance - chronically ill appearing  Neck - supple, no significant adenopathy  Heart - normal rate   Abdomen - soft, non-tender when distracted, reducible umbilical hernia     Results Review:    Lab Results (last 24 hours)       Procedure Component Value Units Date/Time    Blood Culture - Blood, Hand, Right [[id number]]  (Normal) Collected: 09/16/24 1339    Specimen: Blood from Hand, Right Updated: 09/21/24 1401     Blood Culture No growth at 5 days    Blood Culture - Blood, Hand, Left [[id number]]  (Normal) Collected: 09/16/24 1339    Specimen: Blood from Hand, Left Updated: 09/21/24 1401     Blood Culture No growth at 5 days    Magnesium [[id number]]  (Normal) Collected: 09/21/24 0612    Specimen: Blood Updated: 09/21/24 0701     Magnesium 1.6 mg/dL     CBC & Differential [[id number]]  (Abnormal) Collected: 09/21/24 0612    Specimen: Blood Updated: 09/21/24 0645    Narrative:      The following orders were created for panel order CBC & Differential.  Procedure                               Abnormality         Status                     ---------                               -----------         ------                     CBC Auto  Differential[[id number]]        Abnormal            Final result                 Please view results for these tests on the individual orders.    CBC Auto Differential [[id number]]  (Abnormal) Collected: 09/21/24 0612    Specimen: Blood Updated: 09/21/24 0645     WBC 12.09 10*3/mm3      RBC 4.41 10*6/mm3      Hemoglobin 13.4 g/dL      Hematocrit 41.3 %      MCV 93.7 fL      MCH 30.4 pg      MCHC 32.4 g/dL      RDW 13.4 %      RDW-SD 46.1 fl      MPV 9.6 fL      Platelets 224 10*3/mm3     Manual Differential [[id number]]  (Abnormal) Collected: 09/21/24 0612    Specimen: Blood Updated: 09/21/24 0645     Neutrophil % 84.5 %      Lymphocyte % 6.8 %      Monocyte % 5.8 %      Eosinophil % 0.0 %      Basophil % 0.0 %      Bands %  1.9 %      Atypical Lymphocyte % 1.0 %      Neutrophils Absolute 10.45 10*3/mm3      Lymphocytes Absolute 0.94 10*3/mm3      Monocytes Absolute 0.70 10*3/mm3      Eosinophils Absolute 0.00 10*3/mm3      Basophils Absolute 0.00 10*3/mm3      Poikilocytes Slight/1+     WBC Morphology Normal     Platelet Morphology Normal    Comprehensive Metabolic Panel [[id number]]  (Abnormal) Collected: 09/21/24 0612    Specimen: Blood Updated: 09/21/24 0641     Glucose 93 mg/dL      BUN 14 mg/dL      Creatinine 0.69 mg/dL      Sodium 137 mmol/L      Potassium 3.5 mmol/L      Chloride 99 mmol/L      CO2 31.0 mmol/L      Calcium 8.6 mg/dL      Total Protein 5.1 g/dL      Albumin 2.3 g/dL      ALT (SGPT) 20 U/L      AST (SGOT) 31 U/L      Alkaline Phosphatase 100 U/L      Total Bilirubin 0.5 mg/dL      Globulin 2.8 gm/dL      A/G Ratio 0.8 g/dL      BUN/Creatinine Ratio 20.3     Anion Gap 7.0 mmol/L      eGFR 96.5 mL/min/1.73     Narrative:      GFR Normal >60  Chronic Kidney Disease <60  Kidney Failure <15    The GFR formula is only valid for adults with stable renal function between ages 18 and 70.          Imaging Results (Last 24 Hours)       Procedure Component Value Units Date/Time    CT Abdomen Pelvis With  Contrast [[id number]] Collected: 09/20/24 1730     Updated: 09/20/24 1742    Narrative:      EXAMINATION:  CT ABDOMEN PELVIS W CONTRAST-  9/20/2024 3:46 PM     HISTORY: Sepsis and abd pain, concern for pneumobilia on xray, please  r/o acute path; R57.9-Shock, unspecified; N17.9-Acute kidney failure,  unspecified; J18.9-Pneumonia, unspecified organism; E87.20-Acidosis,  unspecified; L08.9-Local infection of the skin and subcutaneous tissue,  unspecified; Z74.09-Other reduced mobility.     TECHNIQUE: Spiral CT was performed of the abdomen and pelvis with IV  contrast. Multiplanar images were reconstructed.     DLP: 596.95 mGy.cm Automated dosage reduction technique was utilized to  reduce patient dose.     COMPARISON: CT pelvis on 9/2/2024.     LUNG BASES: There are small pleural effusions in both lung bases. There  are parenchymal infiltrates in both lung bases.     LIVER AND SPLEEN: There is air within left hepatic lobe biliary ductal  branches. There is air within the common hepatic duct. The common bile  duct measures up to 1.3 cm. There has been prior cholecystectomy. No  focal liver lesion is seen. The spleen is normal in size.     PANCREAS: No pancreatic mass or inflammatory changes seen. There is a  duodenal diverticulum adjacent to the head of the pancreas.     KIDNEYS AND ADRENALS: There is mild thickening of the adrenal glands.  There are bilateral renal cysts. There are greater number of cysts  within the left kidney. The ureters are nondilated. There is a Mario  catheter in the bladder. There is thickening of the bladder wall. There  are bladder diverticula. The prostate is enlarged measuring 5.3 cm.     BOWEL: There is under distention of the stomach. There is thickening of  the gastric wall. There is a small hiatal hernia. Small bowel loops are  nondilated. There is moderate stool in the colon.     OTHER: There is an umbilical hernia containing peritoneal fat and small  bowel measuring 10.1 cm  transversely. The opening through the anterior  abdominal wall measures 2.7 cm. The small bowel is nondilated. There is  atheromatous disease of the aortoiliac vessels. There are small  retroperitoneal lymph nodes. There is body wall edema. There are  degenerative changes of the spine and bilateral hips.          Impression:      1. Small bilateral pleural effusions. Infiltrates in both lung bases may  represent pneumonia and/or atelectasis. There is cardiomegaly. There is  body wall edema.  2. There is air in the biliary tree. There is air in the common hepatic  duct. The common bile duct measures up to 1.3 cm. This could represent  reservoir effect as there has been prior cholecystectomy. The air in the  biliary tree may be related to prior sphincterotomy. If no prior  sphincterotomy, gas-forming infection would also be included in the  differential.  3. Gastric wall thickening is probably an artifact of under distention.  There is a small hiatal hernia. No small bowel obstruction is seen.  There is diverticulosis of the colon with no CT findings of  diverticulitis.  4. Umbilical hernia containing small bowel and peritoneal fat measuring  10.1 cm transversely. The small bowel is nondilated suggesting the  absence of obstruction. The opening through the anterior abdominal wall  measures 2.7 cm.  5. Atheromatous disease of the aortoiliac vessels. Degenerative changes  of the spine and bilateral hips.     This report was signed and finalized on 9/20/2024 5:39 PM by Dr. Frantz Cardenas MD.       XR Abdomen KUB [[id number]] Collected: 09/20/24 1443     Updated: 09/20/24 1454    Narrative:      EXAMINATION: XR ABDOMEN KUB- 9/20/2024 2:43 PM     HISTORY: abd pain, nausea; R57.9-Shock, unspecified; N17.9-Acute kidney  failure, unspecified; J18.9-Pneumonia, unspecified organism;  E87.20-Acidosis, unspecified; L08.9-Local infection of the skin and  subcutaneous tissue, unspecified; Z74.09-Other reduced mobility.     REPORT:  Supine imaging of the abdomen was performed.     COMPARISON: There are no correlative imaging studies for comparison.     There is mild respiratory motion artifact. Gas is seen within portions  of the colon and small intestine in a nonspecific pattern, no convincing  evidence of bowel obstruction. Early changes of ileus are not excluded.  There is questionable pneumobilia versus portal venous gas over the  liver. Cholecystectomy clips are present. There are opacities in both  lung bases favored to represent atelectasis with or without scarring.  Advanced degenerative changes are seen throughout the lumbar spine.  There is advanced osteoarthritis of the left hip. Mario catheter is  present.       Impression:      The bowel gas pattern is nonobstructive, early changes of  ileus are not excluded. Questionable trace branching gas within the  upper liver, pneumobilia versus portal venous gas. Consider further  evaluation with CT of the abdomen and pelvis.     This report was signed and finalized on 9/20/2024 2:51 PM by Dr. Servando Segura MD.                     Assessment & Plan     Pneumobilia   Umbilical hernia     Mr. Cook is a 73 year old male admitted with urosepsis which has improved.     (1) Pneumobilia - CT from 2/24 at [facility] with the same finding. This is a chronic finding likely 2/2 his history of cholecystectomy. On review of records in care everywhere, he has a documented ERCP with stent placement by Dr. Zabala at [facility] on 1/29/19; he had subsequent removal on 4/24/19 and then replacement of stent on 5/17/19. On 7/24/19 he had a lithotripsy and stent removal. This explains his pneumobilia which is documented as chronic by prior imaging. No intervention indicated.     (2) Umbilical hernia - This is a recurrent hernia as it was previously fixed x 2 by Dr. Alvarez at [facility]. It is reducible. Recommend follow up with [facility] general surgery on discharge as he is being followed there and most recently saw Ajith  JES Flores for this. No urgent surgical intervention indicated as it is reducible.     This is 2 chronic problems. I have reviewed the CT from this admission, CT from 2/24, notes from Ajith Flores in care everywhere, and op notes in care everywhere.     Rin Hameed MD  09/21/24  14:52 CDT

## 2024-09-21 NOTE — PROGRESS NOTES
[facility]  INPATIENT PROGRESS NOTE    Patient Name: Jagdish Cook  Date of Admission: 9/16/2024  Today's Date: 09/21/24  Length of Stay: 5  Primary Care Physician: Aidan Shields MD    Subjective   Chief Complaint: f/u sepsis, uti    HPI   Patient seen resting in bed with nursing at bedside giving pills. He continues to complain of abdominal discomfort, no appetite, not eating. No vomiting noted overnight. No fevers. Denies shortness of breath, he is not the most reliable historian at this time though however. Keeps asking me to call an ambulance because he needs to go to the hospital.         Review of Systems   Unable to fully assess to patient factors    Objective    Temp:  [97.6 °F (36.4 °C)-98.3 °F (36.8 °C)] 97.6 °F (36.4 °C)  Heart Rate:  [76-94] 80  Resp:  [18] 18  BP: (111-144)/(60-73) 144/72  Physical Exam  GEN: Awake, alert, interactive, in NAD. Confused.  HEENT: PERRLA, EOMI, Anicteric, Trachea midline  Lungs:  no wheezing/rales/rhonchi  Heart: RRR, +S1/s2, no rub  ABD: ventral hernia, reducible, soft, nd, +BS, no guarding/rebound  Extremities: atraumatic, no cyanosis, no pitting edema  Skin: L hip wound, some eschar around penile meatus   Neuro: AAOx1, no obvious focal deficits          Results Review:  I have reviewed the labs, radiology results, and diagnostic studies.    Laboratory Data:   Results from last 7 days   Lab Units 09/21/24  0612 09/20/24  0515 09/19/24  0339   WBC 10*3/mm3 12.09* 11.38* 12.41*   HEMOGLOBIN g/dL 13.4 12.6* 11.9*   HEMATOCRIT % 41.3 40.7 36.7*   PLATELETS 10*3/mm3 224 206 217        Results from last 7 days   Lab Units 09/21/24  0612 09/20/24  0515 09/19/24  2221 09/19/24  1039 09/19/24  0339   SODIUM mmol/L 137 141 145   < > 148*   POTASSIUM mmol/L 3.5 4.8 3.4*   < > 4.0   CHLORIDE mmol/L 99 107 109*   < > 112*   CO2 mmol/L 31.0* 28.0 29.0   < > 31.0*   BUN mg/dL 14 20 22   < > 31*   CREATININE mg/dL 0.69*  0.62* 0.58*   < > 0.74*   CALCIUM mg/dL 8.6 8.3* 8.2*   < > 8.7   BILIRUBIN mg/dL 0.5 0.4  --   --  0.4   ALK PHOS U/L 100 85  --   --  83   ALT (SGPT) U/L 20 19  --   --  21   AST (SGOT) U/L 31 27  --   --  25   GLUCOSE mg/dL 93 113* 103*   < > 138*    < > = values in this interval not displayed.       Culture Data:   Blood Culture   Date Value Ref Range Status   09/16/2024 No growth at 3 days  Preliminary   09/16/2024 No growth at 3 days  Preliminary     Urine Culture   Date Value Ref Range Status   09/16/2024 >100,000 CFU/mL Enterococcus faecalis (A)  Final   09/16/2024 >100,000 CFU/mL Corynebacterium species (A)  Final     Comment:       Based on laboratory diagnosis criteria, these organisms are common urogenital commensal rolando and have not been associated with urinary tract infections; clinical correlation is recommended.       Radiology Data:   Imaging Results (Last 24 Hours)       Procedure Component Value Units Date/Time    CT Abdomen Pelvis With Contrast [[id number]] Collected: 09/20/24 1730     Updated: 09/20/24 1742    Narrative:      EXAMINATION:  CT ABDOMEN PELVIS W CONTRAST-  9/20/2024 3:46 PM     HISTORY: Sepsis and abd pain, concern for pneumobilia on xray, please  r/o acute path; R57.9-Shock, unspecified; N17.9-Acute kidney failure,  unspecified; J18.9-Pneumonia, unspecified organism; E87.20-Acidosis,  unspecified; L08.9-Local infection of the skin and subcutaneous tissue,  unspecified; Z74.09-Other reduced mobility.     TECHNIQUE: Spiral CT was performed of the abdomen and pelvis with IV  contrast. Multiplanar images were reconstructed.     DLP: 596.95 mGy.cm Automated dosage reduction technique was utilized to  reduce patient dose.     COMPARISON: CT pelvis on 9/2/2024.     LUNG BASES: There are small pleural effusions in both lung bases. There  are parenchymal infiltrates in both lung bases.     LIVER AND SPLEEN: There is air within left hepatic lobe biliary ductal  branches. There is air within  the common hepatic duct. The common bile  duct measures up to 1.3 cm. There has been prior cholecystectomy. No  focal liver lesion is seen. The spleen is normal in size.     PANCREAS: No pancreatic mass or inflammatory changes seen. There is a  duodenal diverticulum adjacent to the head of the pancreas.     KIDNEYS AND ADRENALS: There is mild thickening of the adrenal glands.  There are bilateral renal cysts. There are greater number of cysts  within the left kidney. The ureters are nondilated. There is a Mario  catheter in the bladder. There is thickening of the bladder wall. There  are bladder diverticula. The prostate is enlarged measuring 5.3 cm.     BOWEL: There is under distention of the stomach. There is thickening of  the gastric wall. There is a small hiatal hernia. Small bowel loops are  nondilated. There is moderate stool in the colon.     OTHER: There is an umbilical hernia containing peritoneal fat and small  bowel measuring 10.1 cm transversely. The opening through the anterior  abdominal wall measures 2.7 cm. The small bowel is nondilated. There is  atheromatous disease of the aortoiliac vessels. There are small  retroperitoneal lymph nodes. There is body wall edema. There are  degenerative changes of the spine and bilateral hips.          Impression:      1. Small bilateral pleural effusions. Infiltrates in both lung bases may  represent pneumonia and/or atelectasis. There is cardiomegaly. There is  body wall edema.  2. There is air in the biliary tree. There is air in the common hepatic  duct. The common bile duct measures up to 1.3 cm. This could represent  reservoir effect as there has been prior cholecystectomy. The air in the  biliary tree may be related to prior sphincterotomy. If no prior  sphincterotomy, gas-forming infection would also be included in the  differential.  3. Gastric wall thickening is probably an artifact of under distention.  There is a small hiatal hernia. No small bowel  obstruction is seen.  There is diverticulosis of the colon with no CT findings of  diverticulitis.  4. Umbilical hernia containing small bowel and peritoneal fat measuring  10.1 cm transversely. The small bowel is nondilated suggesting the  absence of obstruction. The opening through the anterior abdominal wall  measures 2.7 cm.  5. Atheromatous disease of the aortoiliac vessels. Degenerative changes  of the spine and bilateral hips.     This report was signed and finalized on 9/20/2024 5:39 PM by Dr. Frantz Cardenas MD.       XR Abdomen KUB [[id number]] Collected: 09/20/24 1443     Updated: 09/20/24 1454    Narrative:      EXAMINATION: XR ABDOMEN KUB- 9/20/2024 2:43 PM     HISTORY: abd pain, nausea; R57.9-Shock, unspecified; N17.9-Acute kidney  failure, unspecified; J18.9-Pneumonia, unspecified organism;  E87.20-Acidosis, unspecified; L08.9-Local infection of the skin and  subcutaneous tissue, unspecified; Z74.09-Other reduced mobility.     REPORT: Supine imaging of the abdomen was performed.     COMPARISON: There are no correlative imaging studies for comparison.     There is mild respiratory motion artifact. Gas is seen within portions  of the colon and small intestine in a nonspecific pattern, no convincing  evidence of bowel obstruction. Early changes of ileus are not excluded.  There is questionable pneumobilia versus portal venous gas over the  liver. Cholecystectomy clips are present. There are opacities in both  lung bases favored to represent atelectasis with or without scarring.  Advanced degenerative changes are seen throughout the lumbar spine.  There is advanced osteoarthritis of the left hip. Mario catheter is  present.       Impression:      The bowel gas pattern is nonobstructive, early changes of  ileus are not excluded. Questionable trace branching gas within the  upper liver, pneumobilia versus portal venous gas. Consider further  evaluation with CT of the abdomen and pelvis.     This report was  signed and finalized on 9/20/2024 2:51 PM by Dr. Servando Segura MD.               I have reviewed the patient's current medications.     Assessment/Plan   Assessment  Active Hospital Problems    Diagnosis     **Shock     Hypomagnesemia     RYNE (acute kidney injury)     Urinary tract infection associated with indwelling urethral catheter     Chronic indwelling Mario catheter     Hypokalemia     Primary hypertension        Treatment Plan  #1 sepsis -documented as septic shock on arrival per ICU staff and team.  Suspected urinary tract infection in the setting of chronic Mario catheter per ICU notation.  Mario catheter was exchanged in the ER.  He was on pressors but now off doing better.  Tolerating blood pressure meds.  Blood cultures negative.  Urine culture grew Enterococcus and Corynebacterium.  Patient was on amoxicillin, changed to macrobid yesterday. Noted in my chart review other potential issues as well including PNA. Pt has skin wounds but seem to be improving. Dry necrosis at penile meatus with wound. KUB yesterday showed concern for pneumobilia. Sent for CT and concern as above with air in biliary tree concerning for infection vs reservoir/sphincterotomy effect. Unfortunately can not find any interventions in our records, he is unable to reliably tell me if he has had them at this time. I do not suspect gas infection in liver given abx he was on prior and overall stability at this point. Will change abx to levaquin and flagyl to cover his potential multitude of issues including urine, wounds, pna, biliary try. Will ask General surgery and urology to evaluate.    #2 UTI -Enterococcus on urinalysis.  Patient was started on amoxicillin but has noted allergy and has some concern.  Changing to levaquin today for broader coverage of other infections, will cover thi sas well.    #3 RYNE -in the setting of #1.  Resolved.  Renal function normalized.    #4 penile/hip wound -continue wound care, will ask urology to  see    #5 abdominal pain/nausea -Does have large ventral hernia but easily reducible and not incarcerated. Patient with poor appetite.  KUB and ct concerning for biliary air... doubt active gas infection. Will ask surgery to see as above. Levaquin and flagyl    #6 hypertension - was hypotensive when septic, resolved, tolerating home HCTZ and lisinopril now that his sepsis has resolved.    #7 hypokalemia -replaced and improved.    #8 hypomagnesemia - replaced improved    Medical Decision Making  Number and Complexity of problems: 5+ acute, multiple chronic.  High medical complexity.   Differential Diagnosis: As above    Conditions and Status        Hard to get a clear story from.  Confused. Does not seem toxic.     MDM Data  External documents reviewed: None  Cardiac tracing (EKG, telemetry) interpretation: None  Radiology interpretation: Reports reviewed  Labs reviewed: As above  Any tests that were considered but not ordered: None     Decision rules/scores evaluated (example [id number], Wells, etc): None     Discussed with: Patient, dietitian, nursing staff     Care Planning  Shared decision making: Patient apprised of current labs, vitals, imaging and treatment plan.  They are agreeable with proceeding with plans as discussed.    Code status and discussions: dnr/dni    Disposition  Social Determinants of Health that impact treatment or disposition: from facility  I expect the patient to be discharged to back to nursing facility when appropriate. Unclear at this time. Will f/u with consultants.     Electronically signed by Chato Boo DO, 09/21/24, 10:04 CDT.

## 2024-09-21 NOTE — CONSULTS
[facility]   Consult Note    Patient Name: Jagdish Cook  : 1950  MRN: [id number]  Primary Care Physician:  Aidan Shields MD  Referring Physician: No ref. provider found  Date of admission: 2024    Subjective   Subjective     Reason for Consult/ Chief Complaint: Ewing catheter induced erosion    HPI:  Jagdish Cook is a 73 y.o. male with a urologic history of urinary retention and phimosis, followed as an outpatient by Dr. Brennen Mittal. He has been medically unable to undergo evaluation and treatment for his outflow obstruction and thus has been catheter dependent since 2024. Similarly, he has been medically unable to undergo circumcision or dorsal slit for his phimosis, mainly due to inability to position and perform the procedure under local anesthesia. He was admitted to [facility] on 2024 with sepsis felt to be of a urologic origin. His ewing was changed in the ER. CT shows bilateral renal cysts, no hydronephrosis or stones, bladder wall thickening and bladder diverticuli. Urine culture has grown Enterococcus and Corynebacterium. He has been clinically improving and is off of pressors for the past few days.   Urology is consulted due to concern about erosion related to the ewing catheter. He has no penile pain.     Review of Systems   Review of systems could not be obtained due to   patient confusion.    Personal History     Past Medical History:   Diagnosis Date    Bacteremia     Chronic back pain     COPD (chronic obstructive pulmonary disease)     Ewing catheter in place     GERD (gastroesophageal reflux disease)     Memory deficits     Neuropathy     Umbilical hernia     Urinary retention        Past Surgical History:   Procedure Laterality Date    BACK SURGERY      HERNIA REPAIR         Family History: family history includes No Known Problems in his father and mother. Otherwise pertinent FHx was reviewed and not pertinent to current issue.    Social  History:  reports that he quit smoking about 20 months ago. His smoking use included cigars and cigarettes. He started smoking about 49 years ago. He has been exposed to tobacco smoke. He has never used smokeless tobacco. He reports that he does not drink alcohol and does not use drugs.    Home Medications:  HYDROcodone-acetaminophen, Naloxone HCl, Vitamin D (Ergocalciferol), Zinc, acetaminophen, alfuzosin, ascorbic acid, bisacodyl, busPIRone, ciprofloxacin, finasteride, fluticasone, guaiFENesin, ipratropium-albuterol, levothyroxine, lisinopril-hydrochlorothiazide, loratadine, magnesium hydroxide, multivitamin with minerals, ondansetron, potassium chloride, probiotic, tiZANidine, and venlafaxine XR    Allergies:  Allergies   Allergen Reactions    Meloxicam Hives     Dizzy, skin crawls    Tamsulosin Dizziness     excessive sweating    Penicillins        Objective    Objective     Vitals:   Temp:  [97.6 °F (36.4 °C)-98.3 °F (36.8 °C)] 97.6 °F (36.4 °C)  Heart Rate:  [76-94] 80  Resp:  [18] 18  BP: (111-144)/(60-73) 144/72    Physical Exam:   Constitutional: Awake, alert   Eyes: PERRLA, sclerae anicteric, no conjunctival injection   HENT: NCAT, mucous membranes moist   Neck: Supple, no thyromegaly, no lymphadenopathy, trachea midline   Respiratory: Clear to auscultation bilaterally, nonlabored respirations    Cardiovascular: RRR, no murmurs, rubs, or gallops, palpable pedal pulses bilaterally   Gastrointestinal: Positive bowel sounds, soft, nontender, nondistended   Musculoskeletal: No bilateral ankle edema, no clubbing or cyanosis to extremities   Psychiatric: Appropriate affect, cooperative   Neurologic: Oriented x 3, strength symmetric in all extremities, Cranial Nerves grossly intact to confrontation, speech clear   Skin: No rashes    Genitourinary: Ewing intact, but not well secured. Urine clear yellow in ewing tubing. Mild venral erosion of foreskin noted without purulence or erythema. 2 scabs on dorsal foreskin  noted without signs of gangrene or infection. Glans is not visible.    Result Review    Result Review:  I have personally reviewed the results from the time of this admission to 9/21/2024 12:48 CDT and agree with these findings:  [x]  Laboratory list / accordion  [x]  Microbiology  [x]  Radiology  []  EKG/Telemetry   []  Cardiology/Vascular   []  Pathology  [x]  Old records  []  Other:  Most notable findings include: History and physical as above      Assessment & Plan   Assessment / Plan     Brief Patient Summary:  Jagdihs Cook is a 73 y.o. male who requires chronic catheterization due to inability to void or be cleared for surgical intervention. He has catheter related erosion of the prepuce. Glans cannot be visualized to comment on his urethral meatus.    Active Hospital Problems:  Active Hospital Problems    Diagnosis     **Shock     Hypomagnesemia     RYNE (acute kidney injury)     Urinary tract infection associated with indwelling urethral catheter     Chronic indwelling Ewing catheter     Hypokalemia     Primary hypertension      Plan: Maintain ewing to gravity. Bacitracin ointment to skin to help promote healing. Keep ewing off of traction (better secured to prevent pulling). Antibiotics per hospitalist. Follow up monthly with Dr. Mittal for ewing changes, or any potential further plans. While an SP tube would be ideal in a situation like this, he is not currently a candidate for such a procedure under anesthesia, and it is not clear that he could be cleared for that, but could be pursued by Dr. Mittal if so desired. Otherwise, transport to a facility with IR capability for SP placement under IR could be considered as an outpatient.   Available as needed.   I spent 40 minutes on this consultation with at least 50% spent in direct face-to-face visit with the patient, review of records, and coordination of care.    Jagdish Rendon MD

## 2024-09-21 NOTE — PLAN OF CARE
Consulted for pneumobilia and common bile duct dilation by Dr. Boo. CT scan stable compared to CT at [facility] in February. This is a chronic finding. No intervention indicated. Umbilical hernia containing bowel with no bowel distention - no evidence of incarceration or strangulation. No obstruction. No indication for intervention per surgery. Full consult to follow.     Rin Hameed MD  09/21/24

## 2024-09-21 NOTE — THERAPY TREATMENT NOTE
Acute Care - Physical Therapy Treatment Note  [facility]     Patient Name: Jagdish Cook  : 1950  MRN: [id number]  Today's Date: 2024      Visit Dx:     ICD-10-CM ICD-9-CM   1. Shock  R57.9 785.50   2. Acute kidney injury  N17.9 584.9   3. Pneumonia of left lung due to infectious organism, unspecified part of lung  J18.9 486   4. Metabolic acidosis  E87.20 276.2   5. Skin infection  L08.9 686.9   6. Impaired transfers [Z74.09]  Z74.09 781.99     Patient Active Problem List   Diagnosis    Primary hypertension    Hypothyroidism    Right hip pain    Chronic bilateral low back pain with bilateral sciatica    Acute on chronic urinary retention    Severe sepsis without septic shock    Gait instability    Transaminitis    Chronic indwelling Mario catheter    Urinary tract infection associated with indwelling urethral catheter    Hypokalemia    Constipation    Traumatic rhabdomyolysis    Syncope    Shock    RYNE (acute kidney injury)    Hypomagnesemia     Past Medical History:   Diagnosis Date    Bacteremia     Chronic back pain     COPD (chronic obstructive pulmonary disease)     Mario catheter in place     GERD (gastroesophageal reflux disease)     Memory deficits     Neuropathy     Umbilical hernia     Urinary retention      Past Surgical History:   Procedure Laterality Date    BACK SURGERY      HERNIA REPAIR       PT Assessment (Last 12 Hours)       PT Evaluation and Treatment       Row Name 24 1302          Physical Therapy Time and Intention    Subjective Information complains of;pain  -AB     Document Type therapy note (daily note)  -AB     Mode of Treatment physical therapy  -AB       Row Name 24 1302          General Information    Existing Precautions/Restrictions fall  Knees at 90 degrees flexion contractures  -AB       Row Name 24 1302          Pain    Pretreatment Pain Rating 0/10 - no pain  -AB     Posttreatment Pain Rating 4/10  with movement  -AB     Pain Location -  Side/Orientation Bilateral  -AB     Pain Location lower  -AB     Pain Location - extremity  -AB     Pain Intervention(s) Repositioned  -AB       Row Name 09/21/24 1302          Bed Mobility    Comment, (Bed Mobility) refused  -AB       Row Name 09/21/24 1302          Motor Skills    Comments, Therapeutic Exercise [facility] Trae UE's, want me to stretch Trae LE's out a little  -AB       Row Name             Wound 09/02/24 1320 gluteal    Wound - Properties Group Placement Date: 09/02/24  -MS Placement Time: 1320  -MS Present on Original Admission: Y  -MS Location: gluteal  -MS    Retired Wound - Properties Group Placement Date: 09/02/24  -MS Placement Time: 1320  -MS Present on Original Admission: Y  -MS Location: gluteal  -MS    Retired Wound - Properties Group Date first assessed: 09/02/24  -MS Time first assessed: 1320  -MS Present on Original Admission: Y  -MS Location: gluteal  -MS      Row Name             Wound 09/02/24 1315 Left posterior heel    Wound - Properties Group Placement Date: 09/02/24  -MS Placement Time: 1315  -MS Present on Original Admission: Y  -MS Side: Left  -MS Orientation: posterior  -MS Location: heel  -MS    Retired Wound - Properties Group Placement Date: 09/02/24  -MS Placement Time: 1315  -MS Present on Original Admission: Y  -MS Side: Left  -MS Orientation: posterior  -MS Location: heel  -MS    Retired Wound - Properties Group Date first assessed: 09/02/24  -MS Time first assessed: 1315  -MS Present on Original Admission: Y  -MS Side: Left  -MS Location: heel  -MS      Row Name             Wound 09/02/24 1320 Right posterior heel    Wound - Properties Group Placement Date: 09/02/24  -MS Placement Time: 1320  -MS Present on Original Admission: Y  -MS Side: Right  -MS Orientation: posterior  -MS Location: heel  -MS    Retired Wound - Properties Group Placement Date: 09/02/24  -MS Placement Time: 1320  -MS Present on Original Admission: Y  -MS Side: Right  -MS Orientation: posterior  -MS  Location: heel  -MS    Retired Wound - Properties Group Date first assessed: 09/02/24  -MS Time first assessed: 1320  -MS Present on Original Admission: Y  -MS Side: Right  -MS Location: heel  -MS      Row Name             Wound 09/16/24 1318 Left anterior greater trochanter    Wound - Properties Group Placement Date: 09/16/24  -SM Placement Time: 1318  -SM Present on Original Admission: Y  -SM Side: Left  -SM Orientation: anterior  -SM Location: greater trochanter  -SM    Retired Wound - Properties Group Placement Date: 09/16/24  -SM Placement Time: 1318  -SM Present on Original Admission: Y  -SM Side: Left  -SM Orientation: anterior  -SM Location: greater trochanter  -SM    Retired Wound - Properties Group Date first assessed: 09/16/24  -SM Time first assessed: 1318  -SM Present on Original Admission: Y  -SM Side: Left  -SM Location: greater trochanter  -SM      Row Name             Wound 09/16/24 1318 penis    Wound - Properties Group Placement Date: 09/16/24  -SM Placement Time: 1318  -SM Present on Original Admission: Y  -SM Location: penis  -SM    Retired Wound - Properties Group Placement Date: 09/16/24  -SM Placement Time: 1318  -SM Present on Original Admission: Y  -SM Location: penis  -SM    Retired Wound - Properties Group Date first assessed: 09/16/24  -SM Time first assessed: 1318  -SM Present on Original Admission: Y  -SM Location: penis  -SM      Row Name 09/21/24 1302          Positioning and Restraints    Pre-Treatment Position in bed  -AB     Post Treatment Position bed  -AB     In Bed side lying right;call light within reach  -AB               User Key  (r) = Recorded By, (t) = Taken By, (c) = Cosigned By      Initials Name Provider Type    AB Nilsa Smith PTA Physical Therapist Assistant    Catherine Martinez, RN Registered Nurse    Macie Cast RN Registered Nurse                    Physical Therapy Education       Title: PT OT SLP Therapies (Done)       Topic: Physical Therapy  (Done)       Point: Mobility training (Done)       Learning Progress Summary             Patient Acceptance, E,TB, VU by SM at 9/20/2024 0337    Acceptance, E,TB, VU by CR at 9/19/2024 0038    Acceptance, E, NR by TOI at 9/18/2024 1059    Comment: benefits of activity, progression of PT   Family Acceptance, E,TB, VU by CR at 9/19/2024 0038                         Point: Home exercise program (Done)       Learning Progress Summary             Patient Acceptance, E,TB, VU by SM at 9/20/2024 0337    Acceptance, E,TB, VU by CR at 9/19/2024 0038   Family Acceptance, E,TB, VU by CR at 9/19/2024 0038                         Point: Body mechanics (Done)       Learning Progress Summary             Patient Acceptance, E,TB, VU by SM at 9/20/2024 0337    Acceptance, E,TB, VU by CR at 9/19/2024 0038   Family Acceptance, E,TB, VU by CR at 9/19/2024 0038                         Point: Precautions (Done)       Learning Progress Summary             Patient Acceptance, E,TB, VU by SM at 9/20/2024 0337    Acceptance, E,TB, VU by CR at 9/19/2024 0038   Family Acceptance, E,TB, VU by CR at 9/19/2024 0038                                         User Key       Initials Effective Dates Name Provider Type Discipline    TOI 02/03/23 -  Dean Norris, PT DPT Physical Therapist PT    CR 03/03/23 -  Clara Valle, RN Registered Nurse Nurse     06/19/24 -  Macie Ulrich, RN Registered Nurse Nurse                  PT Recommendation and Plan         Outcome Measures       Row Name 09/20/24 1524             How much help from another person do you currently need...    Turning from your back to your side while in flat bed without using bedrails? 2  -MF      Moving from lying on back to sitting on the side of a flat bed without bedrails? 1  -MF      Moving to and from a bed to a chair (including a wheelchair)? 1  -MF      Standing up from a chair using your arms (e.g., wheelchair, bedside chair)? 1  -MF      Climbing 3-5 steps with a  railing? 1  -MF      To walk in hospital room? 1  -MF      AM-PAC 6 Clicks Score (PT) 7  -      Highest Level of Mobility Goal 2 --> Bed activities/dependent transfer  -         Functional Assessment    Outcome Measure Options AM-PAC 6 Clicks Basic Mobility (PT)  -                User Key  (r) = Recorded By, (t) = Taken By, (c) = Cosigned By      Initials Name Provider Type    Hilda Rios, PTA Physical Therapist Assistant                     Time Calculation:    PT Charges       Row Name 09/21/24 1315             Time Calculation    Start Time 1302  -AB      Stop Time 1314  -AB      Time Calculation (min) 12 min  -AB      PT Received On 09/21/24  -AB         Time Calculation- PT    Total Timed Code Minutes- PT 12 minute(s)  -AB                User Key  (r) = Recorded By, (t) = Taken By, (c) = Cosigned By      Initials Name Provider Type    AB Nilsa Smith, MICHELLE Physical Therapist Assistant                      PT G-Codes  Outcome Measure Options: AM-PAC 6 Clicks Basic Mobility (PT)  AM-PAC 6 Clicks Score (PT): 7  AM-PAC 6 Clicks Score (OT): 8    Nilsa Smith PTA  9/21/2024

## 2024-09-22 NOTE — THERAPY TREATMENT NOTE
Patient Name: Jagdish Cook  : 1950    MRN: [id number]                              Today's Date: 2024       Admit Date: 2024    Visit Dx:     ICD-10-CM ICD-9-CM   1. Shock  R57.9 785.50   2. Acute kidney injury  N17.9 584.9   3. Pneumonia of left lung due to infectious organism, unspecified part of lung  J18.9 486   4. Metabolic acidosis  E87.20 276.2   5. Skin infection  L08.9 686.9   6. Impaired transfers [Z74.09]  Z74.09 781.99     Patient Active Problem List   Diagnosis    Primary hypertension    Hypothyroidism    Right hip pain    Chronic bilateral low back pain with bilateral sciatica    Acute on chronic urinary retention    Severe sepsis without septic shock    Gait instability    Transaminitis    Chronic indwelling Mario catheter    Urinary tract infection associated with indwelling urethral catheter    Hypokalemia    Constipation    Traumatic rhabdomyolysis    Syncope    Shock    RYNE (acute kidney injury)    Hypomagnesemia     Past Medical History:   Diagnosis Date    Bacteremia     Chronic back pain     COPD (chronic obstructive pulmonary disease)     Mario catheter in place     GERD (gastroesophageal reflux disease)     Memory deficits     Neuropathy     Umbilical hernia     Urinary retention      Past Surgical History:   Procedure Laterality Date    BACK SURGERY      HERNIA REPAIR        General Information       Row Name 24 0847          OT Time and Intention    Document Type therapy note (daily note)  -MT     Mode of Treatment occupational therapy  -MT       Row Name 24 0847          General Information    Patient Profile Reviewed yes  -MT     Existing Precautions/Restrictions fall  knees at 90 degrees flexion contracture, noted skin tear L inside thigh. NSG present and aware of it  -MT       Row Name 24 0847          Cognition    Orientation Status (Cognition) oriented to;person  -MT       Row Name 24 0847          Safety Issues, Functional Mobility     Impairments Affecting Function (Mobility) cognition;balance;endurance/activity tolerance;strength;range of motion (ROM)  -MT     Cognitive Impairments, Mobility Safety/Performance attention;awareness, need for assistance;impulsivity;insight into deficits/self-awareness;judgment;problem-solving/reasoning;safety precaution follow-through;safety precaution awareness;sequencing abilities  -MT               User Key  (r) = Recorded By, (t) = Taken By, (c) = Cosigned By      Initials Name Provider Type    Ariadna Clark COTA Occupational Therapist Assistant                     Mobility/ADL's       Row Name 09/22/24 0870          Bed Mobility    Supine-Sit [place] (Bed Mobility) maximum assist (25% patient effort);dependent (less than 25% patient effort)  -MT     Sit-Supine [place] (Bed Mobility) verbal cues;maximum assist (25% patient effort);dependent (less than 25% patient effort)  -MT     Bed Mobility, Safety Issues decreased use of arms for pushing/pulling;decreased use of legs for bridging/pushing;cognitive deficits limit understanding  -MT     Assistive Device (Bed Mobility) draw sheet;head of bed elevated  -MT     Comment, (Bed Mobility) Pt agreed to EOB. Pt initially demo's movement of LLE thigh area but unable to bring LEs toward EOB. Pt MaxA to bring UB into upright sitting. Once EOB pt balance ranged ModA-short intervals of CGA. Pt with R lean. Easily distracted. Pt was unable to complete ADLs while EOB d/t decreased ability to remove UEs from bed. Pt EOB 11 mins, declined stand attempt  -MT               User Key  (r) = Recorded By, (t) = Taken By, (c) = Cosigned By      Initials Name Provider Type    Ariadna Clark COTA Occupational Therapist Assistant                   Obj/Interventions    No documentation.                  Goals/Plan    No documentation.                  Clinical Impression       Row Name 09/22/24 1126          Plan of Care Review    Plan of Care Reviewed With patient   -MT     Progress no change  -MT       Row Name 09/22/24 0847          Therapy Plan Review/Discharge Plan (OT)    Anticipated Discharge Disposition (OT) skilled nursing facility  -MT       Row Name 09/22/24 0847          Positioning and Restraints    Pre-Treatment Position in bed  -MT     Post Treatment Position bed  -MT     In Bed sitting;call light within reach;exit alarm on;encouraged to call for assist;with nsg  -MT               User Key  (r) = Recorded By, (t) = Taken By, (c) = Cosigned By      Initials Name Provider Type    Ariadna Clark COTA Occupational Therapist Assistant                   Outcome Measures       Row Name 09/22/24 0847          How much help from another is currently needed...    Putting on and taking off regular lower body clothing? 1  -MT     Bathing (including washing, rinsing, and drying) 1  -MT     Toileting (which includes using toilet bed pan or urinal) 1  -MT     Putting on and taking off regular upper body clothing 1  -MT     Taking care of personal grooming (such as brushing teeth) 2  -MT     Eating meals 2  -MT     AM-PAC 6 Clicks Score (OT) 8  -MT       Row Name 09/22/24 0912          How much help from another person do you currently need...    Turning from your back to your side while in flat bed without using bedrails? 1  -AC     Moving from lying on back to sitting on the side of a flat bed without bedrails? 1  -AC     Moving to and from a bed to a chair (including a wheelchair)? 1  -AC     Standing up from a chair using your arms (e.g., wheelchair, bedside chair)? 1  -AC     Climbing 3-5 steps with a railing? 1  -AC     To walk in hospital room? 1  -AC     AM-PAC 6 Clicks Score (PT) 6  -AC     Highest Level of Mobility Goal 2 --> Bed activities/dependent transfer  -AC               User Key  (r) = Recorded By, (t) = Taken By, (c) = Cosigned By      Initials Name Provider Type    Luiz Yip RN Registered Nurse    Ariadna Clark COTA Occupational Therapist  Assistant                    Occupational Therapy Education       Title: PT OT SLP Therapies (Done)       Topic: Occupational Therapy (Done)       Point: ADL training (Done)       Description:   Instruct learner(s) on proper safety adaptation and remediation techniques during self care or transfers.   Instruct in proper use of assistive devices.                  Learning Progress Summary             Patient Acceptance, E,TB, VU by SM at 9/20/2024 0337    Acceptance, E,TB, VU by CR at 9/19/2024 0038    Acceptance, E, NR,NL by DALIA at 9/18/2024 1151   Family Acceptance, E,TB, VU by CR at 9/19/2024 0038                         Point: Home exercise program (Done)       Description:   Instruct learner(s) on appropriate technique for monitoring, assisting and/or progressing therapeutic exercises/activities.                  Learning Progress Summary             Patient Acceptance, E,TB, VU by SM at 9/20/2024 0337    Acceptance, E,TB, VU by CR at 9/19/2024 0038   Family Acceptance, E,TB, VU by CR at 9/19/2024 0038                         Point: Precautions (Done)       Description:   Instruct learner(s) on prescribed precautions during self-care and functional transfers.                  Learning Progress Summary             Patient Acceptance, E,TB, VU by SM at 9/20/2024 0337    Acceptance, E,TB, VU by CR at 9/19/2024 0038    Acceptance, E, NR,NL by DALIA at 9/18/2024 1151   Family Acceptance, E,TB, VU by CR at 9/19/2024 0038                         Point: Body mechanics (Done)       Description:   Instruct learner(s) on proper positioning and spine alignment during self-care, functional mobility activities and/or exercises.                  Learning Progress Summary             Patient Acceptance, E,TB, VU by SM at 9/20/2024 0337    Acceptance, E,TB, VU by CR at 9/19/2024 0038    Acceptance, E, NR,NL by DALIA at 9/18/2024 1151   Family Acceptance, E,TB, VU by CR at 9/19/2024 0038                                         User Key        Initials Effective Dates Name Provider Type Discipline     07/11/23 -  Tish Sanches OTR/L, CSRS Occupational Therapist OT    CR 03/03/23 -  Clara Valle, RN Registered Nurse Nurse     06/19/24 -  Macie Ulrich, RN Registered Nurse Nurse                  OT Recommendation and Plan     Plan of Care Review  Plan of Care Reviewed With: patient  Progress: no change  Outcome Evaluation: Breakfast tray untouched, offered assist for self feeding but pt declined food. Pt agreed to EOB. Pt initially demo's movement of LLE thigh area but unable to bring LEs toward EOB. BLE contractures at approx 90 degrees. Pt MaxA to bring UB into upright sitting. Once EOB pt balance ranged ModA-short intervals of CGA. Pt with R lean. Easily distracted. Pt was unable to complete ADLs while EOB d/t decreased ability to remove UEs from bed. Pt EOB 11 mins, declined stand attempt. Pt confused often thinking he was at SNF, re orientation throughout. Decreased attention to task noted. Recommend return to SNF     Time Calculation:         Time Calculation- OT       Row Name 09/22/24 0847             Time Calculation- OT    OT Start Time 0847  -MT      OT Stop Time 0911  -MT      OT Time Calculation (min) 24 min  -MT      Total Timed Code Minutes- OT 24 minute(s)  -MT      OT Received On 09/22/24  -MT         Timed Charges    24276 - OT Therapeutic Activity Minutes 24  -MT         Total Minutes    Timed Charges Total Minutes 24  -MT       Total Minutes 24  -MT                User Key  (r) = Recorded By, (t) = Taken By, (c) = Cosigned By      Initials Name Provider Type    MT Ariadna Eubanks COTA Occupational Therapist Assistant                  Therapy Charges for Today       Code Description Service Date Service Provider Modifiers Qty    [id number]  OT THERAPEUTIC ACT EA 15 MIN 9/22/2024 Ariadna Eubanks COTA GO 2                 TEMO Monteiro  9/22/2024

## 2024-09-22 NOTE — PLAN OF CARE
Goal Outcome Evaluation:  Plan of Care Reviewed With: patient        Progress: no change  Outcome Evaluation: Breakfast tray untouched, offered assist for self feeding but pt declined food. Pt agreed to EOB. Pt initially demo's movement of LLE thigh area but unable to bring LEs toward EOB. BLE contractures at approx 90 degrees. Pt MaxA to bring UB into upright sitting. Once EOB pt balance ranged ModA-short intervals of CGA. Pt with R lean. Easily distracted. Pt was unable to complete ADLs while EOB d/t decreased ability to remove UEs from bed. Pt EOB 11 mins, declined stand attempt. Pt confused often thinking he was at SNF, re orientation throughout. Decreased attention to task noted. Recommend return to SNF      Anticipated Discharge Disposition (OT): skilled nursing facility

## 2024-09-22 NOTE — PLAN OF CARE
Goal Outcome Evaluation:         No changes overnight. VSS. Sinus  with PAC and PVCs. No c/o pain. Wound care as ordered. Safety maintained.

## 2024-09-22 NOTE — PROGRESS NOTES
[facility] Medicine Services  INPATIENT PROGRESS NOTE    Patient Name: Jagdish Cook  Date of Admission: 9/16/2024  Today's Date: 09/22/24  Length of Stay: 6  Primary Care Physician: Aidan Shields MD    Subjective   Chief Complaint: f/u sepsis, uti  Altered Mental Status    Today  Patient has no new complaint      Review of Systems   Unable to fully assess to patient factors    Objective    Temp:  [97.7 °F (36.5 °C)-98.7 °F (37.1 °C)] 97.7 °F (36.5 °C)  Heart Rate:  [] 93  Resp:  [18] 18  BP: ()/(51-66) 129/66  Physical Exam  GEN: Awake, alert, interactive, in NAD. Confused.  HEENT: PERRLA, EOMI, Anicteric, Trachea midline  Lungs:  no wheezing/rales/rhonchi  Heart: RRR, +S1/s2, no rub  ABD: ventral hernia, reducible, soft, nd, +BS, no guarding/rebound  Extremities: atraumatic, no cyanosis, no pitting edema  Skin: L hip wound, some eschar around penile meatus   Neuro: AAOx1, no obvious focal deficits      Results Review:  I have reviewed the labs, radiology results, and diagnostic studies.    Laboratory Data:   Results from last 7 days   Lab Units 09/22/24 0444 09/21/24  0612 09/20/24  0515   WBC 10*3/mm3 10.77 12.09* 11.38*   HEMOGLOBIN g/dL 12.6* 13.4 12.6*   HEMATOCRIT % 38.8 41.3 40.7   PLATELETS 10*3/mm3 212 224 206        Results from last 7 days   Lab Units 09/22/24  0444 09/21/24  0612 09/20/24  0515   SODIUM mmol/L 139 137 141   POTASSIUM mmol/L 3.2* 3.5 4.8   CHLORIDE mmol/L 97* 99 107   CO2 mmol/L 29.0 31.0* 28.0   BUN mg/dL 13 14 20   CREATININE mg/dL 0.63* 0.69* 0.62*   CALCIUM mg/dL 8.5* 8.6 8.3*   BILIRUBIN mg/dL 0.5 0.5 0.4   ALK PHOS U/L 98 100 85   ALT (SGPT) U/L 18 20 19   AST (SGOT) U/L 27 31 27   GLUCOSE mg/dL 87 93 113*       Culture Data:   Blood Culture   Date Value Ref Range Status   09/16/2024 No growth at 3 days  Preliminary   09/16/2024 No growth at 3 days  Preliminary     Urine Culture   Date Value Ref Range Status   09/16/2024  >100,000 CFU/mL Enterococcus faecalis (A)  Final   09/16/2024 >100,000 CFU/mL Corynebacterium species (A)  Final     Comment:       Based on laboratory diagnosis criteria, these organisms are common urogenital commensal rolando and have not been associated with urinary tract infections; clinical correlation is recommended.       Radiology Data:   Imaging Results (Last 24 Hours)       ** No results found for the last 24 hours. **            I have reviewed the patient's current medications.     Assessment/Plan   Assessment  Active Hospital Problems    Diagnosis     **Shock     Hypomagnesemia     RYNE (acute kidney injury)     Urinary tract infection associated with indwelling urethral catheter     Chronic indwelling Mario catheter     Hypokalemia     Primary hypertension        Treatment Plan  #Sepsis -documented as septic shock on arrival per ICU staff and team.  Suspected urinary tract infection in the setting of chronic Mario catheter per ICU notation.  Mario catheter was exchanged in the ER.  He was on pressors but now off doing better.  Tolerating blood pressure meds.    Blood cultures negative.  Urine culture grew Enterococcus and Corynebacterium.  Patient was on amoxicillin, changed to macrobid yesterday. Noted in my chart review other potential issues as well including PNA. Pt has skin wounds but seem to be improving. Dry necrosis at penile meatus with wound. KUB showed concern for pneumobilia. Sent for CT and concern as above with air in biliary tree concerning for infection vs reservoir/sphincterotomy effect. Unfortunately can not find any interventions in our records, he is unable to reliably tell me if he has had them at this time. I do not suspect gas infection in liver given abx he was on prior and overall stability at this point. Will change abx to levaquin and flagyl to cover his potential multitude of issues including urine, wounds, pna, biliary try. Will ask General surgery and urology to  evaluate.    #UTI -Enterococcus on urinalysis.  Patient was started on amoxicillin but has noted allergy and has some concern.  Changed to levaquin for broader coverage of other infections.    #Pneumobilia on CT  General Surgery was consulted and after evaluation and review of prior records determined it was chronic and therefore no surgical intervention.    #RYNE -in the setting of #1.  Resolved.  Renal function normalized.    #Penile/hip wound -continue wound care  Urology was consulted with the following recommendations-  Maintain ewing to gravity. Bacitracin ointment to skin to help promote healing. Keep ewing off of traction (better secured to prevent pulling). Antibiotics per hospitalist. Follow up monthly with Dr. Mittal for ewing changes, or any potential further plans. While an SP tube would be ideal in a situation like this, he is not currently a candidate for such a procedure under anesthesia, and it is not clear that he could be cleared for that, but could be pursued by Dr. Mittal if so desired. Otherwise, transport to a facility with IR capability for SP placement under IR could be considered as an outpatient.    #Abdominal pain/nausea -Does have large ventral hernia but easily reducible and not incarcerated. Patient with poor appetite.  KUB and ct concerning for biliary air... doubt active gas infection. Will ask surgery to see as above. Levaquin and flagyl    #Hypertension - was hypotensive when septic, resolved, tolerating home HCTZ and lisinopril now that his sepsis has resolved.    #Hypokalemia -replaced and improved.    #Hypomagnesemia - replaced improved    Medical Decision Making  Number and Complexity of problems: 5+ acute, multiple chronic.  High medical complexity.   Differential Diagnosis: As above    Conditions and Status        Hard to get a clear story from.  Confused. Does not seem toxic.     MDM Data  External documents reviewed: None  Cardiac tracing (EKG, telemetry) interpretation:  None  Radiology interpretation: Reports reviewed  Labs reviewed: As above  Any tests that were considered but not ordered: None     Decision rules/scores evaluated (example [id number], Wells, etc): None     Discussed with: Patient, dietitian, nursing staff     Care Planning  Shared decision making: Patient apprised of current labs, vitals, imaging and treatment plan.  They are agreeable with proceeding with plans as discussed.    Code status and discussions: dnr/dni    Disposition  Social Determinants of Health that impact treatment or disposition: from facility  I expect the patient to be discharged to back to nursing facility when appropriate. Unclear at this time. Will f/u with consultants.     Electronically signed by Celestine Guerin MD, 09/22/24, 17:25 CDT.

## 2024-09-22 NOTE — PLAN OF CARE
Goal Outcome Evaluation:  Plan of Care Reviewed With: patient        Progress: no change  Outcome Evaluation: Pt much more pleasant this shift. Took meds with sips of water without difficulty. Pt did c/o leg pain this shift, medicated with PRN Tylenol with relief obtained. Wound care provided, Lazaro protocol in place. Mag and K+ replaced this shift per electrolyte protocol. Safety maintained. Plan of care ongoing.

## 2024-09-23 NOTE — PROGRESS NOTES
[facility] Palliative Care Services    Palliative Care Daily Progress Note   Chief complaint-follow up goals of care/advanced care planning, support for patient/family, and comfort care    Code Status:   Code Status and Medical Interventions: No CPR (Do Not Attempt to Resuscitate); Limited Support; No intubation (DNI)   Ordered at: 09/17/24 0107     Medical Intervention Limits:    No intubation (DNI)     Level Of Support Discussed With:    Next of Kin (If No Surrogate)     Code Status (Patient has no pulse and is not breathing):    No CPR (Do Not Attempt to Resuscitate)     Medical Interventions (Patient has pulse or is breathing):    Limited Support      Advanced Directives: Advance Directive Status: Patient does not have advance directive   Goals of Care: Ongoing.     S: Medical record reviewed. Events noted.  Previously seen by my colleague, CONCHITA Calderon and patient noted to have history of chronic back pain, COPD, GERD, memory deficits, neuropathy, umbilical hernia, former smoker, and urinary retention.  Noted to be noncompliant with appointments to cardiology and pulmonology to obtain surgery clearance per urology recommendations and with need for chronic indwelling catheter.  He had recent hospitalization 9/2-9/3 after being found down by caregiver and treated for sepsis, traumatic rhabdomyolysis, and gait instability, unfortunately left AMA.  Of particular note, patient was found to have UTI with Proteus mirabilis.  Apparently placed in nursing facility 1 week prior to this hospitalization.  Transferred to critical care unit and required vasopressor support and IV antibiotics.  Urine cultures positive for Enterococcus facialis and corny bacterium.  In addition, patient was noted to have catheter related erosion of prepuce due to chronic Mario catheter needs.  Patient has unfortunately been confused throughout hospitalization.  Underwent bedside debridement of left hip wound by  "wound care, APRN.  Transferred to medical floor 9/19.  Urology consulted 9/21 and recommends consideration of suprapubic catheter placement though not a candidate for procedure under anesthesia.  Discussion of transfer to tertiary facility for IR capability considered, but recommends maintain Mario catheter to gravity.  Surgery consulted due to CT findings of pneumobilia, felt to be chronic with history of cholecystectomy and underwent ERCP stent placement by Dr. Zabala at [facility] 1/29/2019 with removal 4/24/2019 and replacement of stent on 5/17/2019.  No further intervention required.  Has been evaluated by therapy and noted to have bilateral lower extremity (knee) contractures, recommends return to SNF.  Patient was noted to remove Mario catheter with bulb intact and noted by nursing to have large clots on bed and gown.  Increased confusion and aggressing noted per nursing.  Refusing care and interventions.  Last known intake 9/19 half of boost per nursing.  Due to the Palliative Care Topics Discussed: palliative care, goals of care, care options, resuscitation status, [facility], and discharge options we will establish an advance care plan.   Advance Care Planning   Advance Care Planning Discussion: Spoke with patient's brother, Jitendra via telephone with regard to events this hospitalization and patient's overall decline despite aggressive care interventions.  We explored medical priorities and given patient's overall decline family would like to de-escalate care measures with focus of comfort \"just keep him comfortable\".  Jitendra verbalizes significant family history of Alzheimer's dementia and seems to have good understanding with regard to hospice expectations.  Based upon goals we will plan to complete a MOST document via email.  A hospice consult will be placed.  Questions encouraged and support given.  Family appreciative for conversation.     Review of Systems   Unable to perform ROS: Mental status change     Pain " Assessment  Preferred Pain Scale: rFLACC (Revised Face, Legs, Activity, Crying, and Consolability Scale)  CPOT Facial Expression: 0-->relaxed, neutral  CPOT Body Movements: 0-->absence of movements  CPOT Muscle Tension: 0-->relaxed  Ventilator Compliance/Vocalization: 0-->talking in normal tone or no sound  CPOT Score: 0  PAINAD Breathin-->normal  PAINAD Negative Vocalization: 0-->none  PAINAD Facial Expression: 0-->smiling or inexpressive  PAINAD Body Language: 0-->relaxed  PAINAD Consolability: 0-->no need to console  PAINAD Score: 0  Pain Location: extremity    O:     Intake/Output Summary (Last 24 hours) at 2024 1120  Last data filed at 2024 0437  Gross per 24 hour   Intake --   Output 1100 ml   Net -1100 ml       Diagnostics and current medications: Reviewed.      Current Facility-Administered Medications:     acetaminophen (TYLENOL) tablet 650 mg, 650 mg, Oral, Q4H PRN, 650 mg at 24 0916 **OR** acetaminophen (TYLENOL) suppository 650 mg, 650 mg, Rectal, Q4H PRN, Kristen Sánchez MD    ascorbic acid (VITAMIN C) tablet 1,000 mg, 1,000 mg, Oral, Daily, Jagdish Courtney PA-C, 1,000 mg at 24 0937    sennosides-docusate (PERICOLACE) 8.6-50 MG per tablet 2 tablet, 2 tablet, Oral, BID, 2 tablet at 24 2149 **AND** polyethylene glycol (MIRALAX) packet 17 g, 17 g, Oral, Daily PRN, 17 g at 24 1015 **AND** bisacodyl (DULCOLAX) EC tablet 5 mg, 5 mg, Oral, Daily PRN **AND** bisacodyl (DULCOLAX) suppository 10 mg, 10 mg, Rectal, Daily PRN, Kristen Sánchez MD    busPIRone (BUSPAR) tablet 10 mg, 10 mg, Oral, BID, Jagdish Courtney PA-C, 10 mg at 24 0937    Calcium Replacement - Follow Nurse / BPA Driven Protocol, , Does not apply, PRN, Kristen Sánchez MD    cetirizine (zyrTEC) tablet 10 mg, 10 mg, Oral, Daily, Jagdish Courtney PA-C, 10 mg at 24 0937    collagenase ointment 1 Application, 1 Application, Topical, Q12H, Yudelka Gonzalez, APRN, 1 Application at 24  0939    finasteride (PROSCAR) tablet 5 mg, 5 mg, Oral, Daily, Jagdish Courtney PA-C, 5 mg at 09/23/24 0937    fluticasone (FLONASE) 50 MCG/ACT nasal spray 2 spray, 2 spray, Nasal, Daily, Jagdish Courtney PA-C, 2 spray at 09/23/24 0939    guaiFENesin (MUCINEX) 12 hr tablet 600 mg, 600 mg, Oral, BID, Jagdish Courtney PA-C, 600 mg at 09/23/24 0937    heparin (porcine) 5000 UNIT/ML injection 5,000 Units, 5,000 Units, Subcutaneous, Q8H, Kristen Sánchez MD, 5,000 Units at 09/23/24 0517    lisinopril (PRINIVIL,ZESTRIL) tablet 10 mg, 10 mg, Oral, Q24H, 10 mg at 09/23/24 0937 **AND** hydroCHLOROthiazide tablet 12.5 mg, 12.5 mg, Oral, Q24H, Jagdish Courtney PA-C, 12.5 mg at 09/23/24 0937    lactobacillus acidophilus (RISAQUAD) capsule 1 capsule, 1 capsule, Oral, Daily, Jagdish Courtney PA-C, 1 capsule at 09/23/24 0937    levoFLOXacin (LEVAQUIN) tablet 750 mg, 750 mg, Oral, Q24H, Chato Boo DO, 750 mg at 09/23/24 0937    levothyroxine (SYNTHROID, LEVOTHROID) tablet 125 mcg, 125 mcg, Oral, Q AM, Jagdish Courtney PA-C, 125 mcg at 09/23/24 0517    Magnesium Standard Dose Replacement - Follow Nurse / BPA Driven Protocol, , Does not apply, PRN, Kristen Sánchez MD    metroNIDAZOLE (FLAGYL) tablet 500 mg, 500 mg, Oral, Q8H, Chato Boo DO, 500 mg at 09/23/24 0517    morphine concentrated solution 10 mg, 10 mg, Oral, Once, Celestine Guerin MD    multivitamin with minerals 1 tablet, 1 tablet, Oral, Daily, Kristen Sánchez MD, 1 tablet at 09/23/24 0937    mupirocin (BACTROBAN) 2 % ointment 1 Application, 1 Application, Topical, Q12H, Yudelka Gonzalez R, APRN, 1 Application at 09/23/24 0938    nitroglycerin (NITROSTAT) SL tablet 0.4 mg, 0.4 mg, Sublingual, Q5 Min PRN, Kristen Sánchez MD    ondansetron (ZOFRAN) injection 4 mg, 4 mg, Intravenous, Q6H PRN, Kristen Sánchez MD, 4 mg at 09/19/24 2048    Phosphorus Replacement - Follow Nurse / BPA Driven Protocol, , Does not apply, PRN, Kristen Sánchez MD    " potassium chloride (MICRO-K/KLOR-CON) CR capsule, 40 mEq, Oral, Once, Celestine Guerin MD    Potassium Replacement - Follow Nurse / BPA Driven Protocol, , Does not apply, PRN, Kristen Sánchez MD    sodium chloride 0.9 % flush 10 mL, 10 mL, Intravenous, Q12H, Kristen Sánchez MD, 10 mL at 09/22/24 0913    sodium chloride 0.9 % flush 10 mL, 10 mL, Intravenous, PRN, Kristen Sánchez MD    sodium chloride 0.9 % infusion 40 mL, 40 mL, Intravenous, PRN, Kristen Sánchez MD    sodium hypochlorite (DAKIN'S 1/4 STRENGTH) 0.125 % topical solution 0.125% solution, , Topical, Q12H, Yudelka Gonzalez, APRN, Given at 09/23/24 0939    terazosin (HYTRIN) capsule 1 mg, 1 mg, Oral, Nightly, Chato Boo DO, 1 mg at 09/22/24 2149    tiZANidine (ZANAFLEX) tablet 4 mg, 4 mg, Oral, Q8H PRN, Jagdish Courtney PA-C    venlafaxine XR (EFFEXOR-XR) 24 hr capsule 150 mg, 150 mg, Oral, Daily, Jagdish Courtney PA-C, 150 mg at 09/23/24 0937    zinc sulfate (ZINCATE) capsule 220 mg, 220 mg, Oral, Daily, Kristen Sánchez MD, 220 mg at 09/23/24 0937    Allergies   Allergen Reactions    Meloxicam Hives     Dizzy, skin crawls    Tamsulosin Dizziness     excessive sweating    Penicillins      I have utilized all available immediate resources to obtain, update, or review the patient's current medications (including all prescriptions, over-the-counter products, herbals, cannabis/cannabidiol products, and vitamin/mineral/dietary (nutritional) supplements) for name, route of administration, type, dose and frequency.    A:    /53 (BP Location: Left arm, Patient Position: Lying)   Pulse 102   Temp 98.4 °F (36.9 °C) (Oral)   Resp 18   Ht 175.3 cm (69.02\")   Wt 62.2 kg (137 lb 1.6 oz)   SpO2 96%   BMI 20.24 kg/m²     Vitals and nursing note reviewed.   Constitutional:       Appearance: Cachectic and frail. Ill-appearing and chronically ill-appearing.   Eyes:      General: Lids are normal.   HENT:      Head: Normocephalic. "   Pulmonary:      Effort: Pulmonary effort is normal.   Cardiovascular:      Tachycardia present.   Edema:     Peripheral edema absent.   Abdominal:      Palpations: Abdomen is soft.   Musculoskeletal:      Cervical back: Neck supple.      Comments: Contractures bilateral lower extremities-knees Skin:     General: Skin is warm.      Comments: Multiple pressure associated skin injury and wounds as documented   Genitourinary:     Comments: Incontinent  Neurological:      Mental Status: Alert. Confused.   Psychiatric:         Mood and Affect: Mood is anxious.         Cognition and Memory: Cognition is impaired.         Judgment: Judgment is impulsive.     Patient status: Disease state: No further treatment being pursued.  Functional status: Palliative Performance Scale Score: Performance 30% based on the following measures: Ambulation: Totally bed bound, Activity and Evidence of Disease: Unable to do any work, extensive evidence of disease, Self-Care: Total care required,  Intake: Reduced, LOC: Full, drowsy or confusion   Nutritional status: Albumin 2.3. Body mass index is 20.24 kg/m².  Screening Status/Interventions Data  Psychosocial Needs: neg  Spiritual Needs: neg  Goals of Care/ACP: pos  Goals of Care/ACP Intervened: yes   Palliative Care Acuity Data  Psychosocial Acuity: normal complexity  Spiritual Acuity: normal complexity    Impression/Problem List:    Shock  Acute kidney injury  Altered mental status   Urinary tract infection associated with indwelling urethral catheter Enterococcus faecalis, and Corynebacterium  COPD  Failure to thrive in adult, 25 pound weight loss since March 2024  Pressure injury of left hip     Pressure injury of penis-catheter related erosion of prepuce due to chronic Mario catheter needs     Chronic urinary retention with chronic indwelling Mario catheter  Pneumobilia, chronic per surgery  Pulmonary hypertension  Diastolic dysfunction  Atherosclerosis  Chronic back pain    Hypoalbuminemia, Alb 2.3   Hypernatremia, resolved  Hypothyroidism  Depression  Impaired transfers-bilateral lower extremity (knee) contractures  BPH    P:    Recommendations/Plan:  1. plan: Goals of care include CODE STATUS no CPR/comfort measures.    Family support: The patient lacks significant family support..  POA/Healthcare [id number], next of kin    2.  Palliative care encounter  -Previously seen by my colleague, CONCHITA Calderon and patient noted to have history of chronic back pain, COPD, GERD, memory deficits, neuropathy, umbilical hernia, former smoker, and urinary retention.  Noted to be noncompliant with appointments to cardiology and pulmonology to obtain surgery clearance per urology recommendations and with need for chronic indwelling catheter.  He had recent hospitalization 9/2-9/3 after being found down by caregiver and treated for sepsis, traumatic rhabdomyolysis, and gait instability, unfortunately left AMA.  Of particular note, patient was found to have UTI with Proteus mirabilis.  Apparently placed in nursing facility 1 week prior to this hospitalization.      -Transferred to critical care unit and required vasopressor support and IV antibiotics.    -unfortunately confused throughout hospitalization.    9/17-Underwent bedside debridement of left hip wound by wound care, APRN.    9/19-Transferred to medical floor.    9/21-Urology consulted and recommends consideration of suprapubic catheter placement though not a candidate for procedure under anesthesia.  Discussion of transfer to tertiary facility for IR capability considered, but recommends maintain Mario catheter to gravity.    -Surgery consulted due to CT findings of pneumobilia, felt to be chronic with history of cholecystectomy and underwent ERCP stent placement by Dr. Zabala at [facility] 1/29/2019 with removal 4/24/2019 and replacement of stent on 5/17/2019.  No further intervention required.    -evaluated by therapy and  noted to have bilateral lower extremity (knee) contractures, recommends return to SNF.    9/23-Patient was noted to remove Mario catheter with bulb intact and noted by nursing to have large clots on bed and gown.    -Increased confusion and aggression noted per nursing.    9/23-CODE STATUS changes no CPR/comfort measures  -Will plan to complete a MOST document via email  -Plan back to SNF with comfort measures.  Hospice consult has been placed.  Electronic communication to SW.  Discussed case with nursing.    3.  Comfort measures  -We will discontinue any treatments and adjuvants not in line with comfort  -May continue oral/home medications if able to safely swallow and willing to do so  -Will add morphine concentrate scheduled every 6 hours for comfort as patient has multiple chronic wounds, erosion of penis, and contractures.  -Palliative adjuvants as needed      Thank you for allowing us to participate in patient's plan of care. Palliative Care Team will continue to follow patient.     Hermila Ramirez, APRN  9/23/2024  11:20 CDT

## 2024-09-23 NOTE — DISCHARGE PLACEMENT REQUEST
"Spencer Cook (73 y.o. Male)       Date of Birth   1950    Social Security Number       Address   [address] [place] [address]    Home Phone   [phone]    MRN   [id number]       Mandaen   Other    Marital Status   Single                            Admission Date   9/16/24    Admission Type   Emergency    Admitting Provider   Celestine Guerin MD    Attending Provider   Celestine Guerin MD    Department, Room/Bed   [facility] 4B, 465/1       Discharge Date       Discharge Disposition       Discharge Destination                                 Attending Provider: Celestine Guerin MD    Allergies: Meloxicam, Tamsulosin, Penicillins    Isolation: None   Infection: None   Code Status: No CPR    Ht: 175.3 cm (69.02\")   Wt: 62.2 kg (137 lb 1.6 oz)    Admission Cmt: None   Principal Problem: Shock [R57.9]                   Active Insurance as of 9/16/2024       Primary Coverage       Payor Plan Insurance Group Employer/Plan Group    MEDICARE MEDICARE A & B        Payor Plan Address Payor Plan Phone Number Payor Plan Fax Number Effective Dates    [address] [phone]  2/1/2004 - None Entered    [facility] [id number]         Subscriber Name Subscriber Birth Date Member ID       SPENCER COOK 1950 [id number]               Secondary Coverage       Payor Plan Insurance Group Employer/Plan Group    [facility] MEDICAID        Payor Plan Address Payor Plan Phone Number Payor Plan Fax Number Effective Dates    [address] [address] [phone]  1/1/2016 - None Entered    [facility] [address]         Subscriber Name Subscriber Birth Date Member ID       SPENCER COOK 1950 [id number]                     Emergency Contacts        (Rel.) Home Phone Work Phone Mobile Phone    Jitendra Cook (Brother) -- -- [phone]    Ever Vazquez (Care Giver) -- -- [phone]                 History & Physical        Otilio Velázquez APRN at " 24          [facility]   HISTORY AND PHYSICAL    Patient Name: Jagdish Cook  : 1950  MRN: [id number]  Primary Care Physician:  Aidan Shields MD  Date of admission: 2024    Subjective  Subjective     Chief Complaint: Altered mental status    History of Present Illness    Mr. Cook is confused, information is obtained from the medical record and the RN.  He is a 73 year old with PMH of arthritis, chronic back pain, COPD, GERD, hypothyroidism, neuropathy, chronic urinary retention with chronic indwelling Mario catheter follows with Dr. Mittal and Dr. North.  He presented to ED today due to altered mental status.  He had a recent hospitalization for UTI and Sepsis and left AMA per discharge summary.  Review of microbiology from that visit shows that urine culture grew Proteus mirabilis. I spoke with his brother Bull ([phone]) who states Mr. Cook was placed in the nursing home about a week ago.  He received a call today from the nursing home and was told that Mr. Cook was confused and being transferred to the ER for evaluation.  According to him, Mr. Cook is usually oriented.  Evaluation in ED significant for RYNE BUN 69, creatinine 3.08 (normal on 9/3/24), troponin 308-> 219, procalcitonin 4.5, WBC 11.2, lactic acid 2.3 -> 1.4, BNP 18877, mild respiratory acidosis and hypoxia, UA significant for UTI.  He has several areas of skin breakdown and necrosis to his meatus.  He was given vancomycin, cefepime, 30 mL/kg crystalloid sepsis fluids, and started on norepinephrine in ED.  I discussed Mr. Cook's diagnostic findings and condition with his brother Bull and discussed initial treatment plan.  I discussed code status and Bull stated that Jagdish's wishes are to be DNR/DNI.  Mr. Cook will be admitted to the ICU for further care.      Review of Systems     Mr. Cook is confused and unable to participate in ROS.    Personal History     Past Medical  History:   Diagnosis Date    Bacteremia     Chronic back pain     COPD (chronic obstructive pulmonary disease)     Mario catheter in place     GERD (gastroesophageal reflux disease)     Memory deficits     Neuropathy     Umbilical hernia     Urinary retention        Past Surgical History:   Procedure Laterality Date    BACK SURGERY      HERNIA REPAIR         Family History: family history includes No Known Problems in his father and mother. Otherwise pertinent FHx was reviewed and not pertinent to current issue.    Social History:  reports that he has been smoking cigars. He has been exposed to tobacco smoke. He has never used smokeless tobacco. Drug use questions deferred to the physician. He reports that he does not drink alcohol.    Home Medications:  HYDROcodone-acetaminophen, Vitamin D (Ergocalciferol), alfuzosin, busPIRone, ciprofloxacin, finasteride, fluticasone, guaiFENesin, ipratropium-albuterol, levothyroxine, lisinopril-hydrochlorothiazide, loratadine, naloxone, probiotic, tiZANidine, and venlafaxine XR    Allergies:  Allergies   Allergen Reactions    Meloxicam Hives     Dizzy, skin crawls    Tamsulosin Dizziness     excessive sweating    Penicillins        Objective   Objective     Vitals:   Temp:  [97.9 °F (36.6 °C)] 97.9 °F (36.6 °C)  Heart Rate:  [] 92  Resp:  [25] 25  BP: ()/(41-76) 129/55  Flow (L/min):  [4-15] 4    Physical Exam  Vitals and nursing note reviewed.   Constitutional:       Appearance: He is ill-appearing.   HENT:      Head: Normocephalic and atraumatic.      Nose: Nose normal.      Mouth/Throat:      Mouth: Mucous membranes are dry.      Pharynx: Oropharynx is clear.   Eyes:      Extraocular Movements: Extraocular movements intact.      Conjunctiva/sclera: Conjunctivae normal.      Pupils: Pupils are equal, round, and reactive to light.   Cardiovascular:      Rate and Rhythm: Normal rate and regular rhythm.      Pulses: Normal pulses.      Heart sounds: Normal heart  sounds.   Pulmonary:      Effort: Pulmonary effort is normal.      Breath sounds: Rhonchi present.   Abdominal:      General: Bowel sounds are normal.      Palpations: Abdomen is soft.   Genitourinary:     Comments: Necrosis around head of penis  Musculoskeletal:         General: Deformity present.      Cervical back: Normal range of motion and neck supple.      Comments: BLE contracture   Skin:     General: Skin is warm and dry.      Capillary Refill: Capillary refill takes less than 2 seconds.      Comments: Multiple areas of skin breakdown.  See nursing documentation.   Neurological:      Mental Status: He is disoriented.   Psychiatric:      Comments: Confused, lethargic         Result Review   Result Review:  I have personally reviewed the results from the time of this admission to 9/16/2024 20:22 CDT and agree with these findings:  [x]  Laboratory list / accordion  [x]  Microbiology  [x]  Radiology  [x]  EKG/Telemetry   []  Cardiology/Vascular   []  Pathology  [x]  Old records  []  Other:  Most notable findings include: RYNE, UTI.      Assessment & Plan  Assessment / Plan     Brief Patient Summary:  Jagdish Cook is a 73 y.o. male who presented to ED from NH due to altered mental status.  Hypotensive on arrival, received 30 mL/kg crystalloid bolus and started on norepinephrine.  UTI,   RYNE noted.  Started on broad spectrum antibiotics.  Admitted to ICU for further care.    Active Hospital Problems:  Active Hospital Problems    Diagnosis     **Shock     RYNE (acute kidney injury)     Urinary tract infection associated with indwelling urethral catheter     Chronic indwelling Mario catheter      Results for orders placed or performed during the hospital encounter of 09/16/24   Respiratory Panel PCR w/COVID-19(SARS-CoV-2) ZEESHAN/DANIELLE/KYLAH/PAD/COR/SHYANN In-House, NP Swab in UTM/VTM, 2 HR TAT - Swab, Nasopharynx    Specimen: Nasopharynx; Swab   Result Value Ref Range    ADENOVIRUS, PCR Not Detected Not Detected     Coronavirus 229E Not Detected Not Detected    Coronavirus HKU1 Not Detected Not Detected    Coronavirus NL63 Not Detected Not Detected    Coronavirus OC43 Not Detected Not Detected    COVID19 Not Detected Not Detected - Ref. Range    Human Metapneumovirus Not Detected Not Detected    Human Rhinovirus/Enterovirus Not Detected Not Detected    Influenza A PCR Not Detected Not Detected    Influenza B PCR Not Detected Not Detected    Parainfluenza Virus 1 Not Detected Not Detected    Parainfluenza Virus 2 Not Detected Not Detected    Parainfluenza Virus 3 Not Detected Not Detected    Parainfluenza Virus 4 Not Detected Not Detected    RSV, PCR Not Detected Not Detected    Bordetella pertussis pcr Not Detected Not Detected    Bordetella parapertussis PCR Not Detected Not Detected    Chlamydophila pneumoniae PCR Not Detected Not Detected    Mycoplasma pneumo by PCR Not Detected Not Detected   Comprehensive Metabolic Panel    Specimen: Blood   Result Value Ref Range    Glucose 85 65 - 99 mg/dL    BUN 69 (H) 8 - 23 mg/dL    Creatinine 3.08 (H) 0.76 - 1.27 mg/dL    Sodium 144 136 - 145 mmol/L    Potassium 3.9 3.5 - 5.2 mmol/L    Chloride 106 98 - 107 mmol/L    CO2 22.0 22.0 - 29.0 mmol/L    Calcium 9.0 8.6 - 10.5 mg/dL    Total Protein 5.2 (L) 6.0 - 8.5 g/dL    Albumin 2.0 (L) 3.5 - 5.2 g/dL    ALT (SGPT) 22 1 - 41 U/L    AST (SGOT) 42 (H) 1 - 40 U/L    Alkaline Phosphatase 101 39 - 117 U/L    Total Bilirubin 0.6 0.0 - 1.2 mg/dL    Globulin 3.2 gm/dL    A/G Ratio 0.6 g/dL    BUN/Creatinine Ratio 22.4 7.0 - 25.0    Anion Gap 16.0 (H) 5.0 - 15.0 mmol/L    eGFR 20.3 (L) >60.0 mL/min/1.73   Protime-INR    Specimen: Blood   Result Value Ref Range    Protime 14.9 (H) 11.8 - 14.8 Seconds    INR 1.12 (H) 0.91 - 1.09   Urinalysis With Culture If Indicated - Indwelling Urethral Catheter    Specimen: Indwelling Urethral Catheter; Urine   Result Value Ref Range    Color, UA Yellow Yellow, Straw    Appearance, UA Cloudy (A) Clear    pH, UA  <=5.0 5.0 - 8.0    Specific Gravity, UA 1.020 1.005 - 1.030    Glucose, UA Negative Negative    Ketones, UA 15 mg/dL (1+) (A) Negative    Bilirubin, UA Large (3+) (A) Negative    Blood, UA Large (3+) (A) Negative    Protein,  mg/dL (2+) (A) Negative    Leuk Esterase, UA Small (1+) (A) Negative    Nitrite, UA Negative Negative    Urobilinogen, UA 2.0 E.U./dL (A) 0.2 - 1.0 E.U./dL   BNP    Specimen: Blood   Result Value Ref Range    proBNP 24,732.0 (H) 0.0 - 1,800.0 pg/mL   Single High Sensitivity Troponin T    Specimen: Blood   Result Value Ref Range    HS Troponin T 308 (C) <22 ng/L   Lactic Acid, Plasma    Specimen: Blood   Result Value Ref Range    Lactate 2.3 (C) 0.5 - 2.0 mmol/L   Procalcitonin    Specimen: Blood   Result Value Ref Range    Procalcitonin 4.57 (H) 0.00 - 0.25 ng/mL   C-reactive Protein    Specimen: Blood   Result Value Ref Range    C-Reactive Protein 30.76 (H) 0.00 - 0.50 mg/dL   Magnesium    Specimen: Blood   Result Value Ref Range    Magnesium 2.3 1.6 - 2.4 mg/dL   CBC Auto Differential    Specimen: Blood   Result Value Ref Range    WBC 11.22 (H) 3.40 - 10.80 10*3/mm3    RBC 3.90 (L) 4.14 - 5.80 10*6/mm3    Hemoglobin 12.1 (L) 13.0 - 17.7 g/dL    Hematocrit 36.8 (L) 37.5 - 51.0 %    MCV 94.4 79.0 - 97.0 fL    MCH 31.0 26.6 - 33.0 pg    MCHC 32.9 31.5 - 35.7 g/dL    RDW 13.8 12.3 - 15.4 %    RDW-SD 47.6 37.0 - 54.0 fl    MPV 9.8 6.0 - 12.0 fL    Platelets 232 140 - 450 10*3/mm3   Manual Differential    Specimen: Blood   Result Value Ref Range    Neutrophil % 89.0 (H) 42.7 - 76.0 %    Lymphocyte % 2.0 (L) 19.6 - 45.3 %    Monocyte % 5.0 5.0 - 12.0 %    Eosinophil % 0.0 (L) 0.3 - 6.2 %    Basophil % 0.0 0.0 - 1.5 %    Bands %  3.0 0.0 - 5.0 %    Metamyelocyte % 1.0 (H) 0.0 - 0.0 %    Neutrophils Absolute 10.32 (H) 1.70 - 7.00 10*3/mm3    Lymphocytes Absolute 0.22 (L) 0.70 - 3.10 10*3/mm3    Monocytes Absolute 0.56 0.10 - 0.90 10*3/mm3    Eosinophils Absolute 0.00 0.00 - 0.40 10*3/mm3     Basophils Absolute 0.00 0.00 - 0.20 10*3/mm3    Anisocytosis Slight/1+ None Seen    Crenated RBC's Large/3+ None Seen    Hypochromia Mod/2+ None Seen    Poikilocytes Slight/1+ None Seen    Polychromasia Slight/1+ None Seen    WBC Morphology Normal Normal    Platelet Morphology Normal Normal   STAT Lactic Acid, Reflex    Specimen: Blood   Result Value Ref Range    Lactate 1.4 0.5 - 2.0 mmol/L   High Sensitivity Troponin T 2Hr    Specimen: Blood   Result Value Ref Range    HS Troponin T 219 (C) <22 ng/L    Troponin T Delta -89 (L) >=-4 - <+4 ng/L   Urinalysis, Microscopic Only - Indwelling Urethral Catheter    Specimen: Indwelling Urethral Catheter; Urine   Result Value Ref Range    RBC, UA 6-10 (A) None Seen, 0-2 /HPF    WBC, UA 6-10 (A) None Seen, 0-2 /HPF    Bacteria, UA 1+ (A) None Seen /HPF    Squamous Epithelial Cells, UA None Seen None Seen, 0-2 /HPF    Transitional Epithelial Cells, UA 3-6 (A) 0 - 2 /HPF    Hyaline Casts, UA 7-12 None Seen /LPF    Amorphous Crystals, UA Small/1+ None Seen /HPF    Methodology Manual Light Microscopy    Blood Gas, Arterial With Co-Ox    Specimen: Arterial Blood   Result Value Ref Range    Site Right Radial     Darryl's Test Positive     pH, Arterial 7.309 (L) 7.350 - 7.450 pH units    pCO2, Arterial 47.1 (H) 35.0 - 45.0 mm Hg    pO2, Arterial 66.4 (L) 83.0 - 108.0 mm Hg    HCO3, Arterial 23.6 20.0 - 26.0 mmol/L    Base Excess, Arterial -2.9 (L) 0.0 - 2.0 mmol/L    O2 Saturation, Arterial 90.8 (L) 94.0 - 99.0 %    Hemoglobin, Blood Gas 12.3 (L) 14 - 18 g/dL    Hematocrit, Blood Gas 37.8 (L) 38.0 - 51.0 %    Oxyhemoglobin 89.4 (L) 94 - 99 %    Methemoglobin 0.50 0.00 - 3.00 %    Carboxyhemoglobin 1.0 0 - 5 %    Temperature 37.0     Sodium, Arterial 145 136 - 145 mmol/L    Potassium, Arterial 3.5 3.5 - 5.2 mmol/L    Barometric Pressure for Blood Gas 754 mmHg    Modality NRB     Flow Rate 15.0 lpm    Ventilator Mode NA     Collected by [id number]     pH, Temp Corrected 7.309 (L) 7.350 -  7.450 pH Units    pCO2, Temperature Corrected 47.1 (H) 35 - 45 mm Hg    pO2, Temperature Corrected 66.4 (L) 83 - 108 mm Hg   ECG 12 Lead Altered Mental Status   Result Value Ref Range    QT Interval 294 ms    QTC Interval 456 ms   ECG 12 Lead Rhythm Change   Result Value Ref Range    QT Interval 374 ms    QTC Interval 494 ms       Current Facility-Administered Medications:     acetaminophen (TYLENOL) tablet 650 mg, 650 mg, Oral, Q4H PRN **OR** acetaminophen (TYLENOL) suppository 650 mg, 650 mg, Rectal, Q4H PRN, Kristen Sánchez MD    sennosides-docusate (PERICOLACE) 8.6-50 MG per tablet 2 tablet, 2 tablet, Oral, BID **AND** polyethylene glycol (MIRALAX) packet 17 g, 17 g, Oral, Daily PRN **AND** bisacodyl (DULCOLAX) EC tablet 5 mg, 5 mg, Oral, Daily PRN **AND** bisacodyl (DULCOLAX) suppository 10 mg, 10 mg, Rectal, Daily PRN, Kristen Sánchez MD    Calcium Replacement - Follow Nurse / BPA Driven Protocol, , Does not apply, PRN, Kristen Sánchez MD    [START ON 9/17/2024] cefepime 2000 mg IVPB in 100 mL NS (MBP), 2,000 mg, Intravenous, Q24H, Otilio Velázquez APRN    [START ON 9/17/2024] Chlorhexidine Gluconate Cloth 2 % pads 1 Application, 1 Application, Topical, Q24H, Kristen Sánchez MD    heparin (porcine) 5000 UNIT/ML injection 5,000 Units, 5,000 Units, Subcutaneous, Q8H, Kristen Sánchez MD    lactated ringers bolus 1,000 mL, 1,000 mL, Intravenous, Once, Otilio Velázquez APRN, Last Rate: 1,000 mL/hr at 09/16/24 2048, 1,000 mL at 09/16/24 2048    lactated ringers infusion, 125 mL/hr, Intravenous, Continuous, Kristen Sánchez MD, Last Rate: 125 mL/hr at 09/16/24 2047, 125 mL/hr at 09/16/24 2047    Magnesium Standard Dose Replacement - Follow Nurse / BPA Driven Protocol, , Does not apply, PRN, Kristen Sánchez MD    mupirocin (BACTROBAN) 2 % nasal ointment 1 Application, 1 Application, Each Nare, BID, Kristen Sánchez MD    nitroglycerin (NITROSTAT) SL tablet 0.4 mg, 0.4 mg, Sublingual, Q5  Min PRN, Kristen Sánchez MD    norepinephrine (LEVOPHED) 8 mg in 250 mL NS infusion (premix), 0.02-0.3 mcg/kg/min, Intravenous, Titrated, Ronald Alcaraz MD, Last Rate: 11.91 mL/hr at 09/16/24 2053, 0.1 mcg/kg/min at 09/16/24 2053    ondansetron (ZOFRAN) injection 4 mg, 4 mg, Intravenous, Q6H PRN, Kristen Sánchez MD    Pharmacy to Dose Cefepime, , Does not apply, Continuous PRN, Otilio Velázquez APRN    Phosphorus Replacement - Follow Nurse / BPA Driven Protocol, , Does not apply, PRN, Kristen Sánchez MD    Potassium Replacement - Follow Nurse / BPA Driven Protocol, , Does not apply, PRN, Kristen Sánchez MD    sodium chloride 0.9 % flush 10 mL, 10 mL, Intravenous, Q12H, Kristen Sánchez MD, 10 mL at 09/16/24 2049    sodium chloride 0.9 % flush 10 mL, 10 mL, Intravenous, PRN, Kristen Sánchez MD    sodium chloride 0.9 % infusion 40 mL, 40 mL, Intravenous, PRN, Kristen Sánchez MD    vancomycin (VANCOCIN) 500 mg IVPB in 100 mL NS (MBP), 500 mg, Intravenous, Q24H, Otilio Velázquez APRN    Plan:   Diagnoses:  -Septic Shock  -UTI  -Acute Kidney Injury  -NSTEMI type II  -Deep Tissue Injury to Left Hip  -Chronic Urinary Retention with Chronic Indwelling Ewing Catheter    Plan:  -Vancomycin and Cefepime, blood and urine cx, 30 mL/kg crystalloid bolus complete, lactic acid trend  -urine cx pending, antibiotics as above, ewing catheter change in ED  -likely prerenal, fluid resuscitated in ED, monitor renal function, avoid nephrotoxic agents  -likely demand ischemia in the setting of septic shock, hyoxia, trend troponin, EKG no ischemic findings  -wound nurse consult  -Ewing changed in ED        VTE Prophylaxis:  Pharmacologic & mechanical VTE prophylaxis orders are present.        CODE STATUS:    Code Status (Patient has no pulse and is not breathing): CPR (Attempt to Resuscitate)  Medical Interventions (Patient has pulse or is breathing): Full Support    Admission Status:  I believe this patient meets  inpatient critical care status.    46 minutes of critical care provided. This time excludes other billable procedures. Time does include preparation of documents, medical consultations, review of old records, and direct bedside care. Patient is at high risk for life-threatening deterioration due to septic shock.       Otilio Velázquez DNP, APRN, AGACNP-BC    Electronically signed by Otilio Velázquez APRN at 09/17/24 0142          Physician Progress Notes (last 48 hours)        Hermila Ramirez APRN at 09/23/24 1120                      [facility]    Palliative Care Daily Progress Note   Chief complaint-follow up goals of care/advanced care planning, support for patient/family, and comfort care    Code Status:   Code Status and Medical Interventions: No CPR (Do Not Attempt to Resuscitate); Limited Support; No intubation (DNI)   Ordered at: 09/17/24 0107     Medical Intervention Limits:    No intubation (DNI)     Level Of Support Discussed With:    Next of Kin (If No Surrogate)     Code Status (Patient has no pulse and is not breathing):    No CPR (Do Not Attempt to Resuscitate)     Medical Interventions (Patient has pulse or is breathing):    Limited Support      Advanced Directives: Advance Directive Status: Patient does not have advance directive   Goals of Care: Ongoing.     S: Medical record reviewed. Events noted.  Previously seen by my colleague, CONCHITA Calderon and patient noted to have history of chronic back pain, COPD, GERD, memory deficits, neuropathy, umbilical hernia, former smoker, and urinary retention.  Noted to be noncompliant with appointments to cardiology and pulmonology to obtain surgery clearance per urology recommendations and with need for chronic indwelling catheter.  He had recent hospitalization 9/2-9/3 after being found down by caregiver and treated for sepsis, traumatic rhabdomyolysis, and gait instability, unfortunately left AMA.  Of  particular note, patient was found to have UTI with Proteus mirabilis.  Apparently placed in nursing facility 1 week prior to this hospitalization.  Transferred to critical care unit and required vasopressor support and IV antibiotics.  Urine cultures positive for Enterococcus facialis and corny bacterium.  In addition, patient was noted to have catheter related erosion of prepuce due to chronic Mario catheter needs.  Patient has unfortunately been confused throughout hospitalization.  Underwent bedside debridement of left hip wound by wound care, APRN.  Transferred to medical floor 9/19.  Urology consulted 9/21 and recommends consideration of suprapubic catheter placement though not a candidate for procedure under anesthesia.  Discussion of transfer to tertiary facility for IR capability considered, but recommends maintain Mario catheter to gravity.  Surgery consulted due to CT findings of pneumobilia, felt to be chronic with history of cholecystectomy and underwent ERCP stent placement by Dr. Zabala at [facility] 1/29/2019 with removal 4/24/2019 and replacement of stent on 5/17/2019.  No further intervention required.  Has been evaluated by therapy and noted to have bilateral lower extremity (knee) contractures, recommends return to SNF.  Patient was noted to remove Mario catheter with bulb intact and noted by nursing to have large clots on bed and gown.  Increased confusion and aggressing noted per nursing.  Refusing care and interventions.  Last known intake 9/19 half of boost per nursing.  Due to the Palliative Care Topics Discussed: palliative care, goals of care, care options, resuscitation status, [facility], and discharge options we will establish an advance care plan.   Advance Care Planning   Advance Care Planning Discussion: Spoke with patient's brother, Jitendra via telephone with regard to events this hospitalization and patient's overall decline despite aggressive care interventions.  We explored medical  "priorities and given patient's overall decline family would like to de-escalate care measures with focus of comfort \"just keep him comfortable\".  Jitendra verbalizes significant family history of Alzheimer's dementia and seems to have good understanding with regard to hospice expectations.  Based upon goals we will plan to complete a MOST document via email.  A hospice consult will be placed.  Questions encouraged and support given.  Family appreciative for conversation.    Review of Systems   Unable to perform ROS: Mental status change     Pain Assessment  Preferred Pain Scale: rFLACC (Revised Face, Legs, Activity, Crying, and Consolability Scale)  CPOT Facial Expression: 0-->relaxed, neutral  CPOT Body Movements: 0-->absence of movements  CPOT Muscle Tension: 0-->relaxed  Ventilator Compliance/Vocalization: 0-->talking in normal tone or no sound  CPOT Score: 0  PAINAD Breathin-->normal  PAINAD Negative Vocalization: 0-->none  PAINAD Facial Expression: 0-->smiling or inexpressive  PAINAD Body Language: 0-->relaxed  PAINAD Consolability: 0-->no need to console  PAINAD Score: 0  Pain Location: extremity    O:     Intake/Output Summary (Last 24 hours) at 2024 1120  Last data filed at 2024 0437  Gross per 24 hour   Intake --   Output 1100 ml   Net -1100 ml       Diagnostics and current medications: Reviewed.      Current Facility-Administered Medications:     acetaminophen (TYLENOL) tablet 650 mg, 650 mg, Oral, Q4H PRN, 650 mg at 24 0916 **OR** acetaminophen (TYLENOL) suppository 650 mg, 650 mg, Rectal, Q4H PRN, Kristen Sánchez MD    ascorbic acid (VITAMIN C) tablet 1,000 mg, 1,000 mg, Oral, Daily, Jagdish Courtney PA-C, 1,000 mg at 24 0937    sennosides-docusate (PERICOLACE) 8.6-50 MG per tablet 2 tablet, 2 tablet, Oral, BID, 2 tablet at 24 3386 **AND** polyethylene glycol (MIRALAX) packet 17 g, 17 g, Oral, Daily PRN, 17 g at 24 1015 **AND** bisacodyl (DULCOLAX) EC tablet 5 mg, 5 " mg, Oral, Daily PRN **AND** bisacodyl (DULCOLAX) suppository 10 mg, 10 mg, Rectal, Daily PRN, Kristen Sánchez MD    busPIRone (BUSPAR) tablet 10 mg, 10 mg, Oral, BID, Jagdish Courtney PA-C, 10 mg at 09/23/24 0937    Calcium Replacement - Follow Nurse / BPA Driven Protocol, , Does not apply, PRN, Kristen Sánchez MD    cetirizine (zyrTEC) tablet 10 mg, 10 mg, Oral, Daily, Jagdish Courtney PA-C, 10 mg at 09/23/24 0937    collagenase ointment 1 Application, 1 Application, Topical, Q12H, Yudelka Gonzalez, APRN, 1 Application at 09/23/24 0939    finasteride (PROSCAR) tablet 5 mg, 5 mg, Oral, Daily, Jagdish Courtney PA-C, 5 mg at 09/23/24 0937    fluticasone (FLONASE) 50 MCG/ACT nasal spray 2 spray, 2 spray, Nasal, Daily, Jagdish Courtney PA-C, 2 spray at 09/23/24 0939    guaiFENesin (MUCINEX) 12 hr tablet 600 mg, 600 mg, Oral, BID, Jagdish Courtney PA-C, 600 mg at 09/23/24 0937    heparin (porcine) 5000 UNIT/ML injection 5,000 Units, 5,000 Units, Subcutaneous, Q8H, Kristen Sánchez MD, 5,000 Units at 09/23/24 0517    lisinopril (PRINIVIL,ZESTRIL) tablet 10 mg, 10 mg, Oral, Q24H, 10 mg at 09/23/24 0937 **AND** hydroCHLOROthiazide tablet 12.5 mg, 12.5 mg, Oral, Q24H, Jagdish Courtney PA-C, 12.5 mg at 09/23/24 0937    lactobacillus acidophilus (RISAQUAD) capsule 1 capsule, 1 capsule, Oral, Daily, Jagdish Courtney PA-C, 1 capsule at 09/23/24 0937    levoFLOXacin (LEVAQUIN) tablet 750 mg, 750 mg, Oral, Q24H, Chato Boo DO, 750 mg at 09/23/24 0937    levothyroxine (SYNTHROID, LEVOTHROID) tablet 125 mcg, 125 mcg, Oral, Q AM, Jagdish Courtney PA-C, 125 mcg at 09/23/24 0517    Magnesium Standard Dose Replacement - Follow Nurse / BPA Driven Protocol, , Does not apply, PRN, Kristen Sánchez MD    metroNIDAZOLE (FLAGYL) tablet 500 mg, 500 mg, Oral, Q8H, Chato Boo DO, 500 mg at 09/23/24 0517    morphine concentrated solution 10 mg, 10 mg, Oral, Once, Celestine Guerin MD    multivitamin with minerals 1  tablet, 1 tablet, Oral, Daily, Kristen Sánchez MD, 1 tablet at 09/23/24 0937    mupirocin (BACTROBAN) 2 % ointment 1 Application, 1 Application, Topical, Q12H, Yudelka Gonzalez, APRN, 1 Application at 09/23/24 0938    nitroglycerin (NITROSTAT) SL tablet 0.4 mg, 0.4 mg, Sublingual, Q5 Min PRN, Kristen Sánchez MD    ondansetron (ZOFRAN) injection 4 mg, 4 mg, Intravenous, Q6H PRN, Kristen Sánchez MD, 4 mg at 09/19/24 2048    Phosphorus Replacement - Follow Nurse / BPA Driven Protocol, , Does not apply, PRN, Kristen Sánchez MD    potassium chloride (MICRO-K/KLOR-CON) CR capsule, 40 mEq, Oral, Once, Celestine Guerin MD    Potassium Replacement - Follow Nurse / BPA Driven Protocol, , Does not apply, PRN, Kristen Sánchez MD    sodium chloride 0.9 % flush 10 mL, 10 mL, Intravenous, Q12H, Kristen Sánchez MD, 10 mL at 09/22/24 0913    sodium chloride 0.9 % flush 10 mL, 10 mL, Intravenous, PRN, Kristen Sánchez MD    sodium chloride 0.9 % infusion 40 mL, 40 mL, Intravenous, PRN, Kristen Sánchez MD    sodium hypochlorite (DAKIN'S 1/4 STRENGTH) 0.125 % topical solution 0.125% solution, , Topical, Q12H, Yudelka Gonzalez, APRN, Given at 09/23/24 0939    terazosin (HYTRIN) capsule 1 mg, 1 mg, Oral, Nightly, Chato Boo, DO, 1 mg at 09/22/24 2149    tiZANidine (ZANAFLEX) tablet 4 mg, 4 mg, Oral, Q8H PRN, Jagdish Courtney PA-C    venlafaxine XR (EFFEXOR-XR) 24 hr capsule 150 mg, 150 mg, Oral, Daily, Jagdish Courtney PA-C, 150 mg at 09/23/24 0937    zinc sulfate (ZINCATE) capsule 220 mg, 220 mg, Oral, Daily, Kristen Sánchez MD, 220 mg at 09/23/24 0937    Allergies   Allergen Reactions    Meloxicam Hives     Dizzy, skin crawls    Tamsulosin Dizziness     excessive sweating    Penicillins      I have utilized all available immediate resources to obtain, update, or review the patient's current medications (including all prescriptions, over-the-counter products, herbals, cannabis/cannabidiol  "products, and vitamin/mineral/dietary (nutritional) supplements) for name, route of administration, type, dose and frequency.    A:    /53 (BP Location: Left arm, Patient Position: Lying)   Pulse 102   Temp 98.4 °F (36.9 °C) (Oral)   Resp 18   Ht 175.3 cm (69.02\")   Wt 62.2 kg (137 lb 1.6 oz)   SpO2 96%   BMI 20.24 kg/m²     Vitals and nursing note reviewed.   Constitutional:       Appearance: Cachectic and frail. Ill-appearing and chronically ill-appearing.   Eyes:      General: Lids are normal.   HENT:      Head: Normocephalic.   Pulmonary:      Effort: Pulmonary effort is normal.   Cardiovascular:      Tachycardia present.   Edema:     Peripheral edema absent.   Abdominal:      Palpations: Abdomen is soft.   Musculoskeletal:      Cervical back: Neck supple.      Comments: Contractures bilateral lower extremities-knees Skin:     General: Skin is warm.      Comments: Multiple pressure associated skin injury and wounds as documented   Genitourinary:     Comments: Incontinent  Neurological:      Mental Status: Alert. Confused.   Psychiatric:         Mood and Affect: Mood is anxious.         Cognition and Memory: Cognition is impaired.         Judgment: Judgment is impulsive.     Patient status: Disease state: No further treatment being pursued.  Functional status: Palliative Performance Scale Score: Performance 30% based on the following measures: Ambulation: Totally bed bound, Activity and Evidence of Disease: Unable to do any work, extensive evidence of disease, Self-Care: Total care required,  Intake: Reduced, LOC: Full, drowsy or confusion   Nutritional status: Albumin 2.3. Body mass index is 20.24 kg/m².  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 plan: Goals of care include CODE STATUS no CPR/comfort measures.    Family support: The patient lacks significant family support..  POA/Healthcare [id number], next of kin    2.  Palliative care encounter  -Previously seen by my colleague, CONCHITA Calderon and patient noted to have history of chronic back pain, COPD, GERD, memory deficits, neuropathy, umbilical hernia, former smoker, and urinary retention.  Noted to be noncompliant with appointments to cardiology and pulmonology to obtain surgery clearance per urology recommendations and with need for chronic indwelling catheter.  He had recent hospitalization 9/2-9/3 after being found down by caregiver and treated for sepsis, traumatic rhabdomyolysis, and gait instability, unfortunately left AMA.  Of particular note, patient was found to have UTI with Proteus mirabilis.  Apparently placed in nursing facility 1 week prior to this hospitalization.      -Transferred to critical care unit and required vasopressor support and IV antibiotics.    -unfortunately confused throughout hospitalization.    9/17-Underwent bedside debridement of left hip wound by wound care, APRN.    9/19-Transferred to medical floor.    9/21-Urology consulted and recommends  consideration of suprapubic catheter placement though not a candidate for procedure under anesthesia.  Discussion of transfer to tertiary facility for IR capability considered, but recommends maintain Mario catheter to gravity.    -Surgery consulted due to CT findings of pneumobilia, felt to be chronic with history of cholecystectomy and underwent ERCP stent placement by Dr. Zabala at [facility] 1/29/2019 with removal 4/24/2019 and replacement of stent on 5/17/2019.  No further intervention required.    -evaluated by therapy and noted to have bilateral lower extremity (knee) contractures, recommends return to SNF.    9/23-Patient was noted to remove Mario catheter with bulb intact and noted by nursing to have large clots on bed and gown.    -Increased confusion and aggression noted per nursing.    9/23-CODE STATUS changes no CPR/comfort measures  -Will plan to complete a MOST document via email  -Plan back to SNF with comfort measures.  Hospice consult has been placed.  Electronic communication to .  Discussed case with nursing.    3.  Comfort measures  -We will discontinue any treatments and adjuvants not in line with comfort  -May continue oral/home medications if able to safely swallow and willing to do so  -Will add morphine concentrate scheduled every 6 hours for comfort as patient has multiple chronic wounds, erosion of penis, and contractures.  -Palliative adjuvants as needed      Thank you for allowing us to participate in patient's plan of care. Palliative Care Team will continue to follow patient.     CONCHITA Small  9/23/2024  11:20 CDT     Electronically signed by Hermila Ramirez APRN at 09/23/24 4672       Celestine Guerin MD at 09/22/24 5108              [facility] Medicine Services  INPATIENT PROGRESS NOTE    Patient Name: Jagdish Cook  Date of Admission: 9/16/2024  Today's Date: 09/22/24  Length of Stay: 6  Primary Care Physician: Aidan Shields,  MD    Subjective   Chief Complaint: f/u sepsis, uti  Altered Mental Status    Today  Patient has no new complaint      Review of Systems   Unable to fully assess to patient factors    Objective    Temp:  [97.7 °F (36.5 °C)-98.7 °F (37.1 °C)] 97.7 °F (36.5 °C)  Heart Rate:  [] 93  Resp:  [18] 18  BP: ()/(51-66) 129/66  Physical Exam  GEN: Awake, alert, interactive, in NAD. Confused.  HEENT: PERRLA, EOMI, Anicteric, Trachea midline  Lungs:  no wheezing/rales/rhonchi  Heart: RRR, +S1/s2, no rub  ABD: ventral hernia, reducible, soft, nd, +BS, no guarding/rebound  Extremities: atraumatic, no cyanosis, no pitting edema  Skin: L hip wound, some eschar around penile meatus   Neuro: AAOx1, no obvious focal deficits      Results Review:  I have reviewed the labs, radiology results, and diagnostic studies.    Laboratory Data:   Results from last 7 days   Lab Units 09/22/24  0444 09/21/24  0612 09/20/24  0515   WBC 10*3/mm3 10.77 12.09* 11.38*   HEMOGLOBIN g/dL 12.6* 13.4 12.6*   HEMATOCRIT % 38.8 41.3 40.7   PLATELETS 10*3/mm3 212 224 206        Results from last 7 days   Lab Units 09/22/24  0444 09/21/24  0612 09/20/24  0515   SODIUM mmol/L 139 137 141   POTASSIUM mmol/L 3.2* 3.5 4.8   CHLORIDE mmol/L 97* 99 107   CO2 mmol/L 29.0 31.0* 28.0   BUN mg/dL 13 14 20   CREATININE mg/dL 0.63* 0.69* 0.62*   CALCIUM mg/dL 8.5* 8.6 8.3*   BILIRUBIN mg/dL 0.5 0.5 0.4   ALK PHOS U/L 98 100 85   ALT (SGPT) U/L 18 20 19   AST (SGOT) U/L 27 31 27   GLUCOSE mg/dL 87 93 113*       Culture Data:   Blood Culture   Date Value Ref Range Status   09/16/2024 No growth at 3 days  Preliminary   09/16/2024 No growth at 3 days  Preliminary     Urine Culture   Date Value Ref Range Status   09/16/2024 >100,000 CFU/mL Enterococcus faecalis (A)  Final   09/16/2024 >100,000 CFU/mL Corynebacterium species (A)  Final     Comment:       Based on laboratory diagnosis criteria, these organisms are common urogenital commensal rolando and have not been  associated with urinary tract infections; clinical correlation is recommended.       Radiology Data:   Imaging Results (Last 24 Hours)       ** No results found for the last 24 hours. **            I have reviewed the patient's current medications.     Assessment/Plan   Assessment  Active Hospital Problems    Diagnosis     **Shock     Hypomagnesemia     RYNE (acute kidney injury)     Urinary tract infection associated with indwelling urethral catheter     Chronic indwelling Mario catheter     Hypokalemia     Primary hypertension        Treatment Plan  #Sepsis -documented as septic shock on arrival per ICU staff and team.  Suspected urinary tract infection in the setting of chronic Mario catheter per ICU notation.  Mario catheter was exchanged in the ER.  He was on pressors but now off doing better.  Tolerating blood pressure meds.    Blood cultures negative.  Urine culture grew Enterococcus and Corynebacterium.  Patient was on amoxicillin, changed to macrobid yesterday. Noted in my chart review other potential issues as well including PNA. Pt has skin wounds but seem to be improving. Dry necrosis at penile meatus with wound. KUB showed concern for pneumobilia. Sent for CT and concern as above with air in biliary tree concerning for infection vs reservoir/sphincterotomy effect. Unfortunately can not find any interventions in our records, he is unable to reliably tell me if he has had them at this time. I do not suspect gas infection in liver given abx he was on prior and overall stability at this point. Will change abx to levaquin and flagyl to cover his potential multitude of issues including urine, wounds, pna, biliary try. Will ask General surgery and urology to evaluate.    #UTI -Enterococcus on urinalysis.  Patient was started on amoxicillin but has noted allergy and has some concern.  Changed to levaquin for broader coverage of other infections.    #Pneumobilia on CT  General Surgery was consulted and after  evaluation and review of prior records determined it was chronic and therefore no surgical intervention.    #RYNE -in the setting of #1.  Resolved.  Renal function normalized.    #Penile/hip wound -continue wound care  Urology was consulted with the following recommendations-  Maintain ewing to gravity. Bacitracin ointment to skin to help promote healing. Keep ewing off of traction (better secured to prevent pulling). Antibiotics per hospitalist. Follow up monthly with Dr. Mittal for ewing changes, or any potential further plans. While an SP tube would be ideal in a situation like this, he is not currently a candidate for such a procedure under anesthesia, and it is not clear that he could be cleared for that, but could be pursued by Dr. Mittal if so desired. Otherwise, transport to a facility with IR capability for SP placement under IR could be considered as an outpatient.    #Abdominal pain/nausea -Does have large ventral hernia but easily reducible and not incarcerated. Patient with poor appetite.  KUB and ct concerning for biliary air... doubt active gas infection. Will ask surgery to see as above. Levaquin and flagyl    #Hypertension - was hypotensive when septic, resolved, tolerating home HCTZ and lisinopril now that his sepsis has resolved.    #Hypokalemia -replaced and improved.    #Hypomagnesemia - replaced improved    Medical Decision Making  Number and Complexity of problems: 5+ acute, multiple chronic.  High medical complexity.   Differential Diagnosis: As above    Conditions and Status        Hard to get a clear story from.  Confused. Does not seem toxic.     MDM Data  External documents reviewed: None  Cardiac tracing (EKG, telemetry) interpretation: None  Radiology interpretation: Reports reviewed  Labs reviewed: As above  Any tests that were considered but not ordered: None     Decision rules/scores evaluated (example [id number], Wells, etc): None     Discussed with: Patient, dietitian, nursing  staff     Care Planning  Shared decision making: Patient apprised of current labs, vitals, imaging and treatment plan.  They are agreeable with proceeding with plans as discussed.    Code status and discussions: dnr/dni    Disposition  Social Determinants of Health that impact treatment or disposition: from facility  I expect the patient to be discharged to back to nursing facility when appropriate. Unclear at this time. Will f/u with consultants.     Electronically signed by Celestine Guerin MD, 09/22/24, 17:25 CDT.        Electronically signed by Celestine Guerin MD at 09/22/24 1735          Consult Notes (last 4 days)        Rin Hameed MD at 09/21/24 1452        Consult Orders    1. Inpatient General Surgery Consult [[id number]] ordered by Chato Boo DO at 09/21/24 1004                 Rin Hameed MD Consult Note - General Surgery     Referring Provider: No ref. provider found    Patient Care Team:  Aidan Shields MD as PCP - General (Family Medicine)    Chief complaint CT findings of pneumobilia     Subjective .     History of present illness:  Mr. Cook is a 73 year old male recently admitted with urosepsis to the ICU. He has since improved, is now stable, and is on the floor. He had a KUB which showed pneumobilia and CT confirmed this finding so I was consulted. He is s/p cholecystectomy. He is not cooperative in my questioning but does state this abdominal pain and low appetite are chronic. He is a former smoker and not on blood thinners at baseline. He cannot give me his whole surgical history.     Review of Systems  Unable to obtain as patient is not cooperative.       History  Past Medical History:   Diagnosis Date    Bacteremia     Chronic back pain     COPD (chronic obstructive pulmonary disease)     Mario catheter in place     GERD (gastroesophageal reflux disease)     Memory deficits     Neuropathy     Umbilical hernia     Urinary retention    ,   Past Surgical  History:   Procedure Laterality Date    BACK SURGERY      HERNIA REPAIR     ,   Family History   Problem Relation Age of Onset    No Known Problems Father     No Known Problems Mother    ,   Social History     Tobacco Use    Smoking status: Former     Current packs/day: 0.00     Types: Cigars, Cigarettes     Start date:      Quit date:      Years since quittin.7     Passive exposure: Past    Smokeless tobacco: Never   Vaping Use    Vaping status: Never Used   Substance Use Topics    Alcohol use: No    Drug use: Never   ,   Medications Prior to Admission   Medication Sig Dispense Refill Last Dose    alfuzosin (UROXATRAL) 10 MG 24 hr tablet Take 1 tablet by mouth every night at bedtime.       busPIRone (BUSPAR) 10 MG tablet Take 1 tablet by mouth 2 (Two) Times a Day. 60 tablet 5     ciprofloxacin (Ciloxan) 0.3 % ophthalmic solution Administer 1 drop to both eyes 4 (Four) Times a Day. (Patient taking differently: Administer 1 drop to both eyes 4 (Four) Times a Day. X10 days (started on (2024)) 2.5 mL 0     finasteride (PROSCAR) 5 MG tablet Take 1 tablet by mouth Daily.       fluticasone (FLONASE) 50 MCG/ACT nasal spray Administer 2 sprays into the nostril(s) as directed by provider Daily. Alternate nostrils       guaiFENesin (MUCINEX) 600 MG 12 hr tablet Take 1 tablet by mouth 2 (Two) Times a Day. X10 days (started on 2024)       ipratropium-albuterol (COMBIVENT RESPIMAT)  MCG/ACT inhaler Inhale 1 puff Daily.       levothyroxine (SYNTHROID, LEVOTHROID) 125 MCG tablet Take 1 tablet by mouth Daily.       lisinopril-hydrochlorothiazide (PRINZIDE,ZESTORETIC) 10-12.5 MG per tablet Take 1 tablet by mouth Daily. 30 tablet 5     loratadine (Claritin) 10 MG tablet Take 1 tablet by mouth Daily. 30 tablet 0     multivitamin with minerals tablet tablet Take 1 tablet by mouth Daily.       potassium chloride (KLOR-CON M20) 20 MEQ CR tablet Take 1 tablet by mouth Daily.       venlafaxine XR (EFFEXOR-XR)  150 MG 24 hr capsule Take 1 capsule by mouth Daily.       Vitamin D, Ergocalciferol, 50 MCG (2000 UT) capsule Take 1 capsule by mouth Daily.       acetaminophen (TYLENOL) 325 MG tablet Take 2 tablets by mouth Every 4 (Four) Hours As Needed for Fever or Mild Pain.       ascorbic acid (VITAMIN C) 500 MG tablet Take 2 tablets by mouth Daily.       bisacodyl (DULCOLAX) 10 MG suppository Insert 1 suppository into the rectum Daily As Needed for Constipation.       HYDROcodone-acetaminophen (Norco) 5-325 MG per tablet Take 1 tablet by mouth Every 12 (Twelve) Hours As Needed for Severe Pain. 46 tablet 0     magnesium hydroxide (MILK OF MAGNESIA) 400 MG/5ML suspension Take 30 mL by mouth Daily As Needed for Constipation (if no BM in 3 days).       Naloxone HCl 2 MG/2ML Prefilled Syringe Kit Inject 1 mL into the appropriate muscle as directed by prescriber As Needed (for opioid overdose give 2 nd dose for failure to respond to first dose).       ondansetron (ZOFRAN) 4 MG tablet Take 1 tablet by mouth Every 6 (Six) Hours As Needed for Nausea.       probiotic (CULTURELLE) capsule capsule Take 1 capsule by mouth Daily.       tiZANidine (ZANAFLEX) 4 MG tablet Take 1 tablet by mouth Every 8 (Eight) Hours As Needed for Muscle Spasms.       Zinc 50 MG tablet Take 1 tablet by mouth Daily.       and Allergies:  Meloxicam, Tamsulosin, and Penicillins    Current Facility-Administered Medications:     acetaminophen (TYLENOL) tablet 650 mg, 650 mg, Oral, Q4H PRN, 650 mg at 09/19/24 1514 **OR** acetaminophen (TYLENOL) suppository 650 mg, 650 mg, Rectal, Q4H PRN, Kristen Sánchez MD    ascorbic acid (VITAMIN C) tablet 1,000 mg, 1,000 mg, Oral, Daily, Jagdish Courtney PA-C, 1,000 mg at 09/21/24 0921    sennosides-docusate (PERICOLACE) 8.6-50 MG per tablet 2 tablet, 2 tablet, Oral, BID, 2 tablet at 09/21/24 0921 **AND** polyethylene glycol (MIRALAX) packet 17 g, 17 g, Oral, Daily PRN, 17 g at 09/18/24 1015 **AND** bisacodyl (DULCOLAX) EC  tablet 5 mg, 5 mg, Oral, Daily PRN **AND** bisacodyl (DULCOLAX) suppository 10 mg, 10 mg, Rectal, Daily PRN, Kristen Sánchez MD    busPIRone (BUSPAR) tablet 10 mg, 10 mg, Oral, BID, Jagdish Courtney PA-C, 10 mg at 09/21/24 0921    Calcium Replacement - Follow Nurse / BPA Driven Protocol, , Does not apply, PRN, Kristen Sánchez MD    cetirizine (zyrTEC) tablet 10 mg, 10 mg, Oral, Daily, Jagdish Courtney PA-C, 10 mg at 09/21/24 0921    collagenase ointment 1 Application, 1 Application, Topical, Q12H, Yudelka Gonzalez APRN, 1 Application at 09/21/24 0922    finasteride (PROSCAR) tablet 5 mg, 5 mg, Oral, Daily, Jagdish Courtney PA-C, 5 mg at 09/21/24 0921    fluticasone (FLONASE) 50 MCG/ACT nasal spray 2 spray, 2 spray, Nasal, Daily, Jagdish Courtney PA-C, 2 spray at 09/21/24 0922    guaiFENesin (MUCINEX) 12 hr tablet 600 mg, 600 mg, Oral, BID, Jagdish Courtney PA-C, 600 mg at 09/21/24 0921    heparin (porcine) 5000 UNIT/ML injection 5,000 Units, 5,000 Units, Subcutaneous, Q8H, Kristen Sánchez MD, 5,000 Units at 09/21/24 1325    lisinopril (PRINIVIL,ZESTRIL) tablet 10 mg, 10 mg, Oral, Q24H, 10 mg at 09/21/24 0921 **AND** hydroCHLOROthiazide tablet 12.5 mg, 12.5 mg, Oral, Q24H, Jagdish Courtney PA-C, 12.5 mg at 09/21/24 0921    lactobacillus acidophilus (RISAQUAD) capsule 1 capsule, 1 capsule, Oral, Daily, Jagdish Courtney PA-C, 1 capsule at 09/21/24 0936    levoFLOXacin (LEVAQUIN) tablet 750 mg, 750 mg, Oral, Q24H, Chato Boo DO, 750 mg at 09/21/24 0937    levothyroxine (SYNTHROID, LEVOTHROID) tablet 125 mcg, 125 mcg, Oral, Q AM, Jagdish Courtney PA-C, 125 mcg at 09/21/24 0630    Magnesium Standard Dose Replacement - Follow Nurse / BPA Driven Protocol, , Does not apply, PRN, Kristen Sánchez MD    metroNIDAZOLE (FLAGYL) tablet 500 mg, 500 mg, Oral, Q8H, Chato Boo DO, 500 mg at 09/21/24 0937    multivitamin with minerals 1 tablet, 1 tablet, Oral, Daily, Kristen Sánchez MD, 1 tablet at  09/21/24 0922    mupirocin (BACTROBAN) 2 % ointment 1 Application, 1 Application, Topical, Q12H, Yudelka Gonzalez, APRN, 1 Application at 09/21/24 0922    nitroglycerin (NITROSTAT) SL tablet 0.4 mg, 0.4 mg, Sublingual, Q5 Min PRN, Kristen Sánchez MD    ondansetron (ZOFRAN) injection 4 mg, 4 mg, Intravenous, Q6H PRN, Kristen Sánchez MD, 4 mg at 09/19/24 2048    Phosphorus Replacement - Follow Nurse / BPA Driven Protocol, , Does not apply, PRN, Kristen Sánchez MD    Potassium Replacement - Follow Nurse / BPA Driven Protocol, , Does not apply, PRN, Kristen áSnchez MD    sodium chloride 0.9 % flush 10 mL, 10 mL, Intravenous, Q12H, Kristen Sánchez MD, 10 mL at 09/20/24 0907    sodium chloride 0.9 % flush 10 mL, 10 mL, Intravenous, PRN, Kristen Sánchez MD    sodium chloride 0.9 % infusion 40 mL, 40 mL, Intravenous, PRN, Kristen Sánchez MD    sodium hypochlorite (DAKIN'S 1/4 STRENGTH) 0.125 % topical solution 0.125% solution, , Topical, Q12H, Yudelka Gonzalez APRN, Given at 09/21/24 0922    terazosin (HYTRIN) capsule 1 mg, 1 mg, Oral, Nightly, Chato Boo DO    tiZANidine (ZANAFLEX) tablet 4 mg, 4 mg, Oral, Q8H PRN, Jagdish Courtney PA-C    venlafaxine XR (EFFEXOR-XR) 24 hr capsule 150 mg, 150 mg, Oral, Daily, Jagdish Courtney PA-C, 150 mg at 09/21/24 0921    zinc sulfate (ZINCATE) capsule 220 mg, 220 mg, Oral, Daily, Kristen Sánchez MD, 220 mg at 09/21/24 0921    Objective     Vital Signs   Temp:  [97.6 °F (36.4 °C)-98.3 °F (36.8 °C)] 97.6 °F (36.4 °C)  Heart Rate:  [76-94] 80  Resp:  [18] 18  BP: (111-144)/(60-73) 144/72    Physical Exam:  General appearance - chronically ill appearing  Neck - supple, no significant adenopathy  Heart - normal rate   Abdomen - soft, non-tender when distracted, reducible umbilical hernia     Results Review:    Lab Results (last 24 hours)       Procedure Component Value Units Date/Time    Blood Culture - Blood, Hand, Right [[id number]]  (Normal)  Collected: 09/16/24 1339    Specimen: Blood from Hand, Right Updated: 09/21/24 1401     Blood Culture No growth at 5 days    Blood Culture - Blood, Hand, Left [[id number]]  (Normal) Collected: 09/16/24 1339    Specimen: Blood from Hand, Left Updated: 09/21/24 1401     Blood Culture No growth at 5 days    Magnesium [[id number]]  (Normal) Collected: 09/21/24 0612    Specimen: Blood Updated: 09/21/24 0701     Magnesium 1.6 mg/dL     CBC & Differential [[id number]]  (Abnormal) Collected: 09/21/24 0612    Specimen: Blood Updated: 09/21/24 0645    Narrative:      The following orders were created for panel order CBC & Differential.  Procedure                               Abnormality         Status                     ---------                               -----------         ------                     CBC Auto Differential[[id number]]        Abnormal            Final result                 Please view results for these tests on the individual orders.    CBC Auto Differential [[id number]]  (Abnormal) Collected: 09/21/24 0612    Specimen: Blood Updated: 09/21/24 0645     WBC 12.09 10*3/mm3      RBC 4.41 10*6/mm3      Hemoglobin 13.4 g/dL      Hematocrit 41.3 %      MCV 93.7 fL      MCH 30.4 pg      MCHC 32.4 g/dL      RDW 13.4 %      RDW-SD 46.1 fl      MPV 9.6 fL      Platelets 224 10*3/mm3     Manual Differential [[id number]]  (Abnormal) Collected: 09/21/24 0612    Specimen: Blood Updated: 09/21/24 0645     Neutrophil % 84.5 %      Lymphocyte % 6.8 %      Monocyte % 5.8 %      Eosinophil % 0.0 %      Basophil % 0.0 %      Bands %  1.9 %      Atypical Lymphocyte % 1.0 %      Neutrophils Absolute 10.45 10*3/mm3      Lymphocytes Absolute 0.94 10*3/mm3      Monocytes Absolute 0.70 10*3/mm3      Eosinophils Absolute 0.00 10*3/mm3      Basophils Absolute 0.00 10*3/mm3      Poikilocytes Slight/1+     WBC Morphology Normal     Platelet Morphology Normal    Comprehensive Metabolic Panel [[id number]]  (Abnormal) Collected: 09/21/24 0612     Specimen: Blood Updated: 09/21/24 0641     Glucose 93 mg/dL      BUN 14 mg/dL      Creatinine 0.69 mg/dL      Sodium 137 mmol/L      Potassium 3.5 mmol/L      Chloride 99 mmol/L      CO2 31.0 mmol/L      Calcium 8.6 mg/dL      Total Protein 5.1 g/dL      Albumin 2.3 g/dL      ALT (SGPT) 20 U/L      AST (SGOT) 31 U/L      Alkaline Phosphatase 100 U/L      Total Bilirubin 0.5 mg/dL      Globulin 2.8 gm/dL      A/G Ratio 0.8 g/dL      BUN/Creatinine Ratio 20.3     Anion Gap 7.0 mmol/L      eGFR 96.5 mL/min/1.73     Narrative:      GFR Normal >60  Chronic Kidney Disease <60  Kidney Failure <15    The GFR formula is only valid for adults with stable renal function between ages 18 and 70.          Imaging Results (Last 24 Hours)       Procedure Component Value Units Date/Time    CT Abdomen Pelvis With Contrast [[id number]] Collected: 09/20/24 1730     Updated: 09/20/24 1742    Narrative:      EXAMINATION:  CT ABDOMEN PELVIS W CONTRAST-  9/20/2024 3:46 PM     HISTORY: Sepsis and abd pain, concern for pneumobilia on xray, please  r/o acute path; R57.9-Shock, unspecified; N17.9-Acute kidney failure,  unspecified; J18.9-Pneumonia, unspecified organism; E87.20-Acidosis,  unspecified; L08.9-Local infection of the skin and subcutaneous tissue,  unspecified; Z74.09-Other reduced mobility.     TECHNIQUE: Spiral CT was performed of the abdomen and pelvis with IV  contrast. Multiplanar images were reconstructed.     DLP: 596.95 mGy.cm Automated dosage reduction technique was utilized to  reduce patient dose.     COMPARISON: CT pelvis on 9/2/2024.     LUNG BASES: There are small pleural effusions in both lung bases. There  are parenchymal infiltrates in both lung bases.     LIVER AND SPLEEN: There is air within left hepatic lobe biliary ductal  branches. There is air within the common hepatic duct. The common bile  duct measures up to 1.3 cm. There has been prior cholecystectomy. No  focal liver lesion is seen. The spleen is normal  in size.     PANCREAS: No pancreatic mass or inflammatory changes seen. There is a  duodenal diverticulum adjacent to the head of the pancreas.     KIDNEYS AND ADRENALS: There is mild thickening of the adrenal glands.  There are bilateral renal cysts. There are greater number of cysts  within the left kidney. The ureters are nondilated. There is a Mario  catheter in the bladder. There is thickening of the bladder wall. There  are bladder diverticula. The prostate is enlarged measuring 5.3 cm.     BOWEL: There is under distention of the stomach. There is thickening of  the gastric wall. There is a small hiatal hernia. Small bowel loops are  nondilated. There is moderate stool in the colon.     OTHER: There is an umbilical hernia containing peritoneal fat and small  bowel measuring 10.1 cm transversely. The opening through the anterior  abdominal wall measures 2.7 cm. The small bowel is nondilated. There is  atheromatous disease of the aortoiliac vessels. There are small  retroperitoneal lymph nodes. There is body wall edema. There are  degenerative changes of the spine and bilateral hips.          Impression:      1. Small bilateral pleural effusions. Infiltrates in both lung bases may  represent pneumonia and/or atelectasis. There is cardiomegaly. There is  body wall edema.  2. There is air in the biliary tree. There is air in the common hepatic  duct. The common bile duct measures up to 1.3 cm. This could represent  reservoir effect as there has been prior cholecystectomy. The air in the  biliary tree may be related to prior sphincterotomy. If no prior  sphincterotomy, gas-forming infection would also be included in the  differential.  3. Gastric wall thickening is probably an artifact of under distention.  There is a small hiatal hernia. No small bowel obstruction is seen.  There is diverticulosis of the colon with no CT findings of  diverticulitis.  4. Umbilical hernia containing small bowel and peritoneal fat  measuring  10.1 cm transversely. The small bowel is nondilated suggesting the  absence of obstruction. The opening through the anterior abdominal wall  measures 2.7 cm.  5. Atheromatous disease of the aortoiliac vessels. Degenerative changes  of the spine and bilateral hips.     This report was signed and finalized on 9/20/2024 5:39 PM by Dr. Frantz Cardenas MD.       XR Abdomen KUB [[id number]] Collected: 09/20/24 1443     Updated: 09/20/24 1454    Narrative:      EXAMINATION: XR ABDOMEN KUB- 9/20/2024 2:43 PM     HISTORY: abd pain, nausea; R57.9-Shock, unspecified; N17.9-Acute kidney  failure, unspecified; J18.9-Pneumonia, unspecified organism;  E87.20-Acidosis, unspecified; L08.9-Local infection of the skin and  subcutaneous tissue, unspecified; Z74.09-Other reduced mobility.     REPORT: Supine imaging of the abdomen was performed.     COMPARISON: There are no correlative imaging studies for comparison.     There is mild respiratory motion artifact. Gas is seen within portions  of the colon and small intestine in a nonspecific pattern, no convincing  evidence of bowel obstruction. Early changes of ileus are not excluded.  There is questionable pneumobilia versus portal venous gas over the  liver. Cholecystectomy clips are present. There are opacities in both  lung bases favored to represent atelectasis with or without scarring.  Advanced degenerative changes are seen throughout the lumbar spine.  There is advanced osteoarthritis of the left hip. Mario catheter is  present.       Impression:      The bowel gas pattern is nonobstructive, early changes of  ileus are not excluded. Questionable trace branching gas within the  upper liver, pneumobilia versus portal venous gas. Consider further  evaluation with CT of the abdomen and pelvis.     This report was signed and finalized on 9/20/2024 2:51 PM by Dr. Servando Segura MD.                     Assessment & Plan     Pneumobilia   Umbilical hernia     Mr. Cook is  a 73 year old male admitted with urosepsis which has improved.     (1) Pneumobilia - CT from  at [facility] with the same finding. This is a chronic finding likely 2/2 his history of cholecystectomy. On review of records in care everywhere, he has a documented ERCP with stent placement by Dr. Zabala at [facility] on 19; he had subsequent removal on 19 and then replacement of stent on 19. On 19 he had a lithotripsy and stent removal. This explains his pneumobilia which is documented as chronic by prior imaging. No intervention indicated.     (2) Umbilical hernia - This is a recurrent hernia as it was previously fixed x 2 by Dr. Alvarez at [facility]. It is reducible. Recommend follow up with [facility] general surgery on discharge as he is being followed there and most recently saw JES Dejesus for this. No urgent surgical intervention indicated as it is reducible.     This is 2 chronic problems. I have reviewed the CT from this admission, CT from , notes from Ajith Flores in care everywhere, and op notes in care everywhere.     Rin Hameed MD  24  14:52 CDT              Electronically signed by Rin Hameed MD at 24 0922       Jagdish Rendon MD at 24 1248        Consult Orders    1. Inpatient Urology Consult [[id number]] ordered by Chato Boo DO at 24 1015                   [facility]   Consult Note    Patient Name: Jagdish Cook  : 1950  MRN: [id number]  Primary Care Physician:  Aidan Shields MD  Referring Physician: No ref. provider found  Date of admission: 2024    Subjective   Subjective     Reason for Consult/ Chief Complaint: Mario catheter induced erosion    HPI:  Jagdish Cook is a 73 y.o. male with a urologic history of urinary retention and phimosis, followed as an outpatient by Dr. Brennen Mittal. He has been medically unable to undergo evaluation and treatment for his outflow obstruction and thus has been catheter  dependent since 2/17/2024. Similarly, he has been medically unable to undergo circumcision or dorsal slit for his phimosis, mainly due to inability to position and perform the procedure under local anesthesia. He was admitted to [facility] on 9/16/2024 with sepsis felt to be of a urologic origin. His ewing was changed in the ER. CT shows bilateral renal cysts, no hydronephrosis or stones, bladder wall thickening and bladder diverticuli. Urine culture has grown Enterococcus and Corynebacterium. He has been clinically improving and is off of pressors for the past few days.   Urology is consulted due to concern about erosion related to the ewing catheter. He has no penile pain.     Review of Systems   Review of systems could not be obtained due to   patient confusion.    Personal History     Past Medical History:   Diagnosis Date    Bacteremia     Chronic back pain     COPD (chronic obstructive pulmonary disease)     Ewing catheter in place     GERD (gastroesophageal reflux disease)     Memory deficits     Neuropathy     Umbilical hernia     Urinary retention        Past Surgical History:   Procedure Laterality Date    BACK SURGERY      HERNIA REPAIR         Family History: family history includes No Known Problems in his father and mother. Otherwise pertinent FHx was reviewed and not pertinent to current issue.    Social History:  reports that he quit smoking about 20 months ago. His smoking use included cigars and cigarettes. He started smoking about 49 years ago. He has been exposed to tobacco smoke. He has never used smokeless tobacco. He reports that he does not drink alcohol and does not use drugs.    Home Medications:  HYDROcodone-acetaminophen, Naloxone HCl, Vitamin D (Ergocalciferol), Zinc, acetaminophen, alfuzosin, ascorbic acid, bisacodyl, busPIRone, ciprofloxacin, finasteride, fluticasone, guaiFENesin, ipratropium-albuterol, levothyroxine, lisinopril-hydrochlorothiazide, loratadine, magnesium  hydroxide, multivitamin with minerals, ondansetron, potassium chloride, probiotic, tiZANidine, and venlafaxine XR    Allergies:  Allergies   Allergen Reactions    Meloxicam Hives     Dizzy, skin crawls    Tamsulosin Dizziness     excessive sweating    Penicillins        Objective    Objective     Vitals:   Temp:  [97.6 °F (36.4 °C)-98.3 °F (36.8 °C)] 97.6 °F (36.4 °C)  Heart Rate:  [76-94] 80  Resp:  [18] 18  BP: (111-144)/(60-73) 144/72    Physical Exam:   Constitutional: Awake, alert   Eyes: PERRLA, sclerae anicteric, no conjunctival injection   HENT: NCAT, mucous membranes moist   Neck: Supple, no thyromegaly, no lymphadenopathy, trachea midline   Respiratory: Clear to auscultation bilaterally, nonlabored respirations    Cardiovascular: RRR, no murmurs, rubs, or gallops, palpable pedal pulses bilaterally   Gastrointestinal: Positive bowel sounds, soft, nontender, nondistended   Musculoskeletal: No bilateral ankle edema, no clubbing or cyanosis to extremities   Psychiatric: Appropriate affect, cooperative   Neurologic: Oriented x 3, strength symmetric in all extremities, Cranial Nerves grossly intact to confrontation, speech clear   Skin: No rashes    Genitourinary: Ewing intact, but not well secured. Urine clear yellow in ewing tubing. Mild venral erosion of foreskin noted without purulence or erythema. 2 scabs on dorsal foreskin noted without signs of gangrene or infection. Glans is not visible.    Result Review    Result Review:  I have personally reviewed the results from the time of this admission to 9/21/2024 12:48 CDT and agree with these findings:  [x]  Laboratory list / accordion  [x]  Microbiology  [x]  Radiology  []  EKG/Telemetry   []  Cardiology/Vascular   []  Pathology  [x]  Old records  []  Other:  Most notable findings include: History and physical as above      Assessment & Plan   Assessment / Plan     Brief Patient Summary:  Jagdish Cook is a 73 y.o. male who requires chronic catheterization  due to inability to void or be cleared for surgical intervention. He has catheter related erosion of the prepuce. Glans cannot be visualized to comment on his urethral meatus.    Active Hospital Problems:  Active Hospital Problems    Diagnosis     **Shock     Hypomagnesemia     RYNE (acute kidney injury)     Urinary tract infection associated with indwelling urethral catheter     Chronic indwelling Ewing catheter     Hypokalemia     Primary hypertension      Plan: Maintain ewing to gravity. Bacitracin ointment to skin to help promote healing. Keep ewing off of traction (better secured to prevent pulling). Antibiotics per hospitalist. Follow up monthly with Dr. Mittal for ewing changes, or any potential further plans. While an SP tube would be ideal in a situation like this, he is not currently a candidate for such a procedure under anesthesia, and it is not clear that he could be cleared for that, but could be pursued by Dr. Mittal if so desired. Otherwise, transport to a facility with IR capability for SP placement under IR could be considered as an outpatient.   Available as needed.   I spent 40 minutes on this consultation with at least 50% spent in direct face-to-face visit with the patient, review of records, and coordination of care.    Jagdish Rendon MD    Electronically signed by Jagdish Rendon MD at 09/21/24 1306            Inpatient Hospice / [facility] Consult [[id number]] (Order [id number])  Order  Date: 9/23/2024 Department: [address] Ordering/Authorizing: Hermila Ramirez APRN     Standing Order Information    Remaining Occurrences Interval Last Released     0/1 Once 9/23/2024              Order History  Inpatient  Date/Time Action Taken User Additional Information   09/23/24 1214 Sign Hermila Ramirez APRN    09/23/24 1214 Release Instance Hermila Ramirez APRN (auto-released) Released Order:[id number]   09/23/24 1220 Acknowledge Luiz Ott, OSMANI New Order     Order  Details    Frequency Duration Priority Order Class   Once 1  occurrence Routine Hospital Performed       Comments    Plan SNF with hospice              Order Questions    Question Answer   Reason for Consult? Outpatient Hospice              Source Order Set / Preference List    Preference List   [facility] FACILITY CONSULTS [[id number]]     Process Instructions    ADITHYA: Please contact assigned  or  to your unit for further assistance.    [place]CKENZ[place]:  Please notify [facility] Department of the Hospice consult order at ext. [phone] and on Weekends and Holidays, call [facility] at [phone]    [place]:  Please call Hospice consults/concerns to hospice liaison, ext [phone].. On weekends/holidays please contact the inpatient hospice nurse, ext [phone].    [place]:  Please call [facility] at [phone]    [place]:  Please call the  for your unit. For after hours, contact the hospital .                 Consult Order Info    ID Description Priority Start Date Start Time   [id number] Inpatient Hospice / [facility] Consult Routine 09/23/2024 12:15 PM   Provider Specialty Referred to   Hospice and Palliative Medicine _____________________________________                           Acknowledgement Info    For At Acknowledged By Acknowledged On   Placing Order 09/23/24 1214 Luiz Ott RN 09/23/24 1220             Reprint Order Requisition    Inpatient Hospice / [facility] Consult (Order #[id number]) on 9/23/24       Encounter    View Encounter                  Order Provider Info        Office phone Pager E-mail   Ordering User  Hermila Ramirez APRN  [phone] -- --   Authorizing Provider  Hermila Ramirez APRN  [phone] -- --   Attending Provider  Celestine Guerin MD  [phone] -- --     Tracking Reports    Cosign Tracking Order Transmittal Tracking

## 2024-09-23 NOTE — SIGNIFICANT NOTE
Upon shift change, it was noted that patient had removed ewing catheter with bulb still intact. Large clots noted on bed and on pt gown. Cleaned pt up. Pt penis still bleeding but has slowed down significantly. Pt very aggressive this morning and wants to be left alone. I attempted x3 to notify urology group on pt status. Will cont to try and contact office.    Luiz Ott RN 9/23/2024 07:55 CDT

## 2024-09-23 NOTE — CASE MANAGEMENT/SOCIAL WORK
Continued Stay Note  [facility]     Patient Name: Jagdish Cook  MRN: [id number]  Today's Date: 9/23/2024    Admit Date: 9/16/2024    Plan: SNF   Discharge Plan       Row Name 09/23/24 1422       Plan    Plan Comments SW has spoken with Bárbara Browning of [facility]. She has advised that she does not have an admission appointment available until Wednesday morning, but she would admit him sooner if an appointment time opens up. FANTA has notified Grace Ely with [facility].      Row Name 09/23/24 1314       Plan    Plan Comments PT to return to SNF with hospice services. FANTA has spoken with PT's brother who is legal next of kin. PT's brother Jitendra would like to use [facility], [place]. FANTA has sent a referral to [facility] and spoke with hospice worker, Bárbara Browning. SW will await update from Bárbara Browning regarding when they can go to SNF to admit to hospice services.                   Discharge Codes    No documentation.                 Expected Discharge Date and Time       Expected Discharge Date Expected Discharge Time    Sep 23, 2024               ALON Tracy

## 2024-09-23 NOTE — DISCHARGE SUMMARY
[facility] Medicine Services  DISCHARGE SUMMARY       Date of Admission: 9/16/2024  Date of Discharge:  9/23/2024  Primary Care Physician: Aidan Shields MD    Presenting Problem/History of Present Illness:  Altered mental status    Final Discharge Diagnoses:  Active Hospital Problems    Diagnosis     **Shock     Hypomagnesemia     RYNE (acute kidney injury)     Urinary tract infection associated with indwelling urethral catheter     Chronic indwelling Mario catheter     Hypokalemia     Primary hypertension        Consults: Palliative, urology, general surgery    Procedures Performed: Mario catheter placement/replacement    Pertinent Test Results:   Results for orders placed during the hospital encounter of 09/16/24    Adult Transthoracic Echo Complete W/ Cont if Necessary Per Protocol    Interpretation Summary    Left ventricular systolic function is hyperdynamic (EF > 70%). Left ventricular ejection fraction appears to be greater than 70%.    The left ventricular cavity is moderately dilated. Left ventricular wall thickness is consistent with mild concentric hypertrophy.    Normal right ventricular cavity size and systolic function noted.    The aortic valve exhibits sclerosis. Moderate aortic valve regurgitation is present. No hemodynamically significant aortic valve stenosis is present.    Mild mitral valve regurgitation is present.      Imaging Results (All)       Procedure Component Value Units Date/Time    CT Abdomen Pelvis With Contrast [[id number]] Collected: 09/20/24 1730     Updated: 09/20/24 1742    Narrative:      EXAMINATION:  CT ABDOMEN PELVIS W CONTRAST-  9/20/2024 3:46 PM     HISTORY: Sepsis and abd pain, concern for pneumobilia on xray, please  r/o acute path; R57.9-Shock, unspecified; N17.9-Acute kidney failure,  unspecified; J18.9-Pneumonia, unspecified organism; E87.20-Acidosis,  unspecified; L08.9-Local infection of the skin and subcutaneous  tissue,  unspecified; Z74.09-Other reduced mobility.     TECHNIQUE: Spiral CT was performed of the abdomen and pelvis with IV  contrast. Multiplanar images were reconstructed.     DLP: 596.95 mGy.cm Automated dosage reduction technique was utilized to  reduce patient dose.     COMPARISON: CT pelvis on 9/2/2024.     LUNG BASES: There are small pleural effusions in both lung bases. There  are parenchymal infiltrates in both lung bases.     LIVER AND SPLEEN: There is air within left hepatic lobe biliary ductal  branches. There is air within the common hepatic duct. The common bile  duct measures up to 1.3 cm. There has been prior cholecystectomy. No  focal liver lesion is seen. The spleen is normal in size.     PANCREAS: No pancreatic mass or inflammatory changes seen. There is a  duodenal diverticulum adjacent to the head of the pancreas.     KIDNEYS AND ADRENALS: There is mild thickening of the adrenal glands.  There are bilateral renal cysts. There are greater number of cysts  within the left kidney. The ureters are nondilated. There is a Mario  catheter in the bladder. There is thickening of the bladder wall. There  are bladder diverticula. The prostate is enlarged measuring 5.3 cm.     BOWEL: There is under distention of the stomach. There is thickening of  the gastric wall. There is a small hiatal hernia. Small bowel loops are  nondilated. There is moderate stool in the colon.     OTHER: There is an umbilical hernia containing peritoneal fat and small  bowel measuring 10.1 cm transversely. The opening through the anterior  abdominal wall measures 2.7 cm. The small bowel is nondilated. There is  atheromatous disease of the aortoiliac vessels. There are small  retroperitoneal lymph nodes. There is body wall edema. There are  degenerative changes of the spine and bilateral hips.          Impression:      1. Small bilateral pleural effusions. Infiltrates in both lung bases may  represent pneumonia and/or atelectasis.  There is cardiomegaly. There is  body wall edema.  2. There is air in the biliary tree. There is air in the common hepatic  duct. The common bile duct measures up to 1.3 cm. This could represent  reservoir effect as there has been prior cholecystectomy. The air in the  biliary tree may be related to prior sphincterotomy. If no prior  sphincterotomy, gas-forming infection would also be included in the  differential.  3. Gastric wall thickening is probably an artifact of under distention.  There is a small hiatal hernia. No small bowel obstruction is seen.  There is diverticulosis of the colon with no CT findings of  diverticulitis.  4. Umbilical hernia containing small bowel and peritoneal fat measuring  10.1 cm transversely. The small bowel is nondilated suggesting the  absence of obstruction. The opening through the anterior abdominal wall  measures 2.7 cm.  5. Atheromatous disease of the aortoiliac vessels. Degenerative changes  of the spine and bilateral hips.     This report was signed and finalized on 9/20/2024 5:39 PM by Dr. Frantz Cardenas MD.       XR Abdomen KUB [[id number]] Collected: 09/20/24 1443     Updated: 09/20/24 1454    Narrative:      EXAMINATION: XR ABDOMEN KUB- 9/20/2024 2:43 PM     HISTORY: abd pain, nausea; R57.9-Shock, unspecified; N17.9-Acute kidney  failure, unspecified; J18.9-Pneumonia, unspecified organism;  E87.20-Acidosis, unspecified; L08.9-Local infection of the skin and  subcutaneous tissue, unspecified; Z74.09-Other reduced mobility.     REPORT: Supine imaging of the abdomen was performed.     COMPARISON: There are no correlative imaging studies for comparison.     There is mild respiratory motion artifact. Gas is seen within portions  of the colon and small intestine in a nonspecific pattern, no convincing  evidence of bowel obstruction. Early changes of ileus are not excluded.  There is questionable pneumobilia versus portal venous gas over the  liver. Cholecystectomy clips are  present. There are opacities in both  lung bases favored to represent atelectasis with or without scarring.  Advanced degenerative changes are seen throughout the lumbar spine.  There is advanced osteoarthritis of the left hip. Mario catheter is  present.       Impression:      The bowel gas pattern is nonobstructive, early changes of  ileus are not excluded. Questionable trace branching gas within the  upper liver, pneumobilia versus portal venous gas. Consider further  evaluation with CT of the abdomen and pelvis.     This report was signed and finalized on 9/20/2024 2:51 PM by Dr. Servando Segura MD.       XR Chest 1 View [[id number]] Collected: 09/17/24 0744     Updated: 09/17/24 0748    Narrative:      XR CHEST 1 VW-     HISTORY: Septic shock, Acute hypoxic respiratory failure; R57.9-Shock,  unspecified; N17.9-Acute kidney failure, unspecified; J18.9-Pneumonia,  unspecified organism; E87.20-Acidosis, unspecified; L08.9-Local  infection of the skin and subcutaneous tissue, unspecified     COMPARISON: 9/16/2024     FINDINGS: Frontal view the chest obtained.     Right IJ central line tip similar in position projecting over the SVC.     There are similar bilateral patchy appearing basilar pulmonary opacities  concerning for multifocal pneumonia. Questional trace pleural effusions.  No pneumothorax. Stable mediastinal contours. No acute regional bony  pathology.       Impression:      1. Similar patchy bibasilar opacities concerning for multifocal  pneumonia. Similar positioning right IJ central line.        This report was signed and finalized on 9/17/2024 7:45 AM by Dr. Tish Guevara MD.       XR Chest 1 View [[id number]] Collected: 09/16/24 1512     Updated: 09/16/24 1517    Narrative:      EXAMINATION: XR CHEST 1 VW-  9/16/2024 3:12 PM 1 view     HISTORY: Pneumonia     COMPARISON: 9/2/2024     TECHNIQUE: A single frontal view of the chest was obtained.     FINDINGS:  There is cardiomegaly and mild pulmonary  vascular prominence. No  pneumothorax. No large effusion. Mild retrocardiac atelectasis  suspected. Some hazy opacity at the lateral LEFT lung base is  nonspecific and could represent pneumonia or atelectasis. Central venous  catheter via RIGHT IJ approach is seen with tip projecting over the  supra azygos SVC. No definite acute osseous abnormality.       Impression:         1.  Shallow inspiration with persistent LEFT basilar opacity which may  represent atelectasis or pneumonia.     2.  There is cardiomegaly and mild pulmonary vascular prominence.     3.  RIGHT IJ approach central venous catheter with tip projecting over  the super azygous portion of the SVC.           This report was signed and finalized on 9/16/2024 3:14 PM by Dr. Ever Jaquez MD.             LAB RESULTS:      Lab 09/22/24  0444 09/21/24  0612 09/20/24  0515 09/19/24  0339 09/18/24  0451 09/17/24  0405 09/16/24  2056 09/16/24  1617 09/16/24  1339 09/16/24  1339   WBC 10.77 12.09* 11.38* 12.41* 16.25* 13.15* 15.16*  --   --  11.22*   HEMOGLOBIN 12.6* 13.4 12.6* 11.9* 11.9* 12.0* 12.6*  --   --  12.1*   HEMATOCRIT 38.8 41.3 40.7 36.7* 37.5 37.3* 38.2  --   --  36.8*   PLATELETS 212 224 206 217 233 241 254  --   --  232   NEUTROS ABS 9.80* 10.45* 10.01* 11.43* 15.28* 11.65* 13.80*  --    < > 10.32*   IMMATURE GRANS (ABS)  --   --   --   --   --  0.42* 0.49*  --   --   --    LYMPHS ABS  --   --   --   --   --  0.39* 0.27*  --   --   --    MONOS ABS  --   --   --   --   --  0.65 0.56  --   --   --    EOS ABS 0.00 0.00 0.00 0.00 0.00 0.00 0.00  --    < > 0.00   MCV 93.3 93.7 97.1* 96.3 95.7 96.4 94.6  --   --  94.4   CRP  --   --   --   --   --   --   --   --   --  30.76*   PROCALCITONIN  --   --   --   --   --   --   --   --   --  4.57*   LACTATE  --   --   --   --   --   --   --  1.4  --  2.3*   PROTIME  --   --   --   --   --   --   --   --   --  14.9*    < > = values in this interval not displayed.         Lab 09/23/24  0406 09/22/24  182  09/22/24 0444 09/21/24  0612 09/20/24  0515 09/19/24  2221 09/19/24  1643 09/19/24  1458 09/19/24  1039 09/19/24  0339 09/18/24  1221 09/18/24  0451   SODIUM 138  --  139 137 141 145   < >  --    < > 148*   < > 150*   POTASSIUM 3.5 3.7 3.2* 3.5 4.8 3.4*   < >  --    < > 4.0   < > 3.1*   CHLORIDE 99  --  97* 99 107 109*   < >  --    < > 112*   < > 111*   CO2 28.0  --  29.0 31.0* 28.0 29.0   < >  --    < > 31.0*   < > 30.0*   ANION GAP 11.0  --  13.0 7.0 6.0 7.0   < >  --    < > 5.0   < > 9.0   BUN 16  --  13 14 20 22   < >  --    < > 31*   < > 43*   CREATININE 0.69*  --  0.63* 0.69* 0.62* 0.58*   < >  --    < > 0.74*   < > 1.00   EGFR 96.5  --  99.2 96.5 99.7 101.7   < >  --    < > 94.5   < > 78.5   GLUCOSE 94  --  87 93 113* 103*   < >  --    < > 138*   < > 110*   CALCIUM 8.2*  --  8.5* 8.6 8.3* 8.2*   < >  --    < > 8.7   < > 9.0   MAGNESIUM 1.8  --  1.5* 1.6 1.4*  --   --   --   --  1.6  --  1.8   PHOSPHORUS  --   --  2.9  --  2.4*  --   --  2.4*  --  2.0*  --  2.3*    < > = values in this interval not displayed.         Lab 09/22/24 0444 09/21/24 0612 09/20/24  0515 09/19/24  0339 09/18/24  0451 09/17/24  0405   TOTAL PROTEIN 4.7* 5.1* 4.5* 4.5* 4.7* 5.1*   ALBUMIN 2.3* 2.3* 1.9* 2.1* 2.1* 2.1*   GLOBULIN 2.4 2.8  --  2.4 2.6 3.0   ALT (SGPT) 18 20 19 21 21 23   AST (SGOT) 27 31 27 25 32 44*   BILIRUBIN 0.5 0.5 0.4 0.4 0.4 0.5   BILIRUBIN DIRECT  --   --  <0.2 0.2  --   --    ALK PHOS 98 100 85 83 89 103         Lab 09/16/24  1617 09/16/24  1339   PROBNP  --  24,732.0*   HSTROP T 219* 308*   PROTIME  --  14.9*   INR  --  1.12*                 Lab 09/16/24  1502   PH, ARTERIAL 7.309*   PCO2, ARTERIAL 47.1*   PO2 ART 66.4*   O2 SATURATION ART 90.8*   HCO3 ART 23.6   BASE EXCESS ART -2.9*   CARBOXYHEMOGLOBIN 1.0     Brief Urine Lab Results  (Last result in the past 365 days)        Color   Clarity   Blood   Leuk Est   Nitrite   Protein   CREAT   Urine HCG        09/16/24 1437 Yellow   Cloudy   Large (3+)   Small  (1+)   Negative   100 mg/dL (2+)                 Microbiology Results (last 10 days)       Procedure Component Value - Date/Time    MRSA Screen, PCR (Inpatient) - Swab, Nares [[id number]]  (Normal) Collected: 09/16/24 2157    Lab Status: Final result Specimen: Swab from Nares Updated: 09/16/24 2317     MRSA PCR No MRSA Detected    Narrative:      The negative predictive value of this diagnostic test is high and should only be used to consider de-escalating anti-MRSA therapy. A positive result may indicate colonization with MRSA and must be correlated clinically.    Urine Culture - Urine, Indwelling Urethral Catheter [[id number]]  (Abnormal)  (Susceptibility) Collected: 09/16/24 1437    Lab Status: Final result Specimen: Urine from Indwelling Urethral Catheter Updated: 09/19/24 1153     Urine Culture >100,000 CFU/mL Enterococcus faecalis      >100,000 CFU/mL Corynebacterium species     Comment:   Based on laboratory diagnosis criteria, these organisms are common urogenital commensal rolando and have not been associated with urinary tract infections; clinical correlation is recommended.       Narrative:      Colonization of the urinary tract without infection is common. Treatment is discouraged unless the patient is symptomatic, pregnant, or undergoing an invasive urologic procedure.    Susceptibility        Enterococcus faecalis      JOSE      Ampicillin Susceptible      Levofloxacin Susceptible      Nitrofurantoin Susceptible      Vancomycin Susceptible                           Respiratory Panel PCR w/COVID-19(SARS-CoV-2) ZEESHAN/DANIELLE/KYLAH/PAD/COR/SHYANN In-House, NP Swab in UTM/VTM, 2 HR TAT - Swab, Nasopharynx [[id number]]  (Normal) Collected: 09/16/24 1343    Lab Status: Final result Specimen: Swab from Nasopharynx Updated: 09/16/24 1446     ADENOVIRUS, PCR Not Detected     Coronavirus 229E Not Detected     Coronavirus HKU1 Not Detected     Coronavirus NL63 Not Detected     Coronavirus OC43 Not Detected     COVID19 Not  Detected     Human Metapneumovirus Not Detected     Human Rhinovirus/Enterovirus Not Detected     Influenza A PCR Not Detected     Influenza B PCR Not Detected     Parainfluenza Virus 1 Not Detected     Parainfluenza Virus 2 Not Detected     Parainfluenza Virus 3 Not Detected     Parainfluenza Virus 4 Not Detected     RSV, PCR Not Detected     Bordetella pertussis pcr Not Detected     Bordetella parapertussis PCR Not Detected     Chlamydophila pneumoniae PCR Not Detected     Mycoplasma pneumo by PCR Not Detected    Narrative:      In the setting of a positive respiratory panel with a viral infection PLUS a negative procalcitonin without other underlying concern for bacterial infection, consider observing off antibiotics or discontinuation of antibiotics and continue supportive care. If the respiratory panel is positive for atypical bacterial infection (Bordetella pertussis, Chlamydophila pneumoniae, or Mycoplasma pneumoniae), consider antibiotic de-escalation to target atypical bacterial infection.    Blood Culture - Blood, Hand, Right [[id number]]  (Normal) Collected: 09/16/24 1339    Lab Status: Final result Specimen: Blood from Hand, Right Updated: 09/21/24 1401     Blood Culture No growth at 5 days    Blood Culture - Blood, Hand, Left [[id number]]  (Normal) Collected: 09/16/24 1339    Lab Status: Final result Specimen: Blood from Hand, Left Updated: 09/21/24 1401     Blood Culture No growth at 5 days            Hospital Course:     -Septic shock on arrival to hospital [secondary to UTI]  Patient was initially managed in ICU with pressor and subsequently weaned off and has been able to maintain his own blood pressure.    -Enterococcus UTI secondary to chronic Mario catheter  Urine culture was positive for Enterococcus and Corynebacterium, he was treated initially with amoxicillin, which was changed to Macrobid and eventually to Levaquin because of suspected reaction.  Patient's Mario catheter was exchanged in the  "emergency room on admission.  Patient's family has opted for hospice care and therefore he will not be discharged on antibiotics, moreover he already had a course of antibiotic in hospital.    -Pneumobilia on CT of the abdomen and pelvis  General Surgery was consulted and after evaluation and review of prior records determined it was chronic and therefore no surgical intervention.      -Penile/hip wound -continue wound care  Urology was consulted with the following recommendations-  Maintain ewing to gravity. Bacitracin ointment to skin to help promote healing. Keep ewing off of traction (better secured to prevent pulling). Antibiotics per hospitalist. Follow up monthly with Dr. Mittal for ewing changes, or any potential further plans. While an SP tube would be ideal in a situation like this, he is not currently a candidate for such a procedure under anesthesia, and it is not clear that he could be cleared for that, but could be pursued by Dr. Mittal if so desired. Otherwise, transport to a facility with IR capability for SP placement under IR could be considered as an outpatient.    Patient's family has opted for Hospice care, postdischarge care will be deferred to hospice doctor.     -Abdominal pain/nausea   Patient does have large ventral hernia but easily reducible and not incarcerated.   General surgery was on consult and did not recommend any surgical intervention.      2024  Notes patient was discharged 2 days ago to skilled nursing facility on hospice, unfortunately there was delay in transferring patient to facility and he eventually  here in our hospital.  There is no change in discharge summary or documentation.      Physical Exam on Discharge:  /53 (BP Location: Left arm, Patient Position: Lying)   Pulse 102   Temp 98.4 °F (36.9 °C) (Oral)   Resp 18   Ht 175.3 cm (69.02\")   Wt 62.2 kg (137 lb 1.6 oz)   SpO2 96%   BMI 20.24 kg/m²   Physical Exam  GEN: Awake, alert, interactive, in " NAD. Confused.  HEENT: PERRLA, EOMI, Anicteric, Trachea midline  Lungs:  no wheezing/rales/rhonchi  Heart: RRR, +S1/s2, no rub  ABD: ventral hernia, reducible, soft, nd, +BS, no guarding/rebound  Extremities: atraumatic, no cyanosis, no pitting edema  Skin: L hip wound, some eschar around penile meatus   Neuro: AAOx1, no obvious focal deficits    Condition on Discharge: Guarded    Discharge Disposition:  Hospice/Medical Facility (DC - External)    Discharge Medications:     Discharge Medications        New Medications        Instructions Start Date   morphine 20 MG/ML concentrated solution 20mg/ml   5 mg, Oral, Every 1 Hour PRN      Scopolamine 1 MG/3DAYS patch   1 patch, Transdermal, Every 72 Hours PRN             Changes to Medications        Instructions Start Date   ciprofloxacin 0.3 % ophthalmic solution  Commonly known as: Ciloxan  What changed: additional instructions   1 drop, Both Eyes, 4 Times Daily             Continue These Medications        Instructions Start Date   acetaminophen 325 MG tablet  Commonly known as: TYLENOL   650 mg, Oral, Every 4 Hours PRN      alfuzosin 10 MG 24 hr tablet  Commonly known as: UROXATRAL   10 mg, Oral, Every Night at Bedtime      ascorbic acid 500 MG tablet  Commonly known as: VITAMIN C   1,000 mg, Oral, Daily      bisacodyl 10 MG suppository  Commonly known as: DULCOLAX   10 mg, Rectal, Daily PRN      busPIRone 10 MG tablet  Commonly known as: BUSPAR   10 mg, Oral, 2 Times Daily      finasteride 5 MG tablet  Commonly known as: PROSCAR   5 mg, Oral, Daily      fluticasone 50 MCG/ACT nasal spray  Commonly known as: FLONASE   2 sprays, Nasal, Daily, Alternate nostrils      guaiFENesin 600 MG 12 hr tablet  Commonly known as: MUCINEX   600 mg, Oral, 2 Times Daily, X10 days (started on 09/06/2024)      HYDROcodone-acetaminophen 5-325 MG per tablet  Commonly known as: Norco   1 tablet, Oral, Every 12 Hours PRN      ipratropium-albuterol  MCG/ACT inhaler  Commonly known as:  COMBIVENT RESPIMAT   1 puff, Inhalation, Daily      levothyroxine 125 MCG tablet  Commonly known as: SYNTHROID, LEVOTHROID   125 mcg, Oral, Daily      lisinopril-hydrochlorothiazide 10-12.5 MG per tablet  Commonly known as: PRINZIDE,ZESTORETIC   1 tablet, Oral, Daily      loratadine 10 MG tablet  Commonly known as: Claritin   10 mg, Oral, Daily      magnesium hydroxide 400 MG/5ML suspension  Commonly known as: MILK OF MAGNESIA   30 mL, Oral, Daily PRN      multivitamin with minerals tablet tablet   1 tablet, Oral, Daily      Naloxone HCl 2 MG/2ML Prefilled Syringe Kit   1 mg, Intramuscular, As Needed      ondansetron 4 MG tablet  Commonly known as: ZOFRAN   4 mg, Oral, Every 6 Hours PRN      potassium chloride 20 MEQ CR tablet  Commonly known as: KLOR-CON M20   20 mEq, Oral, Daily      probiotic capsule capsule   1 capsule, Oral, Daily      tiZANidine 4 MG tablet  Commonly known as: ZANAFLEX   4 mg, Oral, Every 8 Hours PRN      venlafaxine  MG 24 hr capsule  Commonly known as: EFFEXOR-XR   150 mg, Oral, Daily      Vitamin D (Ergocalciferol) 50 MCG (2000 UT) capsule   1 capsule, Oral, Daily      Zinc 50 MG tablet   1 tablet, Oral, Daily             Discharge Diet: Regular    Activity at Discharge: As tolerated    Follow-up Appointments:   Future Appointments   Date Time Provider Department Center   3/6/2025  7:00 AM [facility] CART 1  [facility]   3/6/2025  8:00 AM [facility] CART 1  [facility]   3/6/2025  9:15 AM Mara Albright APRN [facility]       Test Results Pending at Discharge: None    Electronically signed by Celestine Guerin MD, 09/23/24, 13:29 CDT.    Time: 50 minutes.

## 2024-09-23 NOTE — PLAN OF CARE
Goal Outcome Evaluation:  Plan of Care Reviewed With: patient     Problem: Adult Inpatient Plan of Care  Goal: Plan of Care Review  Outcome: Ongoing, Progressing  Flowsheets (Taken 9/23/2024 4472)  Progress: no change  Plan of Care Reviewed With: patient  Outcome Evaluation: Alert to person only, Pt has talked and sang to other people tonight. VSS,  S/ST  with focal Pac and PVC on tele. CHG bath given and wound care performed as ordered. Pt turned q 2 hours. safety maintained, care plan ongoing.

## 2024-09-23 NOTE — CASE MANAGEMENT/SOCIAL WORK
Continued Stay Note   Christal     Patient Name: Jagdish Cook  MRN: [id number]  Today's Date: 9/23/2024    Admit Date: 9/16/2024    Plan: SNF   Discharge Plan       Row Name 09/23/24 1314       Plan    Plan Comments PT to return to SNF with hospice services. FANTA has spoken with PT's brother who is legal next of kin. PT's brother Jitendra would like to use [facility], [place]. FANTA has sent a referral to [facility] and spoke with hospice worker, Bárbara Browning. SW will await update from Bárbara Browning regarding when they can go to SNF to admit to hospice services.                   Discharge Codes    No documentation.                 Expected Discharge Date and Time       Expected Discharge Date Expected Discharge Time    Sep 23, 2024               ALON Tracy

## 2024-09-23 NOTE — CASE MANAGEMENT/SOCIAL WORK
Continued Stay Note   Christal     Patient Name: Jagdish Cook  MRN: [id number]  Today's Date: 9/23/2024    Admit Date: 9/16/2024    Plan: SNF   Discharge Plan       Row Name 09/23/24 0827       Plan    Plan Comments PT continues to plan to return to [facility] upon dc, [phone]. SW will continue to follow and assist as needed. SW has confirmed that PT may return to SNF today if medically ready. SNF pharmacy is [facility] [place].     Final Discharge Disposition Code 03 - skilled nursing facility (SNF)                   Discharge Codes    No documentation.                 Expected Discharge Date and Time       Expected Discharge Date Expected Discharge Time    Sep 23, 2024               ALON Tracy

## 2024-09-24 NOTE — PLAN OF CARE
Goal Outcome Evaluation:  Plan of Care Reviewed With: patient        Progress: declining  Outcome Evaluation: Pt  has been resting all of the shift. Scheduled morphine given to maintain comfort. Turned q2h.  Safety maintained.S/ ST  per tele.

## 2024-09-24 NOTE — PROGRESS NOTES
[facility]    Palliative Care Daily Progress Note   Chief complaint-follow up comfort care    Code Status:   Code Status and Medical Interventions: No CPR (Do Not Attempt to Resuscitate); Comfort Measures; Jitendra santa   Ordered at: 24 1225     Level Of Support Discussed With:    Next of Kin (If No Surrogate)     Code Status (Patient has no pulse and is not breathing):    No CPR (Do Not Attempt to Resuscitate)     Medical Interventions (Patient has pulse or is breathing):    Comfort Measures     Comments:    Jitendra santa      Advanced Directives: Advance Directive Status: Patient does not have advance directive   Goals of Care: Ongoing.     S: Medical record reviewed. Events noted.  Transition to comfort measures .  Receiving scheduled morphine concentrate every 6 hours.  In addition received 4 doses of Ativan and 3 additional as needed doses of morphine concentrate, totaling 60 mg oral morphine equivalent over the last 24 hours.  SNF with hospice, according to SW note cannot admit until Wednesday, however anticipate death imminent.Noted to have grunting respirations with open mouthed breathing pattern. Nurse recently gave morphine concentrate with minimal improvement. Will give a one time dose of morphine now and increase scheduled dosing.      Review of Systems   Unable to perform ROS: Mental status change     Pain Assessment  Preferred Pain Scale: rFLACC (Revised Face, Legs, Activity, Crying, and Consolability Scale)  CPOT Facial Expression: 0-->relaxed, neutral  CPOT Body Movements: 0-->absence of movements  CPOT Muscle Tension: 0-->relaxed  Ventilator Compliance/Vocalization: 0-->talking in normal tone or no sound  CPOT Score: 0  PAINAD Breathin-->normal  PAINAD Negative Vocalization: 1-->occasional moan/groan, low speech, negative/disapproving quality  PAINAD Facial Expression: 2-->facial grimacing  PAINAD Body Language: 1-->tense, distressed  pacing, fidgeting  PAINAD Consolability: 1-->distracted or reassured by voice/touch  PAINAD Score: 5  Pain Location: extremity    O:   No intake or output data in the 24 hours ending 09/24/24 0749      Diagnostics and current medications: Reviewed.      Current Facility-Administered Medications:     acetaminophen (TYLENOL) tablet 650 mg, 650 mg, Oral, Q4H PRN, 650 mg at 09/22/24 0916 **OR** acetaminophen (TYLENOL) suppository 650 mg, 650 mg, Rectal, Q4H PRN, Kristen Sánchez MD    atropine 1 % ophthalmic solution 2 drop, 2 drop, Sublingual, BID PRN, Hermila Ramirez APRN    sennosides-docusate (PERICOLACE) 8.6-50 MG per tablet 2 tablet, 2 tablet, Oral, BID, 2 tablet at 09/22/24 2149 **AND** polyethylene glycol (MIRALAX) packet 17 g, 17 g, Oral, Daily PRN, 17 g at 09/18/24 1015 **AND** bisacodyl (DULCOLAX) EC tablet 5 mg, 5 mg, Oral, Daily PRN **AND** bisacodyl (DULCOLAX) suppository 10 mg, 10 mg, Rectal, Daily PRN, Kristen Sánchez MD    busPIRone (BUSPAR) tablet 10 mg, 10 mg, Oral, BID, Jagdish Courtney PA-C, 10 mg at 09/23/24 0937    collagenase ointment 1 Application, 1 Application, Topical, Q12H, Yudelka Gonzalez APRN, 1 Application at 09/23/24 2217    diphenhydrAMINE (BENADRYL) capsule 25 mg, 25 mg, Oral, Q6H PRN **OR** diphenhydrAMINE (BENADRYL) injection 25 mg, 25 mg, Intravenous, Q6H PRN, Hermila Ramirez APRN    diphenoxylate-atropine (LOMOTIL) 2.5-0.025 MG per tablet 1 tablet, 1 tablet, Oral, Q2H PRN, Hermila Ramirez APRN    finasteride (PROSCAR) tablet 5 mg, 5 mg, Oral, Daily, Sherwin, Jagdish C, PA-C, 5 mg at 09/23/24 0937    fluticasone (FLONASE) 50 MCG/ACT nasal spray 2 spray, 2 spray, Nasal, Daily, Jagdish Courtney PA-C, 2 spray at 09/23/24 0939    furosemide (LASIX) injection 20 mg, 20 mg, Intravenous, Q6H PRN **OR** furosemide (LASIX) injection 20 mg, 20 mg, Subcutaneous, Q6H PRN, Hermila Ramirez, CONCHITA    guaiFENesin (MUCINEX) 12 hr tablet 600 mg, 600 mg, Oral, BID, Sherwin,  Jagdish YI PA-C, 600 mg at 09/23/24 0937    haloperidol (HALDOL) 2 MG/ML solution 1 mg, 1 mg, Sublingual, Q4H PRN **OR** haloperidol lactate (HALDOL) injection 1 mg, 1 mg, Intravenous, Q4H PRN, Hermila Ramirez L, APRN    haloperidol (HALDOL) 2 MG/ML solution 2 mg, 2 mg, Sublingual, Q4H PRN **OR** haloperidol lactate (HALDOL) injection 2 mg, 2 mg, Intravenous, Q4H PRN, Carline Ramireza L, APRN    lisinopril (PRINIVIL,ZESTRIL) tablet 10 mg, 10 mg, Oral, Q24H, 10 mg at 09/23/24 0937 **AND** hydroCHLOROthiazide tablet 12.5 mg, 12.5 mg, Oral, Q24H, Jagdish Courtney PA-C, 12.5 mg at 09/23/24 0937    ipratropium-albuterol (DUO-NEB) nebulizer solution 3 mL, 3 mL, Nebulization, Q4H PRN, Hermila Ramirez, APRN    lactobacillus acidophilus (RISAQUAD) capsule 1 capsule, 1 capsule, Oral, Daily, Jagdish Courtney PA-C, 1 capsule at 09/23/24 0937    levoFLOXacin (LEVAQUIN) tablet 750 mg, 750 mg, Oral, Q24H, Chato Boo DO, 750 mg at 09/23/24 0937    levothyroxine (SYNTHROID, LEVOTHROID) tablet 125 mcg, 125 mcg, Oral, Q AM, Jagdish Courtney PA-C, 125 mcg at 09/23/24 0517    LORazepam (ATIVAN) 2 MG/ML concentrated solution 0.5 mg, 0.5 mg, Sublingual, Q1H PRN, Hermila Ramirez, APRN    LORazepam (ATIVAN) 2 MG/ML concentrated solution 1 mg, 1 mg, Sublingual, Q1H PRN, Carline Ramireza L, APRN    LORazepam (ATIVAN) injection 2 mg, 2 mg, Intravenous, Q1H PRN **OR** LORazepam (ATIVAN) 2 MG/ML concentrated solution 2 mg, 2 mg, Sublingual, Q1H PRN, Ramirez, Hermila L, APRN, 2 mg at 09/24/24 0727    metroNIDAZOLE (FLAGYL) tablet 500 mg, 500 mg, Oral, Q8H, Chato Boo, DO, 500 mg at 09/23/24 0517    morphine concentrated solution 10 mg, 10 mg, Sublingual, Q1H PRN, Ramirez, Hermila L, APRN, 10 mg at 09/23/24 1527    morphine concentrated solution 15 mg, 15 mg, Sublingual, Q1H PRN, Ramirez, Hermila L, APRN, 15 mg at 09/24/24 0727    morphine concentrated solution 5 mg, 5 mg, Sublingual, Q6H, Ramirez, Hermila L, APRN, 5 mg  at 09/24/24 0601    morphine concentrated solution 5 mg, 5 mg, Sublingual, Q1H PRN, Hermila Ramirez APRN    mupirocin (BACTROBAN) 2 % ointment 1 Application, 1 Application, Topical, Q12H, Yudelka Gonzalez APRN, 1 Application at 09/23/24 2217    ondansetron (ZOFRAN) injection 4 mg, 4 mg, Intravenous, Q6H PRN, Kristen Sánchez MD, 4 mg at 09/19/24 2048    Polyvinyl Alcohol-Povidone PF (ARTIFICIAL TEARS) 1.4-0.6 % ophthalmic solution 1 drop, 1 drop, Both Eyes, Q30 Min PRN, Hermila Ramirez APRN    potassium chloride (MICRO-K/KLOR-CON) CR capsule, 40 mEq, Oral, Once, Celestine Guerin MD    prochlorperazine (COMPAZINE) injection 5 mg, 5 mg, Intravenous, Q6H PRN **OR** prochlorperazine (COMPAZINE) tablet 5 mg, 5 mg, Oral, Q6H PRN **OR** prochlorperazine (COMPAZINE) suppository 25 mg, 25 mg, Rectal, Q12H PRN, Hermila Ramirez APRN    scopolamine patch 1 mg/72 hr, 1 patch, Transdermal, Q72H PRN, Hermila Ramirez APRN    sodium chloride 0.9 % flush 10 mL, 10 mL, Intravenous, Q12H, Kristen Sánchez MD, 10 mL at 09/22/24 0913    sodium chloride 0.9 % flush 10 mL, 10 mL, Intravenous, PRN, Kristen Sánchez MD    sodium chloride 0.9 % infusion 40 mL, 40 mL, Intravenous, PRN, Kristen Sánchez MD    sodium hypochlorite (DAKIN'S 1/4 STRENGTH) 0.125 % topical solution 0.125% solution, , Topical, Q12H, Yudelka Gonzalez APRN, Given at 09/23/24 2216    terazosin (HYTRIN) capsule 1 mg, 1 mg, Oral, Nightly, Chato Boo DO, 1 mg at 09/22/24 2149    tiZANidine (ZANAFLEX) tablet 4 mg, 4 mg, Oral, Q8H PRN, Jagdish Courtney PA-C    venlafaxine XR (EFFEXOR-XR) 24 hr capsule 150 mg, 150 mg, Oral, Daily, Jagdish Courtney PA-C, 150 mg at 09/23/24 0937    zinc sulfate (ZINCATE) capsule 220 mg, 220 mg, Oral, Daily, Kristen Sánchez MD, 220 mg at 09/23/24 0937    Allergies   Allergen Reactions    Meloxicam Hives     Dizzy, skin crawls    Tamsulosin Dizziness     excessive sweating    Penicillins      I  "have utilized all available immediate resources to obtain, update, or review the patient's current medications (including all prescriptions, over-the-counter products, herbals, cannabis/cannabidiol products, and vitamin/mineral/dietary (nutritional) supplements) for name, route of administration, type, dose and frequency.    A:    BP (!) 70/40 (BP Location: Left arm, Patient Position: Lying) Comment: RN Notified  Pulse 95   Temp 97.9 °F (36.6 °C) (Axillary)   Resp 18   Ht 175.3 cm (69.02\")   Wt 62.2 kg (137 lb 1.6 oz)   SpO2 95%   BMI 20.24 kg/m²     Vitals and nursing note reviewed.   Constitutional:       Appearance: Cachectic and frail. Ill-appearing and chronically ill-appearing.   Eyes:      General: Lids are normal.   HENT:      Head: Normocephalic.   Pulmonary:      Effort: Accessory muscle usage present.      Comments: Grunting with respirations  Cardiovascular:      Normal rate.      Comments: Significant hypotension  Edema:     Peripheral edema absent.   Abdominal:      Palpations: Abdomen is soft.   Musculoskeletal:      Cervical back: Neck supple.      Comments: Contractures bilateral lower extremities-knees Skin:     General: Skin is warm.      Comments: Multiple pressure associated skin injury and wounds as documented   Genitourinary:     Comments: Incontinent  Neurological:      Mental Status: Lethargic.     Patient status: Disease state: No further treatment being pursued.  Functional status: Palliative Performance Scale Score: Performance 30% based on the following measures: Ambulation: Totally bed bound, Activity and Evidence of Disease: Unable to do any work, extensive evidence of disease, Self-Care: Total care required,  Intake: Reduced, LOC: Full, drowsy or confusion   Nutritional status: Albumin 2.3. Body mass index is 20.24 kg/m².  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 plan: Goals of care include CODE STATUS no CPR/comfort measures.    Family support: The patient lacks significant family support..  POA/Healthcare [id number]-Lhcj, next of kin    2.  Palliative care encounter  -Previously seen by my colleague, CONCHITA Calderon and patient noted to have history of chronic back pain, COPD, GERD, memory deficits, neuropathy, umbilical hernia, former smoker, and urinary retention.  Noted to be noncompliant with appointments to cardiology and pulmonology to obtain surgery clearance per urology recommendations and with need for chronic indwelling catheter.  He had recent hospitalization 9/2-9/3 after being found down by caregiver and treated for sepsis, traumatic rhabdomyolysis, and gait instability, unfortunately left AMA.  Of particular note, patient was found to have UTI with Proteus mirabilis.  Apparently placed in nursing facility 1 week prior to this hospitalization.      -Transferred to critical care unit and required vasopressor support and IV antibiotics.    -unfortunately confused throughout hospitalization.     9/17-Underwent bedside debridement of left hip wound by wound care, APRN.    9/19-Transferred to medical floor.    9/21-Urology consulted and recommends consideration of suprapubic catheter placement though not a candidate for procedure under anesthesia.  Discussion of transfer to tertiary facility for IR capability considered, but recommends maintain Mario catheter to gravity.    -Surgery consulted due to CT findings of pneumobilia, felt to be chronic with history of cholecystectomy and underwent ERCP stent placement by Dr. Zabala at [facility] 1/29/2019 with removal 4/24/2019 and replacement of stent on 5/17/2019.  No further intervention required.    -evaluated by therapy and noted to have bilateral lower extremity (knee) contractures, recommends return to SNF.    9/23-Patient was noted to remove Mario catheter with bulb intact and noted by nursing to have large clots on bed and gown.    -Increased confusion and aggression noted per nursing.    9/23-CODE STATUS changes no CPR/comfort measures    9/24-continue comfort measures  -Will plan to complete a MOST document via email  -initial plan back to SNF with comfort measures.  Hospice consult has been placed 9/23, according to  note cannot admit until Wednesday.   -Anticipate death imminent. Electronic communication to care team    3.  Comfort measures  -increase morphine concentrate scheduled every 6 hours for comfort as patient has multiple chronic wounds, erosion of penis, and contractures.  -Palliative adjuvants as needed      Thank you for allowing us to participate in patient's plan of care. Palliative Care Team will continue to follow patient.     Hermila Ramirez, CONCHITA  9/24/2024  07:49 CDT

## 2024-09-24 NOTE — THERAPY DISCHARGE NOTE
Acute Care - Occupational Therapy Discharge Summary  [facility]     Patient Name: Jagdish Cook  : 1950  MRN: [id number]    Today's Date: 2024                 Admit Date: 2024        OT Recommendation and Plan    Visit Dx:    ICD-10-CM ICD-9-CM   1. Traumatic rhabdomyolysis, initial encounter  T79.6XXA 958.6   2. Shock  R57.9 785.50   3. Acute kidney injury  N17.9 584.9   4. Pneumonia of left lung due to infectious organism, unspecified part of lung  J18.9 486   5. Metabolic acidosis  E87.20 276.2   6. Skin infection  L08.9 686.9   7. Impaired transfers [Z74.09]  Z74.09 781.99   8. Chronic indwelling Mario catheter  Z97.8 V45.89                OT Rehab Goals       Row Name 24 1000             Dressing Goal 1 (OT)    Activity/Device (Dressing Goal 1, OT) upper body dressing  -CH      [place]/Cues Needed (Dressing Goal 1, OT) minimum assist (75% or more patient effort)  -CH      Time Frame (Dressing Goal 1, OT) long term goal (LTG);by discharge  -CH      Progress/Outcome (Dressing Goal 1, OT) goal not met;goal no longer appropriate  -CH         Toileting Goal 1 (OT)    Activity/Device (Toileting Goal 1, OT) toileting skills, all  -CH      [place] Level/Cues Needed (Toileting Goal 1, OT) moderate assist (50-74% patient effort)  -CH      Time Frame (Toileting Goal 1, OT) long term goal (LTG);by discharge  -CH      Progress/Outcome (Toileting Goal 1, OT) goal not met;goal no longer appropriate  -CH         Grooming Goal 1 (OT)    Activity/Device (Grooming Goal 1, OT) grooming skills, all  -CH      [place] (Grooming Goal 1, OT) minimum assist (75% or more patient effort)  -CH      Time Frame (Grooming Goal 1, OT) long term goal (LTG);by discharge  -CH      Progress/Outcome (Grooming Goal 1, OT) goal no longer appropriate;goal not met  -CH         Self-Feeding Goal 1 (OT)    Activity/Device (Self-Feeding Goal 1, OT) self-feeding skills, all  -CH      [place] Level/Cues Needed  (Self-Feeding Goal 1, OT) set-up required  -CH      Time Frame (Self-Feeding Goal 1, OT) long term goal (LTG);by discharge  -CH      Progress/Outcomes (Self-Feeding Goal 1, OT) goal not met;goal no longer appropriate  -                User Key  (r) = Recorded By, (t) = Taken By, (c) = Cosigned By      Initials Name Provider Type Discipline     Christine Cuellar OTR/L Occupational Therapist OT                        Timed Therapy Charges  Total Units: 2      Suggested Charges  Total Units: 2      Procedure Name Documented Minutes Units Code    HC OT THERAPEUTIC ACT EA 15 MIN 24 2   15535 (CPT®)                 Documented Minutes  Total Minutes: 24      Therapy Provided Minutes    64272 - OT Therapeutic Activity Minutes 24                        OT Discharge Summary  Anticipated Discharge Disposition (OT): skilled nursing facility  Reason for Discharge: Discharge from facility, other (comment)  Outcomes Achieved: Refer to plan of care for updates on goals achieved  Discharge Destination: other (comment), SNF      FRANCK Lomeli/MARTY  9/24/2024

## 2024-09-24 NOTE — CASE MANAGEMENT/SOCIAL WORK
Continued Stay Note  [facility]     Patient Name: Jagdish Cook  MRN: [id number]  Today's Date: 9/24/2024    Admit Date: 9/16/2024    Plan: SNF   Discharge Plan       Row Name 09/24/24 1006       Plan    Plan Comments PT is able to dc to SNF with comfort meds ordered. SNF can administer comfort meds until hopsice is able to admit at SNF Wednesday morning.                   Discharge Codes    No documentation.                 Expected Discharge Date and Time       Expected Discharge Date Expected Discharge Time    Sep 23, 2024               ALON Tracy

## 2024-09-24 NOTE — PROGRESS NOTES
[facility] Medicine Services  INPATIENT PROGRESS NOTE    Patient Name: Jagdish Cook  Date of Admission: 9/16/2024  Today's Date: 09/24/24  Length of Stay: 8  Primary Care Physician: Aidan Shields MD    Subjective   Chief Complaint: f/u sepsis, uti  Altered Mental Status    Today  Patient is unresponsive today, now comfort care only.  He was discharged yesterday but for unclear reason has not been transferred to the skilled nursing facility to continue hospice.      Review of Systems   Unable to fully assess to patient factors    Objective    Temp:  [97.9 °F (36.6 °C)-98.8 °F (37.1 °C)] 98.8 °F (37.1 °C)  Heart Rate:  [] 108  Resp:  [12-18] 12  BP: (54-70)/(26-40) 54/26  Physical Exam  GEN: Awake, alert, interactive, in NAD. Confused.  HEENT: PERRLA, EOMI, Anicteric, Trachea midline  Lungs:  no wheezing/rales/rhonchi  Heart: RRR, +S1/s2, no rub  ABD: ventral hernia, reducible, soft, nd, +BS, no guarding/rebound  Extremities: atraumatic, no cyanosis, no pitting edema  Skin: L hip wound, some eschar around penile meatus   Neuro: AAOx1, no obvious focal deficits      Results Review:  I have reviewed the labs, radiology results, and diagnostic studies.    Laboratory Data:   Results from last 7 days   Lab Units 09/22/24  0444 09/21/24  0612 09/20/24  0515   WBC 10*3/mm3 10.77 12.09* 11.38*   HEMOGLOBIN g/dL 12.6* 13.4 12.6*   HEMATOCRIT % 38.8 41.3 40.7   PLATELETS 10*3/mm3 212 224 206        Results from last 7 days   Lab Units 09/23/24  0406 09/22/24  1828 09/22/24  0444 09/21/24  0612 09/20/24  0515   SODIUM mmol/L 138  --  139 137 141   POTASSIUM mmol/L 3.5 3.7 3.2* 3.5 4.8   CHLORIDE mmol/L 99  --  97* 99 107   CO2 mmol/L 28.0  --  29.0 31.0* 28.0   BUN mg/dL 16  --  13 14 20   CREATININE mg/dL 0.69*  --  0.63* 0.69* 0.62*   CALCIUM mg/dL 8.2*  --  8.5* 8.6 8.3*   BILIRUBIN mg/dL  --   --  0.5 0.5 0.4   ALK PHOS U/L  --   --  98 100 85   ALT (SGPT) U/L  --   --  18  20 19   AST (SGOT) U/L  --   --  27 31 27   GLUCOSE mg/dL 94  --  87 93 113*       Culture Data:   Blood Culture   Date Value Ref Range Status   09/16/2024 No growth at 3 days  Preliminary   09/16/2024 No growth at 3 days  Preliminary     Urine Culture   Date Value Ref Range Status   09/16/2024 >100,000 CFU/mL Enterococcus faecalis (A)  Final   09/16/2024 >100,000 CFU/mL Corynebacterium species (A)  Final     Comment:       Based on laboratory diagnosis criteria, these organisms are common urogenital commensal rolando and have not been associated with urinary tract infections; clinical correlation is recommended.       Radiology Data:   Imaging Results (Last 24 Hours)       ** No results found for the last 24 hours. **            I have reviewed the patient's current medications.     Assessment/Plan   Assessment  Active Hospital Problems    Diagnosis     **Shock     Hypomagnesemia     RYNE (acute kidney injury)     Urinary tract infection associated with indwelling urethral catheter     Chronic indwelling Mario catheter     Hypokalemia     Primary hypertension        Treatment Plan  #Sepsis -documented as septic shock on arrival per ICU staff and team.  Suspected urinary tract infection in the setting of chronic Mario catheter per ICU notation.  Mario catheter was exchanged in the ER.  He was on pressors but now off doing better.  Tolerating blood pressure meds.    Blood cultures negative.  Urine culture grew Enterococcus and Corynebacterium.  Patient was on amoxicillin, changed to macrobid yesterday. Noted in my chart review other potential issues as well including PNA. Pt has skin wounds but seem to be improving. Dry necrosis at penile meatus with wound. KUB showed concern for pneumobilia. Sent for CT and concern as above with air in biliary tree concerning for infection vs reservoir/sphincterotomy effect. Unfortunately can not find any interventions in our records, he is unable to reliably tell me if he has had them  at this time. I do not suspect gas infection in liver given abx he was on prior and overall stability at this point. Will change abx to levaquin and flagyl to cover his potential multitude of issues including urine, wounds, pna, biliary try. Will ask General surgery and urology to evaluate.    #UTI -Enterococcus on urinalysis.  Patient was started on amoxicillin but has noted allergy and has some concern.  Changed to levaquin for broader coverage of other infections.    #Pneumobilia on CT  General Surgery was consulted and after evaluation and review of prior records determined it was chronic and therefore no surgical intervention.    #RYNE -in the setting of #1.  Resolved.  Renal function normalized.    #Penile/hip wound -continue wound care  Urology was consulted with the following recommendations-  Maintain ewing to gravity. Bacitracin ointment to skin to help promote healing. Keep ewing off of traction (better secured to prevent pulling). Antibiotics per hospitalist. Follow up monthly with Dr. Mittal for ewing changes, or any potential further plans. While an SP tube would be ideal in a situation like this, he is not currently a candidate for such a procedure under anesthesia, and it is not clear that he could be cleared for that, but could be pursued by Dr. Mtital if so desired. Otherwise, transport to a facility with IR capability for SP placement under IR could be considered as an outpatient.    #Abdominal pain/nausea -Does have large ventral hernia but easily reducible and not incarcerated. Patient with poor appetite.  KUB and ct concerning for biliary air... doubt active gas infection. Will ask surgery to see as above. Levaquin and flagyl    #Hypertension - was hypotensive when septic, resolved, tolerating home HCTZ and lisinopril now that his sepsis has resolved.    #Hypokalemia -replaced and improved.    #Hypomagnesemia - replaced improved    Medical Decision Making  Number and Complexity of problems: 5+  acute, multiple chronic.  High medical complexity.   Differential Diagnosis: As above    Conditions and Status        Hard to get a clear story from.  Confused. Does not seem toxic.     MDM Data  External documents reviewed: None  Cardiac tracing (EKG, telemetry) interpretation: None  Radiology interpretation: Reports reviewed  Labs reviewed: As above  Any tests that were considered but not ordered: None     Decision rules/scores evaluated (example [id number], [place], etc): None     Discussed with: Patient, dietitian, nursing staff     Care Planning  Shared decision making: Patient apprised of current labs, vitals, imaging and treatment plan.  They are agreeable with proceeding with plans as discussed.    Code status and discussions: dnr/dni    Disposition  Hospice care, pending transfer to facility.    Electronically signed by Celestine Guerin MD, 09/24/24, 16:41 CDT.

## 2024-09-24 NOTE — PLAN OF CARE
Goal Outcome Evaluation:  Plan of Care Reviewed With: patient        Progress: no change  Outcome Evaluation: Patient has had ongoing grunting and signs of grimacing throughout the shift. PRN medication given. Patient to be discharged to SNF once hospice is available at SNF  per case management note. S 90-98 with pvc's per tele.

## 2024-09-25 NOTE — PLAN OF CARE
Goal Outcome Evaluation:  Plan of Care Reviewed With: patient        Progress: declining  Outcome Evaluation: Pt has not been alert this shift. He only responds to pain at this time. PRN morphine given to maintain comfort. -111 per tele. Safety maintained. Turned q2h.

## 2024-09-25 NOTE — THERAPY DISCHARGE NOTE
Acute Care - Physical Therapy Discharge Summary  [facility]       Patient Name: Jagdish Cook  : 1950  MRN: [id number]    Today's Date: 2024                 Admit Date: 2024      PT Recommendation and Plan    Visit Dx:    ICD-10-CM ICD-9-CM   1. Traumatic rhabdomyolysis, initial encounter  T79.6XXA 958.6   2. Shock  R57.9 785.50   3. Acute kidney injury  N17.9 584.9   4. Pneumonia of left lung due to infectious organism, unspecified part of lung  J18.9 486   5. Metabolic acidosis  E87.20 276.2   6. Skin infection  L08.9 686.9   7. Impaired transfers [Z74.09]  Z74.09 781.99   8. Chronic indwelling Mario catheter  Z97.8 V45.89                PT Rehab Goals       Row Name 24 1300             Bed Mobility Goal 1 (PT)    Activity/Assistive Device (Bed Mobility Goal 1, PT) sit to supine/supine to sit  -AB      Gates Level/Cues Needed (Bed Mobility Goal 1, PT) moderate assist (50-74% patient effort)  -AB      Time Frame (Bed Mobility Goal 1, PT) long term goal (LTG);10 days  -AB      Progress/Outcomes (Bed Mobility Goal 1, PT) goal not met;goal no longer appropriate  -AB         Transfer Goal 1 (PT)    Activity/Assistive Device (Transfer Goal 1, PT) sit-to-stand/stand-to-sit;bed-to-chair/chair-to-bed  -AB      Gates Level/Cues Needed (Transfer Goal 1, PT) moderate assist (50-74% patient effort)  -AB      Time Frame (Transfer Goal 1, PT) long term goal (LTG);10 days  -AB      Progress/Outcome (Transfer Goal 1, PT) goal not met;goal no longer appropriate  -AB                User Key  (r) = Recorded By, (t) = Taken By, (c) = Cosigned By      Initials Name Provider Type Discipline    Nilsa Florian, PTA Physical Therapist Assistant PT                        PT Discharge Summary  Anticipated Discharge Disposition (PT): skilled nursing facility  Reason for Discharge: Patient   Outcomes Achieved: Refer to plan of care for updates on goals achieved  Discharge Destination: other  (comment) ()      Nilsa Smith, PTA   2024

## 2024-10-09 ENCOUNTER — TELEPHONE (OUTPATIENT)
Dept: PRIMARY CARE CLINIC | Age: 74
End: 2024-10-09

## 2024-10-09 NOTE — TELEPHONE ENCOUNTER
I called the patient to inform him that Dr. Encarnacion is not seeing patients in this clinic on Wednesday mornings. I was unable to leave a message as the mail box is full.

## (undated) DEVICE — PENCIL CAUT PUSH BTTN W HOLSTER AND CRD 15FT

## (undated) DEVICE — SUTURE PERMAHAND SZ 2-0 L18IN NONABSORBABLE BLK L26MM FS 685G

## (undated) DEVICE — GOWN,PREVENTION PLUS,2XL,ST,22/CS: Brand: MEDLINE

## (undated) DEVICE — Device

## (undated) DEVICE — MINOR CDS: Brand: MEDLINE INDUSTRIES, INC.

## (undated) DEVICE — PACK,UNIVERSAL,NO GOWNS: Brand: MEDLINE

## (undated) DEVICE — CONTRAST IOTHALAMATE MEGLUMINE 60% 50 ML INJ CONRAY 60

## (undated) DEVICE — SPHINCTEROTOME: Brand: DREAMTOME™ RX 44

## (undated) DEVICE — SUTURE VCRL SZ 3-0 L27IN ABSRB UD L26MM SH 1/2 CIR J416H

## (undated) DEVICE — SUTURE VCRL 3-0 L18IN ABSRB VLT TIE POLYGLACTIN 910 BRAID J904T

## (undated) DEVICE — SUTURE SZ 0 27IN 5/8 CIR UR-6  TAPER PT VIOLET ABSRB VICRYL J603H

## (undated) DEVICE — SUTURE VCRL SZ 3-0 L18IN ABSRB UD L26MM SH 1/2 CIR J864D

## (undated) DEVICE — RETRIEVAL BALLOON CATHETER: Brand: EXTRACTOR™ PRO RX

## (undated) DEVICE — GOWN,PREVENTION PLUS,XL,ST,24/CS: Brand: MEDLINE

## (undated) DEVICE — LARYNGOSCOPE VID MILLER 2 MTL BLADE M HNDL CURAPLEX

## (undated) DEVICE — SYRINGE, LUER LOCK, 10ML: Brand: MEDLINE

## (undated) DEVICE — TRAY PROC CHOLE

## (undated) DEVICE — SYSTEM BX CAP BILI RAP EXCHG CAP LOK DEV COMPATIBLE W/ OLY

## (undated) DEVICE — SUTURE ABSORBABLE BRAIDED 0 CT-1 8X27 IN UD VICRYL JJ41G

## (undated) DEVICE — GLOVE SURG SZ 75 CRM LTX FREE POLYISOPRENE POLYMER BEAD ANTI

## (undated) DEVICE — SUTURE MCRYL SZ 4-0 L18IN ABSRB UD L19MM PS-2 3/8 CIR PRIM Y496G

## (undated) DEVICE — FINISHING WIRE: Brand: FINISHING WIRE® SUPPORTRAK®

## (undated) DEVICE — PROBE LITHO 1.9 FRX375 CM BILI ELEC HYDRLC AUTOLITH TCH DISP

## (undated) DEVICE — ACCESS AND DELIVERY CATHETER: Brand: SPYSCOPE DS

## (undated) DEVICE — BALLOON DILATATION CATHETER: Brand: HURRICANE™ RX

## (undated) DEVICE — GENERAL LAP CDS

## (undated) DEVICE — GLOVE SURG SZ 85 L12IN FNGR ORTHO 126MIL CRM LTX FREE

## (undated) DEVICE — SET EXTN TBNG IV STD BOR 30IN NO STPCOCK

## (undated) DEVICE — SOLUTION IV 1000ML 0.9% SOD CHL PH 5 INJ USP VIAFLX PLAS

## (undated) DEVICE — PUMP SUC IRR TBNG L10FT W/ HNDPC ASSEMB STRYKEFLOW 2

## (undated) DEVICE — MINI ENDOCUT SCISSOR TIP, DISPOSABLE: Brand: RENEW

## (undated) DEVICE — SUTURE VCRL SZ 3-0 L36IN ABSRB UD L36MM CT-1 1/2 CIR J944H

## (undated) DEVICE — ENDO KIT,LOURDES HOSPITAL: Brand: MEDLINE INDUSTRIES, INC.

## (undated) DEVICE — SOLUTION IV IRRIG POUR BRL 0.9% SODIUM CHL 2F7124

## (undated) DEVICE — ADHESIVE SKIN CLSR 0.7ML TOP DERMBND ADV

## (undated) DEVICE — KIT CHOLGM POLYUR W/ KARLAN BLLN CATH 4FR L60CM 5MM INTRO

## (undated) DEVICE — SNARE POLYP SM W13MMXL240CM SHTH DIA2.4MM OVL FLX DISP

## (undated) DEVICE — C-ARM: Brand: UNBRANDED